# Patient Record
Sex: FEMALE | Race: BLACK OR AFRICAN AMERICAN | NOT HISPANIC OR LATINO | URBAN - METROPOLITAN AREA
[De-identification: names, ages, dates, MRNs, and addresses within clinical notes are randomized per-mention and may not be internally consistent; named-entity substitution may affect disease eponyms.]

---

## 2017-12-14 ENCOUNTER — AMBULATORY SURGICAL CENTER (AMBULATORY)
Dept: URBAN - METROPOLITAN AREA SURGERY 17 | Facility: SURGERY | Age: 67
End: 2017-12-14
Payer: MEDICARE

## 2017-12-14 ENCOUNTER — OFFICE (AMBULATORY)
Dept: URBAN - METROPOLITAN AREA CLINIC 64 | Facility: CLINIC | Age: 67
End: 2017-12-14
Payer: COMMERCIAL

## 2017-12-14 VITALS
HEART RATE: 87 BPM | HEART RATE: 70 BPM | DIASTOLIC BLOOD PRESSURE: 75 MMHG | SYSTOLIC BLOOD PRESSURE: 130 MMHG | OXYGEN SATURATION: 99 % | TEMPERATURE: 96.6 F | SYSTOLIC BLOOD PRESSURE: 142 MMHG | RESPIRATION RATE: 13 BRPM | RESPIRATION RATE: 20 BRPM | DIASTOLIC BLOOD PRESSURE: 106 MMHG | SYSTOLIC BLOOD PRESSURE: 114 MMHG | SYSTOLIC BLOOD PRESSURE: 117 MMHG | SYSTOLIC BLOOD PRESSURE: 128 MMHG | SYSTOLIC BLOOD PRESSURE: 135 MMHG | HEART RATE: 82 BPM | DIASTOLIC BLOOD PRESSURE: 70 MMHG | HEART RATE: 89 BPM | OXYGEN SATURATION: 98 % | OXYGEN SATURATION: 96 % | RESPIRATION RATE: 16 BRPM | DIASTOLIC BLOOD PRESSURE: 84 MMHG | SYSTOLIC BLOOD PRESSURE: 113 MMHG | DIASTOLIC BLOOD PRESSURE: 71 MMHG | HEART RATE: 62 BPM | RESPIRATION RATE: 12 BRPM | OXYGEN SATURATION: 100 % | DIASTOLIC BLOOD PRESSURE: 73 MMHG | SYSTOLIC BLOOD PRESSURE: 107 MMHG | RESPIRATION RATE: 17 BRPM | HEIGHT: 66 IN | WEIGHT: 221 LBS | HEART RATE: 86 BPM | RESPIRATION RATE: 14 BRPM | DIASTOLIC BLOOD PRESSURE: 76 MMHG | TEMPERATURE: 96.2 F | HEART RATE: 60 BPM | HEART RATE: 79 BPM | DIASTOLIC BLOOD PRESSURE: 53 MMHG | SYSTOLIC BLOOD PRESSURE: 102 MMHG | HEART RATE: 80 BPM | HEART RATE: 84 BPM | SYSTOLIC BLOOD PRESSURE: 111 MMHG | RESPIRATION RATE: 18 BRPM | OXYGEN SATURATION: 97 % | HEART RATE: 50 BPM | DIASTOLIC BLOOD PRESSURE: 67 MMHG

## 2017-12-14 DIAGNOSIS — D12.2 BENIGN NEOPLASM OF ASCENDING COLON: ICD-10-CM

## 2017-12-14 DIAGNOSIS — D12.4 BENIGN NEOPLASM OF DESCENDING COLON: ICD-10-CM

## 2017-12-14 DIAGNOSIS — D12.0 BENIGN NEOPLASM OF CECUM: ICD-10-CM

## 2017-12-14 DIAGNOSIS — Z86.010 PERSONAL HISTORY OF COLONIC POLYPS: ICD-10-CM

## 2017-12-14 DIAGNOSIS — D12.5 BENIGN NEOPLASM OF SIGMOID COLON: ICD-10-CM

## 2017-12-14 DIAGNOSIS — K57.30 DIVERTICULOSIS OF LARGE INTESTINE WITHOUT PERFORATION OR ABS: ICD-10-CM

## 2017-12-14 DIAGNOSIS — K64.9 UNSPECIFIED HEMORRHOIDS: ICD-10-CM

## 2017-12-14 PROBLEM — Z12.11 SCREENING FOR COLONIC NEOPLASIA- AVERAGE RISK: Status: ACTIVE | Noted: 2017-12-14

## 2017-12-14 PROBLEM — Z12.11 SURVEILLANCE DUE TO PRIOR COLONIC NEOPLASIA: Status: ACTIVE | Noted: 2017-12-14

## 2017-12-14 LAB
GI HISTOLOGY: A. UNSPECIFIED: (no result)
GI HISTOLOGY: B. UNSPECIFIED: (no result)
GI HISTOLOGY: C. UNSPECIFIED: (no result)
GI HISTOLOGY: D. UNSPECIFIED: (no result)
GI HISTOLOGY: PDF REPORT: (no result)

## 2017-12-14 PROCEDURE — 88305 TISSUE EXAM BY PATHOLOGIST: CPT | Performed by: INTERNAL MEDICINE

## 2017-12-14 PROCEDURE — 45380 COLONOSCOPY AND BIOPSY: CPT | Mod: 33 | Performed by: INTERNAL MEDICINE

## 2017-12-14 RX ADMIN — PROPOFOL 25 MG: 10 INJECTION, EMULSION INTRAVENOUS at 09:30

## 2017-12-14 RX ADMIN — PROPOFOL 50 MG: 10 INJECTION, EMULSION INTRAVENOUS at 09:17

## 2017-12-14 RX ADMIN — PROPOFOL 50 MG: 10 INJECTION, EMULSION INTRAVENOUS at 09:21

## 2017-12-14 RX ADMIN — LIDOCAINE HYDROCHLORIDE 50 MG: 10 INJECTION, SOLUTION EPIDURAL; INFILTRATION; INTRACAUDAL; PERINEURAL at 09:15

## 2017-12-14 RX ADMIN — PROPOFOL 50 MG: 10 INJECTION, EMULSION INTRAVENOUS at 09:27

## 2017-12-14 RX ADMIN — PROPOFOL 25 MG: 10 INJECTION, EMULSION INTRAVENOUS at 09:24

## 2017-12-14 RX ADMIN — PROPOFOL 25 MG: 10 INJECTION, EMULSION INTRAVENOUS at 09:35

## 2017-12-14 RX ADMIN — PROPOFOL 50 MG: 10 INJECTION, EMULSION INTRAVENOUS at 09:19

## 2017-12-14 RX ADMIN — PROPOFOL 100 MG: 10 INJECTION, EMULSION INTRAVENOUS at 09:15

## 2017-12-14 NOTE — SERVICEHPINOTES
Patient is a 68 yo F with h/o HTN, arthritis here for screening.  Her last CN was negative, but per note has had polyps prior.  She denies fam hx colon ca and polyps.  She is asymptomatic.

## 2018-04-10 ENCOUNTER — OFFICE (AMBULATORY)
Dept: URBAN - METROPOLITAN AREA CLINIC 75 | Facility: CLINIC | Age: 68
End: 2018-04-10
Payer: COMMERCIAL

## 2018-04-10 VITALS
HEIGHT: 66 IN | SYSTOLIC BLOOD PRESSURE: 136 MMHG | HEART RATE: 70 BPM | WEIGHT: 215 LBS | DIASTOLIC BLOOD PRESSURE: 84 MMHG

## 2018-04-10 DIAGNOSIS — R19.7 DIARRHEA, UNSPECIFIED: ICD-10-CM

## 2018-04-10 DIAGNOSIS — R10.30 LOWER ABDOMINAL PAIN, UNSPECIFIED: ICD-10-CM

## 2018-04-10 PROCEDURE — 99214 OFFICE O/P EST MOD 30 MIN: CPT

## 2018-05-08 ENCOUNTER — OFFICE (AMBULATORY)
Dept: URBAN - METROPOLITAN AREA CLINIC 75 | Facility: CLINIC | Age: 68
End: 2018-05-08
Payer: COMMERCIAL

## 2018-05-08 VITALS
DIASTOLIC BLOOD PRESSURE: 74 MMHG | SYSTOLIC BLOOD PRESSURE: 126 MMHG | HEIGHT: 66 IN | WEIGHT: 213 LBS | HEART RATE: 68 BPM

## 2018-05-08 DIAGNOSIS — R19.7 DIARRHEA, UNSPECIFIED: ICD-10-CM

## 2018-05-08 DIAGNOSIS — A04.72 ENTEROCOLITIS DUE TO CLOSTRIDIUM DIFFICILE, NOT SPECIFIED AS: ICD-10-CM

## 2018-05-08 PROCEDURE — 99214 OFFICE O/P EST MOD 30 MIN: CPT

## 2019-01-08 ENCOUNTER — APPOINTMENT (OUTPATIENT)
Dept: PREADMISSION TESTING | Facility: HOSPITAL | Age: 69
End: 2019-01-08

## 2019-01-08 ENCOUNTER — HOSPITAL ENCOUNTER (OUTPATIENT)
Dept: GENERAL RADIOLOGY | Facility: HOSPITAL | Age: 69
Discharge: HOME | End: 2019-01-08
Admitting: ORTHOPAEDIC SURGERY

## 2019-01-08 VITALS
OXYGEN SATURATION: 99 % | BODY MASS INDEX: 34.28 KG/M2 | RESPIRATION RATE: 16 BRPM | TEMPERATURE: 97.8 F | HEIGHT: 66 IN | SYSTOLIC BLOOD PRESSURE: 121 MMHG | WEIGHT: 213.31 LBS | DIASTOLIC BLOOD PRESSURE: 80 MMHG | HEART RATE: 61 BPM

## 2019-01-08 DIAGNOSIS — Z96.642 H/O TOTAL HIP ARTHROPLASTY, LEFT: ICD-10-CM

## 2019-01-08 LAB
ABO GROUP BLD: NORMAL
ALBUMIN SERPL-MCNC: 3.7 G/DL (ref 3.5–5.2)
ALBUMIN/GLOB SERPL: 0.9 G/DL
ALP SERPL-CCNC: 68 U/L (ref 39–117)
ALT SERPL W P-5'-P-CCNC: 15 U/L (ref 1–33)
ANION GAP SERPL CALCULATED.3IONS-SCNC: 10.9 MMOL/L
AST SERPL-CCNC: 17 U/L (ref 1–32)
BILIRUB SERPL-MCNC: 0.6 MG/DL (ref 0.1–1.2)
BILIRUB UR QL STRIP: NEGATIVE
BLD GP AB SCN SERPL QL: NEGATIVE
BUN BLD-MCNC: 16 MG/DL (ref 8–23)
BUN/CREAT SERPL: 18.4 (ref 7–25)
CALCIUM SPEC-SCNC: 9.3 MG/DL (ref 8.6–10.5)
CHLORIDE SERPL-SCNC: 100 MMOL/L (ref 98–107)
CLARITY UR: CLEAR
CO2 SERPL-SCNC: 28.1 MMOL/L (ref 22–29)
COLOR UR: YELLOW
CREAT BLD-MCNC: 0.87 MG/DL (ref 0.57–1)
DEPRECATED RDW RBC AUTO: 46 FL (ref 37–54)
ERYTHROCYTE [DISTWIDTH] IN BLOOD BY AUTOMATED COUNT: 14.3 % (ref 11.7–13)
GFR SERPL CREATININE-BSD FRML MDRD: 79 ML/MIN/1.73
GLOBULIN UR ELPH-MCNC: 3.9 GM/DL
GLUCOSE BLD-MCNC: 107 MG/DL (ref 65–99)
GLUCOSE UR STRIP-MCNC: NEGATIVE MG/DL
HCT VFR BLD AUTO: 38.6 % (ref 35.6–45.5)
HGB BLD-MCNC: 12.6 G/DL (ref 11.9–15.5)
HGB UR QL STRIP.AUTO: NEGATIVE
INR PPP: 0.99 (ref 0.9–1.1)
KETONES UR QL STRIP: NEGATIVE
LEUKOCYTE ESTERASE UR QL STRIP.AUTO: NEGATIVE
MCH RBC QN AUTO: 28.4 PG (ref 26.9–32)
MCHC RBC AUTO-ENTMCNC: 32.6 G/DL (ref 32.4–36.3)
MCV RBC AUTO: 87.1 FL (ref 80.5–98.2)
NITRITE UR QL STRIP: NEGATIVE
PH UR STRIP.AUTO: 5.5 [PH] (ref 5–8)
PLATELET # BLD AUTO: 276 10*3/MM3 (ref 140–500)
PMV BLD AUTO: 9.6 FL (ref 6–12)
POTASSIUM BLD-SCNC: 4.2 MMOL/L (ref 3.5–5.2)
PROT SERPL-MCNC: 7.6 G/DL (ref 6–8.5)
PROT UR QL STRIP: NEGATIVE
PROTHROMBIN TIME: 12.9 SECONDS (ref 11.7–14.2)
RBC # BLD AUTO: 4.43 10*6/MM3 (ref 3.9–5.2)
RH BLD: POSITIVE
SODIUM BLD-SCNC: 139 MMOL/L (ref 136–145)
SP GR UR STRIP: 1.02 (ref 1–1.03)
T&S EXPIRATION DATE: NORMAL
UROBILINOGEN UR QL STRIP: NORMAL
WBC NRBC COR # BLD: 2.91 10*3/MM3 (ref 4.5–10.7)

## 2019-01-08 PROCEDURE — 93010 ELECTROCARDIOGRAM REPORT: CPT | Performed by: INTERNAL MEDICINE

## 2019-01-08 PROCEDURE — 80053 COMPREHEN METABOLIC PANEL: CPT | Performed by: ORTHOPAEDIC SURGERY

## 2019-01-08 PROCEDURE — 71046 X-RAY EXAM CHEST 2 VIEWS: CPT

## 2019-01-08 PROCEDURE — 85610 PROTHROMBIN TIME: CPT | Performed by: ORTHOPAEDIC SURGERY

## 2019-01-08 PROCEDURE — 36415 COLL VENOUS BLD VENIPUNCTURE: CPT

## 2019-01-08 PROCEDURE — 86850 RBC ANTIBODY SCREEN: CPT | Performed by: ORTHOPAEDIC SURGERY

## 2019-01-08 PROCEDURE — 86901 BLOOD TYPING SEROLOGIC RH(D): CPT | Performed by: ORTHOPAEDIC SURGERY

## 2019-01-08 PROCEDURE — 81003 URINALYSIS AUTO W/O SCOPE: CPT | Performed by: ORTHOPAEDIC SURGERY

## 2019-01-08 PROCEDURE — 93005 ELECTROCARDIOGRAM TRACING: CPT

## 2019-01-08 PROCEDURE — 86900 BLOOD TYPING SEROLOGIC ABO: CPT | Performed by: ORTHOPAEDIC SURGERY

## 2019-01-08 PROCEDURE — 85027 COMPLETE CBC AUTOMATED: CPT | Performed by: ORTHOPAEDIC SURGERY

## 2019-01-08 RX ORDER — AZITHROMYCIN 250 MG/1
250 TABLET, FILM COATED ORAL DAILY
COMMUNITY
Start: 2019-01-04 | End: 2019-01-08

## 2019-01-08 ASSESSMENT — HOOS JR
HOOS JR SCORE: 14
HOOS JR SCORE: 46.652

## 2019-01-08 NOTE — DISCHARGE INSTRUCTIONS
Take the following medications the morning of surgery with a small sip of water:    CARVEDILOL  BENICAR    General Instructions:  • Do not eat solid food after midnight the night before surgery.  • You may drink clear liquids day of surgery but must stop at least one hour before your hospital arrival time. @ 0430 AM STOP DRINKING!!  • It is beneficial for you to have a clear drink that contains carbohydrates the day of surgery.  We suggest a 12 to 20 ounce bottle of Gatorade or Powerade for non-diabetic patients or a 12 to 20 ounce bottle of G2 or Powerade Zero for diabetic patients.     Clear liquids are liquids you can see through.  Nothing red in color.     Plain water                               Sports drinks  Sodas                                   Gelatin (Jell-O)  Fruit juices without pulp such as white grape juice and apple juice  Popsicles that contain no fruit or yogurt  Tea or coffee (no cream or milk added)  Gatorade / Powerade  G2 / Powerade Zero    • Patients who avoid smoking, chewing tobacco and alcohol for 4 weeks prior to surgery have a reduced risk of post-operative complications.  Quit smoking as many days before surgery as you can.  • Do not smoke, use chewing tobacco or drink alcohol the day of surgery.   • If applicable bring your C-PAP/ BI-PAP machine.  • Bring any papers given to you in the doctor’s office.  • Wear clean comfortable clothes and socks.  • Do not wear contact lenses or make-up.  Bring a case for your glasses.   • Bring crutches or walker if applicable.  • Remove all piercings.  Leave jewelry and any other valuables at home.  • Hair extensions with metal clips must be removed prior to surgery.  • The Pre-Admission Testing nurse will instruct you to bring medications if unable to obtain an accurate list in Pre-Admission Testing.        You were given a blood bank ID arm band remember to bring it with you the day of surgery.     Preventing a Surgical Site Infection:  • For 2 to  3 days before surgery, avoid shaving with a razor because the razor can irritate skin and make it easier to develop an infection.    • Any areas of open skin can increase the risk of a post-operative wound infection by allowing bacteria to enter and travel throughout the body.  Notify your surgeon if you have any skin wounds / rashes even if it is not near the expected surgical site.  The area will need assessed to determine if surgery should be delayed until it is healed.  • The night prior to surgery sleep in a clean bed with clean clothing.  Do not allow pets to sleep with you.  • Shower on the morning of surgery using a fresh bar of anti-bacterial soap (such as Dial) and clean washcloth.  Dry with a clean towel and dress in clean clothing.  • Ask your surgeon if you will be receiving antibiotics prior to surgery.  • Make sure you, your family, and all healthcare providers clean their hands with soap and water or an alcohol based hand  before caring for you or your wound.    Day of surgery:01- ARRIVE @ 0530 AM REPORT TO THE MAIN OR   Upon arrival, a Pre-op nurse and Anesthesiologist will review your health history, obtain vital signs, and answer questions you may have.  The only belongings needed at this time will be your home medications and if applicable your C-PAP/BI-PAP machine.  If you are staying overnight your family can leave the rest of your belongings in the car and bring them to your room later.  A Pre-op nurse will start an IV and you may receive medication in preparation for surgery, including something to help you relax.  Your family will be able to see you in the Pre-op area.  While you are in surgery your family should notify the waiting room  if they leave the waiting room area and provide a contact phone number.    Please be aware that surgery does come with discomfort.  We want to make every effort to control your discomfort so please discuss any uncontrolled  symptoms with your nurse.   Your doctor will most likely have prescribed pain medications.      If you are going home after surgery you will receive individualized written care instructions before being discharged.  A responsible adult must drive you to and from the hospital on the day of your surgery and stay with you for 24 hours.    If you are staying overnight following surgery, you will be transported to your hospital room following the recovery period.  [facility] has all private rooms.    You have received a list of surgical assistants for your reference.  If you have any questions please call Pre-Admission Testing at [phone].  Deductibles and co-payments are collected on the day of service. Please be prepared to pay the required co-pay, deductible or deposit on the day of service as defined by your plan.      2% CHLORAHEXIDINE GLUCONATE* CLOTH  Preparing or “prepping” skin before surgery can reduce the risk of infection at the surgical site. To make the process easier, [facility] has chosen disposable cloths moistened with a rinse-free, 2% Chlorhexidine Gluconate (CHG) antiseptic solution. The steps below outline the prepping process and should be carefully followed.        Use the prep cloth on the area that is circled in the diagram             Directions Night before Surgery 01- PM PRIOR TO SURGERY (LEFT HIP AREA)  1) Shower using a fresh bar of anti-bacterial soap (such as Dial) and clean washcloth.  Use a clean towel to completely dry your skin.  2) Do not use any lotions, oils or creams on your skin.  3) Open the package and remove 1 cloth, wipe your skin for 30 seconds in a circular motion.  Allow to dry for 3 minutes.  4) Repeat #3 with second cloth.  5) Do not touch your eyes, ears, or mouth with the prep cloth.  6) Allow the wet prep solution to air dry.  7) Discard the prep cloth and wash your hands with soap and water.   8) Dress in clean bed clothes and  sleep on fresh clean bed sheets.   9) You may experience some temporary itching after the prep.    Directions Day of Surgery 01- AM PRIOR TO SURGERY (LEFT HIP AREA)  1) Repeat steps 1,2,3,4,5,6,7, and 9.   2) Dress in clean clothes before coming to the hospital.    BACTROBAN NASAL OINTMENT  There are many germs normally in your nose. Bactroban is an ointment that will help reduce these germs. Please follow these instructions for Bactroban use:      ____The day before surgery in the morning  [id number]_______    ____The day before surgery in the evening              [id number]_______    ____The day of surgery in the morning    [id number]_______    **Squirt ½ package of Bactroban Ointment onto a cotton applicator and apply to inside of 1st nostril.  Squirt the remaining Bactroban and apply to the inside of the other nostril.

## 2019-01-15 ENCOUNTER — ANESTHESIA (OUTPATIENT)
Dept: PERIOP | Facility: HOSPITAL | Age: 69
End: 2019-01-15

## 2019-01-15 ENCOUNTER — HOSPITAL ENCOUNTER (INPATIENT)
Facility: HOSPITAL | Age: 69
LOS: 1 days | Discharge: HOME HEALTH | End: 2019-01-16
Attending: ORTHOPAEDIC SURGERY | Admitting: ORTHOPAEDIC SURGERY

## 2019-01-15 ENCOUNTER — ANESTHESIA EVENT (OUTPATIENT)
Dept: PERIOP | Facility: HOSPITAL | Age: 69
End: 2019-01-15

## 2019-01-15 ENCOUNTER — APPOINTMENT (OUTPATIENT)
Dept: GENERAL RADIOLOGY | Facility: HOSPITAL | Age: 69
End: 2019-01-15

## 2019-01-15 PROBLEM — Z96.642 H/O TOTAL HIP ARTHROPLASTY, LEFT: Status: ACTIVE | Noted: 2019-01-15

## 2019-01-15 LAB
HCT VFR BLD AUTO: 37.8 % (ref 35.6–45.5)
HGB BLD-MCNC: 11.9 G/DL (ref 11.9–15.5)

## 2019-01-15 PROCEDURE — 25010000002 ONDANSETRON PER 1 MG: Performed by: ORTHOPAEDIC SURGERY

## 2019-01-15 PROCEDURE — 25010000002 CLONIDINE PER 1 MG: Performed by: ORTHOPAEDIC SURGERY

## 2019-01-15 PROCEDURE — 25010000002 ONDANSETRON PER 1 MG: Performed by: NURSE ANESTHETIST, CERTIFIED REGISTERED

## 2019-01-15 PROCEDURE — 25010000002 FENTANYL CITRATE (PF) 100 MCG/2ML SOLUTION: Performed by: NURSE ANESTHETIST, CERTIFIED REGISTERED

## 2019-01-15 PROCEDURE — 36415 COLL VENOUS BLD VENIPUNCTURE: CPT | Performed by: ORTHOPAEDIC SURGERY

## 2019-01-15 PROCEDURE — 85018 HEMOGLOBIN: CPT | Performed by: ORTHOPAEDIC SURGERY

## 2019-01-15 PROCEDURE — 72170 X-RAY EXAM OF PELVIS: CPT

## 2019-01-15 PROCEDURE — 25010000002 HYDROMORPHONE PER 4 MG: Performed by: NURSE ANESTHETIST, CERTIFIED REGISTERED

## 2019-01-15 PROCEDURE — 25010000002 ROPIVACAINE PER 1 MG: Performed by: ORTHOPAEDIC SURGERY

## 2019-01-15 PROCEDURE — 25010000002 PROPOFOL 10 MG/ML EMULSION: Performed by: NURSE ANESTHETIST, CERTIFIED REGISTERED

## 2019-01-15 PROCEDURE — 25010000002 PHENYLEPHRINE PER 1 ML: Performed by: NURSE ANESTHETIST, CERTIFIED REGISTERED

## 2019-01-15 PROCEDURE — 85014 HEMATOCRIT: CPT | Performed by: ORTHOPAEDIC SURGERY

## 2019-01-15 PROCEDURE — 97110 THERAPEUTIC EXERCISES: CPT

## 2019-01-15 PROCEDURE — C1776 JOINT DEVICE (IMPLANTABLE): HCPCS | Performed by: ORTHOPAEDIC SURGERY

## 2019-01-15 PROCEDURE — 0SRB0JA REPLACEMENT OF LEFT HIP JOINT WITH SYNTHETIC SUBSTITUTE, UNCEMENTED, OPEN APPROACH: ICD-10-PCS | Performed by: ORTHOPAEDIC SURGERY

## 2019-01-15 PROCEDURE — 97161 PT EVAL LOW COMPLEX 20 MIN: CPT

## 2019-01-15 PROCEDURE — 25010000002 KETOROLAC TROMETHAMINE PER 15 MG: Performed by: ORTHOPAEDIC SURGERY

## 2019-01-15 PROCEDURE — 25010000002 DEXAMETHASONE PER 1 MG: Performed by: NURSE ANESTHETIST, CERTIFIED REGISTERED

## 2019-01-15 PROCEDURE — 25010000002 VANCOMYCIN 10 G RECONSTITUTED SOLUTION: Performed by: ORTHOPAEDIC SURGERY

## 2019-01-15 DEVICE — TOTAL HIP PRIMARY: Type: IMPLANTABLE DEVICE | Site: HIP | Status: FUNCTIONAL

## 2019-01-15 DEVICE — IMPLANTABLE DEVICE: Type: IMPLANTABLE DEVICE | Site: HIP | Status: FUNCTIONAL

## 2019-01-15 DEVICE — CAP HIP TM UPCHRG: Type: IMPLANTABLE DEVICE | Site: HIP | Status: FUNCTIONAL

## 2019-01-15 DEVICE — SCRW ACET CORT TRILOGY S/TAP 6.5X25: Type: IMPLANTABLE DEVICE | Site: HIP | Status: FUNCTIONAL

## 2019-01-15 DEVICE — SHLL ACET OSSEOTI G7 3H SZD 50MM: Type: IMPLANTABLE DEVICE | Site: HIP | Status: FUNCTIONAL

## 2019-01-15 DEVICE — HD FEM/HIP BIOLOX/DELTA CERAM NK 36MM STD: Type: IMPLANTABLE DEVICE | Site: HIP | Status: FUNCTIONAL

## 2019-01-15 DEVICE — CAP CERAM HD HIP UPCHRG: Type: IMPLANTABLE DEVICE | Site: HIP | Status: FUNCTIONAL

## 2019-01-15 DEVICE — CAP HIP VE UPCHRG: Type: IMPLANTABLE DEVICE | Site: HIP | Status: FUNCTIONAL

## 2019-01-15 RX ORDER — SODIUM CHLORIDE, SODIUM LACTATE, POTASSIUM CHLORIDE, CALCIUM CHLORIDE 600; 310; 30; 20 MG/100ML; MG/100ML; MG/100ML; MG/100ML
125 INJECTION, SOLUTION INTRAVENOUS CONTINUOUS
Status: DISCONTINUED | OUTPATIENT
Start: 2019-01-15 | End: 2019-01-16 | Stop reason: HOSPADM

## 2019-01-15 RX ORDER — LABETALOL HYDROCHLORIDE 5 MG/ML
5 INJECTION, SOLUTION INTRAVENOUS
Status: DISCONTINUED | OUTPATIENT
Start: 2019-01-15 | End: 2019-01-15 | Stop reason: HOSPADM

## 2019-01-15 RX ORDER — CARVEDILOL 3.12 MG/1
3.12 TABLET ORAL 2 TIMES DAILY WITH MEALS
Status: DISCONTINUED | OUTPATIENT
Start: 2019-01-15 | End: 2019-01-16 | Stop reason: HOSPADM

## 2019-01-15 RX ORDER — LIDOCAINE HYDROCHLORIDE 10 MG/ML
0.5 INJECTION, SOLUTION EPIDURAL; INFILTRATION; INTRACAUDAL; PERINEURAL ONCE AS NEEDED
Status: DISCONTINUED | OUTPATIENT
Start: 2019-01-15 | End: 2019-01-15 | Stop reason: HOSPADM

## 2019-01-15 RX ORDER — DOCUSATE SODIUM 100 MG/1
100 CAPSULE, LIQUID FILLED ORAL 2 TIMES DAILY PRN
Status: DISCONTINUED | OUTPATIENT
Start: 2019-01-15 | End: 2019-01-16 | Stop reason: HOSPADM

## 2019-01-15 RX ORDER — ACETAMINOPHEN 325 MG/1
650 TABLET ORAL ONCE AS NEEDED
Status: DISCONTINUED | OUTPATIENT
Start: 2019-01-15 | End: 2019-01-15 | Stop reason: HOSPADM

## 2019-01-15 RX ORDER — TRAMADOL HYDROCHLORIDE 50 MG/1
50 TABLET ORAL EVERY 8 HOURS
Qty: 45 TABLET | Refills: 0 | Status: SHIPPED | OUTPATIENT
Start: 2019-01-15 | End: 2019-01-25

## 2019-01-15 RX ORDER — GLYCOPYRROLATE 0.2 MG/ML
INJECTION INTRAMUSCULAR; INTRAVENOUS AS NEEDED
Status: DISCONTINUED | OUTPATIENT
Start: 2019-01-15 | End: 2019-01-15 | Stop reason: SURG

## 2019-01-15 RX ORDER — MIDAZOLAM HYDROCHLORIDE 1 MG/ML
1 INJECTION INTRAMUSCULAR; INTRAVENOUS
Status: DISCONTINUED | OUTPATIENT
Start: 2019-01-15 | End: 2019-01-15 | Stop reason: HOSPADM

## 2019-01-15 RX ORDER — SENNA AND DOCUSATE SODIUM 50; 8.6 MG/1; MG/1
2 TABLET, FILM COATED ORAL 2 TIMES DAILY
Status: DISCONTINUED | OUTPATIENT
Start: 2019-01-15 | End: 2019-01-16 | Stop reason: HOSPADM

## 2019-01-15 RX ORDER — ONDANSETRON 4 MG/1
4 TABLET, ORALLY DISINTEGRATING ORAL EVERY 6 HOURS PRN
Status: DISCONTINUED | OUTPATIENT
Start: 2019-01-15 | End: 2019-01-16 | Stop reason: HOSPADM

## 2019-01-15 RX ORDER — ACETAMINOPHEN 325 MG/1
325 TABLET ORAL EVERY 4 HOURS PRN
Status: DISCONTINUED | OUTPATIENT
Start: 2019-01-15 | End: 2019-01-16 | Stop reason: HOSPADM

## 2019-01-15 RX ORDER — ONDANSETRON 4 MG/1
4 TABLET, FILM COATED ORAL EVERY 6 HOURS PRN
Status: DISCONTINUED | OUTPATIENT
Start: 2019-01-15 | End: 2019-01-16 | Stop reason: HOSPADM

## 2019-01-15 RX ORDER — PROMETHAZINE HYDROCHLORIDE 25 MG/ML
12.5 INJECTION, SOLUTION INTRAMUSCULAR; INTRAVENOUS ONCE AS NEEDED
Status: DISCONTINUED | OUTPATIENT
Start: 2019-01-15 | End: 2019-01-15 | Stop reason: HOSPADM

## 2019-01-15 RX ORDER — PROMETHAZINE HYDROCHLORIDE 12.5 MG/1
12.5 TABLET ORAL EVERY 6 HOURS PRN
Status: DISCONTINUED | OUTPATIENT
Start: 2019-01-15 | End: 2019-01-16 | Stop reason: HOSPADM

## 2019-01-15 RX ORDER — KETOROLAC TROMETHAMINE 30 MG/ML
15 INJECTION, SOLUTION INTRAMUSCULAR; INTRAVENOUS EVERY 6 HOURS PRN
Status: DISCONTINUED | OUTPATIENT
Start: 2019-01-15 | End: 2019-01-16 | Stop reason: HOSPADM

## 2019-01-15 RX ORDER — POTASSIUM CHLORIDE 1.5 G/1.77G
20 POWDER, FOR SOLUTION ORAL DAILY
Status: DISCONTINUED | OUTPATIENT
Start: 2019-01-16 | End: 2019-01-16 | Stop reason: HOSPADM

## 2019-01-15 RX ORDER — MAGNESIUM HYDROXIDE 1200 MG/15ML
LIQUID ORAL AS NEEDED
Status: DISCONTINUED | OUTPATIENT
Start: 2019-01-15 | End: 2019-01-15 | Stop reason: HOSPADM

## 2019-01-15 RX ORDER — PROMETHAZINE HYDROCHLORIDE 25 MG/1
25 SUPPOSITORY RECTAL ONCE AS NEEDED
Status: DISCONTINUED | OUTPATIENT
Start: 2019-01-15 | End: 2019-01-15 | Stop reason: HOSPADM

## 2019-01-15 RX ORDER — HYDROMORPHONE HYDROCHLORIDE 1 MG/ML
0.5 INJECTION, SOLUTION INTRAMUSCULAR; INTRAVENOUS; SUBCUTANEOUS
Status: DISCONTINUED | OUTPATIENT
Start: 2019-01-15 | End: 2019-01-16 | Stop reason: HOSPADM

## 2019-01-15 RX ORDER — HYDRALAZINE HYDROCHLORIDE 20 MG/ML
5 INJECTION INTRAMUSCULAR; INTRAVENOUS
Status: DISCONTINUED | OUTPATIENT
Start: 2019-01-15 | End: 2019-01-15 | Stop reason: HOSPADM

## 2019-01-15 RX ORDER — FLUMAZENIL 0.1 MG/ML
0.2 INJECTION INTRAVENOUS AS NEEDED
Status: DISCONTINUED | OUTPATIENT
Start: 2019-01-15 | End: 2019-01-15 | Stop reason: HOSPADM

## 2019-01-15 RX ORDER — HYDROCODONE BITARTRATE AND ACETAMINOPHEN 7.5; 325 MG/1; MG/1
2 TABLET ORAL EVERY 6 HOURS PRN
Qty: 56 TABLET | Refills: 0 | Status: SHIPPED | OUTPATIENT
Start: 2019-01-15 | End: 2019-01-25

## 2019-01-15 RX ORDER — DEXAMETHASONE SODIUM PHOSPHATE 10 MG/ML
INJECTION INTRAMUSCULAR; INTRAVENOUS AS NEEDED
Status: DISCONTINUED | OUTPATIENT
Start: 2019-01-15 | End: 2019-01-15 | Stop reason: SURG

## 2019-01-15 RX ORDER — LIDOCAINE HYDROCHLORIDE 20 MG/ML
INJECTION, SOLUTION INFILTRATION; PERINEURAL AS NEEDED
Status: DISCONTINUED | OUTPATIENT
Start: 2019-01-15 | End: 2019-01-15 | Stop reason: SURG

## 2019-01-15 RX ORDER — EPHEDRINE SULFATE 50 MG/ML
5 INJECTION, SOLUTION INTRAVENOUS ONCE AS NEEDED
Status: DISCONTINUED | OUTPATIENT
Start: 2019-01-15 | End: 2019-01-15 | Stop reason: HOSPADM

## 2019-01-15 RX ORDER — DEXAMETHASONE SODIUM PHOSPHATE 4 MG/ML
4 INJECTION, SOLUTION INTRA-ARTICULAR; INTRALESIONAL; INTRAMUSCULAR; INTRAVENOUS; SOFT TISSUE ONCE
Status: COMPLETED | OUTPATIENT
Start: 2019-01-16 | End: 2019-01-16

## 2019-01-15 RX ORDER — TRAMADOL HYDROCHLORIDE 50 MG/1
50 TABLET ORAL EVERY 8 HOURS
Status: DISCONTINUED | OUTPATIENT
Start: 2019-01-15 | End: 2019-01-16 | Stop reason: HOSPADM

## 2019-01-15 RX ORDER — DIPHENHYDRAMINE HCL 25 MG
25 CAPSULE ORAL
Status: DISCONTINUED | OUTPATIENT
Start: 2019-01-15 | End: 2019-01-15 | Stop reason: HOSPADM

## 2019-01-15 RX ORDER — DIPHENHYDRAMINE HYDROCHLORIDE 50 MG/ML
12.5 INJECTION INTRAMUSCULAR; INTRAVENOUS
Status: DISCONTINUED | OUTPATIENT
Start: 2019-01-15 | End: 2019-01-15 | Stop reason: HOSPADM

## 2019-01-15 RX ORDER — FAMOTIDINE 10 MG/ML
20 INJECTION, SOLUTION INTRAVENOUS ONCE
Status: COMPLETED | OUTPATIENT
Start: 2019-01-15 | End: 2019-01-15

## 2019-01-15 RX ORDER — ONDANSETRON 2 MG/ML
INJECTION INTRAMUSCULAR; INTRAVENOUS AS NEEDED
Status: DISCONTINUED | OUTPATIENT
Start: 2019-01-15 | End: 2019-01-15 | Stop reason: SURG

## 2019-01-15 RX ORDER — ONDANSETRON 2 MG/ML
4 INJECTION INTRAMUSCULAR; INTRAVENOUS ONCE AS NEEDED
Status: DISCONTINUED | OUTPATIENT
Start: 2019-01-15 | End: 2019-01-15 | Stop reason: HOSPADM

## 2019-01-15 RX ORDER — LIDOCAINE HYDROCHLORIDE 40 MG/ML
SOLUTION TOPICAL AS NEEDED
Status: DISCONTINUED | OUTPATIENT
Start: 2019-01-15 | End: 2019-01-15 | Stop reason: SURG

## 2019-01-15 RX ORDER — PSEUDOEPHEDRINE HCL 30 MG
100 TABLET ORAL 2 TIMES DAILY PRN
Qty: 60 EACH | Refills: 3 | Status: SHIPPED | OUTPATIENT
Start: 2019-01-15 | End: 2020-03-04

## 2019-01-15 RX ORDER — SODIUM CHLORIDE 0.9 % (FLUSH) 0.9 %
1-10 SYRINGE (ML) INJECTION AS NEEDED
Status: DISCONTINUED | OUTPATIENT
Start: 2019-01-15 | End: 2019-01-15 | Stop reason: HOSPADM

## 2019-01-15 RX ORDER — NALOXONE HCL 0.4 MG/ML
0.2 VIAL (ML) INJECTION AS NEEDED
Status: DISCONTINUED | OUTPATIENT
Start: 2019-01-15 | End: 2019-01-15 | Stop reason: HOSPADM

## 2019-01-15 RX ORDER — EPHEDRINE SULFATE 50 MG/ML
INJECTION, SOLUTION INTRAVENOUS AS NEEDED
Status: DISCONTINUED | OUTPATIENT
Start: 2019-01-15 | End: 2019-01-15 | Stop reason: SURG

## 2019-01-15 RX ORDER — NALOXONE HCL 0.4 MG/ML
0.1 VIAL (ML) INJECTION
Status: DISCONTINUED | OUTPATIENT
Start: 2019-01-15 | End: 2019-01-16 | Stop reason: HOSPADM

## 2019-01-15 RX ORDER — HYDROCODONE BITARTRATE AND ACETAMINOPHEN 7.5; 325 MG/1; MG/1
1 TABLET ORAL EVERY 6 HOURS PRN
Status: DISCONTINUED | OUTPATIENT
Start: 2019-01-15 | End: 2019-01-16 | Stop reason: HOSPADM

## 2019-01-15 RX ORDER — BISACODYL 5 MG/1
10 TABLET, DELAYED RELEASE ORAL DAILY PRN
Status: DISCONTINUED | OUTPATIENT
Start: 2019-01-15 | End: 2019-01-16 | Stop reason: HOSPADM

## 2019-01-15 RX ORDER — ONDANSETRON 2 MG/ML
4 INJECTION INTRAMUSCULAR; INTRAVENOUS EVERY 6 HOURS PRN
Status: DISCONTINUED | OUTPATIENT
Start: 2019-01-15 | End: 2019-01-16 | Stop reason: HOSPADM

## 2019-01-15 RX ORDER — FENTANYL CITRATE 50 UG/ML
INJECTION, SOLUTION INTRAMUSCULAR; INTRAVENOUS AS NEEDED
Status: DISCONTINUED | OUTPATIENT
Start: 2019-01-15 | End: 2019-01-15 | Stop reason: SURG

## 2019-01-15 RX ORDER — FERROUS SULFATE 325(65) MG
325 TABLET ORAL
Status: DISCONTINUED | OUTPATIENT
Start: 2019-01-15 | End: 2019-01-16 | Stop reason: HOSPADM

## 2019-01-15 RX ORDER — PROPOFOL 10 MG/ML
VIAL (ML) INTRAVENOUS AS NEEDED
Status: DISCONTINUED | OUTPATIENT
Start: 2019-01-15 | End: 2019-01-15 | Stop reason: SURG

## 2019-01-15 RX ORDER — MIDAZOLAM HYDROCHLORIDE 1 MG/ML
2 INJECTION INTRAMUSCULAR; INTRAVENOUS
Status: DISCONTINUED | OUTPATIENT
Start: 2019-01-15 | End: 2019-01-15 | Stop reason: HOSPADM

## 2019-01-15 RX ORDER — TRANEXAMIC ACID 100 MG/ML
INJECTION, SOLUTION INTRAVENOUS AS NEEDED
Status: DISCONTINUED | OUTPATIENT
Start: 2019-01-15 | End: 2019-01-15 | Stop reason: SURG

## 2019-01-15 RX ORDER — SODIUM CHLORIDE 0.9 % (FLUSH) 0.9 %
3 SYRINGE (ML) INJECTION EVERY 12 HOURS SCHEDULED
Status: DISCONTINUED | OUTPATIENT
Start: 2019-01-15 | End: 2019-01-16 | Stop reason: HOSPADM

## 2019-01-15 RX ORDER — ASPIRIN 325 MG
325 TABLET, DELAYED RELEASE (ENTERIC COATED) ORAL EVERY 12 HOURS SCHEDULED
Status: DISCONTINUED | OUTPATIENT
Start: 2019-01-15 | End: 2019-01-16 | Stop reason: HOSPADM

## 2019-01-15 RX ORDER — HYDROMORPHONE HYDROCHLORIDE 1 MG/ML
0.5 INJECTION, SOLUTION INTRAMUSCULAR; INTRAVENOUS; SUBCUTANEOUS
Status: DISCONTINUED | OUTPATIENT
Start: 2019-01-15 | End: 2019-01-15 | Stop reason: HOSPADM

## 2019-01-15 RX ORDER — PROMETHAZINE HYDROCHLORIDE 25 MG/1
25 TABLET ORAL ONCE AS NEEDED
Status: DISCONTINUED | OUTPATIENT
Start: 2019-01-15 | End: 2019-01-15 | Stop reason: HOSPADM

## 2019-01-15 RX ORDER — SODIUM CHLORIDE, SODIUM LACTATE, POTASSIUM CHLORIDE, CALCIUM CHLORIDE 600; 310; 30; 20 MG/100ML; MG/100ML; MG/100ML; MG/100ML
9 INJECTION, SOLUTION INTRAVENOUS CONTINUOUS
Status: DISCONTINUED | OUTPATIENT
Start: 2019-01-15 | End: 2019-01-16 | Stop reason: HOSPADM

## 2019-01-15 RX ORDER — SODIUM CHLORIDE 0.9 % (FLUSH) 0.9 %
3-10 SYRINGE (ML) INJECTION AS NEEDED
Status: DISCONTINUED | OUTPATIENT
Start: 2019-01-15 | End: 2019-01-16 | Stop reason: HOSPADM

## 2019-01-15 RX ORDER — ROCURONIUM BROMIDE 10 MG/ML
INJECTION, SOLUTION INTRAVENOUS AS NEEDED
Status: DISCONTINUED | OUTPATIENT
Start: 2019-01-15 | End: 2019-01-15 | Stop reason: SURG

## 2019-01-15 RX ORDER — FENTANYL CITRATE 50 UG/ML
50 INJECTION, SOLUTION INTRAMUSCULAR; INTRAVENOUS
Status: DISCONTINUED | OUTPATIENT
Start: 2019-01-15 | End: 2019-01-15 | Stop reason: HOSPADM

## 2019-01-15 RX ORDER — HYDROCODONE BITARTRATE AND ACETAMINOPHEN 7.5; 325 MG/1; MG/1
2 TABLET ORAL EVERY 6 HOURS PRN
Status: DISCONTINUED | OUTPATIENT
Start: 2019-01-15 | End: 2019-01-16 | Stop reason: HOSPADM

## 2019-01-15 RX ADMIN — TRAMADOL HYDROCHLORIDE 50 MG: 50 TABLET, FILM COATED ORAL at 14:37

## 2019-01-15 RX ADMIN — ONDANSETRON 4 MG: 2 INJECTION INTRAMUSCULAR; INTRAVENOUS at 09:36

## 2019-01-15 RX ADMIN — KETOROLAC TROMETHAMINE 15 MG: 30 INJECTION, SOLUTION INTRAMUSCULAR at 11:39

## 2019-01-15 RX ADMIN — CARVEDILOL 3.12 MG: 3.12 TABLET, FILM COATED ORAL at 17:54

## 2019-01-15 RX ADMIN — GLYCOPYRROLATE 0.1 MG: 0.2 INJECTION INTRAMUSCULAR; INTRAVENOUS at 08:19

## 2019-01-15 RX ADMIN — EPHEDRINE SULFATE 10 MG: 50 INJECTION INTRAMUSCULAR; INTRAVENOUS; SUBCUTANEOUS at 10:16

## 2019-01-15 RX ADMIN — SUGAMMADEX 200 MG: 100 INJECTION, SOLUTION INTRAVENOUS at 09:39

## 2019-01-15 RX ADMIN — SODIUM CHLORIDE, POTASSIUM CHLORIDE, SODIUM LACTATE AND CALCIUM CHLORIDE: 600; 310; 30; 20 INJECTION, SOLUTION INTRAVENOUS at 08:25

## 2019-01-15 RX ADMIN — HYDROMORPHONE HYDROCHLORIDE 0.5 MG: 1 INJECTION, SOLUTION INTRAMUSCULAR; INTRAVENOUS; SUBCUTANEOUS at 10:59

## 2019-01-15 RX ADMIN — EPHEDRINE SULFATE 10 MG: 50 INJECTION INTRAMUSCULAR; INTRAVENOUS; SUBCUTANEOUS at 10:06

## 2019-01-15 RX ADMIN — ONDANSETRON 4 MG: 2 INJECTION INTRAMUSCULAR; INTRAVENOUS at 16:10

## 2019-01-15 RX ADMIN — FENTANYL CITRATE 50 MCG: 50 INJECTION INTRAMUSCULAR; INTRAVENOUS at 08:09

## 2019-01-15 RX ADMIN — VANCOMYCIN HYDROCHLORIDE 1500 MG: 10 INJECTION, POWDER, LYOPHILIZED, FOR SOLUTION INTRAVENOUS at 06:42

## 2019-01-15 RX ADMIN — ASPIRIN 325 MG: 325 TABLET, DELAYED RELEASE ORAL at 21:04

## 2019-01-15 RX ADMIN — FAMOTIDINE 20 MG: 10 INJECTION, SOLUTION INTRAVENOUS at 06:42

## 2019-01-15 RX ADMIN — VANCOMYCIN HYDROCHLORIDE 1500 MG: 10 INJECTION, POWDER, LYOPHILIZED, FOR SOLUTION INTRAVENOUS at 17:54

## 2019-01-15 RX ADMIN — TRANEXAMIC ACID 1000 MG: 100 INJECTION, SOLUTION INTRAVENOUS at 07:53

## 2019-01-15 RX ADMIN — EPHEDRINE SULFATE 10 MG: 50 INJECTION INTRAMUSCULAR; INTRAVENOUS; SUBCUTANEOUS at 08:21

## 2019-01-15 RX ADMIN — HYDROMORPHONE HYDROCHLORIDE 0.5 MG: 1 INJECTION, SOLUTION INTRAMUSCULAR; INTRAVENOUS; SUBCUTANEOUS at 11:30

## 2019-01-15 RX ADMIN — PROPOFOL 200 MG: 10 INJECTION, EMULSION INTRAVENOUS at 07:41

## 2019-01-15 RX ADMIN — PHENYLEPHRINE HYDROCHLORIDE 100 MCG: 10 INJECTION INTRAVENOUS at 09:26

## 2019-01-15 RX ADMIN — LIDOCAINE HYDROCHLORIDE 1 EACH: 40 SOLUTION TOPICAL at 07:46

## 2019-01-15 RX ADMIN — ROCURONIUM BROMIDE 50 MG: 10 INJECTION INTRAVENOUS at 07:41

## 2019-01-15 RX ADMIN — PHENYLEPHRINE HYDROCHLORIDE 100 MCG: 10 INJECTION INTRAVENOUS at 08:57

## 2019-01-15 RX ADMIN — PHENYLEPHRINE HYDROCHLORIDE 100 MCG: 10 INJECTION INTRAVENOUS at 09:16

## 2019-01-15 RX ADMIN — FENTANYL CITRATE 50 MCG: 50 INJECTION INTRAMUSCULAR; INTRAVENOUS at 07:36

## 2019-01-15 RX ADMIN — PHENYLEPHRINE HYDROCHLORIDE 100 MCG: 10 INJECTION INTRAVENOUS at 09:06

## 2019-01-15 RX ADMIN — KETOROLAC TROMETHAMINE 15 MG: 30 INJECTION, SOLUTION INTRAMUSCULAR at 21:04

## 2019-01-15 RX ADMIN — TRAMADOL HYDROCHLORIDE 50 MG: 50 TABLET, FILM COATED ORAL at 21:04

## 2019-01-15 RX ADMIN — EPHEDRINE SULFATE 10 MG: 50 INJECTION INTRAMUSCULAR; INTRAVENOUS; SUBCUTANEOUS at 09:56

## 2019-01-15 RX ADMIN — TRANEXAMIC ACID 1000 MG: 100 INJECTION, SOLUTION INTRAVENOUS at 09:32

## 2019-01-15 RX ADMIN — HYDROMORPHONE HYDROCHLORIDE 0.5 MG: 1 INJECTION, SOLUTION INTRAMUSCULAR; INTRAVENOUS; SUBCUTANEOUS at 11:48

## 2019-01-15 RX ADMIN — LIDOCAINE HYDROCHLORIDE 100 MG: 20 INJECTION, SOLUTION INFILTRATION; PERINEURAL at 07:41

## 2019-01-15 RX ADMIN — SODIUM CHLORIDE, POTASSIUM CHLORIDE, SODIUM LACTATE AND CALCIUM CHLORIDE 9 ML/HR: 600; 310; 30; 20 INJECTION, SOLUTION INTRAVENOUS at 06:10

## 2019-01-15 RX ADMIN — SODIUM CHLORIDE, PRESERVATIVE FREE 3 ML: 5 INJECTION INTRAVENOUS at 21:04

## 2019-01-15 RX ADMIN — PHENYLEPHRINE HYDROCHLORIDE 100 MCG: 10 INJECTION INTRAVENOUS at 09:36

## 2019-01-15 RX ADMIN — DEXAMETHASONE SODIUM PHOSPHATE 8 MG: 10 INJECTION INTRAMUSCULAR; INTRAVENOUS at 07:48

## 2019-01-15 RX ADMIN — FENTANYL CITRATE 50 MCG: 50 INJECTION INTRAMUSCULAR; INTRAVENOUS at 08:35

## 2019-01-15 RX ADMIN — SENNOSIDES AND DOCUSATE SODIUM 2 TABLET: 8.6; 5 TABLET ORAL at 21:04

## 2019-01-15 NOTE — OP NOTE
TOTAL HIP ARTHROPLASTY  Procedure Note    Sol Rojas  1/15/2019    Pre-op Diagnosis: Left hip osteoarthritis  Post-op Diagnosis: Same  Procedure: Left total hip arthroplasty  Surgeon:  Hieu Orosco MD  Anesthesia: General, Anesthesiologist: Pepe Tran MD  CRNA: Shannan Floyd CRNA  Staff: Circulator: Tanya Ji RN  Scrub Person: Gaviota Salazar William  Vendor Representative: Dany Solorzano  Assistant: Delmer Sena CSA CFA  Estimated Blood Loss: 300 mL  Specimens: * No orders in the log *  Drains: none  Complications: None    Components Utilized:    Implant Name Type Inv. Item Serial No.  Lot No. LRB No. Used   SHLL ACET OSSEOTI G7 3H SZD 50MM - [id number] Implant SHLL ACET OSSEOTI G7 3H SZD 50MM  [employer] [id number] Left 1   SCRW ACET LAUREN TRILOGY S/TAP 6.5X25 - [id number] Implant SCRW ACET LAUREN TRILOGY S/TAP 6.5X25  [employer] [id number] Left 1   SCRW ACET LAUREN TRILOGY S/TAP 6.5X25 - [id number] Implant SCRW ACET LAUREN TRILOGY S/TAP 6.5X25  [employer] [id number] Left 1   LINER ACET G7 NEUTRAL E1 SZD 36MM - [id number] Implant LINER ACET G7 NEUTRAL E1 SZD 36MM  [employer] [id number] Left 1   Taperloc Complete Micro Primary Femoral Porous Coated Stem [id number] standard offset    [employer] [id number] Left 1   HD FEM BIOLOX DELTA CERAM NK 36MM STD - [id number] Implant HD FEM BIOLOX DELTA CERAM NK 36MM STD  [employer] [id number] Left 1       Indication for Procedure:  This patient is a 69 y.o. female who failed conservative treatment of end-stage osteoarthritis of the left hip.  Surgical options and non-surgical options were discussed in detail and to the patient's satisfaction.  Surgical intervention was recommended based on the patient's injury and functional status.      The risks and benefits of surgery were discussed with patient and informed consent was obtained.  Risks include but are not limited to, infection, bleeding, nerve injury, blood clots, risks  associated with anesthesia, need for further surgery, persistent pain, and possibly death.    Protocols for intravenous antibiotics and venous thrombosis were followed for this patient.  IV antibiotics were infused prior to surgery and will be discontinued within 24 hours of completion of the surgical procedure.       DESCRIPTION OF PROCEDURE:     The patient was seen in Preoperative Holding Area where their surgical site was marked. Preoperative antibiotics were received. H&P and consent updated. They were taken to the Operating Room and provided general anesthesia on the Operating Room table.  Patient was placed in the lateral decubitus position with the operative hip up.  Bony prominences were well-padded.  An axillary pad was placed.  The pegboard was positioned in the pelvis was stabilized in neutral alignment.  Attention was paid to make certain that the pelvis was oriented perpendicular to the floor.  At this point the extremity was prepped and draped in usual sterile fashion using alcohol followed by ChloraPrep.  Next a formal surgical timeout was carried out by all members team confirming correct patient laterality procedure be performed perioperative antibiotics as well as tranexamic acid administration all members of the team were in agreement.  Next an incision was marked out at the posterior lateral aspect of the greater trochanter for standard posterior approach.  A 10 blade scalpel used to sharply incise the skin and subcutaneous tissue Bovie electrocautery was utilized to achieve hemostasis.  Dissection was carried down to the level of the IT band.  IT band was identified and then was incised.  This was incised proximally into the gluteus linda raphae.  A Charnley retractor was utilized to reflect these tissues anteriorly and posteriorly.  Next the rotators were reflected off the posterior aspect of the greater trochanter down to the level of the lesser trochanter.  Hemostasis was achieved.  A  full-thickness capsulotomy was performed including the piriformis tendon.  This was tagged using #5 Ethibond suture.  Next the femoral head was identified once the capsulotomy was released off the inferior neck the femoral head was dislocated from the acetabulum.  A neck osteotomy was made using a sagittal saw.  At this point anterior and posterior acetabular retractors were placed and the knee was placed in mild flexion as well as internal rotation to improve acetabular exposure.  The pulvinar was removed using Bovie electrocautery.  The remainder of the acetabular labrum was also removed.  The Y ligament of ajay was incised using a tonsil as well as Bovie electrocautery.  Next the acetabulum was sized was reamed concentrically medially down to the medial wall and then concentrically reamed up until achieving excellent rim fit and purchase.  The reaming was felt to be concentric with good bleeding bone all the way around.  No fractures were identified. The patient did have coxa profunda preoperatively.  There was excellent purchase at the rim so the decision was made to bone graft medially in an attempt to lateralize the acetabular component to a more anatomic position for increased offset and stability.  Bone graft from the acetabular reamings was impacted into the medial wall.  At this point a final implant was impacted in place in appropriate abduction and version in accordance with the pelvic landmarks.  The acetabular component was confirmed to be all the way down to the medial wall and then 2 screws were placed for supplemental fixation.  A definitive liner was placed.  Next attention was directed towards the femur.  The femur was exposed femoral elevator was utilized the lateral ridge was removed using a box osteotome.  A dull canal finder was used to open the canal and then a lateralizing reamer was utilized to perform further lateralization the femur was then concentrically broached up to the  aforementioned size and the definitive implant sheet.  This had excellent purchase.  The hip was reduced and had excellent stability at 90° of flexion tolerating internal rotation 60°.  At 45° of flexion tolerating internal rotation about 70° without any subluxation.  Leg lengths were felt to be equal patient preoperatively was about 7 mm short.  Next the hip was again dislocated.  Definitive implants were placed and the hip was re-trialed with a neutral head as I did that impacted the stem slightly further than the broach after calcar planing.  At this point the hip was re-used with a trial head and had excellent stability again with equal leg lengths.  A definitive head was placed.  The hip was then irrigated using copious sterile saline after our reduction and then para-articular injection was placed throughout the hip.  Next the capsulotomy and piriformis tendon were repaired using transosseous tunnels to the greater trochanter.  A side-to-side repair of the superior aspect of the capsule was repaired using Ethibond suture as well.  Next the iliotibial band was closed using #2 Ethibond stay sutures followed by #1 PDS stratofix running suture.  The deep layer was closed using 0 Vicryl suture in interrupted fashion followed by 2-0 Vicryl suture in interrupted inverted fashion for the subcutaneous cutaneous layer and subcuticular layer was closed using 3-0 Monocryl stratofix suture.  Dermabond was applied as well as a silver impregnated occlusive dressing.  All counts are correct at the end of the procedure.    Postoperative Plan:  Protocols for intravenous antibiotics and venous thrombosis were followed for this patient.  IV antibiotics were infused prior to surgery and will be discontinued within 24 hours of completion of the surgical procedure.  Thrombosis prophylaxis will be initiated within 24 hours of the completion of the surgical procedure.  The patient will be weight bearing as tolerated with posterior hip  precautions.    Hieu Orosco MD

## 2019-01-15 NOTE — INTERVAL H&P NOTE
H&P reviewed. The patient was examined and there are no changes to the H&P.  Proceed with left total hip arthroplasty

## 2019-01-15 NOTE — ANESTHESIA PROCEDURE NOTES
ANESTHESIA INTUBATION  Urgency: elective    Date/Time: 1/15/2019 7:46 AM  Airway not difficult    General Information and Staff    Patient location during procedure: OR  Anesthesiologist: Pepe Tran MD  CRNA: Shannan Floyd CRNA    Indications and Patient Condition  Indications for airway management: airway protection    Preoxygenated: yes  Mask difficulty assessment: 2 - vent by mask + OA or adjuvant +/- NMBA    Final Airway Details  Final airway type: endotracheal airway      Successful airway: ETT  Cuffed: yes   Successful intubation technique: direct laryngoscopy  Facilitating devices/methods: intubating stylet  Endotracheal tube insertion site: oral  Blade: Espinoza  Blade size: 2  ETT size (mm): 7.0  Cormack-Lehane Classification: grade I - full view of glottis  Placement verified by: chest auscultation and capnometry   Measured from: teeth  ETT to teeth (cm): 18  Number of attempts at approach: 1    Additional Comments  Atraumatic. No dental damage noted.

## 2019-01-15 NOTE — ANESTHESIA PREPROCEDURE EVALUATION
Anesthesia Evaluation     Patient summary reviewed and Nursing notes reviewed                Airway   Mallampati: II  TM distance: >3 FB  Neck ROM: full  No difficulty expected  Dental - normal exam     Pulmonary - normal exam   Cardiovascular - normal exam    (+) hypertension,       Neuro/Psych  GI/Hepatic/Renal/Endo    (+) obesity,       Musculoskeletal     Abdominal   (+) obese,    Substance History      OB/GYN          Other   (+) arthritis                     Anesthesia Plan    ASA 2     general     intravenous induction   Anesthetic plan, all risks, benefits, and alternatives have been provided, discussed and informed consent has been obtained with: patient.

## 2019-01-15 NOTE — BRIEF OP NOTE
TOTAL HIP ARTHROPLASTY  Progress Note    Sol Rojas  1/15/2019    Pre-op Diagnosis:   Left hip osteoarthritis       Post-Op Diagnosis Codes    Procedure/CPT® Codes:  Left total hip arthroplasty    Procedure(s):  LEFT TOTAL HIP ARTHROPLASTY    Surgeon(s):  Hieu Orosco MD    Anesthesia: General    Staff:   Circulator: Tanya Ji RN  Scrub Person: Gavitoa Salazar William  Vendor Representative: Dany Solorzano  Assistant: Delmer Sena CSA    Estimated Blood Loss: 300 mL    Urine Voided: * No values recorded between 1/15/2019  7:32 AM and 1/15/2019 10:13 AM *    Specimens:                None      Drains:  None     Findings: Left hip OA    Complications: None      Hieu Orosco MD     Date: 1/15/2019  Time: 10:13 AM

## 2019-01-15 NOTE — DISCHARGE INSTRUCTIONS
Dr. Hieu Orosco Total Hip Joint Replacement Discharge Instructions:  Office Phone Number: [phone]    I. ACTIVITIES:  1. Exercises:  ? Complete exercise program as taught by the hospital physical therapist 2 times per day.  You may wean off the walker to a cane when directed by the physical therapist.  ? Exercise program will be advanced by the physical therapist  ? During the day be up ambulating every 2 hours (while awake) for short distances  ? Complete the ankle pump exercises at least 10 times per hour (while awake)  ? Elevate legs most of the day the first week post operatively and thereafter elevate legs when in bed and for at least 30 minutes during the day. Use cold packs 20-30 minutes approximately 5 times per day. This should be done before and after completing your exercises and at any time you are experiencing pain/ stiffness in your operative extremity.      2. Activities of Daily Living:  ? No tub baths, hot tubs, or swimming pools for 4 weeks  ? The clear dressing with thin white gauze strip dressing is waterproof.  You may shower without covering the dressing beginning 3 days after the operation.  After 7 days you may remove the dressing.  If the dressing becomes saturated prior to day 7, it may be changed.  After dressing removal, do not scrub or rub the incision. Allow skin glue to fall off over the next few weeks.  After the dressing is removed, simply let the water run over the incision and pat dry.    II. Restrictions  ? Follow any movement restrictions that was discussed with you by either Dr. Orosco or the physical therapist.     ? You should maintain posterior hip precautions on the operative hip as directed by the therapist.  Avoid deep bending at the hips beyond 90 degrees, no bending at the waist and use an elevated toilet seat if necessary.  ? Dr. Orosco will discuss with you when you will be able to drive again at your first post-op appointment.  ? Weight bearing is as  tolerated.  ? First week stay inside on even terrain. May go up and down stairs one stair at a time utilizing the hand rail.  ? Once you feel confident, you may venture outside.    III. Precautions:  ? Everyone that comes near you should wash their hands  ? No elective dental, genital-urinary, or colon procedures or surgical procedures for 12 weeks after surgery unless absolutely necessary.  ?  If dental work or surgical procedure is deemed absolutely necessary within 12 weeks of surgery, you will need to contact Dr. Orosco's office as you will need to take antibiotics 1 hour prior to any dental work (including teeth cleanings).  ? Dr. Orosco will prescribe prophylactic antibiotics for all dental procedures for one year  as a precautionary measure to minimize risk of infection.  If you are a diabetic or take immunosuppressive medication, you may have to take prophylactic antibiotics the remainder of your life before dental work.    ? Avoid sick people. If you must be around someone who is ill, they should wear a mask.  ? Avoid visits to the Emergency Room or Urgent Care unless you are having a life threatening event.   ? If you have leg swelling you may wear leg compression stocking.   Stockings are to be placed on in the morning and removed at night. Monitor the stockings to ensure that any swelling is not causing the stockings to become too tight. In this case, remove stockings immediately.    IV. INCISION CARE:  ? Dr. Orosco takes great care in closing your incision to give you the best opportunity for a healthy incision with minimal scarring. He places sutures below the skin surface that will eventually dissolve.  The incision is then covered with a skin glue which makes the incision water tight, and minimizes bleeding onto the dressing.  No staples are used.  Occasionally one of the buried stitches may come to the skin surface and may need to be removed.  Please resist the temptation of removing the stitch by  yourself.   will be happy to remove it for you.  Bruising around the incision and thigh is normal and to be expected.  Please keep dressing in place at least until post-op day 7. You may remove and replace dressing before day 7 if the dressing begins to fall off or becomes saturated. Wash your hands and under your finger nails prior to dressing changes.  After day 7 as long as incision is dry and intact, you may leave the dressing off and open to air.  You will need to find underwear that does not rub on the incision for a few weeks after the surgery.  You may find it more comfortable to place a dressing on to keep your underwear from rubbing the incision.       ? If dressing must be changed, utilize dry gauze and paper tape. Avoid touching the side of the gauze that goes against the incision with your hands.  ? No creams or ointments to the incision until permission given by Dr. Dodge.  ? Do not touch or pick at the incision, or try to remove any sutures or skin glue.  ? Check dressing every day and notify surgeon immediately if any of the following signs or symptoms are noted:  o Increase in redness  o Increase in swelling of the entire extremity that does not go away with elevation.  Notify office that you may have a blood clot.    o Drainage oozing from the incision  o Pulling apart of the edges of the incision  o Increase in overall body temperature (greater than 100.5 degrees)    V. Medications:   1. Anticoagulants: You will be discharged on an anticoagulant. This is a prophylactic medication that helps prevent blood clots during your post-operative period. The type and length of dosage varies based on your individual needs, procedure performed, and Dr. Orosco's preference.  ? While taking the anticoagulant, you should avoid taking any additional aspirin than what is prescribed.   ? Notify surgeon immediately if any luh bleeding is noted in the urine, stool, emesis, or from the nose or the  incision. Blood in the stool will often appear as black rather than red. Blood in urine may appear as pink. Blood in emesis may appear as brown/black like coffee grounds.  ? You will need to apply pressure for longer periods of time to any cuts or abrasions to stop bleeding  ? Avoid alcohol while taking anticoagulants.    2. Stool Softeners: You will be at greater risk of constipation after surgery due to being less mobile and the pain medications.   ? Take stool softeners as instructed by your surgeon while on pain medications. Over the counter Colace 100 mg 1-2 capsules twice daily.   ? If stools become too loose or too frequent, please decreases the dosage or stop the stool softener.  ? If constipation occurs despite use of stool softeners, you are to continue the stool softeners and add a laxative (Milk of Magnesia 1 ounce daily as needed).  If no bowel movement occurs past 3 days, then purchase Magnesium citrate and drink 1/2 bottle every 8 hours (on ice tastes better) until success. If no bowel movement by post-operative day 5 please call Dr. Dodge office for further instructions.   You may need to decrease or stop your pain medications if bowel movements to not occur.     ? Drink plenty of fluids, and eat fruits and vegetables during your recovery time.    3. Pain Medications utilized after surgery are narcotics and the law requires that the following information be given to all patients that are prescribed narcotics:  ? CLASSIFICATION: Pain medications are called Opioids and are narcotics  ? LEGALITIES: It is illegal to share narcotics with others and to drive within 24 hours of taking narcotics  ? POTENTIAL SIDE EFFECTS: Potential side effects of opioids include: nausea, vomiting, itching, dizziness, drowsiness, dry mouth, constipation, and difficulty urinating.  ? POTENTIAL ADVERSE EFFECTS:   o Opioid tolerance can develop with use of pain medications and this simply means that it requires more and more  "of the medication to control pain; however, this is seen more in patients that use opioids for longer periods of time.  o Opioid dependence can develop with use of Opioids and this simply means that to stop the medication can cause withdrawal symptoms; however, this is seen with patients that use Opioids for longer periods of time.  o Opioid addiction can develop with use of Opioids and the incidence of this is very unlikely in patients who take the medications as ordered and stop the medications as instructed.  o Opioid overdose can be dangerous, but is unlikely when the medication is taken as ordered and stopped when ordered. It is important not to mix opioids with alcohol or with and type of sedative such as Benadryl as this can lead to over sedation and respiratory difficulty.  ? DOSAGE:   o Pain medications will need to be taken consistently for the first few days to decrease pain and promote adequate pain relief and participation in physical therapy.  o After the initial surgical pain begins to resolve, you may begin to decrease the pain medication. By the end of 6 weeks, you should be off of pain medications except for before physical therapy or to help with pain when attempting to fall asleep.  Pain medications will be tapered to lesser dosages as you are further from your surgical day.  No pain medications will be provided after 3 months from surgery.     o Refills will not be given by the office during evening hours, or weekends.  o To seek refills on pain medications during the post-operative period, you must call the office 48 hours in advance to request the refill. The office will then notify you when to  the prescription. DO NOT wait until you are out of the medication to request a refill.  They can not be \"called in\" to the pharmacy.      How to Wean Off Pain Medication:   As you begin to feel better, gradually wean off the narcotic pain medication and begin to use it only for breakthrough " pain.  · Gradually reduce the total number of pills you take each day.  This can be done by taking fewer pills at a time or by increasing the amount of time between each pill.    · For example, if you were taking 2 pills every 6 hours you would be taking a total of 8 pills per day.  Reduce this to 6 pills per day, then 4-5 and so on.  This can be done by taking 1 pill at a time instead of 2, or by taking the pills every 8 hours instead of every 6.    · As you begin to wean from the narcotic pain medication, begin substituting with over the counter tylenol when you are not taking the narcotic.  Limit total tylenol dosage to less than 4 grams per day.    V. FOLLOW-UP VISITS:  ? You will need to follow up in the office with Dr. Orosco at 3 weeks.  Please call [phone] if you need to confirm or reschedule your appointment time.   ? If you have any concerns or suspected complications prior to your follow up visit, please call your surgeons office. Do not wait until your appointment time if you suspect complications. These will need to be addressed in the office promptly.

## 2019-01-15 NOTE — THERAPY EVALUATION
"Acute Care - Physical Therapy Initial Evaluation  [facility]     Patient Name: Sol Rojas  : 1950  MRN: [id number]  Today's Date: 1/15/2019                Admit Date: 1/15/2019    Visit Dx: No diagnosis found.  Patient Active Problem List   Diagnosis   • Pain in left shin   • Hyperglycemia   • H/O total hip arthroplasty, left     Past Medical History:   Diagnosis Date   • Arthritis     OSTEO   • Bilateral knee pain    • Chest pain     SAW A CARDIOLOGIST--PER PT, EVERYTHING WAS \"OKAY.\"   • Hypertension    • Left hip pain    • Obesity    • Pulmonary hypertension (CMS/HCC)      Past Surgical History:   Procedure Laterality Date   • CATARACT EXTRACTION Bilateral    • COLONOSCOPY W/ BIOPSIES AND POLYPECTOMY      BENIGN   • HERNIA REPAIR      UMBILICAL HERNIA REPAIR   • LEG SURGERY Left     cellulitis        PT ASSESSMENT (last 12 hours)      Physical Therapy Evaluation     Row Name 01/15/19 1443          PT Evaluation Time/Intention    Subjective Information  complains of;pain;fatigue;dizziness;nausea/vomiting  -RD     Document Type  evaluation  -RD     Mode of Treatment  physical therapy  -RD     Patient Effort  good  -RD     Row Name 01/15/19 1443          General Information    Prior Level of Function  independent:;community mobility  -RD     Existing Precautions/Restrictions  hip, posterior  -RD     Row Name 01/15/19 1443          Cognitive Assessment/Intervention- PT/OT    Orientation Status (Cognition)  oriented x 4  -RD     Follows Commands (Cognition)  WNL  -RD     Row Name 01/15/19 1443          Mobility Assessment/Treatment    Extremity Weight-bearing Status  left lower extremity  -RD     Left Lower Extremity (Weight-bearing Status)  weight-bearing as tolerated (WBAT)  -RD     Row Name 01/15/19 1443          Bed Mobility Assessment/Treatment    Bed Mobility Assessment/Treatment  supine-sit  -RD     Supine-Sit [place] (Bed Mobility)  supervision;verbal cues  -RD     Assistive Device (Bed " Mobility)  head of bed elevated  -     Row Name 01/15/19 1443          Transfer Assessment/Treatment    Transfer Assessment/Treatment  sit-stand transfer;stand-sit transfer  -RD     Sit-Stand [place] (Transfers)  contact guard  -RD     Stand-Sit [place] (Transfers)  contact guard  -RD     Row Name 01/15/19 1443          Sit-Stand Transfer    Assistive Device (Sit-Stand Transfers)  walker, front-wheeled  -RD     Row Name 01/15/19 1443          Stand-Sit Transfer    Assistive Device (Stand-Sit Transfers)  walker, front-wheeled  -RD     Row Name 01/15/19 1443          Gait/Stairs Assessment/Training    [place] Level (Gait)  contact guard  -RD     Assistive Device (Gait)  walker, front-wheeled  -RD     Distance in Feet (Gait)  10  -RD     Pattern (Gait)  step-to  -RD     Deviations/Abnormal Patterns (Gait)  antalgic;gait speed decreased;stride length decreased  -RD     Left Sided Gait Deviations  heel strike decreased;weight shift ability decreased  -     Row Name 01/15/19 1443          General ROM    GENERAL ROM COMMENTS  wfl following hip prec  -[facility] Name 01/15/19 1443          MMT (Manual Muscle Testing)    General MMT Comments  3-4/5  -[facility] Name 01/15/19 1443          Motor Assessment/Intervention    Additional Documentation  Therapeutic Exercise Interventions (Group)  -     Row Name 01/15/19 1443          Therapeutic Exercise    Comment (Therapeutic Exercise)  10 reps barbara protocol  -[facility] Name 01/15/19 1443          Pain Assessment    Additional Documentation  Pain Scale: Numbers Pre/Post-Treatment (Group)  -[facility] Name 01/15/19 1443          Pain Scale: Numbers Pre/Post-Treatment    Pain Scale: Numbers, Pretreatment  7/10  -RD     Pain Scale: Numbers, Post-Treatment  7/10  -RD     Pain Intervention(s)  Medication (See MAR);Cold applied;Ambulation/increased activity  -     Row Name             Wound 01/15/19 0936 Left hip incision    Wound - Properties Group Date first  assessed: 01/15/19  -BM Time first assessed: 0936  -BM Side: Left  -BM Location: hip  -BM Type: incision  -BM    Row Name 01/15/19 1443          Physical Therapy Clinical Impression    Patient/Family Goals Statement (PT Clinical Impression)  go home   -RD     Criteria for Skilled Interventions Met (PT Clinical Impression)  yes  -RD     Pathology/Pathophysiology Noted (Describe Specifically for Each System)  musculoskeletal  -RD     Impairments Found (describe specific impairments)  muscle performance;motor function;gait, locomotion, and balance  -RD     Functional Limitations in Following Categories (Describe Specific Limitations)  self-care;home management  -RD     Rehab Potential (PT Clinical Summary)  good, to achieve stated therapy goals  -RD     Row Name 01/15/19 1443          Physical Therapy Goals    Bed Mobility Goal Selection (PT)  bed mobility, PT goal 1  -RD     Transfer Goal Selection (PT)  transfer, PT goal 1  -RD     Row Name 01/15/19 1443          Bed Mobility Goal 1 (PT)    Activity/Assistive Device (Bed Mobility Goal 1, PT)  bed mobility activities, all  -RD     [place] Level/Cues Needed (Bed Mobility Goal 1, PT)  minimum assist (75% or more patient effort)  -RD     Time Frame (Bed Mobility Goal 1, PT)  3 days  -RD     Row Name 01/15/19 1443          Transfer Goal 1 (PT)    Activity/Assistive Device (Transfer Goal 1, PT)  walker, rolling  -RD     [place] Level/Cues Needed (Transfer Goal 1, PT)  contact guard assist  -RD     Time Frame (Transfer Goal 1, PT)  3 days  -RD     Row Name 01/15/19 1400          ROM Goal 1 (PT)    ROM Goal 1 (PT)  --  -RD     Time Frame (ROM Goal 1, PT)  --  -RD     Row Name 01/15/19 1428          Patient Education Goal (PT)    Activity (Patient Education Goal, PT)  indep w/ HEP; hip prec  -RD     [place]/Cues/Accuracy (Memory Goal 2, PT)  demonstrates adequately  -RD     Time Frame (Patient Education Goal, PT)  3 days  -RD     Row Name 01/15/19 1174           Positioning and Restraints    Pre-Treatment Position  in bed  -RD     Post Treatment Position  chair  -RD     In Chair  reclined;call light within reach;encouraged to call for assist;with family/caregiver  -RD       User Key  (r) = Recorded By, (t) = Taken By, (c) = Cosigned By    Initials Name Provider Type    RD Candice Schmitz, PT Physical Therapist    Tanya Cast RN Registered Nurse        Physical Therapy Education     Title: PT OT SLP Therapies (Done)     Topic: Physical Therapy (Done)     Point: Mobility training (Done)     Learning Progress Summary           Patient Acceptance, E,TB, VU,NR by RD at 1/15/2019  2:41 PM                   Point: Home exercise program (Done)     Learning Progress Summary           Patient Acceptance, E,TB, VU,NR by RD at 1/15/2019  2:41 PM                               User Key     Initials Effective Dates Name Provider Type Discipline    RD 04/03/18 -  Candice Schmitz, PT Physical Therapist PT              PT Recommendation and Plan  Anticipated Discharge Disposition (PT): home with home health  Planned Therapy Interventions (PT Eval): bed mobility training, gait training, home exercise program, patient/family education  Therapy Frequency (PT Clinical Impression): 2 times/day  Outcome Summary/Treatment Plan (PT)  Anticipated Discharge Disposition (PT): home with home health  Plan of Care Reviewed With: patient  Outcome Summary: pt c/o nausea, fatigue, and pain but was able to transfer and amb short distance.  Gt distance limited by nausea but pt demonstrated good technique and strength for postop.  Encouraged inc. amb later today with [facility] staff.  Will be ready for home after tomm 930 gym session.   Outcome Measures     Row Name 01/15/19 1400             How much help from another person do you currently need...    Turning from your back to your side while in flat bed without using bedrails?  3  -RD      Moving from lying on back to sitting on the side of a flat  bed without bedrails?  3  -RD      Moving to and from a bed to a chair (including a wheelchair)?  3  -RD      Standing up from a chair using your arms (e.g., wheelchair, bedside chair)?  3  -RD      Climbing 3-5 steps with a railing?  3  -RD      To walk in hospital room?  3  -RD      AM-PAC 6 Clicks Score  18  -RD         Functional Assessment    Outcome Measure Options  AM-PAC 6 Clicks Basic Mobility (PT)  -RD        User Key  (r) = Recorded By, (t) = Taken By, (c) = Cosigned By    Initials Name Provider Type    Candice Curtis, PT Physical Therapist         Time Calculation:   PT Charges     Row Name 01/15/19 1512 01/15/19 1440          Time Calculation    Start Time  --  1441  -RD     Stop Time  1512  -RD  --     PT Received On  --  01/15/19  -RD     PT - Next Appointment  --  01/16/19  -RD     PT Goal Re-Cert Due Date  --  01/18/19  -RD        Time Calculation- PT    Total Timed Code Minutes- PT  15 minute(s)  -RD  --       User Key  (r) = Recorded By, (t) = Taken By, (c) = Cosigned By    Initials Name Provider Type    Candice Curtis, PT Physical Therapist        Therapy Suggested Charges     Code   Minutes Charges    None           Therapy Charges for Today     Code Description Service Date Service Provider Modifiers Qty    99803191185 HC PT EVAL LOW COMPLEXITY 2 1/15/2019 Candice Schmitz, PT GP 1    51941724444 HC PT THER PROC EA 15 MIN 1/15/2019 Candice Schmitz, PT GP 1          PT G-Codes  Outcome Measure Options: AM-PAC 6 Clicks Basic Mobility (PT)  AM-PAC 6 Clicks Score: 18      Candice Schmitz, PT  1/15/2019

## 2019-01-15 NOTE — PLAN OF CARE
Problem: Patient Care Overview  Goal: Plan of Care Review   01/15/19 1510   Coping/Psychosocial   Plan of Care Reviewed With patient   OTHER   Outcome Summary pt c/o nausea, fatigue, and pain but was able to transfer and amb short distance. Gt distance limited by nausea but pt demonstrated good technique and strength for postop. Encouraged inc. amb later today with [facility] staff. Will be ready for home after tomm 930 gym session.

## 2019-01-15 NOTE — ANESTHESIA POSTPROCEDURE EVALUATION
"Patient: Sol Rojas    Procedure Summary     Date:  01/15/19 Room / Location:  [facility] OR  /  [facility]    Anesthesia Start:  0733 Anesthesia Stop:  1033    Procedure:  LEFT TOTAL HIP ARTHROPLASTY (Left Hip) Diagnosis:      Surgeon:  Hieu Orosco MD Provider:  Pepe Tran MD    Anesthesia Type:  general ASA Status:  2          Anesthesia Type: general  Last vitals  BP   112/68 (01/15/19 1428)   Temp   36.2 °C (97.2 °F) (01/15/19 1428)   Pulse   55 (01/15/19 1428)   Resp   12 (01/15/19 1428)     SpO2   99 % (01/15/19 1428)     Post Anesthesia Care and Evaluation      Comments: Patient discharged before being evaluated by an Anesthesiologist. No apparent complications per the record.  This case was not medically directed. I am completing this chart for medical records purposes; I personally have no medical involvement with this patient.    /68 (BP Location: Left arm, Patient Position: Lying)   Pulse 55   Temp 36.2 °C (97.2 °F) (Oral)   Resp 12   Ht 167.6 cm (66\")   Wt 99.6 kg (219 lb 8 oz)   LMP  (LMP Unknown)   SpO2 99%   BMI 35.43 kg/m²           "

## 2019-01-15 NOTE — PLAN OF CARE
Problem: Patient Care Overview  Goal: Plan of Care Review  Outcome: Ongoing (interventions implemented as appropriate)   01/15/19 1556   Coping/Psychosocial   Plan of Care Reviewed With patient   OTHER   Outcome Summary Patient received from pacu s/p left [place]AMEKA. Dressing c/d/i. Vitals have been stable. DTV by 1830. Ongoing sedation from anesthesia present. Pain control w/ po meds. Up in chair and resting comfortably at this time.  Anticipates dc home tomorrow.    Plan of Care Review   Progress improving       Problem: Fall Risk (Adult)  Goal: Absence of Fall  Outcome: Ongoing (interventions implemented as appropriate)   01/15/19 1556   Fall Risk (Adult)   Absence of Fall achieves outcome       Problem: Hip Arthroplasty (Total, Partial) (Adult)  Goal: Signs and Symptoms of Listed Potential Problems Will be Absent, Minimized or Managed (Hip Arthroplasty)  Outcome: Ongoing (interventions implemented as appropriate)   01/15/19 1556   Goal/Outcome Evaluation   Problems Assessed (Hip Arthroplasty) all   Problems Present (Hip Arthroplasty) pain     Goal: Anesthesia/Sedation Recovery  Outcome: Ongoing (interventions implemented as appropriate)   01/15/19 1556   Goal/Outcome Evaluation   Anesthesia/Sedation Recovery ongoing sedation/anesthesia

## 2019-01-16 VITALS
RESPIRATION RATE: 18 BRPM | SYSTOLIC BLOOD PRESSURE: 106 MMHG | BODY MASS INDEX: 35.27 KG/M2 | HEART RATE: 63 BPM | HEIGHT: 66 IN | DIASTOLIC BLOOD PRESSURE: 63 MMHG | WEIGHT: 219.5 LBS | TEMPERATURE: 97.9 F | OXYGEN SATURATION: 97 %

## 2019-01-16 LAB
ANION GAP SERPL CALCULATED.3IONS-SCNC: 11.7 MMOL/L
BUN BLD-MCNC: 23 MG/DL (ref 8–23)
BUN/CREAT SERPL: 23.5 (ref 7–25)
CALCIUM SPEC-SCNC: 8.6 MG/DL (ref 8.6–10.5)
CHLORIDE SERPL-SCNC: 103 MMOL/L (ref 98–107)
CO2 SERPL-SCNC: 23.3 MMOL/L (ref 22–29)
CREAT BLD-MCNC: 0.98 MG/DL (ref 0.57–1)
GFR SERPL CREATININE-BSD FRML MDRD: 69 ML/MIN/1.73
GLUCOSE BLD-MCNC: 118 MG/DL (ref 65–99)
HCT VFR BLD AUTO: 34.6 % (ref 35.6–45.5)
HGB BLD-MCNC: 11 G/DL (ref 11.9–15.5)
POTASSIUM BLD-SCNC: 4.5 MMOL/L (ref 3.5–5.2)
SODIUM BLD-SCNC: 138 MMOL/L (ref 136–145)

## 2019-01-16 PROCEDURE — 80048 BASIC METABOLIC PNL TOTAL CA: CPT | Performed by: ORTHOPAEDIC SURGERY

## 2019-01-16 PROCEDURE — 97150 GROUP THERAPEUTIC PROCEDURES: CPT

## 2019-01-16 PROCEDURE — 97110 THERAPEUTIC EXERCISES: CPT

## 2019-01-16 PROCEDURE — 85018 HEMOGLOBIN: CPT | Performed by: ORTHOPAEDIC SURGERY

## 2019-01-16 PROCEDURE — 85014 HEMATOCRIT: CPT | Performed by: ORTHOPAEDIC SURGERY

## 2019-01-16 PROCEDURE — 25010000002 DEXAMETHASONE PER 1 MG: Performed by: ORTHOPAEDIC SURGERY

## 2019-01-16 PROCEDURE — 25010000002 KETOROLAC TROMETHAMINE PER 15 MG: Performed by: ORTHOPAEDIC SURGERY

## 2019-01-16 RX ADMIN — TRAMADOL HYDROCHLORIDE 50 MG: 50 TABLET, FILM COATED ORAL at 05:14

## 2019-01-16 RX ADMIN — SODIUM CHLORIDE, PRESERVATIVE FREE 3 ML: 5 INJECTION INTRAVENOUS at 08:13

## 2019-01-16 RX ADMIN — ASPIRIN 325 MG: 325 TABLET, DELAYED RELEASE ORAL at 08:13

## 2019-01-16 RX ADMIN — POTASSIUM CHLORIDE 20 MEQ: 1.5 POWDER, FOR SOLUTION ORAL at 08:13

## 2019-01-16 RX ADMIN — KETOROLAC TROMETHAMINE 15 MG: 30 INJECTION, SOLUTION INTRAMUSCULAR at 05:14

## 2019-01-16 RX ADMIN — HYDROCODONE BITARTRATE AND ACETAMINOPHEN 1 TABLET: 7.5; 325 TABLET ORAL at 08:13

## 2019-01-16 RX ADMIN — SENNOSIDES AND DOCUSATE SODIUM 2 TABLET: 8.6; 5 TABLET ORAL at 08:13

## 2019-01-16 RX ADMIN — DEXAMETHASONE SODIUM PHOSPHATE 4 MG: 4 INJECTION, SOLUTION INTRA-ARTICULAR; INTRALESIONAL; INTRAMUSCULAR; INTRAVENOUS; SOFT TISSUE at 05:14

## 2019-01-16 RX ADMIN — CARVEDILOL 3.12 MG: 3.12 TABLET, FILM COATED ORAL at 08:13

## 2019-01-16 RX ADMIN — TRAMADOL HYDROCHLORIDE 50 MG: 50 TABLET, FILM COATED ORAL at 13:41

## 2019-01-16 RX ADMIN — DOCUSATE SODIUM 100 MG: 100 CAPSULE, LIQUID FILLED ORAL at 08:13

## 2019-01-16 RX ADMIN — FERROUS SULFATE TAB 325 MG (65 MG ELEMENTAL FE) 325 MG: 325 (65 FE) TAB at 08:13

## 2019-01-16 NOTE — DISCHARGE SUMMARY
"    Discharge Summary    Date of Admission: 1/15/2019  5:37 AM  Date of Discharge:  1/16/2019    Discharge Diagnosis:   H/O total hip arthroplasty, left [Z96.642]      PMHX:   Past Medical History:   Diagnosis Date   • Arthritis     OSTEO   • Bilateral knee pain    • Chest pain     SAW A CARDIOLOGIST--PER PT, EVERYTHING WAS \"OKAY.\"   • Hypertension    • Left hip pain    • Obesity    • Pulmonary hypertension (CMS/HCC)        Discharge Disposition  [place]Health Care [facility] [place]    Procedures Performed  Procedure(s):  LEFT TOTAL HIP ARTHROPLASTY       Indication for Admission  Patient is a 69 y.o. female admitted after undergoing the above surgical procedure. They were admitted for post-operative pain control, medical management and physical therapy.  They progressed with physical therapy.  They were immediately started on DVT prophylaxis and antibiotics per SCIP protocol. Post-operative labs and vital signs remained stable and  they were deemed stable for discharge home with home health care.      Consults:   Consults     No orders found for last 30 day(s).          Discharge Instructions:  Patient is weight bearing as tolerated on the operative leg. Strict Posterior hip precautions. Use walker as needed for stability and gait.  May progress to cane as tolerated.  The dressing should be left on hip until post-operative day 7, and then removed.  Dressing is waterproof. Patient may shower after two weeks.  Use ace wrap as needed for swelling.  Patient will follow-up in the office in 3 weeks.  Call the office at [phone] for any questions or concerns.      Discharge Medications     Discharge Medications      New Medications      Instructions Start Date   aspirin 325 MG EC tablet   325 mg, Oral, Every 12 Hours Scheduled      docusate sodium 100 MG capsule   100 mg, Oral, 2 Times Daily PRN      HYDROcodone-acetaminophen 7.5-325 MG per tablet  Commonly known as:  NORCO   2 tablets, Oral, Every 6 Hours PRN      traMADol 50 MG " tablet  Commonly known as:  ULTRAM   50 mg, Oral, Every 8 Hours         Continue These Medications      Instructions Start Date   carvedilol 3.125 MG tablet  Commonly known as:  COREG   3.125 mg, Oral, 2 Times Daily With Meals      KCL-20 PO   20 mEq, Oral, Daily      MULTIVITAMIN PO   1 tablet, Oral, Daily      olmesartan-hydrochlorothiazide 40-25 MG per tablet  Commonly known as:  BENICAR HCT   1 tablet, Oral, Daily         Stop These Medications    CHLORHEXIDINE GLUCONATE CLOTH EX     mupirocin 2 % nasal ointment  Commonly known as:  BACTROBAN            Discharge Diet: Regular home diet    Activity at Discharge: WBAT with walker. Progress with physical therapy. Strict posterior hip precautions.    Follow-up Appointments    Follow-up Dr. Orosco's office in 3 weeks.      Robbie Causey PA-C  01/16/19,  7:37 AM

## 2019-01-16 NOTE — PLAN OF CARE
Problem: Patient Care Overview  Goal: Plan of Care Review  Outcome: Ongoing (interventions implemented as appropriate)   01/16/19 0618   Coping/Psychosocial   Plan of Care Reviewed With patient   OTHER   Outcome Summary client POD 0 LTHA. VSS, pain well controlled with prn toradol and scheduled ultram, PONV has subsided per client. assist x1 with amb and transfers tolerated well with assist x1, walker, and gait belt. plans to d/c home with HH 1/16. discussed BP monitoring r/t hx HTN.    Plan of Care Review   Progress improving     Goal: Individualization and Mutuality  Outcome: Ongoing (interventions implemented as appropriate)    Goal: Discharge Needs Assessment  Outcome: Ongoing (interventions implemented as appropriate)      Problem: Fall Risk (Adult)  Goal: Identify Related Risk Factors and Signs and Symptoms  Outcome: Outcome(s) achieved Date Met: 01/16/19    Goal: Absence of Fall  Outcome: Ongoing (interventions implemented as appropriate)      Problem: Hip Arthroplasty (Total, Partial) (Adult)  Goal: Signs and Symptoms of Listed Potential Problems Will be Absent, Minimized or Managed (Hip Arthroplasty)  Outcome: Ongoing (interventions implemented as appropriate)    Goal: Anesthesia/Sedation Recovery  Outcome: Ongoing (interventions implemented as appropriate)

## 2019-01-16 NOTE — PROGRESS NOTES
Discharge Planning Assessment  [facility]     Patient Name: Sol Rojas  MRN: [id number]  Today's Date: 1/16/2019    Admit Date: 1/15/2019    Discharge Needs Assessment     Row Name 01/16/19 1021       Living Environment    Lives With  spouse    Current Living Arrangements  home/apartment/condo       Discharge Needs Assessment    Readmission Within the Last 30 Days  no previous admission in last 30 days    Concerns to be Addressed  basic needs    Discharge Facility/Level of Care Needs  home with home health        Discharge Plan     Row Name 01/16/19 1021       Plan    Plan  [facility]    Patient/Family in Agreement with Plan  yes    Plan Comments  Spoke with pt, verified correct information on facesheet and explained the role of CCP. Pt would like to d/c home with [facility], referral given to Renate with [facility] who states they are able to accept. Plan will be to d/c home with [facility] and family support. No other needs identified.     Final Discharge Disposition Code  06 - home with home health care    Final Note  Pt to d/c home with [facility] and family support.        Destination      No service coordination in this encounter.      Durable Medical Equipment      No service coordination in this encounter.      Dialysis/Infusion      No service coordination in this encounter.      Home Medical Care - Selection Complete      Service Provider Request Status Selected Services Address Phone Number Fax Number    [facility] Selected Home Health Services [address] [address] [phone]      Community Resources      No service coordination in this encounter.        Expected Discharge Date and Time     Expected Discharge Date Expected Discharge Time    Jan 16, 2019         Demographic Summary    No documentation.       Functional Status    No documentation.       Psychosocial    No documentation.       Abuse/Neglect    No documentation.       Legal    No documentation.        Substance Abuse    No documentation.       Patient Forms    No documentation.           Lindsay Strauss RN

## 2019-01-16 NOTE — PROGRESS NOTES
Orthopedic Note   LOS: 1 day  Total Hip Arthroplasty - Posterior approach    Subjective :   Patient seen and examined. Resting comfortably in hospital bed. Ambulated yesterday. Pain controlled.    Objective :    Vital signs in last 24 hours:  Temp:  [96 °F (35.6 °C)-97.9 °F (36.6 °C)] 97.8 °F (36.6 °C)  Heart Rate:  [48-98] 68  Resp:  [12-20] 16  BP: (102-126)/(52-80) 105/66  Vitals:    01/15/19 1900 01/15/19 2300 01/16/19 0300 01/16/19 0722   BP: 116/74 118/76 102/67 105/66   BP Location: Right arm Right arm Left arm Right arm   Patient Position: Lying Lying Lying Lying   Pulse: 75 82 85 68   Resp: 20 16 16 16   Temp: 96.9 °F (36.1 °C) 97.9 °F (36.6 °C) 97.9 °F (36.6 °C) 97.8 °F (36.6 °C)   TempSrc: Oral   Oral   SpO2: 98% 96% 97% 96%   Weight:       Height:           PHYSICAL EXAM:  Patient is calm, in no acute distress, awake and oriented x 3.  Dressing is clean, dry and intact.  No signs of infection.  Swelling is appropriate.  Ecchymosis is appropriate in amount.  Homans test is negative.  Patient is neurovascularly intact distally.  EHL,FHL,TA,GS are intact  DP/TP 2+    LABS:  Results from last 7 days   Lab Units 01/16/19  0520   HEMOGLOBIN g/dL 11.0*   HEMATOCRIT % 34.6*     Results from last 7 days   Lab Units 01/16/19  0520   SODIUM mmol/L 138   POTASSIUM mmol/L 4.5   CHLORIDE mmol/L 103   CO2 mmol/L 23.3   BUN mg/dL 23   CREATININE mg/dL 0.98   GLUCOSE mg/dL 118*   CALCIUM mg/dL 8.6           ASSESSMENT:  Status post left total hip arthroplasty POD#1    Plan:  Continue Physical Therapy, increase mobility and range of motion as tolerated.  WBAT, Posterior hip precautions  Continue SCDs, Continue Lovenox for DVT prophalaxis in hospital. ECASA 325mg PO BID x30 days on discharge.  Dispo planning for home with home health care today when medically stable.    Robbie Causey PA-C    Date: 1/16/2019  Time: 7:31 AM

## 2019-01-16 NOTE — PLAN OF CARE
Problem: Patient Care Overview  Goal: Plan of Care Review  Outcome: Ongoing (interventions implemented as appropriate)   01/16/19 1408   Coping/Psychosocial   Plan of Care Reviewed With patient   OTHER   Outcome Summary Vitals stable. Dressing cdi. Pain control w/ po meds. Transfering well using walker, x1 assist. Progressed with PT today. Patient feels ready for discharge home today w. HH.    Plan of Care Review   Progress improving       Problem: Fall Risk (Adult)  Goal: Absence of Fall  Outcome: Ongoing (interventions implemented as appropriate)   01/16/19 1408   Fall Risk (Adult)   Absence of Fall achieves outcome       Problem: Hip Arthroplasty (Total, Partial) (Adult)  Goal: Signs and Symptoms of Listed Potential Problems Will be Absent, Minimized or Managed (Hip Arthroplasty)  Outcome: Ongoing (interventions implemented as appropriate)   01/16/19 1408   Goal/Outcome Evaluation   Problems Assessed (Hip Arthroplasty) all   Problems Present (Hip Arthroplasty) pain

## 2019-01-16 NOTE — DISCHARGE SUMMARY
"    Discharge Summary    Date of Admission: 1/15/2019  5:37 AM    Date of Discharge:  1/16/2019    Discharge Diagnosis:   H/O total hip arthroplasty, left [Z96.642]    PMHX:   Past Medical History:   Diagnosis Date   • Arthritis     OSTEO   • Bilateral knee pain    • Chest pain     SAW A CARDIOLOGIST--PER PT, EVERYTHING WAS \"OKAY.\"   • Hypertension    • Left hip pain    • Obesity    • Pulmonary hypertension (CMS/HCC)        Discharge Disposition  Home-Health Care [facility] [place]    Procedures Performed  Procedure(s):  LEFT TOTAL HIP ARTHROPLASTY       Indication for Admission  Patient is a 69 y.o. female admitted after undergoing the above surgical procedure. They were admitted for post-operative pain control, medical management and physical therapy.  They progressed with physical therapy.    They were deemed stable for discharge.      Consults:   Consults     No orders found for last 30 day(s).          Discharge Instructions:  Patient is weight bearing as tolerated on the operative leg.  Patient has no hip dislocation precautions.  Patient is to progress ambulation as tolerated.  Use walker as needed for stability and gait.  May progress to cane as tolerated.  The dressing is waterproof, and the patient may shower starting 3 days after the operation.  Keep dressing in place 7 days. May change dressing before saturated or starts to fall off.  Patient will follow-up in the office at 3 weeks. Home health physical therapy will follow patient once patient is discharged home.   Call the office at [phone] for any questions or concerns.      Discharge Medications     Discharge Medications      New Medications      Instructions Start Date   aspirin 325 MG EC tablet   325 mg, Oral, Every 12 Hours Scheduled      docusate sodium 100 MG capsule   100 mg, Oral, 2 Times Daily PRN      HYDROcodone-acetaminophen 7.5-325 MG per tablet  Commonly known as:  NORCO   2 tablets, Oral, Every 6 Hours PRN      traMADol 50 MG tablet  Commonly " known as:  ULTRAM   50 mg, Oral, Every 8 Hours         Continue These Medications      Instructions Start Date   carvedilol 3.125 MG tablet  Commonly known as:  COREG   3.125 mg, Oral, 2 Times Daily With Meals      KCL-20 PO   20 mEq, Oral, Daily      MULTIVITAMIN PO   1 tablet, Oral, Daily      olmesartan-hydrochlorothiazide 40-25 MG per tablet  Commonly known as:  BENICAR HCT   1 tablet, Oral, Daily         Stop These Medications    CHLORHEXIDINE GLUCONATE CLOTH EX     mupirocin 2 % nasal ointment  Commonly known as:  BACTROBAN            Discharge Diet: Regular diet    Activity at Discharge: Weight bearing as tolerated, posterior hip precautions    Follow-up Appointments  No future appointments.          01/16/19,  2:33 PM    Hieu Orosco MD  Orthopaedic Surgeon    [facility]  [phone]

## 2019-01-18 NOTE — THERAPY TREATMENT NOTE
Acute Care - Physical Therapy Treatment Note  [facility]     Patient Name: Sol Rojas  : 1950  MRN: [id number]  Today's Date: 2019             Admit Date: 1/15/2019    Visit Dx:  No diagnosis found.  Patient Active Problem List   Diagnosis   • Pain in left shin   • Hyperglycemia   • H/O total hip arthroplasty, left       Therapy Treatment    Rehabilitation Treatment Summary     Row Name                Wound 01/15/19 0936 Left hip incision    Wound - Properties Group Date first assessed: 01/15/19 [BM] Time first assessed: 936 [BM] Side: Left [BM] Location: hip [BM] Type: incision [BM] Recorded by:  [BM] Tanya Ji RN 01/15/19 0936      User Key  (r) = Recorded By, (t) = Taken By, (c) = Cosigned By    Initials Name Effective Dates Discipline     Tanya Ji RN 16 -  Nurse                   Physical Therapy Education     Title: PT OT SLP Therapies (Resolved)     Topic: Physical Therapy (Resolved)     Point: Mobility training (Resolved)     Learning Progress Summary           Patient Acceptance, E,TB, VU,NR by RD at 1/15/2019  2:41 PM                   Point: Home exercise program (Resolved)     Learning Progress Summary           Patient Acceptance, E,TB, VU,NR by LIV at 1/15/2019  2:41 PM                               User Key     Initials Effective Dates Name Provider Type Discipline    RD 18 -  Candice Schmitz PT Physical Therapist PT                PT Recommendation and Plan        Outcome Measures     Row Name 01/15/19 1400             How much help from another person do you currently need...    Turning from your back to your side while in flat bed without using bedrails?  3  -RD      Moving from lying on back to sitting on the side of a flat bed without bedrails?  3  -RD      Moving to and from a bed to a chair (including a wheelchair)?  3  -RD      Standing up from a chair using your arms (e.g., wheelchair, bedside chair)?  3  -RD      Climbing 3-5 steps  with a railing?  3  -RD      To walk in hospital room?  3  -RD      AM-PAC 6 Clicks Score  18  -RD         Functional Assessment    Outcome Measure Options  AM-PAC 6 Clicks Basic Mobility (PT)  -RD        User Key  (r) = Recorded By, (t) = Taken By, (c) = Cosigned By    Initials Name Provider Type    Candice Curtis, PT Physical Therapist         Time Calculation:     Therapy Suggested Charges     Code   Minutes Charges    None           Therapy Charges for Today     Code Description Service Date Service Provider Modifiers Qty    87387381198 HC PT THER PROC EA 15 MIN 1/16/2019 Jacqueline Espinoza, JENARO GP 2    85223378628 HC PT THER PROC GROUP 1/16/2019 Jacqueline Espinoza, JENARO GP 1          PT G-Codes  Outcome Measure Options: AM-PAC 6 Clicks Basic Mobility (PT)  AM-PAC 6 Clicks Score: 18    Jacqueline Espinoza PTA  1/17/2019

## 2019-12-16 ENCOUNTER — OFFICE (AMBULATORY)
Dept: URBAN - METROPOLITAN AREA LAB 2 | Facility: LAB | Age: 69
End: 2019-12-16
Payer: MEDICARE

## 2019-12-16 ENCOUNTER — OFFICE (AMBULATORY)
Dept: URBAN - METROPOLITAN AREA CLINIC 75 | Facility: CLINIC | Age: 69
End: 2019-12-16
Payer: COMMERCIAL

## 2019-12-16 VITALS
HEIGHT: 66 IN | WEIGHT: 223 LBS | HEART RATE: 73 BPM | DIASTOLIC BLOOD PRESSURE: 82 MMHG | SYSTOLIC BLOOD PRESSURE: 122 MMHG | OXYGEN SATURATION: 98 %

## 2019-12-16 DIAGNOSIS — A04.72 ENTEROCOLITIS DUE TO CLOSTRIDIUM DIFFICILE, NOT SPECIFIED AS: ICD-10-CM

## 2019-12-16 DIAGNOSIS — A04.71 ENTEROCOLITIS DUE TO CLOSTRIDIUM DIFFICILE, RECURRENT: ICD-10-CM

## 2019-12-16 DIAGNOSIS — I10 ESSENTIAL (PRIMARY) HYPERTENSION: ICD-10-CM

## 2019-12-16 DIAGNOSIS — R19.7 DIARRHEA, UNSPECIFIED: ICD-10-CM

## 2019-12-16 PROCEDURE — 87449 NOS EACH ORGANISM AG IA: CPT | Performed by: INTERNAL MEDICINE

## 2019-12-16 PROCEDURE — 99214 OFFICE O/P EST MOD 30 MIN: CPT

## 2019-12-16 PROCEDURE — 87324 CLOSTRIDIUM AG IA: CPT | Mod: 59 | Performed by: INTERNAL MEDICINE

## 2019-12-30 ENCOUNTER — OFFICE (AMBULATORY)
Dept: URBAN - METROPOLITAN AREA LAB 2 | Facility: LAB | Age: 69
End: 2019-12-30
Payer: MEDICARE

## 2019-12-30 DIAGNOSIS — R19.7 DIARRHEA, UNSPECIFIED: ICD-10-CM

## 2019-12-30 PROCEDURE — 87449 NOS EACH ORGANISM AG IA: CPT | Performed by: INTERNAL MEDICINE

## 2019-12-30 PROCEDURE — 87324 CLOSTRIDIUM AG IA: CPT | Mod: 59 | Performed by: INTERNAL MEDICINE

## 2020-01-30 ENCOUNTER — TRANSCRIBE ORDERS (OUTPATIENT)
Dept: ADMINISTRATIVE | Facility: HOSPITAL | Age: 70
End: 2020-01-30

## 2020-01-30 DIAGNOSIS — R89.9 ABNORMAL LABORATORY TEST: Primary | ICD-10-CM

## 2020-02-06 ENCOUNTER — HOSPITAL ENCOUNTER (OUTPATIENT)
Dept: NUCLEAR MEDICINE | Facility: HOSPITAL | Age: 70
Discharge: HOME | End: 2020-02-06

## 2020-02-06 DIAGNOSIS — R89.9 ABNORMAL LABORATORY TEST: ICD-10-CM

## 2020-02-06 PROCEDURE — 0 SODIUM IODIDE 3.7 MBQ CAPSULE: Performed by: FAMILY MEDICINE

## 2020-02-06 PROCEDURE — 78014 THYROID IMAGING W/BLOOD FLOW: CPT

## 2020-02-06 PROCEDURE — A9516 IODINE I-123 SOD IODIDE MIC: HCPCS | Performed by: FAMILY MEDICINE

## 2020-02-06 RX ORDER — SODIUM IODIDE I 123 100 UCI/1
1 CAPSULE, GELATIN COATED ORAL
Status: COMPLETED | OUTPATIENT
Start: 2020-02-06 | End: 2020-02-06

## 2020-02-06 RX ADMIN — Medication 1 CAPSULE: at 13:24

## 2020-02-07 ENCOUNTER — HOSPITAL ENCOUNTER (OUTPATIENT)
Dept: NUCLEAR MEDICINE | Facility: HOSPITAL | Age: 70
Discharge: HOME | End: 2020-02-07

## 2020-03-04 ENCOUNTER — OFFICE VISIT (OUTPATIENT)
Dept: ORTHOPEDIC SURGERY | Facility: CLINIC | Age: 70
End: 2020-03-04

## 2020-03-04 VITALS — TEMPERATURE: 98.2 F | WEIGHT: 227 LBS | HEIGHT: 66 IN | BODY MASS INDEX: 36.48 KG/M2

## 2020-03-04 DIAGNOSIS — M17.12 ARTHRITIS OF LEFT KNEE: ICD-10-CM

## 2020-03-04 DIAGNOSIS — M17.11 ARTHRITIS OF RIGHT KNEE: Primary | ICD-10-CM

## 2020-03-04 PROCEDURE — 99204 OFFICE O/P NEW MOD 45 MIN: CPT | Performed by: ORTHOPAEDIC SURGERY

## 2020-03-04 PROCEDURE — 20610 DRAIN/INJ JOINT/BURSA W/O US: CPT | Performed by: ORTHOPAEDIC SURGERY

## 2020-03-04 PROCEDURE — 73562 X-RAY EXAM OF KNEE 3: CPT | Performed by: ORTHOPAEDIC SURGERY

## 2020-03-04 RX ORDER — METHYLPREDNISOLONE ACETATE 80 MG/ML
80 INJECTION, SUSPENSION INTRA-ARTICULAR; INTRALESIONAL; INTRAMUSCULAR; SOFT TISSUE
Status: COMPLETED | OUTPATIENT
Start: 2020-03-04 | End: 2020-03-04

## 2020-03-04 RX ADMIN — METHYLPREDNISOLONE ACETATE 80 MG: 80 INJECTION, SUSPENSION INTRA-ARTICULAR; INTRALESIONAL; INTRAMUSCULAR; SOFT TISSUE at 15:26

## 2020-03-04 NOTE — PROGRESS NOTES
Patient Name: Sol Rojas   YOB: 1950  Referring Primary Care Physician: Reyes, Rosenberg Acosta, MD  BMI: Body mass index is 36.64 kg/m².    Chief Complaint:    Chief Complaint   Patient presents with   • Left Knee - Establish Care, Pain   • Right Knee - Establish Care, Pain        HPI:     Sol Rojas is a 70 y.o. female who presents today for evaluation of   Chief Complaint   Patient presents with   • Left Knee - Establish Care, Pain   • Right Knee - Establish Care, Pain   .  She is seen today complaining of bilateral knee pain.  Said that been going on better than a year.  No real medicines no physical therapy.  She was seeing Dr. Orosco who did a total hip on her in the past and he had injected her knee on the left that helped some.  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  Does have a history of elevated blood sugars.  Old charts reviewed.    This problem is new to this examiner.     Subjective   Medications:   Home Medications:  Current Outpatient Medications on File Prior to Visit   Medication Sig   • carvedilol (COREG) 3.125 MG tablet Take 3.125 mg by mouth 2 (two) times a day with meals.   • Multiple Vitamins-Minerals (MULTIVITAMIN PO) Take 1 tablet by mouth Daily.   • olmesartan-hydrochlorothiazide (BENICAR HCT) 40-25 MG per tablet Take 1 tablet by mouth daily.   • Potassium Chloride (KCL-20 PO) Take 20 mEq by mouth Daily.   • [DISCONTINUED] aspirin  MG EC tablet Take 1 tablet by mouth Every 12 (Twelve) Hours.   • [DISCONTINUED] docusate sodium 100 MG capsule Take 100 mg by mouth 2 (Two) Times a Day As Needed for Constipation.     No current facility-administered medications on file prior to visit.      Current Medications:  Scheduled Meds:  Continuous Infusions:  No current facility-administered medications for this visit.   PRN Meds:.    I have reviewed the patient's medical history in detail and updated the computerized patient record.   "Review and summarization of old records includes:    Past Medical History:   Diagnosis Date   • Arthritis     OSTEO   • Bilateral knee pain    • Chest pain     SAW A CARDIOLOGIST--PER PT, EVERYTHING WAS \"OKAY.\"   • Hypertension    • Left hip pain    • Obesity    • Pulmonary hypertension (CMS/HCC)         Past Surgical History:   Procedure Laterality Date   • CATARACT EXTRACTION Bilateral    • COLONOSCOPY W/ BIOPSIES AND POLYPECTOMY      BENIGN   • HERNIA REPAIR      UMBILICAL HERNIA REPAIR   • LEG SURGERY Left     cellulitis   • TOTAL HIP ARTHROPLASTY Left 1/15/2019    Procedure: LEFT TOTAL HIP ARTHROPLASTY;  Surgeon: Hieu Orosco MD;  Location: [facility] OR;  Service: Orthopedics        Social History     Occupational History   • Not on file   Tobacco Use   • Smoking status: Never Smoker   • Smokeless tobacco: Never Used   Substance and Sexual Activity   • Alcohol use: No   • Drug use: No   • Sexual activity: Defer      Social History     Social History Narrative   • Not on file        Family History   Problem Relation Age of Onset   • Malig Hyperthermia Neg Hx        ROS: 14 point review of systems was performed and all other systems were reviewed and are negative except for documented findings in HPI and today's encounter.     Allergies:   Allergies   Allergen Reactions   • Augmentin [Amoxicillin-Pot Clavulanate] GI Intolerance   • Hydrocodone Nausea Only and GI Intolerance   • Latex Hives   • Penicillins Itching, Nausea Only and GI Intolerance   • Percocet [Oxycodone-Acetaminophen] GI Intolerance     Constitutional:  Denies fever, shaking or chills   Eyes:  Denies change in visual acuity   HENT:  Denies nasal congestion or sore throat   Respiratory:  Denies cough or shortness of breath   Cardiovascular:  Denies chest pain or severe LE edema   GI:  Denies abdominal pain, nausea, vomiting, bloody stools or diarrhea   Musculoskeletal:  Numbness, tingling, pain, or loss of motor function only as noted above in " "history of present illness.  : Denies painful urination or hematuria  Integument:  Denies rash, lesion or ulceration   Neurologic:  Denies headache or focal weakness  Endocrine:  Denies lymphadenopathy  Psych:  Denies confusion or change in mental status   Hem:  Denies active bleeding    OBJECTIVE:  Physical Exam: 70 y.o. female  Wt Readings from Last 3 Encounters:   03/04/20 103 kg (227 lb)   01/15/19 99.6 kg (219 lb 8 oz)   01/08/19 96.8 kg (213 lb 5 oz)     Ht Readings from Last 1 Encounters:   03/04/20 167.6 cm (66\")     Body mass index is 36.64 kg/m².  Vitals:    03/04/20 1455   Temp: 98.2 °F (36.8 °C)     Vital signs reviewed.     General Appearance:    Alert, cooperative, in no acute distress                  Eyes: conjunctiva clear  ENT: external ears and nose atraumatic  CV: no peripheral edema  Resp: normal respiratory effort  Skin: no rashes or wounds; normal turgor  Psych: mood and affect appropriate  Lymph: no nodes appreciated  Neuro: gross sensation intact  Vascular:  Palpable peripheral pulse in noted extremity  Musculoskeletal Extremities: Bilateral knees have crepitation synovitis swelling joint line tenderness and varus hips are stiff but noncontributory and no true effusion    Radiology:   AP lateral 40 agree PA x-rays taken in the office today without comparison view show advanced end-stage varus osteoarthritis with bone-on-bone  Assessment:     ICD-10-CM ICD-9-CM   1. Arthritis of right knee M17.11 716.96   2. Arthritis of left knee M17.12 716.96        Large Joint Arthrocentesis: R knee  Date/Time: 3/4/2020 3:26 PM  Consent given by: patient  Site marked: site marked  Timeout: Immediately prior to procedure a time out was called to verify the correct patient, procedure, equipment, support staff and site/side marked as required   Supporting Documentation  Indications: pain and joint swelling   Procedure Details  Location: knee - R knee  Preparation: Patient was prepped and draped in the usual " sterile fashion  Needle gauge: 21.  Approach: anterolateral  Medications administered: 80 mg methylPREDNISolone acetate 80 MG/ML; 4 mL lidocaine (cardiac)  Patient tolerance: patient tolerated the procedure well with no immediate complications    Large Joint Arthrocentesis: L knee  Date/Time: 3/4/2020 3:26 PM  Consent given by: patient  Site marked: site marked  Timeout: Immediately prior to procedure a time out was called to verify the correct patient, procedure, equipment, support staff and site/side marked as required   Supporting Documentation  Indications: pain and joint swelling   Procedure Details  Location: knee - L knee  Preparation: Patient was prepped and draped in the usual sterile fashion  Needle gauge: 21.  Approach: anterolateral  Medications administered: 80 mg methylPREDNISolone acetate 80 MG/ML; 4 mL lidocaine (cardiac)  Patient tolerance: patient tolerated the procedure well with no immediate complications             Plan: Biomechanics of pertinent body area discussed.  Risks, benefits, alternatives, comparisons, and complications of accepted medicines, injections, recommendations, surgical procedures, and therapies explained and education provided in laymen's terms. Natural history and expected course of this patient's diagnosis discussed along with evaluation of therapies. Questions answered. When appropriate I also discussed proper use of cane, walker, trekking poles.   BMI:  The concept of BMI body mass index and its importance and implications discussed.  BMI suggested to be < 40 or as low as possible. Lifestyle measures for weight loss and how this affects orthopedic condition.  EXERCISES:  Advice on benefits of, and types of regular/moderate exercise including biomechanical forces involved as it pertains to this complaint.  MEDICATIONS:  Prescription, OTC and Monitoring of Medications per orders to address ortho complaints; Evaluation and discussion of safety, precautions, side effects,  and warnings given especially of long term NSAID or steroid therapy.    DIABETES:  Diabetic counseling on effects of coritisone injections given and importance of keeping Hgb A1c <7.5.   RICE: Rest, ice, compression, and elevation therapy, Cryotherapy/brachy therapy, and or OTC linaments as indicated with instructions.   Cortisone Injection. See procedure note.  PT referral.  MEDICAL RECORDS reviewed from other provider(s) for past and current medical history pertinent to this complaint.  Suggest to see her back in about 6 weeks to see how she is getting along and/or if she desired surgery..  She is doing well she can cancel that and see us in 2 months consider cortisone injection      3/4/2020    Much of this encounter note is an electronic transcription/translation of spoken language to printed text. The electronic translation of spoken language may permit erroneous, or at times, nonsensical words or phrases to be inadvertently transcribed; Although I have reviewed the note for such errors, some may still exist

## 2020-03-11 ENCOUNTER — TREATMENT (OUTPATIENT)
Dept: PHYSICAL THERAPY | Facility: CLINIC | Age: 70
End: 2020-03-11

## 2020-03-11 DIAGNOSIS — M25.561 PAIN IN BOTH KNEES, UNSPECIFIED CHRONICITY: Primary | ICD-10-CM

## 2020-03-11 DIAGNOSIS — M25.562 PAIN IN BOTH KNEES, UNSPECIFIED CHRONICITY: Primary | ICD-10-CM

## 2020-03-11 DIAGNOSIS — R26.9 GAIT DISTURBANCE: ICD-10-CM

## 2020-03-11 DIAGNOSIS — M17.0 OSTEOARTHRITIS OF BOTH KNEES, UNSPECIFIED OSTEOARTHRITIS TYPE: ICD-10-CM

## 2020-03-11 PROCEDURE — G0283 ELEC STIM OTHER THAN WOUND: HCPCS | Performed by: PHYSICAL THERAPIST

## 2020-03-11 PROCEDURE — 97110 THERAPEUTIC EXERCISES: CPT | Performed by: PHYSICAL THERAPIST

## 2020-03-11 PROCEDURE — 97161 PT EVAL LOW COMPLEX 20 MIN: CPT | Performed by: PHYSICAL THERAPIST

## 2020-03-11 NOTE — PROGRESS NOTES
Physical Therapy Initial Evaluation and Plan of Care    Patient: Sol Rojas   : 1950  Diagnosis/ICD-10 Code:  No primary diagnosis found.  Referring practitioner: Guilherme Smith MD  Date of Initial Evaluation:  Type: THERAPY  Noted: 3/11/2020    Subjective Evaluation    History of Present Illness  Mechanism of injury: Insidious onset ~ 1 yr ago; had L SHAMEKA 2019 and did OK; injections B knees; x-rays show arthritis; L knee gives out some but no catch/lock      Patient Occupation: retired   Precautions and Work Restrictions: [employer]  Current pain ratin  At best pain ratin  At worst pain ratin  Location: anterior knees L>R  Quality: burning, sharp and grinding  Relieving factors: rest  Aggravating factors: stairs, ambulation and squatting  Progression: worsening    Social Support  Lives in: one-story house    Diagnostic Tests  X-ray: abnormal    Treatments  Previous treatment: injection treatment  Current treatment: physical therapy  Patient Goals  Patient goals for therapy: decreased pain             Objective       Observations     Additional Observation Details  Mild antalgia L>R with dec stance time and stride    Tenderness   Left Knee   Tenderness in the lateral joint line and medial joint line.     Right Knee   Tenderness in the medial joint line.     Active Range of Motion   Left Knee   Flexion: 100 degrees   Extensor la degrees     Right Knee   Flexion: 113 degrees   Extensor lag: 3 degrees     Patellar Mobility     Additional Patellar Mobility Details  B hypomobile L>R    Strength/Myotome Testing     Left Hip   Planes of Motion   Flexion: 4-  Abduction: 4+  Adduction: 4+  External rotation: 4-    Right Hip   Planes of Motion   Flexion: 4+  Abduction: 4+  Adduction: 4+  External rotation: 4    Left Knee   Flexion: 4  Extension: 4  Quadriceps contraction: poor    Right Knee   Right knee flexion strength: 5-/5.  Extension: 4+  Quadriceps contraction: fair    Tests     Additional  Tests Details  Negative instability testing; mod HS tightness     Subjective Questionnaire: LEFS: 36        Assessment & Plan     Assessment  Impairments: abnormal gait, abnormal or restricted ROM and impaired physical strength  Assessment details: 71 yo F with insidious onset pain and gradual worsening presents with Dx of OA and with moderate weakness and tightness BLE, L>R and abnormal PFJ mechanics.  Should benefit from PT to improve mobility, strength and decreased pain for improved ADLs  Prognosis: fair  Functional Limitations: walking, uncomfortable because of pain, standing and stooping  Goals  Plan Goals: STGs x 2 wks  1. Increasing ROM and strength for improved ADLs  2. Pain at rest < 3/10 and with ADLs < 6/10  3. Review body mechanics and joint protection principles with ADLs  4. Ambulates level surface and 4 inch steps with min to no antalgia    LTGs x 6 wks  1. ROM and strength WFL for normal ADLs  2. Independent with HEP and joint protection for prevention  3. Ambulates 5 min and 6 inch steps with minimal to no antalgia  4. Demonstrates tolerance for prolonged sit and stand/walk > 20 min for improved ADLs and perform functional squat     Plan  Therapy options: will be seen for skilled physical therapy services  Planned modality interventions: cryotherapy, electrical stimulation/Russian stimulation and thermotherapy (hydrocollator packs)  Planned therapy interventions: therapeutic activities, stretching, strengthening, home exercise program, gait training and body mechanics training  Frequency: 2x week  Duration in weeks: 6  Treatment plan discussed with: patient        Manual Therapy:    0     mins  19314;  Therapeutic Exercise:    24     mins  04424;     E-stim               15   mins 22878    Timed Treatment:   24   mins   Total Treatment:     62   mins    PT SIGNATURE: Veronica Rose PT   DATE TREATMENT INITIATED: 3/11/2020    Medicare Initial Certification  Certification Period: 6/9/2020  I certify  that the therapy services are furnished while this patient is under my care.  The services outlined above are required by this patient, and will be reviewed every 90 days.     PHYSICIAN: Guilherme Smith MD      DATE:     Please sign and return via fax to [phone].. Thank you, [facility].

## 2020-03-13 ENCOUNTER — TREATMENT (OUTPATIENT)
Dept: PHYSICAL THERAPY | Facility: CLINIC | Age: 70
End: 2020-03-13

## 2020-03-13 DIAGNOSIS — M25.562 PAIN IN BOTH KNEES, UNSPECIFIED CHRONICITY: Primary | ICD-10-CM

## 2020-03-13 DIAGNOSIS — M17.0 OSTEOARTHRITIS OF BOTH KNEES, UNSPECIFIED OSTEOARTHRITIS TYPE: ICD-10-CM

## 2020-03-13 DIAGNOSIS — M25.561 PAIN IN BOTH KNEES, UNSPECIFIED CHRONICITY: Primary | ICD-10-CM

## 2020-03-13 DIAGNOSIS — R26.9 GAIT DISTURBANCE: ICD-10-CM

## 2020-03-13 PROCEDURE — 97110 THERAPEUTIC EXERCISES: CPT | Performed by: PHYSICAL THERAPIST

## 2020-03-13 PROCEDURE — G0283 ELEC STIM OTHER THAN WOUND: HCPCS | Performed by: PHYSICAL THERAPIST

## 2020-03-13 NOTE — PROGRESS NOTES
Physical Therapy Daily Progress Note      Visit # 2      Subjective   Had some muscle cramps in the front and back of legs.    Objective   See Exercise, Manual, and Modality Logs for complete treatment.       Assessment/Plan    Able to progress ex but still easily aggravated, shimon left knee.    Progress strength and WB activities as asha.  SLR or SAQ               Manual Therapy:    0     mins  62835;  Therapeutic Exercise:    43     mins  40855;     Neuromuscular Ang:    0    mins  16969;    Therapeutic Activity:     5     mins  66508;       Estim   15 min 56328      Timed Treatment:   48   mins   Total Treatment:     63   mins    Veronica Rose, PT  Physical Therapist  [place] License #[id number]

## 2020-03-16 ENCOUNTER — TREATMENT (OUTPATIENT)
Dept: PHYSICAL THERAPY | Facility: CLINIC | Age: 70
End: 2020-03-16

## 2020-03-16 DIAGNOSIS — M25.562 PAIN IN BOTH KNEES, UNSPECIFIED CHRONICITY: Primary | ICD-10-CM

## 2020-03-16 DIAGNOSIS — M25.561 PAIN IN BOTH KNEES, UNSPECIFIED CHRONICITY: Primary | ICD-10-CM

## 2020-03-16 DIAGNOSIS — R26.9 GAIT DISTURBANCE: ICD-10-CM

## 2020-03-16 DIAGNOSIS — M17.0 OSTEOARTHRITIS OF BOTH KNEES, UNSPECIFIED OSTEOARTHRITIS TYPE: ICD-10-CM

## 2020-03-16 PROCEDURE — G0283 ELEC STIM OTHER THAN WOUND: HCPCS | Performed by: PHYSICAL THERAPIST

## 2020-03-16 PROCEDURE — 97110 THERAPEUTIC EXERCISES: CPT | Performed by: PHYSICAL THERAPIST

## 2020-03-16 NOTE — PROGRESS NOTES
Physical Therapy Daily Progress Note      Visit # 3      Subjective   Had a cramp in front of R leg Friday night but none since then.  Feel good today.    Objective   See Exercise, Manual, and Modality Logs for complete treatment.       Assessment/Plan    Overall responding favorably to PT with dec c/o pain.  Still mild gait disturbance and intermittent cramping.    Progress strength and WB activities as asha             Manual Therapy:    0     mins  20978;  Therapeutic Exercise:    46     mins  14707;     Neuromuscular Ang:    0    mins  33726;    Therapeutic Activity:     3     mins  76587;       Estim   15 min 82340      Timed Treatment:   49   mins   Total Treatment:     64   mins    Veronica Rose PT  Physical Therapist  [place] License #[id number]

## 2020-03-18 ENCOUNTER — TREATMENT (OUTPATIENT)
Dept: PHYSICAL THERAPY | Facility: CLINIC | Age: 70
End: 2020-03-18

## 2020-03-18 DIAGNOSIS — M25.561 PAIN IN BOTH KNEES, UNSPECIFIED CHRONICITY: Primary | ICD-10-CM

## 2020-03-18 DIAGNOSIS — M25.562 PAIN IN BOTH KNEES, UNSPECIFIED CHRONICITY: Primary | ICD-10-CM

## 2020-03-18 DIAGNOSIS — R26.9 GAIT DISTURBANCE: ICD-10-CM

## 2020-03-18 DIAGNOSIS — M17.0 OSTEOARTHRITIS OF BOTH KNEES, UNSPECIFIED OSTEOARTHRITIS TYPE: ICD-10-CM

## 2020-03-18 PROCEDURE — 97110 THERAPEUTIC EXERCISES: CPT | Performed by: PHYSICAL THERAPIST

## 2020-03-18 NOTE — PROGRESS NOTES
Physical Therapy Daily Progress Note      Visit # 4      Subjective   Knees not bad - doing pretty good.  Less muscle cramps.  Had shot in foot yesterday.    Objective   See Exercise, Manual, and Modality Logs for complete treatment.       Assessment/Plan    Responding favorably to PT intervention so far.  Reviewed HEP - demos independence on current ex.    Hold further PT until Covid-19 concerns resolve per pt request         Manual Therapy:    0     mins  07011;  Therapeutic Exercise:    46     mins  45537;     Neuromuscular Ang:    0    mins  42206;    Therapeutic Activity:     4     mins  74781;         Timed Treatment:   50   mins   Total Treatment:     50   mins    Veronica Rose, PT  Physical Therapist  [place] License #[id number]

## 2020-06-01 ENCOUNTER — OFFICE VISIT (OUTPATIENT)
Dept: ORTHOPEDIC SURGERY | Facility: CLINIC | Age: 70
End: 2020-06-01

## 2020-06-01 VITALS — BODY MASS INDEX: 35.68 KG/M2 | WEIGHT: 222 LBS | TEMPERATURE: 97.8 F | HEIGHT: 66 IN

## 2020-06-01 DIAGNOSIS — M17.0 PRIMARY OSTEOARTHRITIS OF BOTH KNEES: Primary | ICD-10-CM

## 2020-06-01 PROCEDURE — 99213 OFFICE O/P EST LOW 20 MIN: CPT | Performed by: ORTHOPAEDIC SURGERY

## 2020-06-01 PROCEDURE — 20610 DRAIN/INJ JOINT/BURSA W/O US: CPT | Performed by: ORTHOPAEDIC SURGERY

## 2020-06-01 RX ORDER — METHYLPREDNISOLONE ACETATE 80 MG/ML
80 INJECTION, SUSPENSION INTRA-ARTICULAR; INTRALESIONAL; INTRAMUSCULAR; SOFT TISSUE
Status: COMPLETED | OUTPATIENT
Start: 2020-06-01 | End: 2020-06-01

## 2020-06-01 RX ADMIN — METHYLPREDNISOLONE ACETATE 80 MG: 80 INJECTION, SUSPENSION INTRA-ARTICULAR; INTRALESIONAL; INTRAMUSCULAR; SOFT TISSUE at 09:02

## 2020-06-01 NOTE — PROGRESS NOTES
"Patient Name: Sol Rojas   YOB: 1950  Referring Primary Care Physician: Reyes, Rosenberg Acosta, MD  BMI: Body mass index is 35.83 kg/m².    Chief Complaint:    Chief Complaint   Patient presents with   • Left Knee - Follow-up, Pain   • Right Knee - Follow-up, Pain        HPI:     Sol Rojas is a 70 y.o. female who presents today for evaluation of   Chief Complaint   Patient presents with   • Left Knee - Follow-up, Pain   • Right Knee - Follow-up, Pain   . Seen back today on knee arthritis bi.  Had injections of knees 3 months ago.  helped \"40%\" trying exercises and activity.    This problem is not new to this examiner.     Subjective   Medications:   Home Medications:  Current Outpatient Medications on File Prior to Visit   Medication Sig   • carvedilol (COREG) 3.125 MG tablet Take 3.125 mg by mouth 2 (two) times a day with meals.   • Multiple Vitamins-Minerals (MULTIVITAMIN PO) Take 1 tablet by mouth Daily.   • olmesartan-hydrochlorothiazide (BENICAR HCT) 40-25 MG per tablet Take 1 tablet by mouth daily.   • Potassium Chloride (KCL-20 PO) Take 20 mEq by mouth Daily.     No current facility-administered medications on file prior to visit.      Current Medications:  Scheduled Meds:  Continuous Infusions:  No current facility-administered medications for this visit.   PRN Meds:.    I have reviewed the patient's medical history in detail and updated the computerized patient record.  Review and summarization of old records includes:    Past Medical History:   Diagnosis Date   • Arthritis     OSTEO   • Bilateral knee pain    • Chest pain     SAW A CARDIOLOGIST--PER PT, EVERYTHING WAS \"OKAY.\"   • Hypertension    • Left hip pain    • Obesity    • Pulmonary hypertension (CMS/HCC)         Past Surgical History:   Procedure Laterality Date   • CATARACT EXTRACTION Bilateral    • COLONOSCOPY W/ BIOPSIES AND POLYPECTOMY      BENIGN   • HERNIA REPAIR      UMBILICAL HERNIA REPAIR   • LEG SURGERY Left     " cellulitis   • TOTAL HIP ARTHROPLASTY Left 1/15/2019    Procedure: LEFT TOTAL HIP ARTHROPLASTY;  Surgeon: Hieu Orosco MD;  Location: [facility] OR;  Service: Orthopedics        Social History     Occupational History   • Not on file   Tobacco Use   • Smoking status: Never Smoker   • Smokeless tobacco: Never Used   Substance and Sexual Activity   • Alcohol use: No   • Drug use: No   • Sexual activity: Defer      Social History     Social History Narrative   • Not on file        Family History   Problem Relation Age of Onset   • Malig Hyperthermia Neg Hx        ROS: 14 point review of systems was performed and all other systems were reviewed and are negative except for documented findings in HPI and today's encounter.     Allergies:   Allergies   Allergen Reactions   • Augmentin [Amoxicillin-Pot Clavulanate] GI Intolerance   • Hydrocodone Nausea Only and GI Intolerance   • Latex Hives   • Penicillins Itching, Nausea Only and GI Intolerance   • Percocet [Oxycodone-Acetaminophen] GI Intolerance     Constitutional:  Denies fever, shaking or chills   Eyes:  Denies change in visual acuity   HENT:  Denies nasal congestion or sore throat   Respiratory:  Denies cough or shortness of breath   Cardiovascular:  Denies chest pain or severe LE edema   GI:  Denies abdominal pain, nausea, vomiting, bloody stools or diarrhea   Musculoskeletal:  Numbness, tingling, pain, or loss of motor function only as noted above in history of present illness.  : Denies painful urination or hematuria  Integument:  Denies rash, lesion or ulceration   Neurologic:  Denies headache or focal weakness  Endocrine:  Denies lymphadenopathy  Psych:  Denies confusion or change in mental status   Hem:  Denies active bleeding    OBJECTIVE:  Physical Exam: 70 y.o. female  Wt Readings from Last 3 Encounters:   06/01/20 101 kg (222 lb)   03/04/20 103 kg (227 lb)   01/15/19 99.6 kg (219 lb 8 oz)     Ht Readings from Last 1 Encounters:   06/01/20 167.6 cm  "(66\")     Body mass index is 35.83 kg/m².  Vitals:    06/01/20 0837   Temp: 97.8 °F (36.6 °C)     Vital signs reviewed.     General Appearance:    Alert, cooperative, in no acute distress                  Eyes: conjunctiva clear  ENT: external ears and nose atraumatic  CV: no peripheral edema  Resp: normal respiratory effort  Skin: no rashes or wounds; normal turgor  Psych: mood and affect appropriate  Lymph: no nodes appreciated  Neuro: gross sensation intact  Vascular:  Palpable peripheral pulse in noted extremity  Musculoskeletal Extremities: katty knees with stiffness, swelling, synovitis and joint line swelling    Radiology:   X-rays from March 4 AP lateral 4 degree PA bilateral knees show advanced arthritis without comparison view    Assessment:     ICD-10-CM ICD-9-CM   1. Primary osteoarthritis of both knees M17.0 715.16        Large Joint Arthrocentesis: R knee  Date/Time: 6/1/2020 9:02 AM  Consent given by: patient  Site marked: site marked  Timeout: Immediately prior to procedure a time out was called to verify the correct patient, procedure, equipment, support staff and site/side marked as required   Supporting Documentation  Indications: pain and joint swelling   Procedure Details  Location: knee - R knee  Preparation: Patient was prepped and draped in the usual sterile fashion  Needle size: 22 G  Approach: anterolateral  Medications administered: 80 mg methylPREDNISolone acetate 80 MG/ML; 4 mL lidocaine (cardiac)  Patient tolerance: patient tolerated the procedure well with no immediate complications    Large Joint Arthrocentesis: L knee  Date/Time: 6/1/2020 9:02 AM  Consent given by: patient  Site marked: site marked  Timeout: Immediately prior to procedure a time out was called to verify the correct patient, procedure, equipment, support staff and site/side marked as required   Supporting Documentation  Indications: pain and joint swelling   Procedure Details  Location: knee - L knee  Preparation: " Patient was prepped and draped in the usual sterile fashion  Needle size: 22 G  Approach: anterolateral  Medications administered: 80 mg methylPREDNISolone acetate 80 MG/ML; 4 mL lidocaine (cardiac)  Patient tolerance: patient tolerated the procedure well with no immediate complications             Plan: Biomechanics of pertinent body area discussed.  Risks, benefits, alternatives, comparisons, and complications of accepted medicines, injections, recommendations, surgical procedures, and therapies explained and education provided in laymen's terms. Natural history and expected course of this patient's diagnosis discussed along with evaluation of therapies. Questions answered. When appropriate I also discussed proper use of cane, walker, trekking poles.   BMI:  The concept of BMI body mass index and its importance and implications discussed.  BMI suggested to be < 40 or as low as possible. Lifestyle measures for weight loss and how this affects orthopedic condition.  EXERCISES:  Advice on benefits of, and types of regular/moderate exercise including biomechanical forces involved as it pertains to this complaint.  RICE: Rest, ice, compression, and elevation therapy, Cryotherapy/brachy therapy, and or OTC linaments as indicated with instructions.   Cortisone Injection. See procedure note.  Spoke to her about total knee arthroplasty if and when she is ready.  She think she is about 40% better with injections she will try to do some exercises      6/1/2020    Much of this encounter note is an electronic transcription/translation of spoken language to printed text. The electronic translation of spoken language may permit erroneous, or at times, nonsensical words or phrases to be inadvertently transcribed; Although I have reviewed the note for such errors, some may still exist

## 2020-09-14 ENCOUNTER — CLINICAL SUPPORT (OUTPATIENT)
Dept: ORTHOPEDIC SURGERY | Facility: CLINIC | Age: 70
End: 2020-09-14

## 2020-09-14 VITALS — TEMPERATURE: 98 F | WEIGHT: 217 LBS | HEIGHT: 66 IN | BODY MASS INDEX: 34.87 KG/M2

## 2020-09-14 DIAGNOSIS — M25.562 PAIN IN BOTH KNEES, UNSPECIFIED CHRONICITY: Primary | ICD-10-CM

## 2020-09-14 DIAGNOSIS — M17.0 PRIMARY OSTEOARTHRITIS OF BOTH KNEES: ICD-10-CM

## 2020-09-14 DIAGNOSIS — M25.561 PAIN IN BOTH KNEES, UNSPECIFIED CHRONICITY: Primary | ICD-10-CM

## 2020-09-14 PROCEDURE — 20610 DRAIN/INJ JOINT/BURSA W/O US: CPT | Performed by: NURSE PRACTITIONER

## 2020-09-14 PROCEDURE — 73562 X-RAY EXAM OF KNEE 3: CPT | Performed by: NURSE PRACTITIONER

## 2020-09-14 PROCEDURE — 99213 OFFICE O/P EST LOW 20 MIN: CPT | Performed by: NURSE PRACTITIONER

## 2020-09-14 RX ADMIN — METHYLPREDNISOLONE ACETATE 80 MG: 80 INJECTION, SUSPENSION INTRA-ARTICULAR; INTRALESIONAL; INTRAMUSCULAR; SOFT TISSUE at 08:43

## 2020-09-14 RX ADMIN — METHYLPREDNISOLONE ACETATE 80 MG: 80 INJECTION, SUSPENSION INTRA-ARTICULAR; INTRALESIONAL; INTRAMUSCULAR; SOFT TISSUE at 08:44

## 2020-09-14 NOTE — PROGRESS NOTES
"Patient Name: Sol Rojas   YOB: 1950  Referring Primary Care Physician: Reyes, Rosenberg Acosta, MD  BMI: Body mass index is 35.02 kg/m².    Chief Complaint:    Chief Complaint   Patient presents with   • Left Knee - Follow-up   • Right Knee - Follow-up        HPI: Patient Dr. Daily here today for cortisone injection to both knees these work well for her mass were worn by everyone for the duration of the visit    Sol Rojas is a 70 y.o. female who presents today for evaluation of   Chief Complaint   Patient presents with   • Left Knee - Follow-up   • Right Knee - Follow-up       This problem is new to this examiner.     Subjective   Medications:   Home Medications:  Current Outpatient Medications on File Prior to Visit   Medication Sig   • carvedilol (COREG) 3.125 MG tablet Take 3.125 mg by mouth 2 (two) times a day with meals.   • Multiple Vitamins-Minerals (MULTIVITAMIN PO) Take 1 tablet by mouth Daily.   • olmesartan-hydrochlorothiazide (BENICAR HCT) 40-25 MG per tablet Take 1 tablet by mouth daily.   • Potassium Chloride (KCL-20 PO) Take 20 mEq by mouth Daily.     No current facility-administered medications on file prior to visit.      Current Medications:  Scheduled Meds:  Continuous Infusions:No current facility-administered medications for this visit.     PRN Meds:.    I have reviewed the patient's medical history in detail and updated the computerized patient record.  Review and summarization of old records includes:    Past Medical History:   Diagnosis Date   • Arthritis     OSTEO   • Bilateral knee pain    • Chest pain     SAW A CARDIOLOGIST--PER PT, EVERYTHING WAS \"OKAY.\"   • Hypertension    • Left hip pain    • Obesity    • Pulmonary hypertension (CMS/HCC)         Past Surgical History:   Procedure Laterality Date   • CATARACT EXTRACTION Bilateral    • COLONOSCOPY W/ BIOPSIES AND POLYPECTOMY      BENIGN   • HERNIA REPAIR      UMBILICAL HERNIA REPAIR   • LEG SURGERY Left     " "cellulitis   • TOTAL HIP ARTHROPLASTY Left 1/15/2019    Procedure: LEFT TOTAL HIP ARTHROPLASTY;  Surgeon: Hieu Orosco MD;  Location: [facility] OR;  Service: Orthopedics        Social History     Occupational History   • Not on file   Tobacco Use   • Smoking status: Never Smoker   • Smokeless tobacco: Never Used   Substance and Sexual Activity   • Alcohol use: No   • Drug use: No   • Sexual activity: Defer      Social History     Social History Narrative   • Not on file        Family History   Problem Relation Age of Onset   • Malig Hyperthermia Neg Hx        ROS: 14 point review of systems was performed and all other systems were reviewed and are negative except for documented findings in HPI and today's encounter.     Allergies:   Allergies   Allergen Reactions   • Augmentin [Amoxicillin-Pot Clavulanate] GI Intolerance   • Hydrocodone Nausea Only and GI Intolerance   • Latex Hives   • Penicillins Itching, Nausea Only and GI Intolerance   • Percocet [Oxycodone-Acetaminophen] GI Intolerance     Constitutional:  Denies fever, shaking or chills   Eyes:  Denies change in visual acuity   HENT:  Denies nasal congestion or sore throat   Respiratory:  Denies cough or shortness of breath   Cardiovascular:  Denies chest pain or severe LE edema   GI:  Denies abdominal pain, nausea, vomiting, bloody stools or diarrhea   Musculoskeletal:  Numbness, tingling, pain, or loss of motor function only as noted above in history of present illness.  : Denies painful urination or hematuria  Integument:  Denies rash, lesion or ulceration   Neurologic:  Denies headache or focal weakness  Endocrine:  Denies lymphadenopathy  Psych:  Denies confusion or change in mental status   Hem:  Denies active bleeding    OBJECTIVE:  Physical Exam: 70 y.o. female  Wt Readings from Last 3 Encounters:   09/14/20 98.4 kg (217 lb)   06/01/20 101 kg (222 lb)   03/04/20 103 kg (227 lb)     Ht Readings from Last 1 Encounters:   09/14/20 167.6 cm (66\") "     Body mass index is 35.02 kg/m².  Vitals:    09/14/20 1026   Temp: 98 °F (36.7 °C)     Vital signs reviewed.     General Appearance:    Alert, cooperative, in no acute distress                  Eyes: conjunctiva clear  ENT: external ears and nose atraumatic  CV: no peripheral edema  Resp: normal respiratory effort  Skin: no rashes or wounds; normal turgor  Psych: mood and affect appropriate  Lymph: no nodes appreciated  Neuro: gross sensation intact  Vascular:  Palpable peripheral pulse in noted extremity  Musculoskeletal Extremities: Skin is warm dry intact with good pulses mood sensation calves are soft and nontender patient has bilateral knee medial joint line effusion synovitis and crepitation    Radiology:   Bilateral knees 3 views were done that show severe medial compartment osteoarthritis in both knees done for pain no comparisons readily available  Assessment:     ICD-10-CM ICD-9-CM   1. Pain in both knees, unspecified chronicity  M25.561 719.46    M25.562    2. Primary osteoarthritis of both knees  M17.0 715.16        Large Joint Arthrocentesis: R knee  Date/Time: 9/14/2020 8:43 AM  Consent given by: patient  Site marked: site marked  Timeout: Immediately prior to procedure a time out was called to verify the correct patient, procedure, equipment, support staff and site/side marked as required   Supporting Documentation  Indications: pain   Procedure Details  Location: knee - R knee  Needle size: 25 G  Approach: anteromedial  Medications administered: 80 mg methylPREDNISolone acetate 80 MG/ML; 4 mL lidocaine (cardiac)      Large Joint Arthrocentesis: L knee  Date/Time: 9/14/2020 8:44 AM  Consent given by: patient  Site marked: site marked  Timeout: Immediately prior to procedure a time out was called to verify the correct patient, procedure, equipment, support staff and site/side marked as required   Supporting Documentation  Indications: pain   Procedure Details  Location: knee - L knee  Needle size:  25 G  Approach: anteromedial  Medications administered: 4 mL lidocaine (cardiac); 80 mg methylPREDNISolone acetate 80 MG/ML  Patient tolerance: patient tolerated the procedure well with no immediate complications             Plan: Biomechanics of pertinent body area discussed.  Risks, benefits, alternatives, comparisons, and complications of accepted medicines, injections, recommendations, surgical procedures, and therapies explained and education provided in laymen's terms. Natural history and expected course of this patient's diagnosis discussed along with evaluation of therapies. Questions answered. When appropriate I also discussed proper use of cane, walker, trekking poles.   EXERCISES:  Advice on benefits of, and types of regular/moderate exercise including biomechanical forces involved as it pertains to this complaint.  MEDICATIONS:  Prescription, OTC and Monitoring of Medications per orders to address ortho complaints; Evaluation and discussion of safety, precautions, side effects, and warnings given especially of long term NSAID or steroid therapy.    RICE: Rest, ice, compression, and elevation therapy, Cryotherapy/brachy therapy, and or OTC linaments as indicated with instructions.   Cortisone Injection. See procedure note.      9/24/2020    Much of this encounter note is an electronic transcription/translation of spoken language to printed text. The electronic translation of spoken language may permit erroneous, or at times, nonsensical words or phrases to be inadvertently transcribed; Although I have reviewed the note for such errors, some may still exist

## 2020-09-24 RX ORDER — METHYLPREDNISOLONE ACETATE 80 MG/ML
80 INJECTION, SUSPENSION INTRA-ARTICULAR; INTRALESIONAL; INTRAMUSCULAR; SOFT TISSUE
Status: COMPLETED | OUTPATIENT
Start: 2020-09-14 | End: 2020-09-14

## 2020-12-16 ENCOUNTER — CLINICAL SUPPORT (OUTPATIENT)
Dept: ORTHOPEDIC SURGERY | Facility: CLINIC | Age: 70
End: 2020-12-16

## 2020-12-16 VITALS — BODY MASS INDEX: 34.86 KG/M2 | WEIGHT: 216.93 LBS | TEMPERATURE: 97.1 F | HEIGHT: 66 IN

## 2020-12-16 DIAGNOSIS — M17.0 PRIMARY OSTEOARTHRITIS OF BOTH KNEES: Primary | ICD-10-CM

## 2020-12-16 PROCEDURE — 20610 DRAIN/INJ JOINT/BURSA W/O US: CPT | Performed by: NURSE PRACTITIONER

## 2020-12-16 PROCEDURE — 99213 OFFICE O/P EST LOW 20 MIN: CPT | Performed by: NURSE PRACTITIONER

## 2020-12-16 RX ORDER — MONTELUKAST SODIUM 10 MG/1
10 TABLET ORAL DAILY
COMMUNITY
Start: 2020-10-07 | End: 2021-04-12

## 2020-12-16 RX ORDER — METHYLPREDNISOLONE ACETATE 80 MG/ML
80 INJECTION, SUSPENSION INTRA-ARTICULAR; INTRALESIONAL; INTRAMUSCULAR; SOFT TISSUE
Status: COMPLETED | OUTPATIENT
Start: 2020-12-16 | End: 2020-12-16

## 2020-12-16 RX ORDER — POTASSIUM CHLORIDE 1500 MG/1
20 TABLET, EXTENDED RELEASE ORAL DAILY
COMMUNITY
Start: 2020-12-09

## 2020-12-16 RX ADMIN — METHYLPREDNISOLONE ACETATE 80 MG: 80 INJECTION, SUSPENSION INTRA-ARTICULAR; INTRALESIONAL; INTRAMUSCULAR; SOFT TISSUE at 08:26

## 2020-12-16 RX ADMIN — METHYLPREDNISOLONE ACETATE 80 MG: 80 INJECTION, SUSPENSION INTRA-ARTICULAR; INTRALESIONAL; INTRAMUSCULAR; SOFT TISSUE at 08:27

## 2020-12-16 NOTE — PROGRESS NOTES
Patient Name: Sol Rojas   YOB: 1950  Referring Primary Care Physician: Reyes, Rosenberg Acosta, MD  BMI: Body mass index is 35.03 kg/m².    Chief Complaint:    Chief Complaint   Patient presents with   • Right Knee - Injections, Pain   • Left Knee - Injections, Pain        HPI:     Sol Rojas is a 70 y.o. female who presents today for evaluation of   Chief Complaint   Patient presents with   • Right Knee - Injections, Pain   • Left Knee - Injections, Pain   .  Patient with acute on chronic pain in bilateral knees due to history of osteoarthritis.  She is receiving cortisone injections every 3 months which are effective in relieving her pain.  They typically do last almost a full 3 months.  She denies taking any anti-inflammatory regularly but she has tried physical therapy in the past and felt like it was not helpful.  She uses ice/heat and topical liniments as needed.  Patient with history of left total arthroplasty done by Dr. Orosco.  Patient asking today if she can transfer care for that to Dr. Smith.  It was replaced in 2018 and is doing well and she currently has no issues.  I told her if she ever has any problems she can certainly see Dr. Smith and have an x-ray taken, however there is no need to do routine x-rays of the joint.      Subjective   Medications:   Home Medications:  Current Outpatient Medications on File Prior to Visit   Medication Sig   • carvedilol (COREG) 3.125 MG tablet Take 3.125 mg by mouth 2 (two) times a day with meals.   • KLOR-CON 20 MEQ CR tablet    • Magnesium 400 MG capsule Take  by mouth.   • montelukast (SINGULAIR) 10 MG tablet Take 10 mg by mouth Daily.   • Multiple Vitamins-Minerals (MULTIVITAMIN PO) Take 1 tablet by mouth Daily.   • olmesartan-hydrochlorothiazide (BENICAR HCT) 40-25 MG per tablet Take 1 tablet by mouth daily.   • Potassium Chloride (KCL-20 PO) Take 20 mEq by mouth Daily.     No current facility-administered medications on file prior to  "visit.      Current Medications:  Scheduled Meds:  Continuous Infusions:No current facility-administered medications for this visit.     PRN Meds:.    I have reviewed the patient's medical history in detail and updated the computerized patient record.  Review and summarization of old records includes:    Past Medical History:   Diagnosis Date   • Arthritis     OSTEO   • Bilateral knee pain    • Chest pain     SAW A CARDIOLOGIST--PER PT, EVERYTHING WAS \"OKAY.\"   • Hypertension    • Left hip pain    • Obesity    • Pulmonary hypertension (CMS/HCC)         Past Surgical History:   Procedure Laterality Date   • CATARACT EXTRACTION Bilateral    • COLONOSCOPY W/ BIOPSIES AND POLYPECTOMY      BENIGN   • HERNIA REPAIR      UMBILICAL HERNIA REPAIR   • LEG SURGERY Left     cellulitis   • TOTAL HIP ARTHROPLASTY Left 1/15/2019    Procedure: LEFT TOTAL HIP ARTHROPLASTY;  Surgeon: Hieu Orosco MD;  Location: [facility];  Service: Orthopedics        Social History     Occupational History   • Not on file   Tobacco Use   • Smoking status: Never Smoker   • Smokeless tobacco: Never Used   Substance and Sexual Activity   • Alcohol use: No   • Drug use: No   • Sexual activity: Defer      Social History     Social History Narrative   • Not on file        Family History   Problem Relation Age of Onset   • Malig Hyperthermia Neg Hx        ROS: 14 point review of systems was performed and all other systems were reviewed and are negative except for documented findings in HPI and today's encounter.     Allergies:   Allergies   Allergen Reactions   • Augmentin [Amoxicillin-Pot Clavulanate] GI Intolerance   • Hydrocodone Nausea Only and GI Intolerance   • Latex Hives   • Penicillins Itching, Nausea Only and GI Intolerance   • Percocet [Oxycodone-Acetaminophen] GI Intolerance     Constitutional:  Denies fever, shaking or chills   Eyes:  Denies change in visual acuity   HENT:  Denies nasal congestion or sore throat   Respiratory:  Denies " "cough or shortness of breath   Cardiovascular:  Denies chest pain or severe LE edema   GI:  Denies abdominal pain, nausea, vomiting, bloody stools or diarrhea   Musculoskeletal:  Numbness, tingling, pain, or loss of motor function only as noted above in history of present illness.  : Denies painful urination or hematuria  Integument:  Denies rash, lesion or ulceration   Neurologic:  Denies headache or focal weakness  Endocrine:  Denies lymphadenopathy  Psych:  Denies confusion or change in mental status   Hem:  Denies active bleeding    OBJECTIVE:  Physical Exam: 70 y.o. female  Wt Readings from Last 3 Encounters:   12/16/20 98.4 kg (216 lb 14.9 oz)   09/14/20 98.4 kg (217 lb)   06/01/20 101 kg (222 lb)     Ht Readings from Last 1 Encounters:   12/16/20 167.6 cm (65.98\")     Body mass index is 35.03 kg/m².  Vitals:    12/16/20 0823   Temp: 97.1 °F (36.2 °C)     Vital signs reviewed.     General Appearance:    Alert, cooperative, in no acute distress                  Eyes: conjunctiva clear  ENT: external ears and nose atraumatic  CV: no peripheral edema  Resp: normal respiratory effort  Skin: no rashes or wounds; normal turgor  Psych: mood and affect appropriate  Lymph: no nodes appreciated  Neuro: gross sensation intact  Vascular:  Palpable peripheral pulse in noted extremity  Musculoskeletal Extremities: Swelling, synovitis, crepitus, medial lateral joint line tenderness to bilateral knees.  Calves soft, nontender.  Skin clean, intact with no unusual rash or lesions.    Radiology:   No x-rays obtained today.  AP, lateral, 40 degree PA of bilateral knees reviewed from 9/2020 which show severe, tricompartmental arthritis to bilateral knees, worse in medial compartment bilaterally.    Assessment:     ICD-10-CM ICD-9-CM   1. Primary osteoarthritis of both knees  M17.0 715.16        Procedures       Plan: The diagnosis(es), natural history, pathophysiology and treatment for diagnosis(es) were discussed. Opportunity " given and questions answered.  Biomechanics of pertinent body areas discussed.  When appropriate, the use of ambulatory aids discussed.  BMI:  The concept of BMI body mass index and its importance and implications discussed.    EXERCISES:  Advice on benefits of, and types of regular/moderate exercise pertaining to orthopedic diagnosis(es).  Inflammation/pain control; with cold, heat, elevation and/or liniments discussed as appropriate  Cortisone Injection. See procedure note.      12/16/2020    Much of this encounter note is an electronic transcription/translation of spoken language to printed text. The electronic translation of spoken language may permit erroneous, or at times, nonsensical words or phrases to be inadvertently transcribed; Although I have reviewed the note for such errors, some may still exist

## 2020-12-16 NOTE — PROGRESS NOTES
Large Joint Arthrocentesis: R knee  Date/Time: 12/16/2020 8:26 AM  Consent given by: patient  Site marked: site marked  Timeout: Immediately prior to procedure a time out was called to verify the correct patient, procedure, equipment, support staff and site/side marked as required   Supporting Documentation  Indications: pain   Procedure Details  Location: knee - R knee  Preparation: Patient was prepped and draped in the usual sterile fashion  Needle gauge: 21 G.  Approach: anterolateral  Medications administered: 2 mL lidocaine (cardiac); 80 mg methylPREDNISolone acetate 80 MG/ML  Patient tolerance: patient tolerated the procedure well with no immediate complications    Large Joint Arthrocentesis: L knee  Date/Time: 12/16/2020 8:27 AM  Consent given by: patient  Site marked: site marked  Timeout: Immediately prior to procedure a time out was called to verify the correct patient, procedure, equipment, support staff and site/side marked as required   Supporting Documentation  Indications: pain   Procedure Details  Location: knee - L knee  Preparation: Patient was prepped and draped in the usual sterile fashion  Needle gauge: 21G.  Medications administered: 2 mL lidocaine (cardiac); 80 mg methylPREDNISolone acetate 80 MG/ML  Patient tolerance: patient tolerated the procedure well with no immediate complications

## 2021-02-08 ENCOUNTER — TRANSCRIBE ORDERS (OUTPATIENT)
Dept: ADMINISTRATIVE | Facility: HOSPITAL | Age: 71
End: 2021-02-08

## 2021-02-08 DIAGNOSIS — E04.2 MULTIPLE THYROID NODULES: Primary | ICD-10-CM

## 2021-02-20 ENCOUNTER — HOSPITAL ENCOUNTER (OUTPATIENT)
Dept: ULTRASOUND IMAGING | Facility: HOSPITAL | Age: 71
Discharge: HOME | End: 2021-02-20
Admitting: FAMILY MEDICINE

## 2021-02-20 DIAGNOSIS — E04.2 MULTIPLE THYROID NODULES: ICD-10-CM

## 2021-02-20 PROCEDURE — 76536 US EXAM OF HEAD AND NECK: CPT

## 2021-03-23 ENCOUNTER — CLINICAL SUPPORT (OUTPATIENT)
Dept: ORTHOPEDIC SURGERY | Facility: CLINIC | Age: 71
End: 2021-03-23

## 2021-03-23 VITALS — HEIGHT: 65 IN | BODY MASS INDEX: 35.99 KG/M2 | TEMPERATURE: 97.2 F | WEIGHT: 216 LBS

## 2021-03-23 DIAGNOSIS — Z96.642 STATUS POST TOTAL REPLACEMENT OF LEFT HIP: ICD-10-CM

## 2021-03-23 DIAGNOSIS — M25.552 LEFT HIP PAIN: ICD-10-CM

## 2021-03-23 DIAGNOSIS — M17.0 PRIMARY OSTEOARTHRITIS OF BOTH KNEES: Primary | ICD-10-CM

## 2021-03-23 DIAGNOSIS — R52 PAIN: ICD-10-CM

## 2021-03-23 PROCEDURE — 20610 DRAIN/INJ JOINT/BURSA W/O US: CPT | Performed by: NURSE PRACTITIONER

## 2021-03-23 PROCEDURE — 73562 X-RAY EXAM OF KNEE 3: CPT | Performed by: NURSE PRACTITIONER

## 2021-03-23 PROCEDURE — 73502 X-RAY EXAM HIP UNI 2-3 VIEWS: CPT | Performed by: NURSE PRACTITIONER

## 2021-03-23 PROCEDURE — 99213 OFFICE O/P EST LOW 20 MIN: CPT | Performed by: NURSE PRACTITIONER

## 2021-03-23 RX ORDER — METHYLPREDNISOLONE ACETATE 80 MG/ML
80 INJECTION, SUSPENSION INTRA-ARTICULAR; INTRALESIONAL; INTRAMUSCULAR; SOFT TISSUE
Status: COMPLETED | OUTPATIENT
Start: 2021-03-23 | End: 2021-03-23

## 2021-03-23 RX ADMIN — METHYLPREDNISOLONE ACETATE 80 MG: 80 INJECTION, SUSPENSION INTRA-ARTICULAR; INTRALESIONAL; INTRAMUSCULAR; SOFT TISSUE at 09:07

## 2021-03-23 NOTE — PROGRESS NOTES
Patient Name: Sol Rojas   YOB: 1950  Referring Primary Care Physician: Reyes, Rosenberg Acosta, MD  BMI: Body mass index is 35.94 kg/m².    Chief Complaint:    Chief Complaint   Patient presents with   • Left Knee - Follow-up, Pain   • Right Knee - Follow-up, Pain        HPI:     Sol Rojas is a 71 y.o. female who presents today for evaluation of   Chief Complaint   Patient presents with   • Left Knee - Follow-up, Pain   • Right Knee - Follow-up, Pain   .  Patient presents for evaluation of bilateral knee pain.  She has known history of osteoarthritis and describes an intermittent achy, generalized pain to bilateral knees with swelling and stiffness.  She denies any new injury or trauma to the knees.  Cortisone injections have worked for her in the past so she returns today for bilateral knee injections.    Patient also mentions that she is having left hip/groin pain.  She is status post left total hip arthroplasty in 2018 by Dr. Orosco.  She reports that she has done well until that a week ago when she started having a mild, achy pain in the groin area as well as in the lateral aspect of the hip.  She denies any injury or trauma.  She denies any fever or chills.  She is able to ambulate without difficulty.      Subjective   Medications:   Home Medications:  Current Outpatient Medications on File Prior to Visit   Medication Sig   • carvedilol (COREG) 3.125 MG tablet Take 3.125 mg by mouth 2 (two) times a day with meals.   • KLOR-CON 20 MEQ CR tablet    • Magnesium 400 MG capsule Take  by mouth.   • montelukast (SINGULAIR) 10 MG tablet Take 10 mg by mouth Daily.   • Multiple Vitamins-Minerals (MULTIVITAMIN PO) Take 1 tablet by mouth Daily.   • olmesartan-hydrochlorothiazide (BENICAR HCT) 40-25 MG per tablet Take 1 tablet by mouth daily.   • Potassium Chloride (KCL-20 PO) Take 20 mEq by mouth Daily.     No current facility-administered medications on file prior to visit.     Current  "Medications:  Scheduled Meds:  Continuous Infusions:No current facility-administered medications for this visit.    PRN Meds:.    I have reviewed the patient's medical history in detail and updated the computerized patient record.  Review and summarization of old records includes:    Past Medical History:   Diagnosis Date   • Arthritis     OSTEO   • Bilateral knee pain    • Chest pain     SAW A CARDIOLOGIST--PER PT, EVERYTHING WAS \"OKAY.\"   • Hypertension    • Left hip pain    • Obesity    • Pulmonary hypertension (CMS/HCC)         Past Surgical History:   Procedure Laterality Date   • CATARACT EXTRACTION Bilateral    • COLONOSCOPY W/ BIOPSIES AND POLYPECTOMY      BENIGN   • HERNIA REPAIR      UMBILICAL HERNIA REPAIR   • LEG SURGERY Left     cellulitis   • TOTAL HIP ARTHROPLASTY Left 1/15/2019    Procedure: LEFT TOTAL HIP ARTHROPLASTY;  Surgeon: Hieu Orosco MD;  Location: [facility];  Service: Orthopedics        Social History     Occupational History   • Not on file   Tobacco Use   • Smoking status: Never Smoker   • Smokeless tobacco: Never Used   Vaping Use   • Vaping Use: Never used   Substance and Sexual Activity   • Alcohol use: No   • Drug use: No   • Sexual activity: Defer      Social History     Social History Narrative   • Not on file        Family History   Problem Relation Age of Onset   • Malig Hyperthermia Neg Hx        ROS: 14 point review of systems was performed and all other systems were reviewed and are negative except for documented findings in HPI and today's encounter.     Allergies:   Allergies   Allergen Reactions   • Augmentin [Amoxicillin-Pot Clavulanate] GI Intolerance   • Hydrocodone Nausea Only and GI Intolerance   • Latex Hives   • Penicillins Itching, Nausea Only and GI Intolerance   • Percocet [Oxycodone-Acetaminophen] GI Intolerance     Constitutional:  Denies fever, shaking or chills   Eyes:  Denies change in visual acuity   HENT:  Denies nasal congestion or sore throat " "  Respiratory:  Denies cough or shortness of breath   Cardiovascular:  Denies chest pain or severe LE edema   GI:  Denies abdominal pain, nausea, vomiting, bloody stools or diarrhea   Musculoskeletal:  Numbness, tingling, pain, or loss of motor function only as noted above in history of present illness.  : Denies painful urination or hematuria  Integument:  Denies rash, lesion or ulceration   Neurologic:  Denies headache or focal weakness  Endocrine:  Denies lymphadenopathy  Psych:  Denies confusion or change in mental status   Hem:  Denies active bleeding    OBJECTIVE:  Physical Exam: 71 y.o. female  Wt Readings from Last 3 Encounters:   03/23/21 98 kg (216 lb)   12/16/20 98.4 kg (216 lb 14.9 oz)   09/14/20 98.4 kg (217 lb)     Ht Readings from Last 1 Encounters:   03/23/21 165.1 cm (65\")     Body mass index is 35.94 kg/m².  Vitals:    03/23/21 0907   Temp: 97.2 °F (36.2 °C)     Vital signs reviewed.     General Appearance:    Alert, cooperative, in no acute distress                  Eyes: conjunctiva clear  ENT: external ears and nose atraumatic  CV: no peripheral edema  Resp: normal respiratory effort  Skin: no rashes or wounds; normal turgor  Psych: mood and affect appropriate  Lymph: no nodes appreciated  Neuro: gross sensation intact  Vascular:  Palpable peripheral pulse in noted extremity  Musculoskeletal Extremities: Medial joint line tenderness, swelling, synovitis, effusion bilateral knee.  Calf soft, nontender.  Skin clean and intact with no rash or lesions.  Left hip: Stinchfield mildly positive, logroll negative.  No pain with internal/external rotation of the hip.  Mild tenderness over left greater trochanter.  Extremity neurovascularly intact.  Ambulates with normal gait without assistive device    Radiology:   AP, lateral, 40 degree PA of bilateral knees obtained in office today due to pain with prior comparison show advanced tricompartmental osteoarthritis of bilateral knees.    AP pelvis, lateral " left hip obtained in the office today due to pain without prior comparison shows well aligned, well positioned left total hip arthroplasty    Assessment:     ICD-10-CM ICD-9-CM   1. Pain  R52 780.96   2. Primary osteoarthritis of both knees  M17.0 715.16           MDM/Plan:   The diagnosis(es), natural history, pathophysiology and treatment for diagnosis(es) were discussed. Opportunity given and questions answered.  Biomechanics of pertinent body areas discussed.  When appropriate, the use of ambulatory aids discussed.  Proceeded with cortisone injections in bilateral knees.  Encouraged to continue Tylenol, ice, heat, topical liniments as needed for pain control.  X-rays of the left hip show a well-positioned, well aligned left total hip arthroplasty although I do not have any comparison views.  Her Stinchfield is mildly positive on the left but she has good range of motion and no pain with internal/external rotation of the hip.  She is ambulating without difficulty.  We discussed obtaining CRP and ESR to rule out infection.  However, patient wants to hold off right now as her pain is very mild and she has had no other symptoms of infection.  She will call the office if her pain worsens or persists.  Return to the office as needed.      3/23/2021    Much of this encounter note is an electronic transcription/translation of spoken language to printed text. The electronic translation of spoken language may permit erroneous, or at times, nonsensical words or phrases to be inadvertently transcribed; Although I have reviewed the note for such errors, some may still exist      Large Joint Arthrocentesis: R knee  Date/Time: 3/23/2021 9:07 AM  Consent given by: patient  Site marked: site marked  Timeout: Immediately prior to procedure a time out was called to verify the correct patient, procedure, equipment, support staff and site/side marked as required   Supporting Documentation  Indications: pain   Procedure  Details  Location: knee - R knee  Preparation: Patient was prepped and draped in the usual sterile fashion  Needle gauge: 21G.  Approach: anterolateral  Medications administered: 80 mg methylPREDNISolone acetate 80 MG/ML; 4 mL lidocaine (cardiac)  Patient tolerance: patient tolerated the procedure well with no immediate complications    Large Joint Arthrocentesis: L knee  Date/Time: 3/23/2021 9:07 AM  Consent given by: patient  Site marked: site marked  Timeout: Immediately prior to procedure a time out was called to verify the correct patient, procedure, equipment, support staff and site/side marked as required   Supporting Documentation  Indications: pain   Procedure Details  Location: knee - L knee  Preparation: Patient was prepped and draped in the usual sterile fashion  Needle gauge: 21G.  Approach: anterolateral  Medications administered: 4 mL lidocaine (cardiac); 80 mg methylPREDNISolone acetate 80 MG/ML  Patient tolerance: patient tolerated the procedure well with no immediate complications

## 2021-03-30 ENCOUNTER — TRANSCRIBE ORDERS (OUTPATIENT)
Dept: SLEEP MEDICINE | Facility: HOSPITAL | Age: 71
End: 2021-03-30

## 2021-03-30 DIAGNOSIS — Z01.818 OTHER SPECIFIED PRE-OPERATIVE EXAMINATION: Primary | ICD-10-CM

## 2021-03-31 ENCOUNTER — TRANSCRIBE ORDERS (OUTPATIENT)
Dept: ADMINISTRATIVE | Facility: HOSPITAL | Age: 71
End: 2021-03-31

## 2021-03-31 DIAGNOSIS — E04.1 THYROID NODULE: Primary | ICD-10-CM

## 2021-04-06 NOTE — PROGRESS NOTES
04/15/21 0000   Pre-Procedure Phone Call   Procedure Time Verified Yes   Arrival Time 1230   Procedure Location Verified Yes   Medical History Reviewed No   NPO Status Reinforced Yes   Ride and Caregiver Arranged Yes   Patient Knows to Bring Current Medications No   Bring Outside Films Requested No

## 2021-04-10 ENCOUNTER — LAB (OUTPATIENT)
Dept: LAB | Facility: HOSPITAL | Age: 71
End: 2021-04-10

## 2021-04-10 DIAGNOSIS — Z01.818 OTHER SPECIFIED PRE-OPERATIVE EXAMINATION: ICD-10-CM

## 2021-04-10 PROCEDURE — C9803 HOPD COVID-19 SPEC COLLECT: HCPCS

## 2021-04-10 PROCEDURE — U0004 COV-19 TEST NON-CDC HGH THRU: HCPCS

## 2021-04-10 PROCEDURE — U0005 INFEC AGEN DETEC AMPLI PROBE: HCPCS

## 2021-04-12 LAB — SARS-COV-2 RNA RESP QL NAA+PROBE: NOT DETECTED

## 2021-04-13 ENCOUNTER — ON CAMPUS - OUTPATIENT (AMBULATORY)
Dept: URBAN - METROPOLITAN AREA HOSPITAL 114 | Facility: HOSPITAL | Age: 71
End: 2021-04-13
Payer: MEDICARE

## 2021-04-13 ENCOUNTER — ANESTHESIA EVENT (OUTPATIENT)
Dept: GASTROENTEROLOGY | Facility: HOSPITAL | Age: 71
End: 2021-04-13

## 2021-04-13 ENCOUNTER — ANESTHESIA (OUTPATIENT)
Dept: GASTROENTEROLOGY | Facility: HOSPITAL | Age: 71
End: 2021-04-13

## 2021-04-13 ENCOUNTER — HOSPITAL ENCOUNTER (OUTPATIENT)
Facility: HOSPITAL | Age: 71
Setting detail: HOSPITAL OUTPATIENT SURGERY
Discharge: HOME | End: 2021-04-13
Attending: INTERNAL MEDICINE | Admitting: INTERNAL MEDICINE

## 2021-04-13 VITALS
HEIGHT: 66 IN | RESPIRATION RATE: 20 BRPM | OXYGEN SATURATION: 98 % | SYSTOLIC BLOOD PRESSURE: 101 MMHG | BODY MASS INDEX: 34.87 KG/M2 | WEIGHT: 217 LBS | TEMPERATURE: 97.2 F | DIASTOLIC BLOOD PRESSURE: 72 MMHG | HEART RATE: 73 BPM

## 2021-04-13 DIAGNOSIS — K57.30 DIVERTICULOSIS OF LARGE INTESTINE WITHOUT PERFORATION OR ABS: ICD-10-CM

## 2021-04-13 DIAGNOSIS — Z86.010 PERSONAL HISTORY OF COLONIC POLYPS: ICD-10-CM

## 2021-04-13 PROCEDURE — 25010000002 PROPOFOL 10 MG/ML EMULSION: Performed by: ANESTHESIOLOGY

## 2021-04-13 PROCEDURE — G0105 COLORECTAL SCRN; HI RISK IND: HCPCS | Performed by: INTERNAL MEDICINE

## 2021-04-13 RX ORDER — SODIUM CHLORIDE, SODIUM LACTATE, POTASSIUM CHLORIDE, CALCIUM CHLORIDE 600; 310; 30; 20 MG/100ML; MG/100ML; MG/100ML; MG/100ML
30 INJECTION, SOLUTION INTRAVENOUS CONTINUOUS PRN
Status: DISCONTINUED | OUTPATIENT
Start: 2021-04-13 | End: 2021-04-13 | Stop reason: HOSPADM

## 2021-04-13 RX ORDER — PROPOFOL 10 MG/ML
VIAL (ML) INTRAVENOUS CONTINUOUS PRN
Status: DISCONTINUED | OUTPATIENT
Start: 2021-04-13 | End: 2021-04-13 | Stop reason: SURG

## 2021-04-13 RX ORDER — LIDOCAINE HYDROCHLORIDE 20 MG/ML
INJECTION, SOLUTION INFILTRATION; PERINEURAL AS NEEDED
Status: DISCONTINUED | OUTPATIENT
Start: 2021-04-13 | End: 2021-04-13 | Stop reason: SURG

## 2021-04-13 RX ADMIN — SODIUM CHLORIDE, POTASSIUM CHLORIDE, SODIUM LACTATE AND CALCIUM CHLORIDE 30 ML/HR: 600; 310; 30; 20 INJECTION, SOLUTION INTRAVENOUS at 10:42

## 2021-04-13 RX ADMIN — PROPOFOL 100 MCG/KG/MIN: 10 INJECTION, EMULSION INTRAVENOUS at 10:47

## 2021-04-13 RX ADMIN — LIDOCAINE HYDROCHLORIDE 60 MG: 20 INJECTION, SOLUTION INFILTRATION; PERINEURAL at 10:47

## 2021-04-13 NOTE — ANESTHESIA PREPROCEDURE EVALUATION
Anesthesia Evaluation     Patient summary reviewed and Nursing notes reviewed   NPO Solid Status: > 8 hours  NPO Liquid Status: > 6 hours           Airway   Mallampati: I  TM distance: >3 FB  Neck ROM: full  No difficulty expected  Dental - normal exam     Pulmonary - negative pulmonary ROS and normal exam   Cardiovascular - normal exam    (+) hypertension,       Neuro/Psych- negative ROS  GI/Hepatic/Renal/Endo    (+) obesity, morbid obesity,      Musculoskeletal     Abdominal  - normal exam    Bowel sounds: normal.   Substance History - negative use     OB/GYN negative ob/gyn ROS         Other   arthritis,                    Anesthesia Plan    ASA 3     MAC       Anesthetic plan, all risks, benefits, and alternatives have been provided, discussed and informed consent has been obtained with: patient.

## 2021-04-13 NOTE — ANESTHESIA POSTPROCEDURE EVALUATION
"Patient: Sol Rojas    Procedure Summary     Date: 04/13/21 Room / [facility]:  [facility]    Anesthesia Start: 1045 Anesthesia Stop: 1111    Procedure: COLONOSCOPY TO CECUM AND INTO TI (N/A ) Diagnosis:     Surgeons: Louie Smith MD Provider: Rehan Hernandez MD    Anesthesia Type: MAC ASA Status: 3          Anesthesia Type: MAC    Vitals  Vitals Value Taken Time   /72 04/13/21 1133   Temp     Pulse 73 04/13/21 1133   Resp 20 04/13/21 1133   SpO2 98 % 04/13/21 1133           Post Anesthesia Care and Evaluation    Patient location during evaluation: PACU  Patient participation: complete - patient participated  Level of consciousness: awake  Pain score: 0  Pain management: adequate  Airway patency: patent  Anesthetic complications: No anesthetic complications  PONV Status: none  Cardiovascular status: acceptable  Respiratory status: acceptable  Hydration status: acceptable    Comments: /72   Pulse 73   Temp 36.2 °C (97.2 °F) (Oral)   Resp 20   Ht 167.6 cm (66\")   Wt 98.4 kg (217 lb)   LMP  (LMP Unknown)   SpO2 98%   BMI 35.02 kg/m²       "

## 2021-04-13 NOTE — H&P
[place] [place]    Patient Name: Sol Rojas  : 1950  MRN: [id number]  Primary Care Physician: Reyes, Rosenberg Acosta, MD  Date of admission: 2021    Subjective   Subjective     Chief Complaint: history of colon polyps     History of Present Illness  The patient presents with no gastrointestinal  Symptoms or issues at this time   Review of Systems   All other systems reviewed and are negative.       Personal History     Past Medical History:   Diagnosis Date   • Arthritis     OSTEO   • Bilateral knee pain    • Chest pain     SAW A CARDIOLOGIST--PER PT, EVERYTHING WAS \"OKAY.\"   • Disease of thyroid gland     POLYP   • Hypertension    • Left hip pain    • Obesity    • Pulmonary hypertension (CMS/HCC)        Past Surgical History:   Procedure Laterality Date   • CATARACT EXTRACTION Bilateral    • COLONOSCOPY W/ BIOPSIES AND POLYPECTOMY      BENIGN   • HERNIA REPAIR      UMBILICAL HERNIA REPAIR   • LEG SURGERY Left     cellulitis   • TOTAL HIP ARTHROPLASTY Left 1/15/2019    Procedure: LEFT TOTAL HIP ARTHROPLASTY;  Surgeon: Hieu Orosco MD;  Location: [facility];  Service: Orthopedics       Family History: family history is not on file. Otherwise pertinent FHx was reviewed and not pertinent to current issue.    Social History:  reports that she has never smoked. She has never used smokeless tobacco. She reports that she does not drink alcohol and does not use drugs.    Home Medications:  Magnesium, carvedilol, multivitamin, olmesartan-hydrochlorothiazide, and potassium chloride      Allergies:  Allergies   Allergen Reactions   • Augmentin [Amoxicillin-Pot Clavulanate] GI Intolerance   • Hydrocodone Nausea Only and GI Intolerance   • Latex Hives   • Penicillins Itching, Nausea Only and GI Intolerance   • Percocet [Oxycodone-Acetaminophen] GI Intolerance       Objective    Objective     Vitals:  Temp:  [97.2 °F (36.2 °C)] 97.2 °F (36.2 °C)  Heart Rate:  [64] 64  Resp:  [16] " 16  BP: (105)/(64) 105/64    Physical Exam  Vitals reviewed.   HENT:      Right Ear: External ear normal.      Left Ear: External ear normal.      Mouth/Throat:      Pharynx: Oropharynx is clear.   Eyes:      Conjunctiva/sclera: Conjunctivae normal.   Cardiovascular:      Rate and Rhythm: Normal rate.   Pulmonary:      Effort: Pulmonary effort is normal.   Abdominal:      General: Abdomen is flat.   Neurological:      General: No focal deficit present.      Mental Status: She is alert.   Psychiatric:         Mood and Affect: Mood normal.         Result Review    Result Review:  I have personally reviewed the results from the time of this admission to 04/13/21 10:46 AM EDT and agree with these findings:  []  Laboratory  []  Microbiology  []  Radiology  []  EKG/Telemetry   []  Cardiology/Vascular   []  Pathology  []  Old records  []  Other:    Assessment/Plan   Assessment / Plan     Brief Patient Summary:  Sol Rojas is a 71 y.o. female with  Personal history of colon polyps     Active Hospital Problems:  There are no active hospital problems to display for this patient.      Plan: Colonoscopy risks, alternatives and benefits discussed with patient and the patient is agreeable to having procedure done.    There are no questions and answers to display.     Electronically signed by Louie Smith MD, 04/13/21, 10:46 AM EDT.

## 2021-04-15 ENCOUNTER — HOSPITAL ENCOUNTER (OUTPATIENT)
Dept: ULTRASOUND IMAGING | Facility: HOSPITAL | Age: 71
Discharge: HOME | End: 2021-04-15
Admitting: OTOLARYNGOLOGY

## 2021-04-15 VITALS
RESPIRATION RATE: 16 BRPM | WEIGHT: 217 LBS | TEMPERATURE: 97.5 F | DIASTOLIC BLOOD PRESSURE: 67 MMHG | BODY MASS INDEX: 34.87 KG/M2 | SYSTOLIC BLOOD PRESSURE: 116 MMHG | OXYGEN SATURATION: 97 % | HEART RATE: 62 BPM | HEIGHT: 66 IN

## 2021-04-15 DIAGNOSIS — E04.1 THYROID NODULE: ICD-10-CM

## 2021-04-15 PROCEDURE — 88305 TISSUE EXAM BY PATHOLOGIST: CPT | Performed by: OTOLARYNGOLOGY

## 2021-04-15 PROCEDURE — 25010000003 LIDOCAINE 1 % SOLUTION: Performed by: RADIOLOGY

## 2021-04-15 PROCEDURE — 88173 CYTOPATH EVAL FNA REPORT: CPT | Performed by: OTOLARYNGOLOGY

## 2021-04-15 RX ORDER — LIDOCAINE HYDROCHLORIDE 10 MG/ML
10 INJECTION, SOLUTION INFILTRATION; PERINEURAL ONCE
Status: COMPLETED | OUTPATIENT
Start: 2021-04-15 | End: 2021-04-15

## 2021-04-15 RX ORDER — ALPRAZOLAM 0.5 MG/1
0.5 TABLET ORAL ONCE
Status: COMPLETED | OUTPATIENT
Start: 2021-04-15 | End: 2021-04-15

## 2021-04-15 RX ADMIN — LIDOCAINE HYDROCHLORIDE 3 ML: 10 INJECTION, SOLUTION INFILTRATION; PERINEURAL at 13:16

## 2021-04-15 RX ADMIN — ALPRAZOLAM 0.5 MG: 0.5 TABLET ORAL at 12:35

## 2021-04-15 NOTE — NURSING NOTE
Pt arrived to Radiology triage [place] [facility] for US Thyroid FNA.   Pt wearing a mask as well as this RN for any bedside care.

## 2021-04-15 NOTE — NURSING NOTE
Pt denies any concerns. Pt assisted to private vehicle via wheelchair by DINA Remy to leave with her spouse. NAD noted.

## 2021-04-15 NOTE — DISCHARGE INSTRUCTIONS
EDUCATION / DISCHARGE INSTRUCTIONS  Aspiration:  An aspiration is a procedure used to take a sample of cells or tissue from a lump, mass or other area of concern.   Within a week the radiologist will send a report to your physician.  A pathologist will also examine the tissue and send a report.  THIS EDUCATION INFORMATION WAS REVIEWED PRIOR TO THE PROCEDURE AND CONSENT.  Patient initials_________________Time________________      Post Procedure:    *  Rest today (no pushing pulling or straining).   *  Slowly increase activity tomorow.    *  If you received sedation do not drive for 24 hours.   *  Keep dressing clean and dry.   *  Leave dressing on puncture site for 24 hours.    *  You may shower when dressing removed.   *  If needed, use an ice pack at the site for up to 20 minutes at a time for pain.    Call your doctor if experiencing:   *  Signs of infection such as redness, swelling, excessive pain and / or foul smelling drainage from the puncture site.   *  Chills or fever over 101 degrees (by mouth).   *  Unrelieved pain.   *  Any new or severe symptoms.      Following the procedure:     Follow-up with the ordering physician as directed.    Continue to take other medications as directed by your physician unless otherwise instructed.     If you have any concerns please call the Radiology Nurses Desk at [phone].  You are the most important factor in your recovery.  Follow the above instructions carefully.

## 2021-04-15 NOTE — H&P
"        Name: Sol Rojas ADMIT: 4/15/2021   : 1950  PCP: Reyes, Rosenberg Acosta, MD    MRN: [id number] LOS: 0 days   AGE/SEX: 71 y.o. female  ROOM: Room/bed info not found       Chief complaint THYROID NODULE    Present Illness or Internal History:  Patient is a 71 y.o. female presents with THYROID NODULE.     Past Surgical History:  Past Surgical History:   Procedure Laterality Date   • CATARACT EXTRACTION Bilateral    • COLONOSCOPY N/A 2021    Procedure: COLONOSCOPY TO CECUM AND INTO TI;  Surgeon: Louie Smith MD;  Location: [facility] ENDOSCOPY;  Service: Gastroenterology;  Laterality: N/A;  pre: history of polyps  post: diverticulosis   • COLONOSCOPY W/ BIOPSIES AND POLYPECTOMY      BENIGN   • HERNIA REPAIR      UMBILICAL HERNIA REPAIR   • LEG SURGERY Left     cellulitis   • TOTAL HIP ARTHROPLASTY Left 1/15/2019    Procedure: LEFT TOTAL HIP ARTHROPLASTY;  Surgeon: Hieu Orosco MD;  Location: [facility] MAIN OR;  Service: Orthopedics       Past Medical History:  Past Medical History:   Diagnosis Date   • Arthritis     OSTEO   • Bilateral knee pain    • Chest pain     SAW A CARDIOLOGIST--PER PT, EVERYTHING WAS \"OKAY.\"   • Disease of thyroid gland     POLYP   • Hypertension    • Left hip pain    • Obesity    • Pulmonary hypertension (CMS/HCC)        Home Medications:  (Not in a hospital admission)      Allergies:  Augmentin [amoxicillin-pot clavulanate], Hydrocodone, Latex, Penicillins, and Percocet [oxycodone-acetaminophen]    Family History:  Family History   Problem Relation Age of Onset   • Malig Hyperthermia Neg Hx        Social History:  Social History     Tobacco Use   • Smoking status: Never Smoker   • Smokeless tobacco: Never Used   Vaping Use   • Vaping Use: Never used   Substance Use Topics   • Alcohol use: No   • Drug use: No        Objective     Physical Exam:      General Appearance:    Alert, cooperative, in no acute distress   Head:    Normocephalic, without obvious abnormality, " atraumatic   Eyes:            Lids and lashes normal, conjunctivae and sclerae normal, no   icterus, no pallor, corneas clear, PERRLA   Ears:    Ears appear intact with no abnormalities noted   Throat:   No oral lesions, no thrush, oral mucosa moist   Neck:   No adenopathy, supple, trachea midline, no thyromegaly, no     carotid bruit, no JVD   Back:     No kyphosis present, no scoliosis present, no skin lesions,       erythema or scars, no tenderness to percussion or                   palpation,   range of motion normal   Lungs:     Clear to auscultation,respirations regular, even and                   unlabored    Heart:    Regular rhythm and normal rate, normal S1 and S2, no            murmur, no gallop, no rub, no click   Breast Exam:    Deferred   Abdomen:     Normal bowel sounds, no masses, no organomegaly, soft        non-tender, non-distended, no guarding, no rebound                 tenderness   Genitalia:    Deferred   Extremities:   Moves all extremities well, no edema, no cyanosis, no              redness   Pulses:   Pulses palpable and equal bilaterally   Skin:   No bleeding, bruising or rash   Lymph nodes:   No palpable adenopathy   Neurologic:   Cranial nerves 2 - 12 grossly intact, sensation intact, DTR        present and equal bilaterally       Vital Signs  Temp:  [97.5 °F (36.4 °C)] 97.5 °F (36.4 °C)  Heart Rate:  [73] 73  Resp:  [18] 18  BP: (114)/(73) 114/73    Anticipated Surgical Procedure:  US THYROID BIOPSY    The risks, benefits and alternatives of this procedure have been discussed with the patient or responsible party: Yes        Georges Lacey MD  04/15/21  13:05 EDT

## 2021-04-16 ENCOUNTER — TELEPHONE (OUTPATIENT)
Dept: INTERVENTIONAL RADIOLOGY/VASCULAR | Facility: HOSPITAL | Age: 71
End: 2021-04-16

## 2021-04-16 LAB
CYTO UR: NORMAL
CYTOLOGY INITIAL INTERPRETATION: NORMAL
LAB AP CASE REPORT: NORMAL
LAB AP NON-GYN SPECIMEN ADEQUACY: NORMAL
PATH REPORT.FINAL DX SPEC: NORMAL
PATH REPORT.GROSS SPEC: NORMAL

## 2021-05-23 ENCOUNTER — APPOINTMENT (OUTPATIENT)
Dept: CT IMAGING | Facility: HOSPITAL | Age: 71
End: 2021-05-23

## 2021-05-23 ENCOUNTER — HOSPITAL ENCOUNTER (EMERGENCY)
Facility: HOSPITAL | Age: 71
Discharge: HOME | End: 2021-05-23
Attending: EMERGENCY MEDICINE | Admitting: EMERGENCY MEDICINE

## 2021-05-23 VITALS
WEIGHT: 220 LBS | HEART RATE: 64 BPM | DIASTOLIC BLOOD PRESSURE: 72 MMHG | HEIGHT: 66 IN | BODY MASS INDEX: 35.36 KG/M2 | SYSTOLIC BLOOD PRESSURE: 119 MMHG | RESPIRATION RATE: 16 BRPM | OXYGEN SATURATION: 100 % | TEMPERATURE: 98.4 F

## 2021-05-23 DIAGNOSIS — E86.0 DEHYDRATION: ICD-10-CM

## 2021-05-23 DIAGNOSIS — R19.7 DIARRHEA, UNSPECIFIED TYPE: Primary | ICD-10-CM

## 2021-05-23 LAB
ALBUMIN SERPL-MCNC: 4.1 G/DL (ref 3.5–5.2)
ALBUMIN/GLOB SERPL: 1.5 G/DL
ALP SERPL-CCNC: 78 U/L (ref 39–117)
ALT SERPL W P-5'-P-CCNC: 13 U/L (ref 1–33)
ANION GAP SERPL CALCULATED.3IONS-SCNC: 7.2 MMOL/L (ref 5–15)
AST SERPL-CCNC: 15 U/L (ref 1–32)
BACTERIA UR QL AUTO: ABNORMAL /HPF
BASOPHILS # BLD AUTO: 0.01 10*3/MM3 (ref 0–0.2)
BASOPHILS NFR BLD AUTO: 0.2 % (ref 0–1.5)
BILIRUB SERPL-MCNC: 0.7 MG/DL (ref 0–1.2)
BILIRUB UR QL STRIP: NEGATIVE
BUN SERPL-MCNC: 21 MG/DL (ref 8–23)
BUN/CREAT SERPL: 19.4 (ref 7–25)
CALCIUM SPEC-SCNC: 9.2 MG/DL (ref 8.6–10.5)
CHLORIDE SERPL-SCNC: 104 MMOL/L (ref 98–107)
CLARITY UR: CLEAR
CO2 SERPL-SCNC: 28.8 MMOL/L (ref 22–29)
COLOR UR: YELLOW
CREAT SERPL-MCNC: 1.08 MG/DL (ref 0.57–1)
DEPRECATED RDW RBC AUTO: 42 FL (ref 37–54)
EOSINOPHIL # BLD AUTO: 0.06 10*3/MM3 (ref 0–0.4)
EOSINOPHIL NFR BLD AUTO: 1.3 % (ref 0.3–6.2)
ERYTHROCYTE [DISTWIDTH] IN BLOOD BY AUTOMATED COUNT: 13.7 % (ref 12.3–15.4)
GFR SERPL CREATININE-BSD FRML MDRD: 61 ML/MIN/1.73
GLOBULIN UR ELPH-MCNC: 2.7 GM/DL
GLUCOSE SERPL-MCNC: 92 MG/DL (ref 65–99)
GLUCOSE UR STRIP-MCNC: NEGATIVE MG/DL
HCT VFR BLD AUTO: 33.8 % (ref 34–46.6)
HGB BLD-MCNC: 11.3 G/DL (ref 12–15.9)
HGB UR QL STRIP.AUTO: NEGATIVE
HOLD SPECIMEN: NORMAL
HOLD SPECIMEN: NORMAL
HYALINE CASTS UR QL AUTO: ABNORMAL /LPF
IMM GRANULOCYTES # BLD AUTO: 0.01 10*3/MM3 (ref 0–0.05)
IMM GRANULOCYTES NFR BLD AUTO: 0.2 % (ref 0–0.5)
KETONES UR QL STRIP: NEGATIVE
LEUKOCYTE ESTERASE UR QL STRIP.AUTO: ABNORMAL
LIPASE SERPL-CCNC: 19 U/L (ref 13–60)
LYMPHOCYTES # BLD AUTO: 1.07 10*3/MM3 (ref 0.7–3.1)
LYMPHOCYTES NFR BLD AUTO: 23.2 % (ref 19.6–45.3)
MAGNESIUM SERPL-MCNC: 2.1 MG/DL (ref 1.6–2.4)
MCH RBC QN AUTO: 28.4 PG (ref 26.6–33)
MCHC RBC AUTO-ENTMCNC: 33.4 G/DL (ref 31.5–35.7)
MCV RBC AUTO: 84.9 FL (ref 79–97)
MONOCYTES # BLD AUTO: 0.63 10*3/MM3 (ref 0.1–0.9)
MONOCYTES NFR BLD AUTO: 13.7 % (ref 5–12)
NEUTROPHILS NFR BLD AUTO: 2.83 10*3/MM3 (ref 1.7–7)
NEUTROPHILS NFR BLD AUTO: 61.4 % (ref 42.7–76)
NITRITE UR QL STRIP: NEGATIVE
NRBC BLD AUTO-RTO: 0 /100 WBC (ref 0–0.2)
PH UR STRIP.AUTO: <=5 [PH] (ref 5–8)
PLATELET # BLD AUTO: 327 10*3/MM3 (ref 140–450)
PMV BLD AUTO: 9 FL (ref 6–12)
POTASSIUM SERPL-SCNC: 4.6 MMOL/L (ref 3.5–5.2)
PROT SERPL-MCNC: 6.8 G/DL (ref 6–8.5)
PROT UR QL STRIP: NEGATIVE
QT INTERVAL: 405 MS
RBC # BLD AUTO: 3.98 10*6/MM3 (ref 3.77–5.28)
RBC # UR: ABNORMAL /HPF
REF LAB TEST METHOD: ABNORMAL
SODIUM SERPL-SCNC: 140 MMOL/L (ref 136–145)
SP GR UR STRIP: >=1.03 (ref 1–1.03)
SQUAMOUS #/AREA URNS HPF: ABNORMAL /HPF
TROPONIN T SERPL-MCNC: <0.01 NG/ML (ref 0–0.03)
UROBILINOGEN UR QL STRIP: ABNORMAL
WBC # BLD AUTO: 4.61 10*3/MM3 (ref 3.4–10.8)
WBC UR QL AUTO: ABNORMAL /HPF
WHOLE BLOOD HOLD SPECIMEN: NORMAL
WHOLE BLOOD HOLD SPECIMEN: NORMAL

## 2021-05-23 PROCEDURE — 83735 ASSAY OF MAGNESIUM: CPT | Performed by: EMERGENCY MEDICINE

## 2021-05-23 PROCEDURE — 25010000002 ONDANSETRON PER 1 MG: Performed by: EMERGENCY MEDICINE

## 2021-05-23 PROCEDURE — 74177 CT ABD & PELVIS W/CONTRAST: CPT

## 2021-05-23 PROCEDURE — 93005 ELECTROCARDIOGRAM TRACING: CPT | Performed by: EMERGENCY MEDICINE

## 2021-05-23 PROCEDURE — 96374 THER/PROPH/DIAG INJ IV PUSH: CPT

## 2021-05-23 PROCEDURE — 81001 URINALYSIS AUTO W/SCOPE: CPT | Performed by: EMERGENCY MEDICINE

## 2021-05-23 PROCEDURE — 25010000002 IOPAMIDOL 61 % SOLUTION: Performed by: EMERGENCY MEDICINE

## 2021-05-23 PROCEDURE — 85025 COMPLETE CBC W/AUTO DIFF WBC: CPT | Performed by: EMERGENCY MEDICINE

## 2021-05-23 PROCEDURE — 83690 ASSAY OF LIPASE: CPT | Performed by: EMERGENCY MEDICINE

## 2021-05-23 PROCEDURE — 93010 ELECTROCARDIOGRAM REPORT: CPT | Performed by: INTERNAL MEDICINE

## 2021-05-23 PROCEDURE — 84484 ASSAY OF TROPONIN QUANT: CPT | Performed by: EMERGENCY MEDICINE

## 2021-05-23 PROCEDURE — 80053 COMPREHEN METABOLIC PANEL: CPT | Performed by: EMERGENCY MEDICINE

## 2021-05-23 PROCEDURE — 99283 EMERGENCY DEPT VISIT LOW MDM: CPT

## 2021-05-23 RX ORDER — SODIUM CHLORIDE 0.9 % (FLUSH) 0.9 %
10 SYRINGE (ML) INJECTION AS NEEDED
Status: DISCONTINUED | OUTPATIENT
Start: 2021-05-23 | End: 2021-05-23 | Stop reason: HOSPADM

## 2021-05-23 RX ORDER — ONDANSETRON 2 MG/ML
4 INJECTION INTRAMUSCULAR; INTRAVENOUS ONCE
Status: COMPLETED | OUTPATIENT
Start: 2021-05-23 | End: 2021-05-23

## 2021-05-23 RX ORDER — ONDANSETRON 4 MG/1
4 TABLET, FILM COATED ORAL EVERY 6 HOURS PRN
Qty: 10 TABLET | Refills: 0 | Status: SHIPPED | OUTPATIENT
Start: 2021-05-23 | End: 2022-04-18

## 2021-05-23 RX ADMIN — IOPAMIDOL 85 ML: 612 INJECTION, SOLUTION INTRAVENOUS at 18:25

## 2021-05-23 RX ADMIN — ONDANSETRON 4 MG: 2 INJECTION INTRAMUSCULAR; INTRAVENOUS at 16:58

## 2021-05-23 RX ADMIN — SODIUM CHLORIDE 1000 ML: 9 INJECTION, SOLUTION INTRAVENOUS at 16:20

## 2021-05-23 NOTE — ED PROVIDER NOTES
EMERGENCY DEPARTMENT ENCOUNTER    Room Number:  42/42  Date of encounter:  5/23/2021  PCP: Reyes, Rosenberg Acosta, MD  Historian: Patient and daughter      HPI:  Chief Complaint: Abdominal pain and diarrhea      Context: Sol Rojas is a 71 y.o. female who presents to the ED c/o lower abdominal pain and diarrhea for the past 48 hours.  The patient states that she had a Frankfurter on Thursday night and on Friday morning began with lower abdominal pain.  She took Dulcolax for that and since that time has had multiple episodes of diarrhea.  Last night the patient began to feel weak and today had a near syncopal episode at the urgent care center with a reported blood pressure of 80.  The patient now complains of some mild lower abdominal cramping but denies vomiting, fevers, chills, recent antibiotics, ill contacts, headache, sore throat, chest pain, cough, known COVID-19 exposure, lower extremity pain, lower extremity swelling or focal neuro deficit      PAST MEDICAL HISTORY  Active Ambulatory Problems     Diagnosis Date Noted   • Pain in left shin 05/01/2016   • Hyperglycemia 05/01/2016   • H/O total hip arthroplasty, left 01/15/2019     Resolved Ambulatory Problems     Diagnosis Date Noted   • Syncope 04/30/2016   • Dehydration 04/30/2016   • Azotemia 05/01/2016   • Hypoxemia 05/01/2016   • Hypermagnesemia 05/01/2016   • Orthostatic hypotension 05/01/2016   • ARF (acute renal failure) (CMS/HCC) 05/01/2016     Past Medical History:   Diagnosis Date   • Arthritis    • Bilateral knee pain    • Chest pain    • Disease of thyroid gland    • Hypertension    • Left hip pain    • Obesity    • Pulmonary hypertension (CMS/[facility])          PAST SURGICAL HISTORY  Past Surgical History:   Procedure Laterality Date   • CATARACT EXTRACTION Bilateral    • COLONOSCOPY N/A 4/13/2021    Procedure: COLONOSCOPY TO CECUM AND INTO TI;  Surgeon: Louie Smith MD;  Location: [facility] ENDOSCOPY;  Service: Gastroenterology;  Laterality:  N/A;  pre: history of polyps  post: diverticulosis   • COLONOSCOPY W/ BIOPSIES AND POLYPECTOMY      BENIGN   • HERNIA REPAIR      UMBILICAL HERNIA REPAIR   • LEG SURGERY Left     cellulitis   • TOTAL HIP ARTHROPLASTY Left 1/15/2019    Procedure: LEFT TOTAL HIP ARTHROPLASTY;  Surgeon: Hieu Orosco MD;  Location: [facility] OR;  Service: Orthopedics   • US GUIDED FINE NEEDLE ASPIRATION  4/15/2021         FAMILY HISTORY  Family History   Problem Relation Age of Onset   • Malig Hyperthermia Neg Hx          SOCIAL HISTORY  Social History     Socioeconomic History   • Marital status:      Spouse name: Not on file   • Number of children: Not on file   • Years of education: Not on file   • Highest education level: Not on file   Tobacco Use   • Smoking status: Never Smoker   • Smokeless tobacco: Never Used   Vaping Use   • Vaping Use: Never used   Substance and Sexual Activity   • Alcohol use: No   • Drug use: No   • Sexual activity: Defer         ALLERGIES  Augmentin [amoxicillin-pot clavulanate], Hydrocodone, Latex, Penicillins, and Percocet [oxycodone-acetaminophen]        REVIEW OF SYSTEMS  Review of Systems         All systems reviewed and negative except for those discussed in HPI.     PHYSICAL EXAM    I have reviewed the triage vital signs and nursing notes.    ED Triage Vitals [05/23/21 1448]   Temp Heart Rate Resp BP SpO2   98.4 °F (36.9 °C) 79 16 117/89 97 %      Temp src Heart Rate Source Patient Position BP Location FiO2 (%)   -- -- -- -- --       GENERAL: 71-year-old well developed, well nourished in no acute distress  HENT: NCAT, neck supple, trachea midline  EYES: no scleral icterus, PERRL, normal conjunctiva  CV: regular rhythm, regular rate, no murmur  RESPIRATORY: unlabored effort, CTAB  ABDOMEN: soft, bilateral lower quadrant tenderness with no guarding or rebound and diminished bowel sounds  MUSCULOSKELETAL: no gross deformity, no pedal edema, no calf tenderness  NEURO: alert,  sensory and motor  function of extremities grossly intact, speech clear, mental status normal/baseline  SKIN: warm, dry, no rash  PSYCH:  Appropriate mood and affect      PPE  Pt does not present with symptoms for COVID19; however, I was wearing a mask and goggles throughout all patient interaction.    Vital signs and nursing notes reviewed.      LAB RESULTS  Recent Results (from the past 24 hour(s))   Light Blue Top    Collection Time: 05/23/21  3:56 PM   Result Value Ref Range    Extra Tube hold for add-on    Green Top (Gel)    Collection Time: 05/23/21  3:56 PM   Result Value Ref Range    Extra Tube Hold for add-ons.    Lavender Top    Collection Time: 05/23/21  3:56 PM   Result Value Ref Range    Extra Tube hold for add-on    Gold Top - SST    Collection Time: 05/23/21  3:56 PM   Result Value Ref Range    Extra Tube Hold for add-ons.    Comprehensive Metabolic Panel    Collection Time: 05/23/21  3:56 PM    Specimen: Blood   Result Value Ref Range    Glucose 92 65 - 99 mg/dL    BUN 21 8 - 23 mg/dL    Creatinine 1.08 (H) 0.57 - 1.00 mg/dL    Sodium 140 136 - 145 mmol/L    Potassium 4.6 3.5 - 5.2 mmol/L    Chloride 104 98 - 107 mmol/L    CO2 28.8 22.0 - 29.0 mmol/L    Calcium 9.2 8.6 - 10.5 mg/dL    Total Protein 6.8 6.0 - 8.5 g/dL    Albumin 4.10 3.50 - 5.20 g/dL    ALT (SGPT) 13 1 - 33 U/L    AST (SGOT) 15 1 - 32 U/L    Alkaline Phosphatase 78 39 - 117 U/L    Total Bilirubin 0.7 0.0 - 1.2 mg/dL    eGFR  African Amer 61 >60 mL/min/1.73    Globulin 2.7 gm/dL    A/G Ratio 1.5 g/dL    BUN/Creatinine Ratio 19.4 7.0 - 25.0    Anion Gap 7.2 5.0 - 15.0 mmol/L   Lipase    Collection Time: 05/23/21  3:56 PM    Specimen: Blood   Result Value Ref Range    Lipase 19 13 - 60 U/L   Troponin    Collection Time: 05/23/21  3:56 PM    Specimen: Blood   Result Value Ref Range    Troponin T <0.010 0.000 - 0.030 ng/mL   Magnesium    Collection Time: 05/23/21  3:56 PM    Specimen: Blood   Result Value Ref Range    Magnesium 2.1 1.6 - 2.4 mg/dL   CBC Auto  Differential    Collection Time: 05/23/21  3:56 PM    Specimen: Blood   Result Value Ref Range    WBC 4.61 3.40 - 10.80 10*3/mm3    RBC 3.98 3.77 - 5.28 10*6/mm3    Hemoglobin 11.3 (L) 12.0 - 15.9 g/dL    Hematocrit 33.8 (L) 34.0 - 46.6 %    MCV 84.9 79.0 - 97.0 fL    MCH 28.4 26.6 - 33.0 pg    MCHC 33.4 31.5 - 35.7 g/dL    RDW 13.7 12.3 - 15.4 %    RDW-SD 42.0 37.0 - 54.0 fl    MPV 9.0 6.0 - 12.0 fL    Platelets 327 140 - 450 10*3/mm3    Neutrophil % 61.4 42.7 - 76.0 %    Lymphocyte % 23.2 19.6 - 45.3 %    Monocyte % 13.7 (H) 5.0 - 12.0 %    Eosinophil % 1.3 0.3 - 6.2 %    Basophil % 0.2 0.0 - 1.5 %    Immature Grans % 0.2 0.0 - 0.5 %    Neutrophils, Absolute 2.83 1.70 - 7.00 10*3/mm3    Lymphocytes, Absolute 1.07 0.70 - 3.10 10*3/mm3    Monocytes, Absolute 0.63 0.10 - 0.90 10*3/mm3    Eosinophils, Absolute 0.06 0.00 - 0.40 10*3/mm3    Basophils, Absolute 0.01 0.00 - 0.20 10*3/mm3    Immature Grans, Absolute 0.01 0.00 - 0.05 10*3/mm3    nRBC 0.0 0.0 - 0.2 /100 WBC   ECG 12 Lead    Collection Time: 05/23/21  4:35 PM   Result Value Ref Range    QT Interval 405 ms   Urinalysis With Microscopic If Indicated (No Culture) - Urine, Clean Catch    Collection Time: 05/23/21  8:00 PM    Specimen: Urine, Clean Catch   Result Value Ref Range    Color, UA Yellow Yellow, Straw    Appearance, UA Clear Clear    pH, UA <=5.0 5.0 - 8.0    Specific Gravity, UA >=1.030 1.005 - 1.030    Glucose, UA Negative Negative    Ketones, UA Negative Negative    Bilirubin, UA Negative Negative    Blood, UA Negative Negative    Protein, UA Negative Negative    Leuk Esterase, UA Small (1+) (A) Negative    Nitrite, UA Negative Negative    Urobilinogen, UA 0.2 E.U./dL 0.2 - 1.0 E.U./dL   Urinalysis, Microscopic Only - Urine, Clean Catch    Collection Time: 05/23/21  8:00 PM    Specimen: Urine, Clean Catch   Result Value Ref Range    RBC, UA 0-2 None Seen, 0-2 /HPF    WBC, UA 6-12 (A) None Seen, 0-2 /HPF    Bacteria, UA None Seen None Seen /HPF     Squamous Epithelial Cells, UA 0-2 None Seen, 0-2 /HPF    Hyaline Casts, UA None Seen None Seen /LPF    Methodology Automated Microscopy        Ordered the above labs and independently reviewed the results.        RADIOLOGY  CT Abdomen Pelvis With Contrast    Result Date: 5/23/2021  Patient: AKBAR DELA CRUZ  Time Out: 19:32 Exam(s): CT ABDOMEN + PELVIS With Contrast EXAM:   CT Abdomen and Pelvis With Intravenous Contrast CLINICAL HISTORY:    Lower abdominal pain with diarrhea. TECHNIQUE:   Axial computed tomography images of the abdomen and pelvis with intravenous contrast.  Sagittal and coronal reformatted images were created and reviewed.  CTDI is 28.40 mGy and DLP is 1579 mGy-cm.  This CT exam was performed according to the principle of ALARA (As Low As Reasonably Achievable) by using one or more of the following dose reduction techniques: automated exposure control, adjustment of the mA and or kV according to patient size, and or use of iterative reconstruction technique. COMPARISON:   CT abdomen pelvis dated 06 02 14. FINDINGS:   Limitations:  Beam hardening artifact from left hip hardware degrades evaluation in the left pelvis.   Lung bases:  Unremarkable. No consolidation. No effusions.  ABDOMEN:   Liver:  13 mm hypodensity in the right hepatic lobe, likely small simple cyst.  The liver otherwise appears unremarkable.   Gallbladder and bile ducts:  Unremarkable.  No calcified stones.  No ductal dilation.   Pancreas:  Unremarkable.  No mass.  No ductal dilation.   Spleen:  Unremarkable.  No splenomegaly.   Adrenals:  Unremarkable.  No mass.   Kidneys and ureters:  Unremarkable.  No solid mass.  No hydronephrosis.   Stomach and bowel:  Scattered luminal air-fluid levels throughout small bowel and colon, consistent with diarrheal disease.  No focal wall thickening.  No evidence of bowel obstruction.  Descending and sigmoid colonic diverticulosis without evidence of acute inflammation.  The stomach and GE  junction appear unremarkable.  PELVIS:   Appendix:  Incidental note of appendicolith at the base of the appendix without evidence of appendiceal inflammation.   Bladder:  Bladder is not visualized and may be surgically absent.   Reproductive:  The uterus and ovaries appear unremarkable.  ABDOMEN and PELVIS:   Intraperitoneal space:  Unremarkable.  No free air.  No significant fluid collection.   Bones joints:  Grade 1 anterolisthesis of L4 relative to L5 with 6 mm offset.  No spondylolysis.  Associated bilateral facet arthrosis.  Status post left hip replacement.  Hardware appears intact with expected alignment.   Soft tissues:  Unremarkable.   Vasculature:  Unremarkable.  No abdominal aortic aneurysm.   Lymph nodes:  Unremarkable.  No enlarged lymph nodes. IMPRESSION:     1.  Scattered luminal air-fluid levels throughout small bowel and colon, consistent with diarrheal disease.  No focal wall thickening.  No evidence of bowel obstruction. 2.  Descending and sigmoid colonic diverticulosis without evidence of acute inflammation. 3.  Incidental note of appendicolith at the base of the appendix without evidence of appendiceal inflammation. 4.  Grade 1 anterolisthesis of L4 relative to L5 with 6 mm offset.  No spondylolysis.  Associated bilateral facet arthrosis.     Electronically signed by Ricardo Parmar MD on 05-23-21 at 1932      I ordered the above noted radiological studies. Independently reviewed by me and discussed with radiologist.  See dictation above for official radiology interpretation.      PROCEDURES    Procedures        MEDICATIONS GIVEN IN ER    Medications   sodium chloride 0.9 % flush 10 mL (has no administration in time range)   sodium chloride 0.9 % bolus 1,000 mL (0 mL Intravenous Stopped 5/23/21 2110)   ondansetron (ZOFRAN) injection 4 mg (4 mg Intravenous Given 5/23/21 1658)   iopamidol (ISOVUE-300) 61 % injection 100 mL (85 mL Intravenous Given by Other 5/23/21 1829)         PROGRESS, DATA  ANALYSIS, CONSULTS, AND MEDICAL DECISION MAKING    All labs have been independently reviewed by me.  All radiology studies have been reviewed by me and discussed with radiologist dictating report.   EKG's independently reviewed by me.  Discussion below represents my analysis of pertinent findings related to patient's condition, differential diagnosis, treatment plan and final disposition.      ED Course as of May 23 2137   Sun May 23, 2021   1618 I will treat the patient with IV fluids while obtaining labs, EKG and abdominal CT for further evaluation.    [GP]   1637 EKG    EKG time: 1635  Rhythm/Rate: Sinus bradycardia 54  No Acute Ischemia  Non-Specific ST-T changes    Unchanged compared to prior on 1/8/2019 when her heart rate was 53    Interpreted Contemporaneously by me.  Independently viewed by me        [GP]   2107 The patient now feels better.  She is ambulated back and forth to the bathroom several times.  I advised her that her lab work is unremarkable and that her CT appears that she has some diarrhea.  I encouraged her to keep yourself hydrated and I gave her a prescription for Zofran and that she can follow-up with her PCP or return to the ER if worse.  The patient and daughter understand and agree with the plan.    [GP]      ED Course User Index  [GP] Osei Capps MD           The differential diagnosis includes but is not limited to gastritis, pancreatitis, cholecystitis, appendicitis, diverticulitis, urinary tract infection, kidney stone, or bowel obstruction.          AS OF 21:37 EDT VITALS:    BP - 119/72  HR - 64  TEMP - 98.4 °F (36.9 °C)  02 SATS - 100%        DIAGNOSIS  Final diagnoses:   Diarrhea, unspecified type   Dehydration         DISPOSITION  DISCHARGE    Patient discharged in stable condition.    Reviewed implications of results, diagnosis, meds, responsibility to follow up, warning signs and symptoms of possible worsening, potential complications and reasons to return to ER,  including fever, vomiting, worsening pain or passing out.    Patient/Family voiced understanding of above instructions.    Discussed plan for discharge, as there is no emergent indication for admission.  Pt/family is agreeable and understands need for follow up and repeat testing.  Pt is aware that discharge does not mean that nothing is wrong but it indicates no emergency is present and they must continue care with follow-up as given below or physician of their choice.     FOLLOW-UP  Reyes, Rosenberg Acosta, MD  [address] [place] [address]  [phone]    In 2 days  If symptoms worsen              EMR Dragon/Transcription disclaimer:   Much of this encounter note is an electronic transcription/translation of spoken language to printed text.        Osei Capps MD  05/23/21 6973

## 2021-05-23 NOTE — ED NOTES
Patient sent from [facility] after being sen for lightheadedness, hypotension, abdominal pain, n/v. Patients blood pressure found to be 80/60 at [facility].      Esmer Hassan, PENNY  05/23/21 5866

## 2021-07-01 ENCOUNTER — CLINICAL SUPPORT (OUTPATIENT)
Dept: ORTHOPEDIC SURGERY | Facility: CLINIC | Age: 71
End: 2021-07-01

## 2021-07-01 VITALS — TEMPERATURE: 97.8 F | HEIGHT: 66 IN | WEIGHT: 216 LBS | BODY MASS INDEX: 34.72 KG/M2

## 2021-07-01 DIAGNOSIS — M17.0 PRIMARY OSTEOARTHRITIS OF BOTH KNEES: Primary | ICD-10-CM

## 2021-07-01 PROCEDURE — 20610 DRAIN/INJ JOINT/BURSA W/O US: CPT | Performed by: NURSE PRACTITIONER

## 2021-07-01 RX ORDER — METHYLPREDNISOLONE ACETATE 80 MG/ML
80 INJECTION, SUSPENSION INTRA-ARTICULAR; INTRALESIONAL; INTRAMUSCULAR; SOFT TISSUE
Status: COMPLETED | OUTPATIENT
Start: 2021-07-01 | End: 2021-07-01

## 2021-07-01 RX ORDER — ALUMINUM HYDROXIDE, MAGNESIUM HYDROXIDE, DIMETHICONE 400; 400; 40 MG/5ML; MG/5ML; MG/5ML
1 LIQUID ORAL DAILY
COMMUNITY
End: 2022-07-20 | Stop reason: HOSPADM

## 2021-07-01 RX ORDER — LIDOCAINE HYDROCHLORIDE 10 MG/ML
4 INJECTION, SOLUTION EPIDURAL; INFILTRATION; INTRACAUDAL; PERINEURAL
Status: COMPLETED | OUTPATIENT
Start: 2021-07-01 | End: 2021-07-01

## 2021-07-01 RX ADMIN — LIDOCAINE HYDROCHLORIDE 4 ML: 10 INJECTION, SOLUTION EPIDURAL; INFILTRATION; INTRACAUDAL; PERINEURAL at 08:04

## 2021-07-01 RX ADMIN — METHYLPREDNISOLONE ACETATE 80 MG: 80 INJECTION, SUSPENSION INTRA-ARTICULAR; INTRALESIONAL; INTRAMUSCULAR; SOFT TISSUE at 08:04

## 2021-07-01 NOTE — PROGRESS NOTES
7/1/2021    Sol Rojas is here today for worsening knee pain. Pt has undergone injection of the knee in the past with good resolution of symptoms. Pt is requesting a repeat injection.     KNEE Injection Procedure Note:  Large Joint Arthrocentesis: R knee  Date/Time: 7/1/2021 8:04 AM  Consent given by: patient  Site marked: site marked  Timeout: Immediately prior to procedure a time out was called to verify the correct patient, procedure, equipment, support staff and site/side marked as required   Supporting Documentation  Indications: pain   Procedure Details  Location: knee - R knee  Preparation: Patient was prepped and draped in the usual sterile fashion  Needle gauge: 21G.  Approach: anterolateral  Medications administered: 80 mg methylPREDNISolone acetate 80 MG/ML; 4 mL lidocaine PF 1% 1 %  Patient tolerance: patient tolerated the procedure well with no immediate complications    Large Joint Arthrocentesis: L knee  Date/Time: 7/1/2021 8:04 AM  Consent given by: patient  Site marked: site marked  Timeout: Immediately prior to procedure a time out was called to verify the correct patient, procedure, equipment, support staff and site/side marked as required   Supporting Documentation  Indications: pain   Procedure Details  Location: knee - L knee  Preparation: Patient was prepped and draped in the usual sterile fashion  Needle gauge: 21G.  Approach: anterolateral  Medications administered: 80 mg methylPREDNISolone acetate 80 MG/ML; 4 mL lidocaine PF 1% 1 %  Patient tolerance: patient tolerated the procedure well with no immediate complications            At the conclusion of the injection I discussed the importance of continued quad strengthening exercises on a daily basis. I will see the patient back if the symptoms should fail to improve or worsen.    Alma Bowen, APRN  7/1/2021

## 2021-10-05 ENCOUNTER — CLINICAL SUPPORT (OUTPATIENT)
Dept: ORTHOPEDIC SURGERY | Facility: CLINIC | Age: 71
End: 2021-10-05

## 2021-10-05 VITALS — HEIGHT: 66 IN | WEIGHT: 213 LBS | BODY MASS INDEX: 34.23 KG/M2 | TEMPERATURE: 98.2 F

## 2021-10-05 DIAGNOSIS — M17.0 PRIMARY OSTEOARTHRITIS OF BOTH KNEES: Primary | ICD-10-CM

## 2021-10-05 DIAGNOSIS — R52 PAIN: ICD-10-CM

## 2021-10-05 PROCEDURE — 73562 X-RAY EXAM OF KNEE 3: CPT | Performed by: NURSE PRACTITIONER

## 2021-10-05 PROCEDURE — 20610 DRAIN/INJ JOINT/BURSA W/O US: CPT | Performed by: NURSE PRACTITIONER

## 2021-10-05 RX ORDER — METHYLPREDNISOLONE ACETATE 80 MG/ML
80 INJECTION, SUSPENSION INTRA-ARTICULAR; INTRALESIONAL; INTRAMUSCULAR; SOFT TISSUE
Status: COMPLETED | OUTPATIENT
Start: 2021-10-05 | End: 2021-10-05

## 2021-10-05 RX ADMIN — METHYLPREDNISOLONE ACETATE 80 MG: 80 INJECTION, SUSPENSION INTRA-ARTICULAR; INTRALESIONAL; INTRAMUSCULAR; SOFT TISSUE at 16:05

## 2021-10-05 RX ADMIN — METHYLPREDNISOLONE ACETATE 80 MG: 80 INJECTION, SUSPENSION INTRA-ARTICULAR; INTRALESIONAL; INTRAMUSCULAR; SOFT TISSUE at 16:06

## 2021-10-05 NOTE — PROGRESS NOTES
10/5/2021    Sol Rojas is here today for worsening knee pain. Pt has undergone injection of the knee in the past with good resolution of symptoms. Pt is requesting a repeat injection.   Radiology: AP, lateral, 40 degree PA of bilateral knees obtained in the office today due to pain with prior comparison shows advanced osteoarthritis of bilateral knees with varus pattern    KNEE Injection Procedure Note:      Large Joint Arthrocentesis: R knee  Date/Time: 10/5/2021 4:05 PM  Consent given by: patient  Site marked: site marked  Timeout: Immediately prior to procedure a time out was called to verify the correct patient, procedure, equipment, support staff and site/side marked as required   Supporting Documentation  Indications: pain   Procedure Details  Location: knee - R knee  Preparation: Patient was prepped and draped in the usual sterile fashion  Needle gauge: 21g.  Approach: lateral  Medications administered: 80 mg methylPREDNISolone acetate 80 MG/ML; 4 mL lidocaine (cardiac)  Patient tolerance: patient tolerated the procedure well with no immediate complications    Large Joint Arthrocentesis: L knee  Date/Time: 10/5/2021 4:06 PM  Consent given by: patient  Site marked: site marked  Timeout: Immediately prior to procedure a time out was called to verify the correct patient, procedure, equipment, support staff and site/side marked as required   Supporting Documentation  Indications: pain   Procedure Details  Location: knee - L knee  Preparation: Patient was prepped and draped in the usual sterile fashion  Needle gauge: 21g.  Approach: lateral  Medications administered: 80 mg methylPREDNISolone acetate 80 MG/ML; 4 mL lidocaine (cardiac)  Patient tolerance: patient tolerated the procedure well with no immediate complications      At the conclusion of the injection I discussed the importance of continued quad strengthening exercises on a daily basis. I will see the patient back if the symptoms should fail to  improve or worsen.    Alma Bowen, APRN  10/5/2021

## 2021-10-16 ENCOUNTER — IMMUNIZATION (OUTPATIENT)
Dept: VACCINE CLINIC | Facility: HOSPITAL | Age: 71
End: 2021-10-16

## 2021-10-16 PROCEDURE — 91300 HC SARSCOV02 VAC 30MCG/0.3ML IM: CPT | Performed by: INTERNAL MEDICINE

## 2021-10-16 PROCEDURE — 0004A ADM SARSCOV2 30MCG/0.3ML BOOSTER: CPT | Performed by: INTERNAL MEDICINE

## 2021-12-28 ENCOUNTER — TRANSCRIBE ORDERS (OUTPATIENT)
Dept: ADMINISTRATIVE | Facility: HOSPITAL | Age: 71
End: 2021-12-28

## 2021-12-28 DIAGNOSIS — E04.1 THYROID NODULE: Primary | ICD-10-CM

## 2022-01-04 ENCOUNTER — HOSPITAL ENCOUNTER (OUTPATIENT)
Dept: ULTRASOUND IMAGING | Facility: HOSPITAL | Age: 72
Discharge: HOME | End: 2022-01-04
Admitting: FAMILY MEDICINE

## 2022-01-04 DIAGNOSIS — E04.1 THYROID NODULE: ICD-10-CM

## 2022-01-04 PROCEDURE — 76536 US EXAM OF HEAD AND NECK: CPT

## 2022-01-07 ENCOUNTER — CLINICAL SUPPORT (OUTPATIENT)
Dept: ORTHOPEDIC SURGERY | Facility: CLINIC | Age: 72
End: 2022-01-07

## 2022-01-07 VITALS — WEIGHT: 213 LBS | BODY MASS INDEX: 34.23 KG/M2 | TEMPERATURE: 97.5 F | HEIGHT: 66 IN

## 2022-01-07 DIAGNOSIS — M17.0 PRIMARY OSTEOARTHRITIS OF BOTH KNEES: Primary | ICD-10-CM

## 2022-01-07 PROCEDURE — 20610 DRAIN/INJ JOINT/BURSA W/O US: CPT | Performed by: NURSE PRACTITIONER

## 2022-01-07 RX ORDER — METHYLPREDNISOLONE ACETATE 40 MG/ML
80 INJECTION, SUSPENSION INTRA-ARTICULAR; INTRALESIONAL; INTRAMUSCULAR; SOFT TISSUE
Status: COMPLETED | OUTPATIENT
Start: 2022-01-07 | End: 2022-01-07

## 2022-01-07 RX ADMIN — METHYLPREDNISOLONE ACETATE 80 MG: 40 INJECTION, SUSPENSION INTRA-ARTICULAR; INTRALESIONAL; INTRAMUSCULAR; SOFT TISSUE at 14:23

## 2022-01-07 RX ADMIN — METHYLPREDNISOLONE ACETATE 80 MG: 40 INJECTION, SUSPENSION INTRA-ARTICULAR; INTRALESIONAL; INTRAMUSCULAR; SOFT TISSUE at 14:22

## 2022-01-07 NOTE — PROGRESS NOTES
1/7/2022    Sol Rojas is here today for worsening knee pain. Pt has undergone injection of the knee in the past with good resolution of symptoms. Pt is requesting a repeat injection.       KNEE Injection Procedure Note:  Large Joint Arthrocentesis: R knee  Date/Time: 1/7/2022 2:22 PM  Consent given by: patient  Site marked: site marked  Timeout: Immediately prior to procedure a time out was called to verify the correct patient, procedure, equipment, support staff and site/side marked as required   Supporting Documentation  Indications: pain   Procedure Details  Location: knee - R knee  Preparation: Patient was prepped and draped in the usual sterile fashion  Needle gauge: 21G.  Approach: anterolateral  Medications administered: 4 mL lidocaine (cardiac); 80 mg methylPREDNISolone acetate 40 MG/ML  Patient tolerance: patient tolerated the procedure well with no immediate complications    Large Joint Arthrocentesis: L knee  Date/Time: 1/7/2022 2:23 PM  Consent given by: patient  Site marked: site marked  Timeout: Immediately prior to procedure a time out was called to verify the correct patient, procedure, equipment, support staff and site/side marked as required   Supporting Documentation  Indications: pain   Procedure Details  Location: knee - L knee  Preparation: Patient was prepped and draped in the usual sterile fashion  Needle gauge: 21G.  Approach: anterolateral  Medications administered: 4 mL lidocaine (cardiac); 80 mg methylPREDNISolone acetate 40 MG/ML  Patient tolerance: patient tolerated the procedure well with no immediate complications          At the conclusion of the injection I discussed the importance of continued quad strengthening exercises on a daily basis. I will see the patient back if the symptoms should fail to improve or worsen.    Alma Bowen, APRN  1/7/2022

## 2022-04-07 ENCOUNTER — CLINICAL SUPPORT (OUTPATIENT)
Dept: ORTHOPEDIC SURGERY | Facility: CLINIC | Age: 72
End: 2022-04-07

## 2022-04-07 VITALS — TEMPERATURE: 97.1 F | HEIGHT: 66 IN | WEIGHT: 213 LBS | BODY MASS INDEX: 34.23 KG/M2

## 2022-04-07 DIAGNOSIS — M17.0 PRIMARY OSTEOARTHRITIS OF BOTH KNEES: Primary | ICD-10-CM

## 2022-04-07 PROCEDURE — 20610 DRAIN/INJ JOINT/BURSA W/O US: CPT | Performed by: NURSE PRACTITIONER

## 2022-04-07 RX ORDER — METHYLPREDNISOLONE ACETATE 80 MG/ML
80 INJECTION, SUSPENSION INTRA-ARTICULAR; INTRALESIONAL; INTRAMUSCULAR; SOFT TISSUE
Status: COMPLETED | OUTPATIENT
Start: 2022-04-07 | End: 2022-04-07

## 2022-04-07 RX ADMIN — METHYLPREDNISOLONE ACETATE 80 MG: 80 INJECTION, SUSPENSION INTRA-ARTICULAR; INTRALESIONAL; INTRAMUSCULAR; SOFT TISSUE at 09:07

## 2022-04-07 RX ADMIN — METHYLPREDNISOLONE ACETATE 80 MG: 80 INJECTION, SUSPENSION INTRA-ARTICULAR; INTRALESIONAL; INTRAMUSCULAR; SOFT TISSUE at 09:08

## 2022-04-07 NOTE — PROGRESS NOTES
HPI: Patient Dr. Parr here today for cortisone injection to both knees these work well for her masks were worn by everyone for the duration of the visit.  She went to talk to her knee replacement on the left told her she would have to have cardiac clearance and clearance from her primary care and continue to work on weight loss and follow-up with Dr. Adam in regards to this decisions      Skin is warm dry intact to both knees with medial joint line tenderness and effusion calves are soft and nontender  Large Joint Arthrocentesis: R knee  Date/Time: 4/7/2022 9:07 AM  Consent given by: patient  Site marked: site marked  Timeout: Immediately prior to procedure a time out was called to verify the correct patient, procedure, equipment, support staff and site/side marked as required   Supporting Documentation  Indications: pain   Procedure Details  Location: knee - R knee  Preparation: Patient was prepped and draped in the usual sterile fashion  Needle gauge: 21.  Approach: medial  Medications administered: 1 mL lidocaine (cardiac); 80 mg methylPREDNISolone acetate 80 MG/ML  Patient tolerance: patient tolerated the procedure well with no immediate complications    Large Joint Arthrocentesis: L knee  Date/Time: 4/7/2022 9:08 AM  Consent given by: patient  Site marked: site marked  Timeout: Immediately prior to procedure a time out was called to verify the correct patient, procedure, equipment, support staff and site/side marked as required   Supporting Documentation  Indications: pain   Procedure Details  Location: knee - L knee  Preparation: Patient was prepped and draped in the usual sterile fashion  Needle gauge: 21.  Approach: medial  Medications administered: 1 mL lidocaine (cardiac); 80 mg methylPREDNISolone acetate 80 MG/ML  Patient tolerance: patient tolerated the procedure well with no immediate complications

## 2022-04-18 ENCOUNTER — OFFICE VISIT (OUTPATIENT)
Dept: CARDIOLOGY | Facility: CLINIC | Age: 72
End: 2022-04-18

## 2022-04-18 VITALS
BODY MASS INDEX: 33.75 KG/M2 | HEIGHT: 66 IN | DIASTOLIC BLOOD PRESSURE: 75 MMHG | WEIGHT: 210 LBS | SYSTOLIC BLOOD PRESSURE: 110 MMHG | HEART RATE: 68 BPM

## 2022-04-18 DIAGNOSIS — Z01.818 PRE-OP TESTING: Primary | ICD-10-CM

## 2022-04-18 DIAGNOSIS — R94.31 ABNORMAL ELECTROCARDIOGRAM (ECG) (EKG): ICD-10-CM

## 2022-04-18 DIAGNOSIS — R06.02 SOB (SHORTNESS OF BREATH): ICD-10-CM

## 2022-04-18 PROCEDURE — 93000 ELECTROCARDIOGRAM COMPLETE: CPT | Performed by: INTERNAL MEDICINE

## 2022-04-18 PROCEDURE — 99203 OFFICE O/P NEW LOW 30 MIN: CPT | Performed by: INTERNAL MEDICINE

## 2022-04-18 NOTE — PROGRESS NOTES
"SURGERY CLEARANCE FOR LEFT KNEE SURGERY. DR. LAFLEUR   Subjective:        [facility]  Cardiology Consult Note    Patient Identification:  Name: Sol Rojas  Age: 72 y.o.  Sex: female  :  1950  MRN: [id number]             CC  KNEE SURG CLEARANCE    HTN  SOB ON WALKING FAST      History of Present Illness:   72 yr old with htn and sob, here for pre op clearance for knee surgery, no cp    Comorbid cardiac risk factors:     Past Medical History:  Past Medical History:   Diagnosis Date   • Arthritis     OSTEO   • Bilateral knee pain    • Chest pain     SAW A CARDIOLOGIST--PER PT, EVERYTHING WAS \"OKAY.\"   • Disease of thyroid gland     POLYP   • Hypertension    • Left hip pain    • Obesity    • Pulmonary hypertension (HCC)      Past Surgical History:  Past Surgical History:   Procedure Laterality Date   • CATARACT EXTRACTION Bilateral    • COLONOSCOPY N/A 2021    Procedure: COLONOSCOPY TO CECUM AND INTO TI;  Surgeon: Louie Lafleur MD;  Location: [facility] ENDOSCOPY;  Service: Gastroenterology;  Laterality: N/A;  pre: history of polyps  post: diverticulosis   • COLONOSCOPY W/ BIOPSIES AND POLYPECTOMY      BENIGN   • HERNIA REPAIR      UMBILICAL HERNIA REPAIR   • LEG SURGERY Left     cellulitis   • TOTAL HIP ARTHROPLASTY Left 1/15/2019    Procedure: LEFT TOTAL HIP ARTHROPLASTY;  Surgeon: Hieu Orosco MD;  Location: [facility] MAIN OR;  Service: Orthopedics   • US GUIDED FINE NEEDLE ASPIRATION  4/15/2021      Allergies:  Allergies   Allergen Reactions   • Augmentin [Amoxicillin-Pot Clavulanate] GI Intolerance   • Hydrocodone Nausea Only and GI Intolerance   • Latex Hives   • Penicillins Itching, Nausea Only and GI Intolerance   • Percocet [Oxycodone-Acetaminophen] GI Intolerance     Home Meds:  (Not in a hospital admission)    Current Meds:   [unfilled]  Social History:   Social History     Tobacco Use   • Smoking status: Never Smoker   • Smokeless tobacco: Never Used   Substance Use Topics   • " Alcohol use: No      Family History:  Family History   Problem Relation Age of Onset   • Malig Hyperthermia Neg Hx         Review of Systems    Constitutional: No weakness,fatigue, fever, rigors, chills   Eyes: No vision changes, eye pain   ENT/oropharynx: No difficulty swallowing, sore throat, epistaxis, changes in hearing   Cardiovascular: No chest pain, chest tightness, palpitations, paroxysmal nocturnal dyspnea, orthopnea, diaphoresis, dizziness / syncopal episode   Respiratory: mild shortness of breath, dyspnea on exertion, cough, wheezing hemoptysis   Gastrointestinal: No abdominal pain, nausea, vomiting, diarrhea, bloody stools   Genitourinary: No hematuria, dysuria   Neurological: No headache, tremors, numbness,  one-sided weakness    Musculoskeletal: No cramps, myalgias,  joint pain, joint swelling   Integument: No rash, edema           Constitutional:  Heart Rate:  [68] 68  BP: (110)/(75) 110/75    Physical Exam   General:  Appears in no acute distress  Eyes: PERTL,  HEENT:  No JVD. Thyroid not visibly enlarged. No mucosal pallor or cyanosis  Respiratory: Respirations regular and unlabored at rest. BBS with good air entry in all fields. No crackles, rubs or wheezes auscultated  Cardiovascular: S1S2 Regular rate and rhythm. No murmur, rub or gallop auscultated. No carotid bruits. DP/PT pulses    . No pretibial pitting edema  Gastrointestinal: Abdomen soft, flat, non tender. Bowel sounds present. No hepatosplenomegaly. No ascites  Musculoskeletal: LONG x4. No abnormal movements  Extremities: No digital clubbing or cyanosis  Skin: Color pink. Skin warm and dry to touch. No rashes  No xanthoma  Neuro: AAO x3 CN II-XII grossly intact            ECG 12 Lead    Date/Time: 4/18/2022 1:44 PM  Performed by: Deepak Myers MD  Authorized by: Deepak Myers MD   Comparison: compared with previous ECG   Similar to previous ECG  Rhythm: sinus rhythm  ST Flattening: all    Clinical impression: non-specific  ECG                Cardiographics  ECG:     Telemetry:    Echocardiogram:     Imaging  Chest X-ray:     Lab Review               @[email or web address]@              Assessment:/ Recommendations / Plan:   Patient Active Problem List   Diagnosis   • Pain in left shin   • Hyperglycemia   • H/O total hip arthroplasty, left                    ICD-10-CM ICD-9-CM   1. Pre-op testing  Z01.818 V72.84   2. Abnormal electrocardiogram (ECG) (EKG)   R94.31 794.31   3. Abnormal electrocardiogram (ECG) (EKG)  R94.31 794.31   4. SOB (shortness of breath)  R06.02 786.05     1. Pre-op testing  Considering the patient's symptoms as well as clinical situation and  EKG findings, along with cardiac risk factors, ischemic workup is necessary to rule out ischemic cardiomyopathy, stress induced arrhythmias, and functional capacity for diagnosis as well as prognostic consideration    - Stress Test With Myocardial Perfusion One Day  - Adult Transthoracic Echo Complete W/ Cont if Necessary Per Protocol    2. Abnormal electrocardiogram (ECG) (EKG)   Considering patient's medical condition as well as the risk factors, patient will require echocardiogram for further evaluation for the LV function, four-chamber evaluation, including the pressures, valvular function and  pericardial disease and pericardial effusion    - Stress Test With Myocardial Perfusion One Day    3. Abnormal electrocardiogram (ECG) (EKG)  Considering the patient's symptoms as well as clinical situation and  EKG findings, along with cardiac risk factors, ischemic workup is necessary to rule out ischemic cardiomyopathy, stress induced arrhythmias, and functional capacity for diagnosis as well as prognostic consideration    - Stress Test With Myocardial Perfusion One Day    4. SOB (shortness of breath)  Considering the patient's symptoms as well as clinical situation and  EKG findings, along with cardiac risk factors, ischemic workup is necessary to rule out ischemic cardiomyopathy,  stress induced arrhythmias, and functional capacity for diagnosis as well as prognostic consideration       WALKING RAUL, ECHO FOR SURGERY CLEARANCE  FOLLOW UP    Labs/tests ordered for am    Deepak Myers MD  4/18/2022, 13:42 EDT      EMR Dragon/Transcription:   Dictated utilizing Dragon dictation

## 2022-04-19 ENCOUNTER — HOSPITAL ENCOUNTER (OUTPATIENT)
Dept: CARDIOLOGY | Facility: HOSPITAL | Age: 72
Discharge: HOME | End: 2022-04-19

## 2022-04-19 ENCOUNTER — HOSPITAL ENCOUNTER (OUTPATIENT)
Dept: CARDIOLOGY | Facility: HOSPITAL | Age: 72
Discharge: HOME | End: 2022-04-19
Admitting: INTERNAL MEDICINE

## 2022-04-19 VITALS
SYSTOLIC BLOOD PRESSURE: 109 MMHG | HEIGHT: 66 IN | BODY MASS INDEX: 33.75 KG/M2 | WEIGHT: 210 LBS | HEART RATE: 52 BPM | DIASTOLIC BLOOD PRESSURE: 74 MMHG

## 2022-04-19 VITALS
SYSTOLIC BLOOD PRESSURE: 120 MMHG | HEART RATE: 54 BPM | RESPIRATION RATE: 18 BRPM | HEIGHT: 66 IN | BODY MASS INDEX: 33.75 KG/M2 | WEIGHT: 210 LBS | DIASTOLIC BLOOD PRESSURE: 64 MMHG

## 2022-04-19 LAB
BH CV ECHO MEAS - ACS: 1.65 CM
BH CV ECHO MEAS - AO MAX PG: 11.1 MMHG
BH CV ECHO MEAS - AO MEAN PG: 5.2 MMHG
BH CV ECHO MEAS - AO ROOT DIAM: 2.26 CM
BH CV ECHO MEAS - AO V2 MAX: 166.5 CM/SEC
BH CV ECHO MEAS - AO V2 VTI: 41.6 CM
BH CV ECHO MEAS - AVA(I,D): 3 CM2
BH CV ECHO MEAS - EDV(CUBED): 81.1 ML
BH CV ECHO MEAS - EDV(MOD-SP2): 71 ML
BH CV ECHO MEAS - EDV(MOD-SP4): 64 ML
BH CV ECHO MEAS - EF(MOD-BP): 56.1 %
BH CV ECHO MEAS - EF(MOD-SP2): 57.7 %
BH CV ECHO MEAS - EF(MOD-SP4): 54.7 %
BH CV ECHO MEAS - ESV(CUBED): 29.5 ML
BH CV ECHO MEAS - ESV(MOD-SP2): 30 ML
BH CV ECHO MEAS - ESV(MOD-SP4): 29 ML
BH CV ECHO MEAS - FS: 28.6 %
BH CV ECHO MEAS - IVS/LVPW: 0.92 CM
BH CV ECHO MEAS - IVSD: 1.11 CM
BH CV ECHO MEAS - LA DIMENSION: 4.6 CM
BH CV ECHO MEAS - LAT PEAK E' VEL: 10 CM/SEC
BH CV ECHO MEAS - LV DIASTOLIC VOL/BSA (35-75): 31.3 CM2
BH CV ECHO MEAS - LV MASS(C)D: 177.8 GRAMS
BH CV ECHO MEAS - LV MAX PG: 7.2 MMHG
BH CV ECHO MEAS - LV MEAN PG: 3.1 MMHG
BH CV ECHO MEAS - LV SYSTOLIC VOL/BSA (12-30): 14.2 CM2
BH CV ECHO MEAS - LV V1 MAX: 134.4 CM/SEC
BH CV ECHO MEAS - LV V1 VTI: 32.9 CM
BH CV ECHO MEAS - LVIDD: 4.3 CM
BH CV ECHO MEAS - LVIDS: 3.1 CM
BH CV ECHO MEAS - LVOT AREA: 3.8 CM2
BH CV ECHO MEAS - LVOT DIAM: 2.2 CM
BH CV ECHO MEAS - LVPWD: 1.21 CM
BH CV ECHO MEAS - MED PEAK E' VEL: 8.5 CM/SEC
BH CV ECHO MEAS - MV A DUR: 0.15 SEC
BH CV ECHO MEAS - MV A MAX VEL: 78.1 CM/SEC
BH CV ECHO MEAS - MV DEC SLOPE: 320.2 CM/SEC2
BH CV ECHO MEAS - MV DEC TIME: 143 MSEC
BH CV ECHO MEAS - MV E MAX VEL: 75.7 CM/SEC
BH CV ECHO MEAS - MV E/A: 0.97
BH CV ECHO MEAS - MV MAX PG: 3.9 MMHG
BH CV ECHO MEAS - MV MEAN PG: 1.34 MMHG
BH CV ECHO MEAS - MV V2 VTI: 39.2 CM
BH CV ECHO MEAS - MVA(VTI): 3.2 CM2
BH CV ECHO MEAS - PA ACC TIME: 0.13 SEC
BH CV ECHO MEAS - PA PR(ACCEL): 20.8 MMHG
BH CV ECHO MEAS - PA V2 MAX: 95 CM/SEC
BH CV ECHO MEAS - PULM A REVS VEL: 37.8 CM/SEC
BH CV ECHO MEAS - PULM DIAS VEL: 35.9 CM/SEC
BH CV ECHO MEAS - PULM SYS VEL: 52.4 CM/SEC
BH CV ECHO MEAS - RAP SYSTOLE: 3 MMHG
BH CV ECHO MEAS - RV MAX PG: 1.66 MMHG
BH CV ECHO MEAS - RV V1 MAX: 64.5 CM/SEC
BH CV ECHO MEAS - RV V1 VTI: 17.7 CM
BH CV ECHO MEAS - RVDD: 3.6 CM
BH CV ECHO MEAS - RVSP: 30.2 MMHG
BH CV ECHO MEAS - SI(MOD-SP2): 20.1 ML/M2
BH CV ECHO MEAS - SI(MOD-SP4): 17.1 ML/M2
BH CV ECHO MEAS - SV(LVOT): 125.1 ML
BH CV ECHO MEAS - SV(MOD-SP2): 41 ML
BH CV ECHO MEAS - SV(MOD-SP4): 35 ML
BH CV ECHO MEAS - TAPSE (>1.6): 2.6 CM
BH CV ECHO MEAS - TR MAX PG: 27.2 MMHG
BH CV ECHO MEAS - TR MAX VEL: 260.6 CM/SEC
BH CV ECHO MEASUREMENTS AVERAGE E/E' RATIO: 8.18
BH CV XLRA - RV BASE: 3.3 CM
BH CV XLRA - RV LENGTH: 7.3 CM
BH CV XLRA - RV MID: 2.8 CM
BH CV XLRA - TDI S': 11.2 CM/SEC
LEFT ATRIUM VOLUME INDEX: 30.6 ML/M2
MAXIMAL PREDICTED HEART RATE: 150 BPM
SINUS: 2.7 CM
STJ: 2.7 CM
STRESS TARGET HR: 128 BPM

## 2022-04-19 PROCEDURE — 93306 TTE W/DOPPLER COMPLETE: CPT

## 2022-04-19 PROCEDURE — 93018 CV STRESS TEST I&R ONLY: CPT | Performed by: INTERNAL MEDICINE

## 2022-04-19 PROCEDURE — 78452 HT MUSCLE IMAGE SPECT MULT: CPT | Performed by: INTERNAL MEDICINE

## 2022-04-19 PROCEDURE — 0 TECHNETIUM SESTAMIBI: Performed by: INTERNAL MEDICINE

## 2022-04-19 PROCEDURE — A9500 TC99M SESTAMIBI: HCPCS | Performed by: INTERNAL MEDICINE

## 2022-04-19 PROCEDURE — 93306 TTE W/DOPPLER COMPLETE: CPT | Performed by: INTERNAL MEDICINE

## 2022-04-19 PROCEDURE — 93016 CV STRESS TEST SUPVJ ONLY: CPT

## 2022-04-19 PROCEDURE — 25010000002 REGADENOSON 0.4 MG/5ML SOLUTION: Performed by: INTERNAL MEDICINE

## 2022-04-19 PROCEDURE — 78452 HT MUSCLE IMAGE SPECT MULT: CPT

## 2022-04-19 PROCEDURE — 93017 CV STRESS TEST TRACING ONLY: CPT

## 2022-04-19 RX ADMIN — TECHNETIUM TC 99M SESTAMIBI 1 DOSE: 1 INJECTION INTRAVENOUS at 10:04

## 2022-04-19 RX ADMIN — REGADENOSON 0.4 MG: 0.08 INJECTION, SOLUTION INTRAVENOUS at 10:04

## 2022-04-19 RX ADMIN — TECHNETIUM TC 99M SESTAMIBI 1 DOSE: 1 INJECTION INTRAVENOUS at 07:16

## 2022-04-21 LAB
BH CV REST NUCLEAR ISOTOPE DOSE: 10.9 MCI
BH CV STRESS BP STAGE 1: NORMAL
BH CV STRESS COMMENTS STAGE 1: NORMAL
BH CV STRESS DOSE REGADENOSON STAGE 1: 0.4
BH CV STRESS DURATION MIN STAGE 1: 1
BH CV STRESS DURATION SEC STAGE 1: 51
BH CV STRESS GRADE STAGE 1: 0
BH CV STRESS HR STAGE 1: 126
BH CV STRESS METS STAGE 1: 1.8
BH CV STRESS NUCLEAR ISOTOPE DOSE: 31.9 MCI
BH CV STRESS PROTOCOL 1: NORMAL
BH CV STRESS RECOVERY BP: NORMAL MMHG
BH CV STRESS RECOVERY HR: 93 BPM
BH CV STRESS SPEED STAGE 1: 1.2
BH CV STRESS STAGE 1: 1
LV EF NUC BP: 55 %
MAXIMAL PREDICTED HEART RATE: 150 BPM
PERCENT MAX PREDICTED HR: 84 %
STRESS BASELINE BP: NORMAL MMHG
STRESS BASELINE HR: 60 BPM
STRESS PERCENT HR: 99 %
STRESS POST ESTIMATED WORKLOAD: 1.8 METS
STRESS POST EXERCISE DUR MIN: 1 MIN
STRESS POST EXERCISE DUR SEC: 51 SEC
STRESS POST PEAK BP: NORMAL MMHG
STRESS POST PEAK HR: 126 BPM
STRESS TARGET HR: 128 BPM

## 2022-04-26 PROBLEM — Z01.818 PRE-OP TESTING: Status: ACTIVE | Noted: 2022-04-26

## 2022-04-26 PROBLEM — R94.31 ABNORMAL ELECTROCARDIOGRAM (ECG) (EKG): Status: ACTIVE | Noted: 2022-04-26

## 2022-04-26 PROBLEM — R06.02 SOB (SHORTNESS OF BREATH): Status: ACTIVE | Noted: 2022-04-26

## 2022-05-05 ENCOUNTER — OFFICE VISIT (OUTPATIENT)
Dept: CARDIOLOGY | Facility: CLINIC | Age: 72
End: 2022-05-05

## 2022-05-05 VITALS
BODY MASS INDEX: 33.91 KG/M2 | DIASTOLIC BLOOD PRESSURE: 65 MMHG | SYSTOLIC BLOOD PRESSURE: 113 MMHG | HEIGHT: 66 IN | HEART RATE: 59 BPM | WEIGHT: 211 LBS

## 2022-05-05 DIAGNOSIS — Z01.818 PREOPERATIVE CLEARANCE: ICD-10-CM

## 2022-05-05 DIAGNOSIS — I10 PRIMARY HYPERTENSION: ICD-10-CM

## 2022-05-05 DIAGNOSIS — R06.02 SOB (SHORTNESS OF BREATH): Primary | ICD-10-CM

## 2022-05-05 DIAGNOSIS — R94.30 ABNORMAL CARDIAC FUNCTION TEST: ICD-10-CM

## 2022-05-05 PROCEDURE — 99214 OFFICE O/P EST MOD 30 MIN: CPT | Performed by: INTERNAL MEDICINE

## 2022-05-05 NOTE — PROGRESS NOTES
"TEST RESULTS   Subjective:        Sol Rojas is a 72 y.o. female who here for follow up    CC  Follow-up shortness of breath  HPI  72-year-old female here for the cardiac clearance with shortness of breath denies any chest pains tightness heaviness or the pressure sensation     Problems Addressed this Visit        Cardiac and Vasculature    Abnormal cardiac function test       Health Encounters    Preoperative clearance       Pulmonary and Pneumonias    SOB (shortness of breath) - Primary      Diagnoses       Codes Comments    SOB (shortness of breath)    -  Primary ICD-10-CM: R06.02  ICD-9-CM: 786.05     Abnormal cardiac function test     ICD-10-CM: R94.30  ICD-9-CM: 794.30     Preoperative clearance     ICD-10-CM: Z01.818  ICD-9-CM: V72.84         .    The following portions of the patient's history were reviewed and updated as appropriate: allergies, current medications, past family history, past medical history, past social history, past surgical history and problem list.    Past Medical History:   Diagnosis Date   • Arthritis     OSTEO   • Bilateral knee pain    • Chest pain     SAW A CARDIOLOGIST--PER PT, EVERYTHING WAS \"OKAY.\"   • Disease of thyroid gland     POLYP   • Dry eye syndrome of both eyes    • Hypertension    • Left hip pain    • Obesity    • Pulmonary hypertension (HCC)      reports that she has never smoked. She has never used smokeless tobacco. She reports that she does not drink alcohol and does not use drugs.   Family History   Problem Relation Age of Onset   • Stroke Mother    • Cancer Father    • Malig Hyperthermia Neg Hx        Review of Systems  Constitutional: No wt loss, fever, fatigue  Gastrointestinal: No nausea, abdominal pain  Behavioral/Psych: No insomnia or anxiety   Cardiovascular no chest pains or tightness in the chest  Objective:       Physical Exam  /65   Pulse 59   Ht 167.6 cm (66\")   Wt 95.7 kg (211 lb)   LMP  (LMP Unknown)   BMI 34.06 kg/m²   General " appearance: No acute changes   Neck: Trachea midline; NECK, supple, no thyromegaly or lymphadenopathy   Lungs: Normal size and shape, normal breath sounds, equal distribution of air, no rales and rhonchi   CV: S1-S2 regular, no murmurs, no rub, no gallop   Abdomen: Soft, nontender; no masses , no abnormal abdominal sounds   Extremities: No deformity , normal color , no peripheral edema   Skin: Normal temperature, turgor and texture; no rash, ulcers          Procedures  Interpretation Summary       · Findings consistent with an equivocal ECG stress test.  · Left ventricular ejection fraction is normal. (Calculated EF = 55%).  · Myocardial perfusion imaging indicates a small-sized, mildly severe area of ischemia located in the lateral wall.  · Impressions are consistent with an intermediate risk study.      Interpretation Summary    · Saline test results are negative.  · Calculated left ventricular EF = 56.1% Estimated left ventricular EF was in agreement with the calculated left ventricular EF.  · Left ventricular diastolic function was normal.  · There is no evidence of pericardial effusion.      Echocardiogram:        Current Outpatient Medications:   •  carvedilol (COREG) 3.125 MG tablet, Take 3.125 mg by mouth 2 (two) times a day with meals., Disp: , Rfl:   •  KLOR-CON 20 MEQ CR tablet, Take 20 mEq by mouth Daily., Disp: , Rfl:   •  Lactobacillus Rhamnosus, GG, ( Probiotic Digestive Care) capsule, Take 1 capsule by mouth Daily., Disp: , Rfl:   •  Magnesium 400 MG capsule, Take  by mouth Daily., Disp: , Rfl:   •  Multiple Vitamins-Minerals (MULTIVITAMIN PO), Take 1 tablet by mouth Daily., Disp: , Rfl:   •  olmesartan-hydrochlorothiazide (BENICAR HCT) 40-25 MG per tablet, Take 1 tablet by mouth daily., Disp: , Rfl:   •  Propylene Glycol 0.6 % solution, Apply  to eye(s) as directed by provider., Disp: , Rfl:    Assessment:        Patient Active Problem List   Diagnosis   • Pain in left shin   • Hyperglycemia   •  H/O total hip arthroplasty, left   • Pre-op testing   • Abnormal electrocardiogram (ECG) (EKG)    • SOB (shortness of breath)               Plan:            ICD-10-CM ICD-9-CM   1. SOB (shortness of breath)  R06.02 786.05   2. Abnormal cardiac function test  R94.30 794.30   3. Preoperative clearance  Z01.818 V72.84   4. Primary hypertension  I10 401.9     1. SOB (shortness of breath)  Multifactorial    2. Abnormal cardiac function test  Treat medically as no chest pain    3. Preoperative clearance  Cleared for the surgery    4.  Hypertension  Continue current treatment    ABNORMAL CARDIAC FUN TEST  NO CP  WILL CLEAR FOR KNEE SURG  SEE IN 6 MONTHS  COUNSELING:    Sol Dennis was given to patient for the following topics: diagnostic results, risk factor reductions, impressions, risks and benefits of treatment options and importance of treatment compliance .       SMOKING COUNSELING:    [unfilled]    Dictated using Dragon dictation

## 2022-05-20 ENCOUNTER — PREP FOR SURGERY (OUTPATIENT)
Dept: OTHER | Facility: HOSPITAL | Age: 72
End: 2022-05-20

## 2022-05-20 ENCOUNTER — OFFICE VISIT (OUTPATIENT)
Dept: ORTHOPEDIC SURGERY | Facility: CLINIC | Age: 72
End: 2022-05-20

## 2022-05-20 VITALS — BODY MASS INDEX: 33.97 KG/M2 | HEIGHT: 66 IN | WEIGHT: 211.4 LBS | TEMPERATURE: 98.2 F

## 2022-05-20 DIAGNOSIS — M17.12 ARTHRITIS OF LEFT KNEE: ICD-10-CM

## 2022-05-20 DIAGNOSIS — R52 PAIN: Primary | ICD-10-CM

## 2022-05-20 DIAGNOSIS — M17.12 ARTHRITIS OF LEFT KNEE: Primary | ICD-10-CM

## 2022-05-20 PROCEDURE — 73562 X-RAY EXAM OF KNEE 3: CPT | Performed by: ORTHOPAEDIC SURGERY

## 2022-05-20 PROCEDURE — 99214 OFFICE O/P EST MOD 30 MIN: CPT | Performed by: ORTHOPAEDIC SURGERY

## 2022-05-20 RX ORDER — PREGABALIN 75 MG/1
150 CAPSULE ORAL ONCE
Status: CANCELLED | OUTPATIENT
Start: 2022-07-19 | End: 2022-05-20

## 2022-05-20 RX ORDER — FERROUS SULFATE 325(65) MG
325 TABLET ORAL
COMMUNITY

## 2022-05-20 RX ORDER — MELOXICAM 15 MG/1
15 TABLET ORAL ONCE
Status: CANCELLED | OUTPATIENT
Start: 2022-07-19 | End: 2022-05-20

## 2022-05-20 RX ORDER — ACETAMINOPHEN 500 MG
1000 TABLET ORAL ONCE
Status: CANCELLED | OUTPATIENT
Start: 2022-07-19 | End: 2022-05-20

## 2022-05-20 RX ORDER — POVIDONE-IODINE 10 MG/ML
SOLUTION TOPICAL ONCE
Status: CANCELLED | OUTPATIENT
Start: 2022-07-19 | End: 2022-05-20

## 2022-05-20 RX ORDER — CHLORHEXIDINE GLUCONATE 500 MG/1
1 CLOTH TOPICAL TAKE AS DIRECTED
Status: CANCELLED | OUTPATIENT
Start: 2022-05-20

## 2022-05-20 NOTE — PROGRESS NOTES
Patient Name: Sol Rojas   YOB: 1950  Referring Primary Care Physician: Reyes, Rosenberg Acosta, MD  BMI: Body mass index is 34.12 kg/m².    Chief Complaint:    Chief Complaint   Patient presents with   • Left Knee - Follow-up        HPI:     Sol Rojas is a 72 y.o. female who presents today for evaluation of   Chief Complaint   Patient presents with   • Left Knee - Follow-up   .  Patient follows up today she has bilateral knee pain but is much worse on the left than the right.  We also got her  on the line with [place].  She tried therapy in the past.  She has had injections.  She tried medications.  She is tried inflammation control measures.  She is tried a cane.  Her knee is hurting her and affecting her quality of life spite these efforts.  She wants to talk about knee replacement at this time.  She is already seeing cardiology and her medical doctor and received clearance.      Subjective   Medications:   Home Medications:  Current Outpatient Medications on File Prior to Visit   Medication Sig   • carvedilol (COREG) 3.125 MG tablet Take 3.125 mg by mouth 2 (two) times a day with meals.   • ferrous sulfate 325 (65 FE) MG tablet Take 325 mg by mouth Daily With Breakfast.   • KLOR-CON 20 MEQ CR tablet Take 20 mEq by mouth Daily.   • Lactobacillus Rhamnosus, GG, ( Probiotic Digestive Care) capsule Take 1 capsule by mouth Daily.   • Magnesium 400 MG capsule Take  by mouth Daily.   • Multiple Vitamins-Minerals (MULTIVITAMIN PO) Take 1 tablet by mouth Daily.   • olmesartan-hydrochlorothiazide (BENICAR HCT) 40-25 MG per tablet Take 1 tablet by mouth daily.   • Propylene Glycol 0.6 % solution Apply  to eye(s) as directed by provider.     No current facility-administered medications on file prior to visit.     Current Medications:  Scheduled Meds:  Continuous Infusions:No current facility-administered medications for this visit.    PRN Meds:.    I have reviewed the patient's medical  "history in detail and updated the computerized patient record.  Review and summarization of old records includes:    Past Medical History:   Diagnosis Date   • Arthritis     OSTEO   • Bilateral knee pain    • Chest pain     SAW A CARDIOLOGIST--PER PT, EVERYTHING WAS \"OKAY.\"   • Disease of thyroid gland     POLYP   • Dry eye syndrome of both eyes    • Hypertension    • Left hip pain    • Obesity    • Pulmonary hypertension (HCC)         Past Surgical History:   Procedure Laterality Date   • CATARACT EXTRACTION Bilateral    • COLONOSCOPY N/A 4/13/2021    Procedure: COLONOSCOPY TO CECUM AND INTO TI;  Surgeon: Louie Smith MD;  Location: [facility] ENDOSCOPY;  Service: Gastroenterology;  Laterality: N/A;  pre: history of polyps  post: diverticulosis   • COLONOSCOPY W/ BIOPSIES AND POLYPECTOMY      BENIGN   • HERNIA REPAIR      UMBILICAL HERNIA REPAIR   • LEG SURGERY Left     cellulitis   • TOTAL HIP ARTHROPLASTY Left 1/15/2019    Procedure: LEFT TOTAL HIP ARTHROPLASTY;  Surgeon: Hieu Orosco MD;  Location: [facility] MAIN OR;  Service: Orthopedics   • US GUIDED FINE NEEDLE ASPIRATION  4/15/2021        Social History     Occupational History   • Not on file   Tobacco Use   • Smoking status: Never Smoker   • Smokeless tobacco: Never Used   Vaping Use   • Vaping Use: Never used   Substance and Sexual Activity   • Alcohol use: No   • Drug use: No   • Sexual activity: Defer     Birth control/protection: Post-menopausal      Social History     Social History Narrative   • Not on file        Family History   Problem Relation Age of Onset   • Stroke Mother    • Cancer Father    • Malig Hyperthermia Neg Hx        ROS: 14 point review of systems was performed and all other systems were reviewed and are negative except for documented findings in HPI and today's encounter.     Allergies:   Allergies   Allergen Reactions   • Augmentin [Amoxicillin-Pot Clavulanate] GI Intolerance   • Hydrocodone Nausea Only and GI Intolerance   • Latex " "Hives   • Penicillins Itching, Nausea Only and GI Intolerance   • Percocet [Oxycodone-Acetaminophen] GI Intolerance     Constitutional:  Denies fever, shaking or chills   Eyes:  Denies change in visual acuity   HENT:  Denies nasal congestion or sore throat   Respiratory:  Denies cough or shortness of breath   Cardiovascular:  Denies chest pain or severe LE edema   GI:  Denies abdominal pain, nausea, vomiting, bloody stools or diarrhea   Musculoskeletal:  Numbness, tingling, pain, or loss of motor function only as noted above in history of present illness.  : Denies painful urination or hematuria  Integument:  Denies rash, lesion or ulceration   Neurologic:  Denies headache or focal weakness  Endocrine:  Denies lymphadenopathy  Psych:  Denies confusion or change in mental status   Hem:  Denies active bleeding    OBJECTIVE:  Physical Exam: 72 y.o. female  Wt Readings from Last 3 Encounters:   05/20/22 95.9 kg (211 lb 6.4 oz)   05/05/22 95.7 kg (211 lb)   04/19/22 95.3 kg (210 lb)     Ht Readings from Last 1 Encounters:   05/20/22 167.6 cm (66\")     Body mass index is 34.12 kg/m².  Vitals:    05/20/22 0806   Temp: 98.2 °F (36.8 °C)     Vital signs reviewed.     General Appearance:    Alert, cooperative, in no acute distress                  Eyes: conjunctiva clear  ENT: external ears and nose atraumatic  CV: no peripheral edema  Resp: normal respiratory effort  Skin: no rashes or wounds; normal turgor  Psych: mood and affect appropriate  Lymph: no nodes appreciated  Neuro: gross sensation intact  Vascular:  Palpable peripheral pulse in noted extremity  Musculoskeletal Extremities: Exam shows crepitation synovitis and swelling in the left versus the right knee hips are noncontributory to the pain picture she has medial joint line tenderness and pseudolaxity range of motion is about -7 maybe back to about 110 degrees she has a Baker's cyst.    Radiology:   AP lateral 40 degree PA x-ray left knee taken the office today " "for complaints of pain with comparison views show advanced end-stage tricompartmental arthritis with a varus pattern with \"bone-on-bone subchondral sclerosis and small cysts        Assessment:     ICD-10-CM ICD-9-CM   1. Pain  R52 780.96   2. Arthritis of left knee  M17.12 716.96        MDM/Plan:   The diagnosis(es), natural history, pathophysiology and treatment for diagnosis(es) were discussed. Opportunity given and questions answered.  Biomechanics of pertinent body areas discussed.  When appropriate, the use of ambulatory aids discussed.    Inflammation/pain control; with cold, heat, elevation and/or liniments discussed as appropriate  PT referral.  MEDICAL RECORDS reviewed from other provider(s) for past and current medical history pertinent to this complaint.  Total Knee Replacement : Continuation of conservative management vs. TKA: I reviewed anatomy of a total knee arthroplasty in laymen's terms, as well as typical postoperative recovery and possibly 6-12 months for maximal recovery, and possible need for rehabilitation stay after hospitalization. We also discussed risks, benefits, alternatives, and limitations of procedure with risks including but not limited to neurovascular damage, bleeding, infection, malalignment, chronic pain, failure of implants, osteolysis, loosening of implants, loss of motion, weakness, stiffness, instability, DVT, pulmonary embolus, death, stroke, complex regional pain syndrome, myocardial infarction, and need for additional procedures. Concept of substitution vs. replacement discussed.  No guarantees were given regarding results of surgery.  Patient verbalized understanding, and was given the opportunity to ask and have all questions answered today.   Will go ahead with a left total knee replacement wanted to get a refresher course of physical therapy and work on the  rehab leading into it.    5/20/2022    Dictated utilizing Dragon dictation    "

## 2022-05-25 ENCOUNTER — TELEPHONE (OUTPATIENT)
Dept: ORTHOPEDIC SURGERY | Facility: CLINIC | Age: 72
End: 2022-05-25

## 2022-05-31 ENCOUNTER — TREATMENT (OUTPATIENT)
Dept: PHYSICAL THERAPY | Facility: CLINIC | Age: 72
End: 2022-05-31

## 2022-05-31 DIAGNOSIS — M25.562 CHRONIC PAIN OF LEFT KNEE: Primary | ICD-10-CM

## 2022-05-31 DIAGNOSIS — Z74.09 IMPAIRED FUNCTIONAL MOBILITY AND ACTIVITY TOLERANCE: ICD-10-CM

## 2022-05-31 DIAGNOSIS — G89.29 CHRONIC PAIN OF LEFT KNEE: Primary | ICD-10-CM

## 2022-05-31 DIAGNOSIS — M17.12 OSTEOARTHRITIS OF LEFT KNEE, UNSPECIFIED OSTEOARTHRITIS TYPE: ICD-10-CM

## 2022-05-31 PROCEDURE — 97110 THERAPEUTIC EXERCISES: CPT | Performed by: PHYSICAL THERAPIST

## 2022-05-31 PROCEDURE — 97161 PT EVAL LOW COMPLEX 20 MIN: CPT | Performed by: PHYSICAL THERAPIST

## 2022-05-31 PROCEDURE — 97530 THERAPEUTIC ACTIVITIES: CPT | Performed by: PHYSICAL THERAPIST

## 2022-05-31 NOTE — PROGRESS NOTES
Physical Therapy Initial Evaluation and Plan of Care    Patient: Sol Rojas   : 1950  Diagnosis/ICD-10 Code:  Chronic pain of left knee [M25.562, G89.29]  Referring practitioner: Guilherme Smith MD  Date of Initial Visit: 2022  Today's Date: 2022  Patient seen for 1 session         Visit Diagnoses:    ICD-10-CM ICD-9-CM   1. Chronic pain of left knee  M25.562 719.46    G89.29 338.29   2. Osteoarthritis of left knee, unspecified osteoarthritis type  M17.12 715.96   3. Impaired functional mobility and activity tolerance  Z74.09 V49.89         Subjective Questionnaire: LEFS: 47      Subjective Evaluation    History of Present Illness  Mechanism of injury: No SHAHIDA - pain for about 3 yrs; Advanced OA per X-rays; some buckling B knees; had prior PT for both knees at this clinic 2+ yrs ago; injections; here for pre-hab for TKA; Hx of left SHAMEKA; no other significant PMH reported      Patient Occupation: NA   Precautions and Work Restrictions: NAPain  Current pain ratin  At best pain ratin  At worst pain ratin  Location: anterior knees  Quality: dull ache (instability)  Relieving factors: rest and medications (topical)  Aggravating factors: ambulation, squatting, prolonged positioning, standing and sleeping (steps)    Social Support  Lives in: apartment (first floor)  Lives with: spouse    Diagnostic Tests  X-ray: abnormal    Treatments  Previous treatment: physical therapy and injection treatment  Current treatment: physical therapy  Patient Goals  Patient goals for therapy: decreased pain  Patient goal: prepare for surgery           Objective          Tenderness   Left Knee   Tenderness in the medial joint line.     Active Range of Motion   Left Knee   Flexion: 119 degrees     Right Knee   Flexion: 116 degrees     Additional Active Range of Motion Details  Lacking 3 deg ext    Patellar Mobility   Left Knee Hypomobile in the left medial, left lateral, left superior and left inferior  patellar tendon(s).     Additional Patellar Mobility Details  + crepitus    Strength/Myotome Testing     Left Hip   Planes of Motion   Flexion: 5  Abduction: 5 (seated)  Adduction: 5    Right Hip   Planes of Motion   Flexion: 5  Abduction: 5  Adduction: 5    Left Knee   Flexion: 5  Extension: 4+    Right Knee   Flexion: 5  Extension: 4+    Tests     Additional Tests Details  No lig instabiltiy    Left Knee Flexibility Comments:   Tightness: B HS, gastroc, quad and hip flexors    Ambulation     Ambulation: Level Surfaces   Ambulation without assistive device: independent    Additional Level Surfaces Ambulation Details  No notable antalgia brief walk          Assessment & Plan     Assessment  Impairments: abnormal or restricted ROM, activity intolerance, impaired physical strength and pain with function  Functional Limitations: walking, standing and stooping  Assessment details: 73 yo F with advanced OA and plans for left TKA this summer presents with limited ROM and  mm flexibility and mild strength deficits.  Can benefit from PT now to improve her flexibility and strength and decrease pain to better prepare her for successful recovery after planned TKA.  Prognosis: good  Prognosis details: For stated goals    Goals  Plan Goals: STGs x 1 wk  1. Demos asha for and compliance with initial HEP  2. Pain decreasing per pt report  3. Review body mechanics and joint protection principles with ADLs  4. Ambulates level surface 300 ft and 4 inch steps with min to no antalgia    LTGs x 3-4 wks  1. ROM, flexibility and strength improving  2. Independent with HEP and joint protection  3. Decreasing pain report  4. No episodes in past  week of giving way    Plan  Therapy options: will be seen for skilled therapy services  Planned modality interventions: cryotherapy  Planned therapy interventions: strengthening, stretching, therapeutic activities, home exercise program, body mechanics training and neuromuscular  re-education  Frequency: 1x week  Duration in weeks: 4  Treatment plan discussed with: patient        History # of Personal Factors and/or Comorbidities: MODERATE (1-2)  Examination of Body System(s): # of elements: LOW (1-2)  Clinical Presentation: STABLE   Clinical Decision Making: LOW       Timed:         Manual Therapy:    0     mins  45862;     Therapeutic Exercise:    20     mins  42495;     Neuromuscular Ang:    0    mins  98922;    Therapeutic Activity:     9     mins  52783;           Un-Timed:  Electrical Stimulation:    0     mins  55140 ( );  Dry Needling     0     mins self-pay  Traction     0     mins 07704  Low Eval     21     Mins  24541  Mod Eval     0     Mins  46830  High Eval                       0     Mins  01139        Timed Treatment:   29   mins   Total Treatment:     50   mins          PT: Veronica Rose PT     License Number: [id number]  Electronically signed by Veronica Rose PT, 05/31/22, 12:30 PM EDT    Certification Period: 5/31/2022 thru 8/28/2022  I certify that the therapy services are furnished while this patient is under my care.  The services outlined above are required by this patient, and will be reviewed every 90 days.         Physician Signature:__________________________________________________    PHYSICIAN: Guilherme Smith MD  NPI: [id number]                                      DATE:      Please sign and return via fax to [email or web address] . Thank you, [facility].

## 2022-05-31 NOTE — PATIENT INSTRUCTIONS
Educated about Dx and exam findings, rationale and POC. Gave handout on HEP with instructions. Educated on basic joint protection principles

## 2022-06-08 ENCOUNTER — TREATMENT (OUTPATIENT)
Dept: PHYSICAL THERAPY | Facility: CLINIC | Age: 72
End: 2022-06-08

## 2022-06-08 DIAGNOSIS — M25.562 CHRONIC PAIN OF LEFT KNEE: Primary | ICD-10-CM

## 2022-06-08 DIAGNOSIS — M17.12 OSTEOARTHRITIS OF LEFT KNEE, UNSPECIFIED OSTEOARTHRITIS TYPE: ICD-10-CM

## 2022-06-08 DIAGNOSIS — Z74.09 IMPAIRED FUNCTIONAL MOBILITY AND ACTIVITY TOLERANCE: ICD-10-CM

## 2022-06-08 DIAGNOSIS — G89.29 CHRONIC PAIN OF LEFT KNEE: Primary | ICD-10-CM

## 2022-06-08 PROCEDURE — 97530 THERAPEUTIC ACTIVITIES: CPT | Performed by: PHYSICAL THERAPIST

## 2022-06-08 PROCEDURE — 97110 THERAPEUTIC EXERCISES: CPT | Performed by: PHYSICAL THERAPIST

## 2022-06-08 NOTE — PROGRESS NOTES
Physical Therapy Daily Progress Note      Visit # 2      Subjective   Doing ok with exercises.  Was tight after last visit.    Objective   See Exercise, Manual, and Modality Logs for complete treatment.       Assessment/Plan    Reporting some tightness at end of sessions but good tolerance for prior ex and new ones today.    F/u in 2 weeks to assess HEP tolerance and progress.  Make revisions as needed with plans for probable D/C if appropriate         Manual Therapy:    0     mins  80730;  Therapeutic Exercise:    30     mins  49334;     Neuromuscular Ang:    0    mins  38944;    Therapeutic Activity:     10     mins  95757;     Gait Trainin     mins  44143;     Ultrasound:     0     mins  63469;    Work Hardening           0      mins 84130  Iontophoresis               0   mins 09528  Estim   0 min 57698      Timed Treatment:   40   mins   Total Treatment:     40   mins    Veronica Rose PT  Physical Therapist  [place] License #[id number]

## 2022-06-22 ENCOUNTER — TREATMENT (OUTPATIENT)
Dept: PHYSICAL THERAPY | Facility: CLINIC | Age: 72
End: 2022-06-22

## 2022-06-22 DIAGNOSIS — G89.29 CHRONIC PAIN OF LEFT KNEE: Primary | ICD-10-CM

## 2022-06-22 DIAGNOSIS — M17.12 OSTEOARTHRITIS OF LEFT KNEE, UNSPECIFIED OSTEOARTHRITIS TYPE: ICD-10-CM

## 2022-06-22 DIAGNOSIS — M25.562 CHRONIC PAIN OF LEFT KNEE: Primary | ICD-10-CM

## 2022-06-22 DIAGNOSIS — Z74.09 IMPAIRED FUNCTIONAL MOBILITY AND ACTIVITY TOLERANCE: ICD-10-CM

## 2022-06-22 PROCEDURE — 97530 THERAPEUTIC ACTIVITIES: CPT | Performed by: PHYSICAL THERAPIST

## 2022-06-22 PROCEDURE — 97110 THERAPEUTIC EXERCISES: CPT | Performed by: PHYSICAL THERAPIST

## 2022-06-22 NOTE — PROGRESS NOTES
Physical Therapy Daily Treatment Note      Visit # 3      Subjective   Pain and tightness both knees and pain left side low back and buttock started last week.  Hard to hold stretches for that long.    Objective   See Exercise, Manual, and Modality Logs for complete treatment.       Assessment/Plan    Modified stretches and other ex for improved tolerance.  Decreased pain with the changes.  Desires to continue with HEP only at this time.    To continue with HEP and return only prn                Manual Therapy:    0     mins  29552;  Therapeutic Exercise:    16     mins  21801;     Neuromuscular Ang:    0    mins  48896;    Therapeutic Activity:     14     mins  77033;     Gait Trainin     mins  00166;     Ultrasound:     0     mins  21673;    Work Hardening           0      mins 61694  Iontophoresis               0   mins 13377  Estim   0 min 59094      Timed Treatment:   30   mins   Total Treatment:     30   mins    Veronica Rose PT  Physical Therapist  [place] License #[id number]

## 2022-07-07 ENCOUNTER — PRE-ADMISSION TESTING (OUTPATIENT)
Dept: PREADMISSION TESTING | Facility: HOSPITAL | Age: 72
End: 2022-07-07

## 2022-07-07 VITALS
OXYGEN SATURATION: 97 % | BODY MASS INDEX: 34.47 KG/M2 | HEART RATE: 65 BPM | SYSTOLIC BLOOD PRESSURE: 113 MMHG | RESPIRATION RATE: 18 BRPM | WEIGHT: 214.5 LBS | DIASTOLIC BLOOD PRESSURE: 74 MMHG | HEIGHT: 66 IN

## 2022-07-07 DIAGNOSIS — M17.12 ARTHRITIS OF LEFT KNEE: ICD-10-CM

## 2022-07-07 LAB
ANION GAP SERPL CALCULATED.3IONS-SCNC: 10 MMOL/L (ref 5–15)
BACTERIA UR QL AUTO: NORMAL /HPF
BILIRUB UR QL STRIP: NEGATIVE
BUN SERPL-MCNC: 19 MG/DL (ref 8–23)
BUN/CREAT SERPL: 17 (ref 7–25)
CALCIUM SPEC-SCNC: 9.4 MG/DL (ref 8.6–10.5)
CHLORIDE SERPL-SCNC: 103 MMOL/L (ref 98–107)
CLARITY UR: CLEAR
CO2 SERPL-SCNC: 27 MMOL/L (ref 22–29)
COLOR UR: YELLOW
CREAT SERPL-MCNC: 1.12 MG/DL (ref 0.57–1)
DEPRECATED RDW RBC AUTO: 40.6 FL (ref 37–54)
EGFRCR SERPLBLD CKD-EPI 2021: 53 ML/MIN/1.73
ERYTHROCYTE [DISTWIDTH] IN BLOOD BY AUTOMATED COUNT: 13 % (ref 12.3–15.4)
GLUCOSE SERPL-MCNC: 97 MG/DL (ref 65–99)
GLUCOSE UR STRIP-MCNC: NEGATIVE MG/DL
HCT VFR BLD AUTO: 36.5 % (ref 34–46.6)
HGB BLD-MCNC: 12.3 G/DL (ref 12–15.9)
HGB UR QL STRIP.AUTO: NEGATIVE
HYALINE CASTS UR QL AUTO: NORMAL /LPF
KETONES UR QL STRIP: NEGATIVE
LEUKOCYTE ESTERASE UR QL STRIP.AUTO: ABNORMAL
MCH RBC QN AUTO: 29.3 PG (ref 26.6–33)
MCHC RBC AUTO-ENTMCNC: 33.7 G/DL (ref 31.5–35.7)
MCV RBC AUTO: 86.9 FL (ref 79–97)
NITRITE UR QL STRIP: NEGATIVE
PH UR STRIP.AUTO: 6.5 [PH] (ref 5–8)
PLATELET # BLD AUTO: 240 10*3/MM3 (ref 140–450)
PMV BLD AUTO: 9.3 FL (ref 6–12)
POTASSIUM SERPL-SCNC: 3.8 MMOL/L (ref 3.5–5.2)
PROT UR QL STRIP: NEGATIVE
RBC # BLD AUTO: 4.2 10*6/MM3 (ref 3.77–5.28)
RBC # UR STRIP: NORMAL /HPF
REF LAB TEST METHOD: NORMAL
SODIUM SERPL-SCNC: 140 MMOL/L (ref 136–145)
SP GR UR STRIP: 1.02 (ref 1–1.03)
SQUAMOUS #/AREA URNS HPF: NORMAL /HPF
UROBILINOGEN UR QL STRIP: ABNORMAL
WBC # UR STRIP: NORMAL /HPF
WBC NRBC COR # BLD: 3.45 10*3/MM3 (ref 3.4–10.8)

## 2022-07-07 PROCEDURE — 36415 COLL VENOUS BLD VENIPUNCTURE: CPT

## 2022-07-07 PROCEDURE — 80048 BASIC METABOLIC PNL TOTAL CA: CPT

## 2022-07-07 PROCEDURE — 85027 COMPLETE CBC AUTOMATED: CPT

## 2022-07-07 PROCEDURE — 81001 URINALYSIS AUTO W/SCOPE: CPT

## 2022-07-07 RX ORDER — CHLORHEXIDINE GLUCONATE 500 MG/1
CLOTH TOPICAL
Status: ON HOLD | COMMUNITY
End: 2022-07-19

## 2022-07-07 ASSESSMENT — KOOS JR
KOOS JR SCORE: 44.905
KOOS JR SCORE: 17

## 2022-07-07 NOTE — DISCHARGE INSTRUCTIONS
ARRIVE DAY OF SURGERY AT  10:00 AM        Take the following medications the morning of surgery:  CARVEDILOL (COREG)      If you are on prescription narcotic pain medication to control your pain you may also take that medication the morning of surgery.    General Instructions:  Do not eat solid food after midnight the night before surgery.  You may drink clear liquids day of surgery but must stop at least one hour before your hospital arrival time.  It is beneficial for you to have a clear drink that contains carbohydrates the day of surgery.  We suggest a 12 to 20 ounce bottle of Gatorade or Powerade for non-diabetic patients or a 12 to 20 ounce bottle of G2 or Powerade Zero for diabetic patients. (Pediatric patients, are not advised to drink a 12 to 20 ounce carbohydrate drink)    Clear liquids are liquids you can see through.  Nothing red in color.     Plain water                               Sports drinks  Sodas                                   Gelatin (Jell-O)  Fruit juices without pulp such as white grape juice and apple juice  Popsicles that contain no fruit or yogurt  Tea or coffee (no cream or milk added)  Gatorade / Powerade  G2 / Powerade Zero    Infants may have breast milk up to four hours before surgery.  Infants drinking formula may drink formula up to six hours before surgery.   Patients who avoid smoking, chewing tobacco and alcohol for 4 weeks prior to surgery have a reduced risk of post-operative complications.  Quit smoking as many days before surgery as you can.  Do not smoke, use chewing tobacco or drink alcohol the day of surgery.   If applicable bring your C-PAP/ BI-PAP machine.  Bring any papers given to you in the doctor’s office.  Wear clean comfortable clothes.  Do not wear contact lenses, false eyelashes or make-up.  Bring a case for your glasses.   Bring crutches or walker if applicable.  Remove all piercings.  Leave jewelry and any other valuables at home.  Hair extensions with metal  clips must be removed prior to surgery.  The Pre-Admission Testing nurse will instruct you to bring medications if unable to obtain an accurate list in Pre-Admission Testing.          Preventing a Surgical Site Infection:  For 2 to 3 days before surgery, avoid shaving with a razor because the razor can irritate skin and make it easier to develop an infection.    Any areas of open skin can increase the risk of a post-operative wound infection by allowing bacteria to enter and travel throughout the body.  Notify your surgeon if you have any skin wounds / rashes even if it is not near the expected surgical site.  The area will need assessed to determine if surgery should be delayed until it is healed.  The night prior to surgery shower using a fresh bar of anti-bacterial soap (such as Dial) and clean washcloth.  Sleep in a clean bed with clean clothing.  Do not allow pets to sleep with you.  Shower on the morning of surgery using a fresh bar of anti-bacterial soap (such as Dial) and clean washcloth.  Dry with a clean towel and dress in clean clothing.  Ask your surgeon if you will be receiving antibiotics prior to surgery.  Make sure you, your family, and all healthcare providers clean their hands with soap and water or an alcohol based hand  before caring for you or your wound.    Day of surgery:  Your arrival time is approximately two hours before your scheduled surgery time.  Upon arrival, a Pre-op nurse and Anesthesiologist will review your health history, obtain vital signs, and answer questions you may have.  The only belongings needed at this time will be a list of your home medications and if applicable your C-PAP/BI-PAP machine.  A Pre-op nurse will start an IV and you may receive medication in preparation for surgery, including something to help you relax.     Please be aware that surgery does come with discomfort.  We want to make every effort to control your discomfort so please discuss any  uncontrolled symptoms with your nurse.   Your doctor will most likely have prescribed pain medications.      If you are going home after surgery you will receive individualized written care instructions before being discharged.  A responsible adult must drive you to and from the hospital on the day of your surgery and stay with you for 24 hours.  Discharge prescriptions can be filled by the hospital pharmacy during regular pharmacy hours.  If you are having surgery late in the day/evening your prescription may be e-prescribed to your pharmacy.  Please verify your pharmacy hours or chose a 24 hour pharmacy to avoid not having access to your prescription because your pharmacy has closed for the day.    If you are staying overnight following surgery, you will be transported to your hospital room following the recovery period.  [facility] has all private rooms.    If you have any questions please call Pre-Admission Testing at [phone].  Deductibles and co-payments are collected on the day of service. Please be prepared to pay the required co-pay, deductible or deposit on the day of service as defined by your plan.    Patient Education for Self-Quarantine Process    Following your COVID testing, we strongly recommend that you wear a mask when you are with other people and practice social distancing.   Limit your activities to only required outings.  Wash your hands with soap and water frequently for at least 20 seconds.   Avoid touching your eyes, nose and mouth with unwashed hands.  Do not share anything - utensils, drinking glasses, food from the same bowl.   Sanitize household surfaces daily. Include all high touch areas (door handles, light switches, phones, countertops, etc.)    Call your surgeon immediately if you experience any of the following symptoms:  Sore Throat  Shortness of Breath or difficulty breathing  Cough  Chills  Body soreness or muscle pain  Headache  Fever  New loss of taste or  smell  Do not arrive for your surgery ill.  Your procedure will need to be rescheduled to another time.  You will need to call your physician before the day of surgery to avoid any unnecessary exposure to hospital staff as well as other patients.           CHLORHEXIDINE CLOTH INSTRUCTIONS  The morning of surgery follow these instructions using the Chlorhexidine cloths you've been given.  These steps reduce bacteria on the body.  Do not use the cloths near your eyes, ears mouth, genitalia or on open wounds.  Throw the cloths away after use but do not try to flush them down a toilet.      Open and remove one cloth at a time from the package.    Leave the cloth unfolded and begin the bathing.  Massage the skin with the cloths using gentle pressure to remove bacteria.  Do not scrub harshly.   Follow the steps below with one 2% CHG cloth per area (6 total cloths).  One cloth for neck, shoulders and chest.  One cloth for both arms, hands, fingers and underarms (do underarms last).  One cloth for the abdomen followed by groin.  One cloth for right leg and foot including between the toes.  One cloth for left leg and foot including between the toes.  The last cloth is to be used for the back of the neck, back and buttocks.    Allow the CHG to air dry 3 minutes on the skin which will give it time to work and decrease the chance of irritation.  The skin may feel sticky until it is dry.  Do not rinse with water or any other liquid or you will lose the beneficial effects of the CHG.  If mild skin irritation occurs, do rinse the skin to remove the CHG.  Report this to the nurse at time of admission.  Do not apply lotions, creams, ointments, deodorants or perfumes after using the clothes. Dress in clean clothes before coming to the hospital.

## 2022-07-12 ENCOUNTER — OFFICE VISIT (OUTPATIENT)
Dept: ORTHOPEDIC SURGERY | Facility: CLINIC | Age: 72
End: 2022-07-12

## 2022-07-12 VITALS — WEIGHT: 213.3 LBS | BODY MASS INDEX: 34.28 KG/M2 | TEMPERATURE: 97.1 F | HEIGHT: 66 IN

## 2022-07-12 DIAGNOSIS — M17.12 ARTHRITIS OF KNEE, LEFT: Primary | ICD-10-CM

## 2022-07-12 PROCEDURE — S0260 H&P FOR SURGERY: HCPCS | Performed by: NURSE PRACTITIONER

## 2022-07-12 NOTE — PROGRESS NOTES
"   History & Physical       Patient: Sol Rojas  YOB: 1950  Medical Record Number: [id number]  Wt Readings from Last 3 Encounters:   07/12/22 96.8 kg (213 lb 4.8 oz)   07/07/22 97.3 kg (214 lb 8 oz)   05/20/22 95.9 kg (211 lb 6.4 oz)     Ht Readings from Last 3 Encounters:   07/12/22 167.6 cm (66\")   07/07/22 167.6 cm (66\")   05/20/22 167.6 cm (66\")     Body mass index is 34.43 kg/m².  Facility age limit for growth percentiles is 20 years.    Surgeon:  Dr. Guilherme Smith    Chief Complaints:   Chief Complaint   Patient presents with   • Left Knee - Follow-up     Surgery:  Left Total Knee Arthroplasty with Chai Navigation    Subjective:    History of Present Illness: 72 y.o. female presents with   Chief Complaint   Patient presents with   • Left Knee - Follow-up   . Chronic symptoms have been progressively worsening despite more conservative treatment measures including medications OTC and prescription, cortisone injections and physical therapy.  Symptoms are associated with ability to move, exercise, and perform activities of daily living.  Symptoms are aggravated by weight bearing and ROM necessary for activities of daily living.   Symptoms improve with rest, ice and elevation only minimally.      Allergies:   Allergies   Allergen Reactions   • Augmentin [Amoxicillin-Pot Clavulanate] GI Intolerance   • Hydrocodone Nausea Only and GI Intolerance   • Latex Hives   • Penicillins Itching, Nausea Only and GI Intolerance   • Percocet [Oxycodone-Acetaminophen] GI Intolerance       Medications:   Home Medications:  Current Outpatient Medications on File Prior to Visit   Medication Sig   • carvedilol (COREG) 3.125 MG tablet Take 3.125 mg by mouth 2 (two) times a day with meals.   • Chlorhexidine Gluconate Cloth 2 % pads Apply  topically. AS DIRECTED PREOP   • ferrous sulfate 325 (65 FE) MG tablet Take 325 mg by mouth Daily With Breakfast.   • KLOR-CON 20 MEQ CR tablet Take 20 mEq by mouth Daily.   • " "Lactobacillus Rhamnosus, GG, ( Probiotic Digestive Care) capsule Take 1 capsule by mouth Daily.   • Magnesium 400 MG capsule Take  by mouth Daily.   • Multiple Vitamins-Minerals (MULTIVITAMIN PO) Take 1 tablet by mouth Daily.   • olmesartan-hydrochlorothiazide (BENICAR HCT) 40-25 MG per tablet Take 1 tablet by mouth daily.   • polyethyl glycol-propyl glycol (SYSTANE) 0.4-0.3 % solution ophthalmic solution (artificial tears) Every 1 (One) Hour As Needed.     No current facility-administered medications on file prior to visit.     Current Medications:  Scheduled Meds:  Continuous Infusions:No current facility-administered medications for this visit.    PRN Meds:.    I have reviewed the patient's medical history in detail and updated the computerized patient record.  Review and summarization of old records include:    Past Medical History:   Diagnosis Date   • Ankle sprain     Left ankle   • Arthritis     OSTEO   • Bilateral knee pain    • Chest pain     SAW A CARDIOLOGIST--PER PT, EVERYTHING WAS \"OKAY.\"   • Disease of thyroid gland     POLYP   • Dry eye syndrome of both eyes    • Hip arthrosis    • Hypertension    • Knee swelling    • Left hip pain    • Left knee pain    • Obesity    • Pulmonary hypertension (HCC)         Past Surgical History:   Procedure Laterality Date   • CATARACT EXTRACTION Bilateral    • COLONOSCOPY N/A 04/13/2021    Procedure: COLONOSCOPY TO CECUM AND INTO TI;  Surgeon: Louie Smith MD;  Location: [facility] ENDOSCOPY;  Service: Gastroenterology;  Laterality: N/A;  pre: history of polyps  post: diverticulosis   • COLONOSCOPY W/ BIOPSIES AND POLYPECTOMY      BENIGN   • HERNIA REPAIR      UMBILICAL HERNIA REPAIR   • JOINT REPLACEMENT  1/15/19   • LEG SURGERY Left     cellulitis   • TOTAL HIP ARTHROPLASTY Left 01/15/2019    Procedure: LEFT TOTAL HIP ARTHROPLASTY;  Surgeon: Hieu Orosco MD;  Location: [facility] OR;  Service: Orthopedics   • US GUIDED FINE NEEDLE ASPIRATION  04/15/2021      " "  Social History     Occupational History   • Not on file   Tobacco Use   • Smoking status: Never Smoker   • Smokeless tobacco: Never Used   Vaping Use   • Vaping Use: Never used   Substance and Sexual Activity   • Alcohol use: No   • Drug use: No   • Sexual activity: Not Currently     Partners: Male     Birth control/protection: Abstinence      Social History     Social History Narrative   • Not on file        Family History   Problem Relation Age of Onset   • Stroke Mother    • Cancer Father    • Malig Hyperthermia Neg Hx        ROS: 14 point review of systems was performed and was negative except for documented findings in HPI and today's encounter.       Constitutional:  Denies fever, shaking or chills   Eyes:  Denies change in visual acuity   HENT:  Denies nasal congestion or sore throat   Respiratory:  Denies cough or shortness of breath   Cardiovascular:  Denies chest pain or severe LE edema   GI:  Denies abdominal pain, nausea, vomiting, bloody stools or diarrhea   Musculoskeletal:  Denies numbness tingling or loss of motor function except as outlined above in history of present illness.  : Denies painful urination or hematuria  Integument:  Denies rash, lesion or ulceration   Neurologic:  Denies headache or focal weakness  Endocrine:  Denies lymphadenopathy  Psych:  Denies confusion or change in mental status   Hem:  Denies active bleeding    Physical Exam: 72 y.o. female  Wt Readings from Last 3 Encounters:   07/12/22 96.8 kg (213 lb 4.8 oz)   07/07/22 97.3 kg (214 lb 8 oz)   05/20/22 95.9 kg (211 lb 6.4 oz)     Ht Readings from Last 3 Encounters:   07/12/22 167.6 cm (66\")   07/07/22 167.6 cm (66\")   05/20/22 167.6 cm (66\")     Body mass index is 34.43 kg/m².  Facility age limit for growth percentiles is 20 years.  Vitals:    07/12/22 1006   Temp: 97.1 °F (36.2 °C)       Vital signs reviewed.   General Appearance:    Alert, cooperative, in no acute distress                  Eyes: conjunctiva clear  ENT: " external ears and nose atraumatic  CV: no peripheral edema  Resp: normal respiratory effort  Skin: no rashes or wounds; normal turgor  Psych: mood and affect appropriate  Lymph: no nodes appreciated  Neuro: gross sensation intact  Vascular:  Palpable peripheral pulse in noted extremity  Musculoskeletal Extremities: KNEE Exam: medial and lateral joint line tenderness with crepitation, synovitis, swelling, and joint effusion left knee.        Diagnostic Tests:  Results for orders placed or performed during the hospital encounter of 21   CBC Auto Differential    Specimen: Blood   Result Value Ref Range    WBC 4.61 3.40 - 10.80 10*3/mm3    RBC 3.98 3.77 - 5.28 10*6/mm3    Hemoglobin 11.3 (L) 12.0 - 15.9 g/dL    Hematocrit 33.8 (L) 34.0 - 46.6 %    MCV 84.9 79.0 - 97.0 fL    MCH 28.4 26.6 - 33.0 pg    MCHC 33.4 31.5 - 35.7 g/dL    RDW 13.7 12.3 - 15.4 %    RDW-SD 42.0 37.0 - 54.0 fl    MPV 9.0 6.0 - 12.0 fL    Platelets 327 140 - 450 10*3/mm3    Neutrophil % 61.4 42.7 - 76.0 %    Lymphocyte % 23.2 19.6 - 45.3 %    Monocyte % 13.7 (H) 5.0 - 12.0 %    Eosinophil % 1.3 0.3 - 6.2 %    Basophil % 0.2 0.0 - 1.5 %    Immature Grans % 0.2 0.0 - 0.5 %    Neutrophils, Absolute 2.83 1.70 - 7.00 10*3/mm3    Lymphocytes, Absolute 1.07 0.70 - 3.10 10*3/mm3    Monocytes, Absolute 0.63 0.10 - 0.90 10*3/mm3    Eosinophils, Absolute 0.06 0.00 - 0.40 10*3/mm3    Basophils, Absolute 0.01 0.00 - 0.20 10*3/mm3    Immature Grans, Absolute 0.01 0.00 - 0.05 10*3/mm3    nRBC 0.0 0.0 - 0.2 /100 WBC     Lab Results   Component Value Date    GLUCOSE 97 2022    CALCIUM 9.4 2022     2022    K 3.8 2022    CO2 27.0 2022     2022    BUN 19 2022    CREATININE 1.12 (H) 2022    EGFRIFAFRI 61 2021    BCR 17.0 2022    ANIONGAP 10.0 2022       EK2022 - Sinus Rhythm     I have reviewed all the lab & EKG results.     Imaging was done previously in the office, viewed  images and discussed with the patient:    Indication: pain related symptoms,  Assessment:  Patient Active Problem List   Diagnosis   • Pain in left shin   • Hyperglycemia   • H/O total hip arthroplasty, left   • Preoperative clearance   • Abnormal electrocardiogram (ECG) (EKG)    • SOB (shortness of breath)   • Abnormal cardiac function test   • Primary hypertension   • Arthritis of left knee       Plan:  Reviewed anatomy of a total joint arthroplasty in laymen's terms, as well as typical postoperative recovery and possibly 6-12 months for maximal recovery, and possible need for rehabilitation stay after hospitalization. We also discussed risks, benefits, alternatives, and limitations of procedure with risks including but not limited to neurovascular damage, bleeding, infection, malalignment, chronic pian, failure of implants, osteolysis, loosening of implants, loss of motion, weakness, stiffness, instability, DVT, pulmonary embolus, death, stroke, complex regional pain syndrome, myocardial infarction, and need for additional procedures. Concept of substitution vs. replacement discussed.  No guarantees were given regarding results of surgery.      Sol Rojas was given the opportunity to ask and have all questions answered today.  The patient voiced understanding of the risks, benefits, and alternative forms of treatment that were discussed and the patient consents to proceed with surgery.       Cleared by PCP on Dr. Reyes 4/13/2022 and Cardio Dr. Myers 5/6/2022    Skin breakdown? WNL  Metal allergy? No  DVT Risk Factors:No significant increased risk factors  COVID Status:Vaccinated with Booster    DVT Prophylaxis:  Aspirin 81mg BID x4wks  Walker - has at home  Consults - none  Added labs- none    Discharge Plan: POD 1 to home, home health and when cleared by physical therapy as safe for discharge      To be updated    Date: 7/12/2022  KELLY Mcrae        Much of this encounter note is an  electronic transcription/translation of spoken language to printed text. The electronic translation of spoken language may permit erroneous, or at times, nonsensical words or phrases to be inadvertently transcribed; Although I have reviewed the note for such errors, some may still exist

## 2022-07-12 NOTE — H&P (VIEW-ONLY)
"   History & Physical       Patient: Sol Rojas  YOB: 1950  Medical Record Number: [id number]  Wt Readings from Last 3 Encounters:   07/12/22 96.8 kg (213 lb 4.8 oz)   07/07/22 97.3 kg (214 lb 8 oz)   05/20/22 95.9 kg (211 lb 6.4 oz)     Ht Readings from Last 3 Encounters:   07/12/22 167.6 cm (66\")   07/07/22 167.6 cm (66\")   05/20/22 167.6 cm (66\")     Body mass index is 34.43 kg/m².  Facility age limit for growth percentiles is 20 years.    Surgeon:  Dr. Guilherme Smith    Chief Complaints:   Chief Complaint   Patient presents with   • Left Knee - Follow-up     Surgery:  Left Total Knee Arthroplasty with Chai Navigation    Subjective:    History of Present Illness: 72 y.o. female presents with   Chief Complaint   Patient presents with   • Left Knee - Follow-up   . Chronic symptoms have been progressively worsening despite more conservative treatment measures including medications OTC and prescription, cortisone injections and physical therapy.  Symptoms are associated with ability to move, exercise, and perform activities of daily living.  Symptoms are aggravated by weight bearing and ROM necessary for activities of daily living.   Symptoms improve with rest, ice and elevation only minimally.      Allergies:   Allergies   Allergen Reactions   • Augmentin [Amoxicillin-Pot Clavulanate] GI Intolerance   • Hydrocodone Nausea Only and GI Intolerance   • Latex Hives   • Penicillins Itching, Nausea Only and GI Intolerance   • Percocet [Oxycodone-Acetaminophen] GI Intolerance       Medications:   Home Medications:  Current Outpatient Medications on File Prior to Visit   Medication Sig   • carvedilol (COREG) 3.125 MG tablet Take 3.125 mg by mouth 2 (two) times a day with meals.   • Chlorhexidine Gluconate Cloth 2 % pads Apply  topically. AS DIRECTED PREOP   • ferrous sulfate 325 (65 FE) MG tablet Take 325 mg by mouth Daily With Breakfast.   • KLOR-CON 20 MEQ CR tablet Take 20 mEq by mouth Daily.   • " "Lactobacillus Rhamnosus, GG, ( Probiotic Digestive Care) capsule Take 1 capsule by mouth Daily.   • Magnesium 400 MG capsule Take  by mouth Daily.   • Multiple Vitamins-Minerals (MULTIVITAMIN PO) Take 1 tablet by mouth Daily.   • olmesartan-hydrochlorothiazide (BENICAR HCT) 40-25 MG per tablet Take 1 tablet by mouth daily.   • polyethyl glycol-propyl glycol (SYSTANE) 0.4-0.3 % solution ophthalmic solution (artificial tears) Every 1 (One) Hour As Needed.     No current facility-administered medications on file prior to visit.     Current Medications:  Scheduled Meds:  Continuous Infusions:No current facility-administered medications for this visit.    PRN Meds:.    I have reviewed the patient's medical history in detail and updated the computerized patient record.  Review and summarization of old records include:    Past Medical History:   Diagnosis Date   • Ankle sprain     Left ankle   • Arthritis     OSTEO   • Bilateral knee pain    • Chest pain     SAW A CARDIOLOGIST--PER PT, EVERYTHING WAS \"OKAY.\"   • Disease of thyroid gland     POLYP   • Dry eye syndrome of both eyes    • Hip arthrosis    • Hypertension    • Knee swelling    • Left hip pain    • Left knee pain    • Obesity    • Pulmonary hypertension (HCC)         Past Surgical History:   Procedure Laterality Date   • CATARACT EXTRACTION Bilateral    • COLONOSCOPY N/A 04/13/2021    Procedure: COLONOSCOPY TO CECUM AND INTO TI;  Surgeon: Louie Smith MD;  Location: [facility] ENDOSCOPY;  Service: Gastroenterology;  Laterality: N/A;  pre: history of polyps  post: diverticulosis   • COLONOSCOPY W/ BIOPSIES AND POLYPECTOMY      BENIGN   • HERNIA REPAIR      UMBILICAL HERNIA REPAIR   • JOINT REPLACEMENT  1/15/19   • LEG SURGERY Left     cellulitis   • TOTAL HIP ARTHROPLASTY Left 01/15/2019    Procedure: LEFT TOTAL HIP ARTHROPLASTY;  Surgeon: Hieu Orosco MD;  Location: [facility] OR;  Service: Orthopedics   • US GUIDED FINE NEEDLE ASPIRATION  04/15/2021      " "  Social History     Occupational History   • Not on file   Tobacco Use   • Smoking status: Never Smoker   • Smokeless tobacco: Never Used   Vaping Use   • Vaping Use: Never used   Substance and Sexual Activity   • Alcohol use: No   • Drug use: No   • Sexual activity: Not Currently     Partners: Male     Birth control/protection: Abstinence      Social History     Social History Narrative   • Not on file        Family History   Problem Relation Age of Onset   • Stroke Mother    • Cancer Father    • Malig Hyperthermia Neg Hx        ROS: 14 point review of systems was performed and was negative except for documented findings in HPI and today's encounter.       Constitutional:  Denies fever, shaking or chills   Eyes:  Denies change in visual acuity   HENT:  Denies nasal congestion or sore throat   Respiratory:  Denies cough or shortness of breath   Cardiovascular:  Denies chest pain or severe LE edema   GI:  Denies abdominal pain, nausea, vomiting, bloody stools or diarrhea   Musculoskeletal:  Denies numbness tingling or loss of motor function except as outlined above in history of present illness.  : Denies painful urination or hematuria  Integument:  Denies rash, lesion or ulceration   Neurologic:  Denies headache or focal weakness  Endocrine:  Denies lymphadenopathy  Psych:  Denies confusion or change in mental status   Hem:  Denies active bleeding    Physical Exam: 72 y.o. female  Wt Readings from Last 3 Encounters:   07/12/22 96.8 kg (213 lb 4.8 oz)   07/07/22 97.3 kg (214 lb 8 oz)   05/20/22 95.9 kg (211 lb 6.4 oz)     Ht Readings from Last 3 Encounters:   07/12/22 167.6 cm (66\")   07/07/22 167.6 cm (66\")   05/20/22 167.6 cm (66\")     Body mass index is 34.43 kg/m².  Facility age limit for growth percentiles is 20 years.  Vitals:    07/12/22 1006   Temp: 97.1 °F (36.2 °C)       Vital signs reviewed.   General Appearance:    Alert, cooperative, in no acute distress                  Eyes: conjunctiva clear  ENT: " external ears and nose atraumatic  CV: no peripheral edema  Resp: normal respiratory effort  Skin: no rashes or wounds; normal turgor  Psych: mood and affect appropriate  Lymph: no nodes appreciated  Neuro: gross sensation intact  Vascular:  Palpable peripheral pulse in noted extremity  Musculoskeletal Extremities: KNEE Exam: medial and lateral joint line tenderness with crepitation, synovitis, swelling, and joint effusion left knee.        Diagnostic Tests:  Results for orders placed or performed during the hospital encounter of 21   CBC Auto Differential    Specimen: Blood   Result Value Ref Range    WBC 4.61 3.40 - 10.80 10*3/mm3    RBC 3.98 3.77 - 5.28 10*6/mm3    Hemoglobin 11.3 (L) 12.0 - 15.9 g/dL    Hematocrit 33.8 (L) 34.0 - 46.6 %    MCV 84.9 79.0 - 97.0 fL    MCH 28.4 26.6 - 33.0 pg    MCHC 33.4 31.5 - 35.7 g/dL    RDW 13.7 12.3 - 15.4 %    RDW-SD 42.0 37.0 - 54.0 fl    MPV 9.0 6.0 - 12.0 fL    Platelets 327 140 - 450 10*3/mm3    Neutrophil % 61.4 42.7 - 76.0 %    Lymphocyte % 23.2 19.6 - 45.3 %    Monocyte % 13.7 (H) 5.0 - 12.0 %    Eosinophil % 1.3 0.3 - 6.2 %    Basophil % 0.2 0.0 - 1.5 %    Immature Grans % 0.2 0.0 - 0.5 %    Neutrophils, Absolute 2.83 1.70 - 7.00 10*3/mm3    Lymphocytes, Absolute 1.07 0.70 - 3.10 10*3/mm3    Monocytes, Absolute 0.63 0.10 - 0.90 10*3/mm3    Eosinophils, Absolute 0.06 0.00 - 0.40 10*3/mm3    Basophils, Absolute 0.01 0.00 - 0.20 10*3/mm3    Immature Grans, Absolute 0.01 0.00 - 0.05 10*3/mm3    nRBC 0.0 0.0 - 0.2 /100 WBC     Lab Results   Component Value Date    GLUCOSE 97 2022    CALCIUM 9.4 2022     2022    K 3.8 2022    CO2 27.0 2022     2022    BUN 19 2022    CREATININE 1.12 (H) 2022    EGFRIFAFRI 61 2021    BCR 17.0 2022    ANIONGAP 10.0 2022       EK2022 - Sinus Rhythm     I have reviewed all the lab & EKG results.     Imaging was done previously in the office, viewed  images and discussed with the patient:    Indication: pain related symptoms,  Assessment:  Patient Active Problem List   Diagnosis   • Pain in left shin   • Hyperglycemia   • H/O total hip arthroplasty, left   • Preoperative clearance   • Abnormal electrocardiogram (ECG) (EKG)    • SOB (shortness of breath)   • Abnormal cardiac function test   • Primary hypertension   • Arthritis of left knee       Plan:  Reviewed anatomy of a total joint arthroplasty in laymen's terms, as well as typical postoperative recovery and possibly 6-12 months for maximal recovery, and possible need for rehabilitation stay after hospitalization. We also discussed risks, benefits, alternatives, and limitations of procedure with risks including but not limited to neurovascular damage, bleeding, infection, malalignment, chronic pian, failure of implants, osteolysis, loosening of implants, loss of motion, weakness, stiffness, instability, DVT, pulmonary embolus, death, stroke, complex regional pain syndrome, myocardial infarction, and need for additional procedures. Concept of substitution vs. replacement discussed.  No guarantees were given regarding results of surgery.      Sol Rojas was given the opportunity to ask and have all questions answered today.  The patient voiced understanding of the risks, benefits, and alternative forms of treatment that were discussed and the patient consents to proceed with surgery.       Cleared by PCP on Dr. Reyes 4/13/2022 and Cardio Dr. Myers 5/6/2022    Skin breakdown? WNL  Metal allergy? No  DVT Risk Factors:No significant increased risk factors  COVID Status:Vaccinated with Booster    DVT Prophylaxis:  Aspirin 81mg BID x4wks  Walker - has at home  Consults - none  Added labs- none    Discharge Plan: POD 1 to home, home health and when cleared by physical therapy as safe for discharge      To be updated    Date: 7/12/2022  KELLY Mcrae        Much of this encounter note is an  electronic transcription/translation of spoken language to printed text. The electronic translation of spoken language may permit erroneous, or at times, nonsensical words or phrases to be inadvertently transcribed; Although I have reviewed the note for such errors, some may still exist

## 2022-07-16 ENCOUNTER — LAB (OUTPATIENT)
Dept: LAB | Facility: HOSPITAL | Age: 72
End: 2022-07-16

## 2022-07-16 DIAGNOSIS — M17.12 ARTHRITIS OF LEFT KNEE: ICD-10-CM

## 2022-07-16 LAB — SARS-COV-2 ORF1AB RESP QL NAA+PROBE: NOT DETECTED

## 2022-07-16 PROCEDURE — C9803 HOPD COVID-19 SPEC COLLECT: HCPCS

## 2022-07-16 PROCEDURE — U0005 INFEC AGEN DETEC AMPLI PROBE: HCPCS

## 2022-07-16 PROCEDURE — U0004 COV-19 TEST NON-CDC HGH THRU: HCPCS

## 2022-07-19 ENCOUNTER — ANESTHESIA EVENT (OUTPATIENT)
Dept: PERIOP | Facility: HOSPITAL | Age: 72
End: 2022-07-19

## 2022-07-19 ENCOUNTER — APPOINTMENT (OUTPATIENT)
Dept: GENERAL RADIOLOGY | Facility: HOSPITAL | Age: 72
End: 2022-07-19

## 2022-07-19 ENCOUNTER — ANESTHESIA (OUTPATIENT)
Dept: PERIOP | Facility: HOSPITAL | Age: 72
End: 2022-07-19

## 2022-07-19 ENCOUNTER — HOSPITAL ENCOUNTER (OUTPATIENT)
Facility: HOSPITAL | Age: 72
Discharge: HOME | End: 2022-07-20
Attending: ORTHOPAEDIC SURGERY | Admitting: ORTHOPAEDIC SURGERY

## 2022-07-19 DIAGNOSIS — M17.12 ARTHRITIS OF LEFT KNEE: ICD-10-CM

## 2022-07-19 PROCEDURE — C1713 ANCHOR/SCREW BN/BN,TIS/BN: HCPCS | Performed by: ORTHOPAEDIC SURGERY

## 2022-07-19 PROCEDURE — 25010000002 ONDANSETRON PER 1 MG

## 2022-07-19 PROCEDURE — 25010000002 PROPOFOL 10 MG/ML EMULSION: Performed by: ANESTHESIOLOGY

## 2022-07-19 PROCEDURE — 25010000002 CLONIDINE PER 1 MG: Performed by: ORTHOPAEDIC SURGERY

## 2022-07-19 PROCEDURE — 97161 PT EVAL LOW COMPLEX 20 MIN: CPT

## 2022-07-19 PROCEDURE — 25010000002 KETOROLAC TROMETHAMINE PER 15 MG: Performed by: ORTHOPAEDIC SURGERY

## 2022-07-19 PROCEDURE — 27447 TOTAL KNEE ARTHROPLASTY: CPT | Performed by: ORTHOPAEDIC SURGERY

## 2022-07-19 PROCEDURE — 25010000002 EPINEPHRINE 1 MG/ML SOLUTION 30 ML VIAL: Performed by: ORTHOPAEDIC SURGERY

## 2022-07-19 PROCEDURE — 97110 THERAPEUTIC EXERCISES: CPT

## 2022-07-19 PROCEDURE — 25010000002 PHENYLEPHRINE 10 MG/ML SOLUTION: Performed by: ANESTHESIOLOGY

## 2022-07-19 PROCEDURE — 25010000002 NEOSTIGMINE 5 MG/10ML SOLUTION

## 2022-07-19 PROCEDURE — 25010000002 DEXAMETHASONE PER 1 MG: Performed by: ANESTHESIOLOGY

## 2022-07-19 PROCEDURE — 25010000002 FENTANYL CITRATE (PF) 50 MCG/ML SOLUTION: Performed by: ANESTHESIOLOGY

## 2022-07-19 PROCEDURE — 25010000002 VANCOMYCIN 10 G RECONSTITUTED SOLUTION: Performed by: ORTHOPAEDIC SURGERY

## 2022-07-19 PROCEDURE — 20985 CPTR-ASST DIR MS PX: CPT | Performed by: ORTHOPAEDIC SURGERY

## 2022-07-19 PROCEDURE — 73560 X-RAY EXAM OF KNEE 1 OR 2: CPT

## 2022-07-19 PROCEDURE — 63710000001 ONDANSETRON PER 8 MG: Performed by: NURSE PRACTITIONER

## 2022-07-19 PROCEDURE — G0378 HOSPITAL OBSERVATION PER HR: HCPCS

## 2022-07-19 PROCEDURE — 25010000002 ROPIVACAINE PER 1 MG: Performed by: ORTHOPAEDIC SURGERY

## 2022-07-19 PROCEDURE — C1776 JOINT DEVICE (IMPLANTABLE): HCPCS | Performed by: ORTHOPAEDIC SURGERY

## 2022-07-19 PROCEDURE — 25010000002 VANCOMYCIN 10 G RECONSTITUTED SOLUTION: Performed by: NURSE PRACTITIONER

## 2022-07-19 PROCEDURE — 25010000002 HYDROMORPHONE PER 4 MG: Performed by: ANESTHESIOLOGY

## 2022-07-19 DEVICE — DEV CONTRL TISS STRATAFIXSPIRALMNCRYL PLSPS2 REV3/0 45CM: Type: IMPLANTABLE DEVICE | Site: KNEE | Status: FUNCTIONAL

## 2022-07-19 DEVICE — CAP KN ATTUNE FB CMT: Type: IMPLANTABLE DEVICE | Site: KNEE | Status: FUNCTIONAL

## 2022-07-19 DEVICE — ARISTA AH ABSORBABLE HEMOSTATIC PARTICLES
Type: IMPLANTABLE DEVICE | Site: KNEE | Status: FUNCTIONAL
Brand: ARISTA™ AH

## 2022-07-19 DEVICE — SMARTSET HIGH PERFORMANCE MV MEDIUM VISCOSITY BONE CEMENT 40G
Type: IMPLANTABLE DEVICE | Site: KNEE | Status: FUNCTIONAL
Brand: SMARTSET

## 2022-07-19 DEVICE — ATTUNE PATELLA MEDIALIZED DOME 35MM CEMENTED AOX
Type: IMPLANTABLE DEVICE | Site: KNEE | Status: FUNCTIONAL
Brand: ATTUNE

## 2022-07-19 DEVICE — ATTUNE KNEE SYSTEM TIBIAL BASE FIXED BEARING SIZE 5 CEMENTED
Type: IMPLANTABLE DEVICE | Site: KNEE | Status: FUNCTIONAL
Brand: ATTUNE

## 2022-07-19 DEVICE — ATTUNE KNEE SYSTEM FEMORAL CRUCIATE RETAINING SIZE 5 LEFT CEMENTED
Type: IMPLANTABLE DEVICE | Site: KNEE | Status: FUNCTIONAL
Brand: ATTUNE

## 2022-07-19 DEVICE — DEV CONTRL TISS STRATAFIX SYMM PDS PLUS VIL CT-1 60CM: Type: IMPLANTABLE DEVICE | Site: KNEE | Status: FUNCTIONAL

## 2022-07-19 DEVICE — ATTUNE KNEE SYSTEM TIBIAL INSERT FIXED BEARING CRUCIATE RETAINING 5 7MM AOX
Type: IMPLANTABLE DEVICE | Site: KNEE | Status: FUNCTIONAL
Brand: ATTUNE

## 2022-07-19 RX ORDER — LIDOCAINE HYDROCHLORIDE 20 MG/ML
INJECTION, SOLUTION INFILTRATION; PERINEURAL AS NEEDED
Status: DISCONTINUED | OUTPATIENT
Start: 2022-07-19 | End: 2022-07-19 | Stop reason: SURG

## 2022-07-19 RX ORDER — MAGNESIUM HYDROXIDE 1200 MG/15ML
LIQUID ORAL AS NEEDED
Status: DISCONTINUED | OUTPATIENT
Start: 2022-07-19 | End: 2022-07-19 | Stop reason: HOSPADM

## 2022-07-19 RX ORDER — TRANEXAMIC ACID 100 MG/ML
INJECTION, SOLUTION INTRAVENOUS AS NEEDED
Status: DISCONTINUED | OUTPATIENT
Start: 2022-07-19 | End: 2022-07-19 | Stop reason: SURG

## 2022-07-19 RX ORDER — MIDAZOLAM HYDROCHLORIDE 1 MG/ML
1 INJECTION INTRAMUSCULAR; INTRAVENOUS
Status: DISCONTINUED | OUTPATIENT
Start: 2022-07-19 | End: 2022-07-19 | Stop reason: HOSPADM

## 2022-07-19 RX ORDER — ASPIRIN 81 MG/1
81 TABLET ORAL EVERY 12 HOURS SCHEDULED
Status: DISCONTINUED | OUTPATIENT
Start: 2022-07-20 | End: 2022-07-20 | Stop reason: HOSPADM

## 2022-07-19 RX ORDER — PHENYLEPHRINE HYDROCHLORIDE 10 MG/ML
INJECTION INTRAVENOUS AS NEEDED
Status: DISCONTINUED | OUTPATIENT
Start: 2022-07-19 | End: 2022-07-19 | Stop reason: SURG

## 2022-07-19 RX ORDER — NEOSTIGMINE METHYLSULFATE 0.5 MG/ML
INJECTION, SOLUTION INTRAVENOUS AS NEEDED
Status: DISCONTINUED | OUTPATIENT
Start: 2022-07-19 | End: 2022-07-19 | Stop reason: SURG

## 2022-07-19 RX ORDER — MELOXICAM 15 MG/1
15 TABLET ORAL ONCE
Status: DISCONTINUED | OUTPATIENT
Start: 2022-07-19 | End: 2022-07-19 | Stop reason: HOSPADM

## 2022-07-19 RX ORDER — ONDANSETRON 2 MG/ML
4 INJECTION INTRAMUSCULAR; INTRAVENOUS EVERY 6 HOURS PRN
Status: DISCONTINUED | OUTPATIENT
Start: 2022-07-19 | End: 2022-07-20 | Stop reason: HOSPADM

## 2022-07-19 RX ORDER — ONDANSETRON 2 MG/ML
INJECTION INTRAMUSCULAR; INTRAVENOUS AS NEEDED
Status: DISCONTINUED | OUTPATIENT
Start: 2022-07-19 | End: 2022-07-19 | Stop reason: SURG

## 2022-07-19 RX ORDER — PROMETHAZINE HYDROCHLORIDE 25 MG/1
25 SUPPOSITORY RECTAL ONCE AS NEEDED
Status: DISCONTINUED | OUTPATIENT
Start: 2022-07-19 | End: 2022-07-19 | Stop reason: HOSPADM

## 2022-07-19 RX ORDER — GLYCOPYRROLATE 0.2 MG/ML
INJECTION INTRAMUSCULAR; INTRAVENOUS AS NEEDED
Status: DISCONTINUED | OUTPATIENT
Start: 2022-07-19 | End: 2022-07-19 | Stop reason: SURG

## 2022-07-19 RX ORDER — FENTANYL CITRATE 50 UG/ML
50 INJECTION, SOLUTION INTRAMUSCULAR; INTRAVENOUS
Status: DISCONTINUED | OUTPATIENT
Start: 2022-07-19 | End: 2022-07-19 | Stop reason: HOSPADM

## 2022-07-19 RX ORDER — ACETAMINOPHEN 650 MG/1
650 SUPPOSITORY RECTAL ONCE AS NEEDED
Status: DISCONTINUED | OUTPATIENT
Start: 2022-07-19 | End: 2022-07-19 | Stop reason: HOSPADM

## 2022-07-19 RX ORDER — SODIUM CHLORIDE, SODIUM LACTATE, POTASSIUM CHLORIDE, CALCIUM CHLORIDE 600; 310; 30; 20 MG/100ML; MG/100ML; MG/100ML; MG/100ML
100 INJECTION, SOLUTION INTRAVENOUS CONTINUOUS
Status: DISCONTINUED | OUTPATIENT
Start: 2022-07-19 | End: 2022-07-20 | Stop reason: HOSPADM

## 2022-07-19 RX ORDER — DIPHENHYDRAMINE HYDROCHLORIDE 50 MG/ML
12.5 INJECTION INTRAMUSCULAR; INTRAVENOUS
Status: DISCONTINUED | OUTPATIENT
Start: 2022-07-19 | End: 2022-07-19 | Stop reason: HOSPADM

## 2022-07-19 RX ORDER — HYDROMORPHONE HYDROCHLORIDE 1 MG/ML
0.25 INJECTION, SOLUTION INTRAMUSCULAR; INTRAVENOUS; SUBCUTANEOUS
Status: DISCONTINUED | OUTPATIENT
Start: 2022-07-19 | End: 2022-07-19 | Stop reason: HOSPADM

## 2022-07-19 RX ORDER — IBUPROFEN 600 MG/1
600 TABLET ORAL ONCE AS NEEDED
Status: DISCONTINUED | OUTPATIENT
Start: 2022-07-19 | End: 2022-07-19 | Stop reason: HOSPADM

## 2022-07-19 RX ORDER — EPHEDRINE SULFATE 50 MG/ML
5 INJECTION, SOLUTION INTRAVENOUS ONCE AS NEEDED
Status: DISCONTINUED | OUTPATIENT
Start: 2022-07-19 | End: 2022-07-19 | Stop reason: HOSPADM

## 2022-07-19 RX ORDER — DOCUSATE SODIUM 100 MG/1
100 CAPSULE, LIQUID FILLED ORAL 2 TIMES DAILY
Status: DISCONTINUED | OUTPATIENT
Start: 2022-07-19 | End: 2022-07-20 | Stop reason: HOSPADM

## 2022-07-19 RX ORDER — PREGABALIN 75 MG/1
150 CAPSULE ORAL ONCE
Status: COMPLETED | OUTPATIENT
Start: 2022-07-19 | End: 2022-07-19

## 2022-07-19 RX ORDER — FAMOTIDINE 20 MG/1
40 TABLET, FILM COATED ORAL DAILY
Status: DISCONTINUED | OUTPATIENT
Start: 2022-07-19 | End: 2022-07-20 | Stop reason: HOSPADM

## 2022-07-19 RX ORDER — LABETALOL HYDROCHLORIDE 5 MG/ML
5 INJECTION, SOLUTION INTRAVENOUS
Status: DISCONTINUED | OUTPATIENT
Start: 2022-07-19 | End: 2022-07-19 | Stop reason: HOSPADM

## 2022-07-19 RX ORDER — CARVEDILOL 3.12 MG/1
3.12 TABLET ORAL 2 TIMES DAILY WITH MEALS
Status: DISCONTINUED | OUTPATIENT
Start: 2022-07-19 | End: 2022-07-20 | Stop reason: HOSPADM

## 2022-07-19 RX ORDER — DIPHENHYDRAMINE HCL 25 MG
25 CAPSULE ORAL
Status: DISCONTINUED | OUTPATIENT
Start: 2022-07-19 | End: 2022-07-19 | Stop reason: HOSPADM

## 2022-07-19 RX ORDER — NALOXONE HCL 0.4 MG/ML
0.2 VIAL (ML) INJECTION AS NEEDED
Status: DISCONTINUED | OUTPATIENT
Start: 2022-07-19 | End: 2022-07-19 | Stop reason: HOSPADM

## 2022-07-19 RX ORDER — OXYCODONE AND ACETAMINOPHEN 7.5; 325 MG/1; MG/1
1 TABLET ORAL EVERY 4 HOURS PRN
Status: DISCONTINUED | OUTPATIENT
Start: 2022-07-19 | End: 2022-07-19 | Stop reason: HOSPADM

## 2022-07-19 RX ORDER — SODIUM CHLORIDE 0.9 % (FLUSH) 0.9 %
3-10 SYRINGE (ML) INJECTION AS NEEDED
Status: DISCONTINUED | OUTPATIENT
Start: 2022-07-19 | End: 2022-07-19 | Stop reason: HOSPADM

## 2022-07-19 RX ORDER — POTASSIUM CHLORIDE 1.5 G/1.77G
20 POWDER, FOR SOLUTION ORAL DAILY
Status: DISCONTINUED | OUTPATIENT
Start: 2022-07-20 | End: 2022-07-20 | Stop reason: HOSPADM

## 2022-07-19 RX ORDER — FAMOTIDINE 10 MG/ML
20 INJECTION, SOLUTION INTRAVENOUS ONCE
Status: COMPLETED | OUTPATIENT
Start: 2022-07-19 | End: 2022-07-19

## 2022-07-19 RX ORDER — ACETAMINOPHEN 500 MG
1000 TABLET ORAL ONCE
Status: COMPLETED | OUTPATIENT
Start: 2022-07-19 | End: 2022-07-19

## 2022-07-19 RX ORDER — CHLORHEXIDINE GLUCONATE 500 MG/1
1 CLOTH TOPICAL TAKE AS DIRECTED
Status: DISCONTINUED | OUTPATIENT
Start: 2022-07-19 | End: 2022-07-19

## 2022-07-19 RX ORDER — ONDANSETRON 2 MG/ML
4 INJECTION INTRAMUSCULAR; INTRAVENOUS ONCE AS NEEDED
Status: DISCONTINUED | OUTPATIENT
Start: 2022-07-19 | End: 2022-07-19 | Stop reason: HOSPADM

## 2022-07-19 RX ORDER — SODIUM CHLORIDE, SODIUM LACTATE, POTASSIUM CHLORIDE, CALCIUM CHLORIDE 600; 310; 30; 20 MG/100ML; MG/100ML; MG/100ML; MG/100ML
9 INJECTION, SOLUTION INTRAVENOUS CONTINUOUS
Status: DISCONTINUED | OUTPATIENT
Start: 2022-07-19 | End: 2022-07-20 | Stop reason: HOSPADM

## 2022-07-19 RX ORDER — NALOXONE HCL 0.4 MG/ML
0.1 VIAL (ML) INJECTION
Status: DISCONTINUED | OUTPATIENT
Start: 2022-07-19 | End: 2022-07-20 | Stop reason: HOSPADM

## 2022-07-19 RX ORDER — MIDAZOLAM HYDROCHLORIDE 1 MG/ML
0.5 INJECTION INTRAMUSCULAR; INTRAVENOUS
Status: DISCONTINUED | OUTPATIENT
Start: 2022-07-19 | End: 2022-07-19 | Stop reason: HOSPADM

## 2022-07-19 RX ORDER — BISACODYL 5 MG/1
10 TABLET, DELAYED RELEASE ORAL DAILY PRN
Status: DISCONTINUED | OUTPATIENT
Start: 2022-07-19 | End: 2022-07-20 | Stop reason: HOSPADM

## 2022-07-19 RX ORDER — ACETAMINOPHEN 500 MG
1000 TABLET ORAL EVERY 8 HOURS
Status: DISCONTINUED | OUTPATIENT
Start: 2022-07-19 | End: 2022-07-20 | Stop reason: HOSPADM

## 2022-07-19 RX ORDER — HYDROMORPHONE HYDROCHLORIDE 1 MG/ML
0.5 INJECTION, SOLUTION INTRAMUSCULAR; INTRAVENOUS; SUBCUTANEOUS
Status: DISCONTINUED | OUTPATIENT
Start: 2022-07-19 | End: 2022-07-19 | Stop reason: HOSPADM

## 2022-07-19 RX ORDER — TRAMADOL HYDROCHLORIDE 50 MG/1
50 TABLET ORAL EVERY 4 HOURS PRN
Status: DISCONTINUED | OUTPATIENT
Start: 2022-07-19 | End: 2022-07-20 | Stop reason: HOSPADM

## 2022-07-19 RX ORDER — TRAMADOL HYDROCHLORIDE 50 MG/1
100 TABLET ORAL EVERY 4 HOURS PRN
Status: DISCONTINUED | OUTPATIENT
Start: 2022-07-19 | End: 2022-07-20 | Stop reason: HOSPADM

## 2022-07-19 RX ORDER — FLUMAZENIL 0.1 MG/ML
0.2 INJECTION INTRAVENOUS AS NEEDED
Status: DISCONTINUED | OUTPATIENT
Start: 2022-07-19 | End: 2022-07-19 | Stop reason: HOSPADM

## 2022-07-19 RX ORDER — FERROUS SULFATE 325(65) MG
325 TABLET ORAL
Status: DISCONTINUED | OUTPATIENT
Start: 2022-07-20 | End: 2022-07-20 | Stop reason: HOSPADM

## 2022-07-19 RX ORDER — DROPERIDOL 2.5 MG/ML
0.62 INJECTION, SOLUTION INTRAMUSCULAR; INTRAVENOUS ONCE AS NEEDED
Status: DISCONTINUED | OUTPATIENT
Start: 2022-07-19 | End: 2022-07-19 | Stop reason: HOSPADM

## 2022-07-19 RX ORDER — ACETAMINOPHEN 325 MG/1
650 TABLET ORAL ONCE AS NEEDED
Status: DISCONTINUED | OUTPATIENT
Start: 2022-07-19 | End: 2022-07-19 | Stop reason: HOSPADM

## 2022-07-19 RX ORDER — POVIDONE-IODINE 10 MG/ML
SOLUTION TOPICAL ONCE
Status: COMPLETED | OUTPATIENT
Start: 2022-07-19 | End: 2022-07-19

## 2022-07-19 RX ORDER — ROCURONIUM BROMIDE 10 MG/ML
INJECTION, SOLUTION INTRAVENOUS AS NEEDED
Status: DISCONTINUED | OUTPATIENT
Start: 2022-07-19 | End: 2022-07-19 | Stop reason: SURG

## 2022-07-19 RX ORDER — HYDROCODONE BITARTRATE AND ACETAMINOPHEN 7.5; 325 MG/1; MG/1
1 TABLET ORAL ONCE AS NEEDED
Status: DISCONTINUED | OUTPATIENT
Start: 2022-07-19 | End: 2022-07-19 | Stop reason: HOSPADM

## 2022-07-19 RX ORDER — POLYETHYLENE GLYCOL 3350 17 G/17G
17 POWDER, FOR SOLUTION ORAL DAILY
Status: DISCONTINUED | OUTPATIENT
Start: 2022-07-19 | End: 2022-07-20 | Stop reason: HOSPADM

## 2022-07-19 RX ORDER — ONDANSETRON 4 MG/1
4 TABLET, FILM COATED ORAL EVERY 6 HOURS PRN
Status: DISCONTINUED | OUTPATIENT
Start: 2022-07-19 | End: 2022-07-20 | Stop reason: HOSPADM

## 2022-07-19 RX ORDER — HYDRALAZINE HYDROCHLORIDE 20 MG/ML
5 INJECTION INTRAMUSCULAR; INTRAVENOUS
Status: DISCONTINUED | OUTPATIENT
Start: 2022-07-19 | End: 2022-07-19 | Stop reason: HOSPADM

## 2022-07-19 RX ORDER — LIDOCAINE HYDROCHLORIDE 10 MG/ML
0.5 INJECTION, SOLUTION EPIDURAL; INFILTRATION; INTRACAUDAL; PERINEURAL ONCE AS NEEDED
Status: DISCONTINUED | OUTPATIENT
Start: 2022-07-19 | End: 2022-07-19 | Stop reason: HOSPADM

## 2022-07-19 RX ORDER — SODIUM CHLORIDE 0.9 % (FLUSH) 0.9 %
3 SYRINGE (ML) INJECTION EVERY 12 HOURS SCHEDULED
Status: DISCONTINUED | OUTPATIENT
Start: 2022-07-19 | End: 2022-07-19 | Stop reason: HOSPADM

## 2022-07-19 RX ORDER — PROMETHAZINE HYDROCHLORIDE 25 MG/1
25 TABLET ORAL ONCE AS NEEDED
Status: DISCONTINUED | OUTPATIENT
Start: 2022-07-19 | End: 2022-07-19 | Stop reason: HOSPADM

## 2022-07-19 RX ORDER — BISACODYL 10 MG
10 SUPPOSITORY, RECTAL RECTAL DAILY PRN
Status: DISCONTINUED | OUTPATIENT
Start: 2022-07-19 | End: 2022-07-20 | Stop reason: HOSPADM

## 2022-07-19 RX ORDER — HYDROMORPHONE HYDROCHLORIDE 1 MG/ML
0.5 INJECTION, SOLUTION INTRAMUSCULAR; INTRAVENOUS; SUBCUTANEOUS
Status: DISCONTINUED | OUTPATIENT
Start: 2022-07-19 | End: 2022-07-20 | Stop reason: HOSPADM

## 2022-07-19 RX ORDER — PROPOFOL 10 MG/ML
VIAL (ML) INTRAVENOUS AS NEEDED
Status: DISCONTINUED | OUTPATIENT
Start: 2022-07-19 | End: 2022-07-19 | Stop reason: SURG

## 2022-07-19 RX ORDER — DEXAMETHASONE SODIUM PHOSPHATE 10 MG/ML
INJECTION INTRAMUSCULAR; INTRAVENOUS AS NEEDED
Status: DISCONTINUED | OUTPATIENT
Start: 2022-07-19 | End: 2022-07-19 | Stop reason: SURG

## 2022-07-19 RX ADMIN — ONDANSETRON 4 MG: 2 INJECTION INTRAMUSCULAR; INTRAVENOUS at 11:19

## 2022-07-19 RX ADMIN — PREGABALIN 150 MG: 75 CAPSULE ORAL at 08:51

## 2022-07-19 RX ADMIN — ACETAMINOPHEN 1000 MG: 500 TABLET ORAL at 19:36

## 2022-07-19 RX ADMIN — PHENYLEPHRINE HYDROCHLORIDE 100 MCG: 10 INJECTION INTRAVENOUS at 10:11

## 2022-07-19 RX ADMIN — PROPOFOL 50 MG: 10 INJECTION, EMULSION INTRAVENOUS at 10:00

## 2022-07-19 RX ADMIN — VANCOMYCIN HYDROCHLORIDE 1500 MG: 10 INJECTION, POWDER, LYOPHILIZED, FOR SOLUTION INTRAVENOUS at 09:27

## 2022-07-19 RX ADMIN — LIDOCAINE HYDROCHLORIDE 60 MG: 20 INJECTION, SOLUTION INFILTRATION; PERINEURAL at 09:54

## 2022-07-19 RX ADMIN — FENTANYL CITRATE 50 MCG: 0.05 INJECTION, SOLUTION INTRAMUSCULAR; INTRAVENOUS at 10:22

## 2022-07-19 RX ADMIN — NEOSTIGMINE METHYLSULFATE 4 MG: 0.5 INJECTION INTRAVENOUS at 11:26

## 2022-07-19 RX ADMIN — DEXAMETHASONE SODIUM PHOSPHATE 8 MG: 10 INJECTION INTRAMUSCULAR; INTRAVENOUS at 10:08

## 2022-07-19 RX ADMIN — DOCUSATE SODIUM 100 MG: 100 CAPSULE, LIQUID FILLED ORAL at 21:04

## 2022-07-19 RX ADMIN — ROCURONIUM BROMIDE 50 MG: 50 INJECTION INTRAVENOUS at 09:54

## 2022-07-19 RX ADMIN — GLYCOPYRROLATE 0.6 MG: 0.2 INJECTION INTRAMUSCULAR; INTRAVENOUS at 11:26

## 2022-07-19 RX ADMIN — FAMOTIDINE 20 MG: 10 INJECTION INTRAVENOUS at 09:23

## 2022-07-19 RX ADMIN — SODIUM CHLORIDE, POTASSIUM CHLORIDE, SODIUM LACTATE AND CALCIUM CHLORIDE 100 ML/HR: 600; 310; 30; 20 INJECTION, SOLUTION INTRAVENOUS at 16:54

## 2022-07-19 RX ADMIN — SODIUM CHLORIDE, POTASSIUM CHLORIDE, SODIUM LACTATE AND CALCIUM CHLORIDE 9 ML/HR: 600; 310; 30; 20 INJECTION, SOLUTION INTRAVENOUS at 08:56

## 2022-07-19 RX ADMIN — TRANEXAMIC ACID 1000 MG: 1 INJECTION, SOLUTION INTRAVENOUS at 10:18

## 2022-07-19 RX ADMIN — ACETAMINOPHEN 1000 MG: 500 TABLET ORAL at 08:51

## 2022-07-19 RX ADMIN — POVIDONE-IODINE: 10 SOLUTION TOPICAL at 08:54

## 2022-07-19 RX ADMIN — TRAMADOL HYDROCHLORIDE 100 MG: 50 TABLET, COATED ORAL at 12:55

## 2022-07-19 RX ADMIN — HYDROMORPHONE HYDROCHLORIDE 0.25 MG: 1 INJECTION, SOLUTION INTRAMUSCULAR; INTRAVENOUS; SUBCUTANEOUS at 12:29

## 2022-07-19 RX ADMIN — TRAMADOL HYDROCHLORIDE 100 MG: 50 TABLET, COATED ORAL at 16:54

## 2022-07-19 RX ADMIN — ONDANSETRON HYDROCHLORIDE 4 MG: 4 TABLET, FILM COATED ORAL at 19:36

## 2022-07-19 RX ADMIN — HYDROMORPHONE HYDROCHLORIDE 0.25 MG: 1 INJECTION, SOLUTION INTRAMUSCULAR; INTRAVENOUS; SUBCUTANEOUS at 12:43

## 2022-07-19 RX ADMIN — PHENYLEPHRINE HYDROCHLORIDE 100 MCG: 10 INJECTION INTRAVENOUS at 11:22

## 2022-07-19 RX ADMIN — PROPOFOL 150 MG: 10 INJECTION, EMULSION INTRAVENOUS at 09:54

## 2022-07-19 RX ADMIN — VANCOMYCIN HYDROCHLORIDE 1500 MG: 10 INJECTION, POWDER, LYOPHILIZED, FOR SOLUTION INTRAVENOUS at 21:04

## 2022-07-19 RX ADMIN — FENTANYL CITRATE 50 MCG: 0.05 INJECTION, SOLUTION INTRAMUSCULAR; INTRAVENOUS at 10:30

## 2022-07-19 NOTE — INTERVAL H&P NOTE
Dictation #1  MRN:[id number]  [facility]:[id number]  Pediatric Neurology Clinic note 02/10/2020  Shakira Caldwell date of birth 2001    This is a 18-year-old whom I saw initially on 12/31/2019.  There were questions about word-finding but her mental status examination was quite normal.  She is being followed by sleep medicine for excessive daytime sleepiness.  There been questions of dysautonomia and she has an appointment next month at [facility].  Stimulant medications will be considered thereafter.  She has had some recent abdominal pain which seems to been solved by dietary adjustment.  No other illness surgery medication allergy or injury.  She is home schooled.  A sibling has epilepsy.  She lives with her mother.  General review of systems is benign.    Weight 72.05 kg height 176 cm blood pressure 127/60 normal body habitus.  Head eyes ears nose and throat were normal.  Neck is supple no masses chest clear no murmurs abdomen benign.  Mental status examination is once again normal. Cranial nerves intact with normal fundi pupils eye movements facial movements hearing neck trapezius strength and tongue protrusion.  Deep tendon reflexes are 2+ throughout, no pathologic reflexes.  Muscle tone and strength normal in all 4 extremities.  Normal gait, no ataxia or intention tremor.  Sensation intact distally to touch.  Today her EEG was normal and I reviewed this personally.    At this point I do not see any evidence for epilepsy or any other primary neurologic disease.  I await the results of her evaluation for autonomic dysfunction at [facility] and a possible trial of stimulants for her excessive sleepiness.---Sushant León MD   H&P reviewed.  The patient was examined and there are no changes to the H&P

## 2022-07-19 NOTE — PLAN OF CARE
Goal Outcome Evaluation:  Plan of Care Reviewed With: patient, spouse           Outcome Evaluation: Patient is a 72 y.o female POD0 L TKA. Patient lives at home with her  with no steps to enter. Patient reports she does not use an AD and is independent at baseline. Patient reports no equipment needs at d/c. Today patient completed all bed mobility with Jennifer.  Patient ambulated 30ft with rwx and CGA. Ambulation distance limited by feeling dizzy and nauseous. Patient returned back to bed at end of evaluation due to no chair being present in room. Patient educated in TKA post op protocol x10. Patient would benefit from continued skilled PT to address deficits in strength, ROM, and activity endurance. PT recommends home with assist and  HHPT at d/c.

## 2022-07-19 NOTE — ANESTHESIA POSTPROCEDURE EVALUATION
Patient: Sol Rojas    Procedure Summary     Date: 07/19/22 Room / Location:  [facility] [address] [place] [address] [facility]    Anesthesia Start: 0944 Anesthesia Stop: 1156    Procedure: LEFT TOTAL KNEE ARTHROPLASTY WITH SANTIAGO NAVIGATION (Left Knee) Diagnosis:       Arthritis of left knee      (Arthritis of left knee [M17.12])    Surgeons: Guilherme Smith MD Provider: Mc Mcfarlane MD    Anesthesia Type: general ASA Status: 2          Anesthesia Type: general    Vitals  Vitals Value Taken Time   /64 07/19/22 1330   Temp 36.4 °C (97.5 °F) 07/19/22 1153   Pulse 46 07/19/22 1346   Resp 16 07/19/22 1300   SpO2 100 % 07/19/22 1346   Vitals shown include unvalidated device data.        Post Anesthesia Care and Evaluation    Patient location during evaluation: bedside  Patient participation: complete - patient participated  Level of consciousness: awake and alert  Pain management: adequate    Airway patency: patent  Anesthetic complications: No anesthetic complications  PONV Status: controlled  Cardiovascular status: acceptable and hemodynamically stable  Respiratory status: acceptable, spontaneous ventilation and nonlabored ventilation  Hydration status: acceptable    Comments: /67   Pulse (!) 43   Temp 36.4 °C (97.5 °F) (Oral)   Resp 16   LMP  (LMP Unknown)   SpO2 98%

## 2022-07-19 NOTE — ANESTHESIA PREPROCEDURE EVALUATION
Anesthesia Evaluation     Patient summary reviewed   no history of anesthetic complications:  NPO Solid Status: > 8 hours  NPO Liquid Status: > 2 hours           Airway   Mallampati: II  TM distance: >3 FB  Neck ROM: full  No difficulty expected  Dental - normal exam     Pulmonary     breath sounds clear to auscultation  (-) shortness of breath, recent URI, not a smoker  Cardiovascular   Exercise tolerance: good (4-7 METS)    ECG reviewed  Patient on routine beta blocker and Beta blocker given within 24 hours of surgery  Rhythm: regular  Rate: normal    (+) hypertension,   (-) past MI, dysrhythmias, angina    ROS comment: 4/2022 - MPI and TTE     Stress Procedure    Rest ECG Baseline ECG rhythm of sinus bradycardia noted. Slow R wave progression.   MD interval = 187 ms. QRS complex = 103 ms. There was no ST segment deviation noted.  Stress Description A pharmacological stress test was performed using regadenoson with low-level exercise.   The patient reached the end of the protocol.   The patient reported expected effects during the stress test. Symptoms were not clinically significant.   Blood pressure demonstrated a normal response to stress. Heart rate demonstrated a normal response to stress.  Stress ECG Stress ECG rhythm of sinus tachycardia noted. Significant motion artifact due to poor exercise tolerance,  ECG is equivocal for ischemia. .   Significant motion artifact due to poor exercise tolerance,  ECG is equivocal for ischemia.     Stress ECG was uninterpretable. Significant motion artifact due to poor exercise tolerance,  ECG is equivocal for ischemia.  Recovery ECG During recovery, the patient complained of no significant symptoms following stress.   Sinus rhythm was noted during recovery. PACs noted on ECG during recovery stage.   There was no ST segment deviation noted during recovery.   Arrhythmias during recovery: occasional PAC's.  Stress Findings Equivocal ECG evidence of myocardial ischemia.  Negative clinical evidence of myocardial ischemia. Findings consistent with an equivocal ECG stress test.        Nuclear Perfusion Findings    Study Impression Myocardial perfusion imaging indicates a small-sized, mildly severe area of ischemia located in the lateral wall. Impressions are consistent with an intermediate risk study.  Rest Perfusion Defect 1 No rest myocardial perfusion defect noted.  Stress Perfusion Defect 1 There is a small sized defect of mild severity present in the mid lateral wall.  Ventricle Size / Description Left ventricular ejection fraction is normal.  (Calculated EF = 55%). Normal LV cavity size. Normal LV wall motion noted. Normal RV cavity size.        Neuro/Psych  (-) seizures, CVA  GI/Hepatic/Renal/Endo    (+) obesity,     (-) no renal disease, diabetes    Musculoskeletal     (-) neck stiffness  Abdominal    Substance History      OB/GYN          Other   arthritis,                        Anesthesia Plan    ASA 2     general     intravenous induction     Anesthetic plan, risks, benefits, and alternatives have been provided, discussed and informed consent has been obtained with: patient.    Plan discussed with CRNA.

## 2022-07-19 NOTE — PLAN OF CARE
Goal Outcome Evaluation:            Pt POD0 Left total Knee, VSS, A&Ox4.  Ultram for pain.  Pt bradycardic.

## 2022-07-19 NOTE — OP NOTE
Operative Note    Name: Sol Rojas  YOB: 1950  MRN: [id number]  BMI: There is no height or weight on file to calculate BMI.    DATE OF SURGERY: 7/19/2022    PREOPERATIVE DIAGNOSIS: left knee end-stage osteoarthritis    POSTOPERATIVE DIAGNOSIS: left knee end-stage osteoarthritis    PROCEDURE PERFORMED: left total knee replacement with [place] navigation    SURGEON: Dr. Guilherme Smith    ASSISTANT: GARO Dalton    IMPLANTS: Depuy Attune    Estimated Blood Loss: 100 mL  Specimens : none  Complications: none  22 Modifier:  none    DESCRIPTION OF PROCEDURE: The patient was taken to the operating room and placed in the supine position. Preoperative antibiotics were administered. Surgical time out was performed. After adequate induction of anesthesia, the leg was prepped and draped in the usual sterile fashion. The leg was exsanguinated with an Esmarch bandage and the tourniquet inflated to 250 mmHg. A midline incision was performed followed by a medial parapatellar arthrotomy. The patella was subluxed laterally.  A portion of the fat pad, ACL, and anterior horns of the meniscus were excised.  The Vontoo navigation device was affixed and navigated. The distal cut was made. The femur was then sized with a sizing guide. The femoral cutting block was placed and all femoral cuts were performed. The proximal tibia was exposed. [place] navigation protocol was accomplished.  10 millimeters were removed.  We used the extramedullary tibial cutting guide set for removal of 3 mm of bone off the low side. The tibial cut was performed. The posterior horns of the menisci were excised. The posterior osteophytes were removed. Flexion extension blocks were then used to balance the knee. The tibial cut surface was then sized with the sizing templates and the tibial and femoral trial were then placed. The tray was aligned with the middle third of the tubercle.    Attention was then placed to the patella. The  patella was balanced to track centrally through range of motion.  It measured 24 and patella resurfacing was performed.   At this point all trial components were removed, the knee was copiously irrigated with pulsed lavage, and the knee was injected with anesthetic cocktail solution. The cut surfaces were then dried with clean lap sponges, and the components were cemented, first the tibia, followed by femur. The knee was held in full extension and all excess cement was removed. The knee was held still until the cement had completely hardened. We then placed the trial polyethylene spacer which resulted in full extension and excellent flexion-extension balance. We placed the final polyethylene spacer.   The knee was then copiously irrigated with the full 3 liters. The tourniquet was then released. There was excellent hemostasis. We closed the knee in multiple layers in standard fashion.  A sterile dressing was applied. At the end of the case, the sponge and needle counts were reported as being correct. There were no known complications. The patient was then transported to the recovery room.    The surgical assistant performed retraction, suction, hemostasis, and specific limb positioning and manipulation required for accuracy of joint placement, and assistance in wound closure and dressing application.       Guilherme Smith M.D.

## 2022-07-19 NOTE — ANESTHESIA PROCEDURE NOTES
Airway  Urgency: elective    Date/Time: 7/19/2022 9:57 AM    General Information and Staff    Patient location during procedure: OR  Anesthesiologist: Mc Mcfarlane MD    Indications and Patient Condition  Indications for airway management: airway protection    Preoxygenated: yes  MILS maintained throughout  Mask difficulty assessment: 1 - vent by mask    Final Airway Details  Final airway type: endotracheal airway      Successful airway: ETT  Cuffed: yes   Successful intubation technique: direct laryngoscopy  Endotracheal tube insertion site: oral  Blade: Bacilio  Blade size: 3  ETT size (mm): 7.0  Cormack-Lehane Classification: grade I - full view of glottis  Placement verified by: chest auscultation   Measured from: lips  ETT/EBT  to lips (cm): 22  Number of attempts at approach: 1  Assessment: lips, teeth, and gum same as pre-op and atraumatic intubation

## 2022-07-19 NOTE — THERAPY EVALUATION
"Patient Name: Sol Rojas  : 1950    MRN: [id number]                              Today's Date: 2022       Admit Date: 2022    Visit Dx:     ICD-10-CM ICD-9-CM   1. Arthritis of left knee  M17.12 716.96     Patient Active Problem List   Diagnosis   • Pain in left shin   • Hyperglycemia   • H/O total hip arthroplasty, left   • Preoperative clearance   • Abnormal electrocardiogram (ECG) (EKG)    • SOB (shortness of breath)   • Abnormal cardiac function test   • Primary hypertension   • Arthritis of left knee     Past Medical History:   Diagnosis Date   • Ankle sprain     Left ankle   • Arthritis     OSTEO   • Bilateral knee pain    • Chest pain     SAW A CARDIOLOGIST--PER PT, EVERYTHING WAS \"OKAY.\"   • Disease of thyroid gland     POLYP   • Dry eye syndrome of both eyes    • Hip arthrosis    • Hypertension    • Knee swelling    • Left hip pain    • Left knee pain    • Obesity    • Pulmonary hypertension (HCC)      Past Surgical History:   Procedure Laterality Date   • CATARACT EXTRACTION Bilateral    • COLONOSCOPY N/A 2021    Procedure: COLONOSCOPY TO CECUM AND INTO TI;  Surgeon: Louie Smith MD;  Location: [facility] ENDOSCOPY;  Service: Gastroenterology;  Laterality: N/A;  pre: history of polyps  post: diverticulosis   • COLONOSCOPY W/ BIOPSIES AND POLYPECTOMY      BENIGN   • HERNIA REPAIR      UMBILICAL HERNIA REPAIR   • LEG SURGERY Left     cellulitis   • TOTAL HIP ARTHROPLASTY Left 01/15/2019    Procedure: LEFT TOTAL HIP ARTHROPLASTY;  Surgeon: Hieu Orosco MD;  Location: [facility] OR;  Service: Orthopedics   • US GUIDED FINE NEEDLE ASPIRATION  04/15/2021      General Information     Row Name 22 1551          Physical Therapy Time and Intention    Document Type evaluation  -SM     Mode of Treatment individual therapy;physical therapy  -SM     Row Name 22 1551          General Information    Patient Profile Reviewed yes  -SM     Prior Level of Function independent:  -SM  "    [facility] Name 07/19/22 1551          Living Environment    People in Home spouse  -[facility] Name 07/19/22 1551          Home Main Entrance    Number of Stairs, Main Entrance none  -[facility] Name 07/19/22 1551          Cognition    Orientation Status (Cognition) oriented x 4  -[facility] Name 07/19/22 1551          Safety Issues, Functional Mobility    Impairments Affecting Function (Mobility) strength;pain;endurance/activity tolerance  -           User Key  (r) = Recorded By, (t) = Taken By, (c) = Cosigned By    Initials Name Provider Type     Romelia Moulton PT Physical Therapist               Mobility     [facility] Name 07/19/22 1552          Bed Mobility    Bed Mobility supine-sit;sit-supine  -     Supine-Sit [place] (Bed Mobility) minimum assist (75% patient effort)  -     Sit-Supine [place] (Bed Mobility) minimum assist (75% patient effort)  -SM     Row Name 07/19/22 1552          Bed-Chair Transfer    Bed-Chair [place] (Transfers) not tested  -     Comment, (Bed-Chair Transfer) not tested due to no chair in room  -SM     Row Name 07/19/22 1552          Sit-Stand Transfer    Sit-Stand [place] (Transfers) contact guard;verbal cues  -     Assistive Device (Sit-Stand Transfers) walker, front-wheeled  -[facility] Name 07/19/22 1552          Gait/Stairs (Locomotion)    [place] Level (Gait) contact guard;verbal cues  -     Assistive Device (Gait) walker, front-wheeled  -     Ambulated day of surgery or within 4 hours of PACU discharge yes  -     Distance in Feet (Gait) 30ft  -     Deviations/Abnormal Patterns (Gait) antalgic;pasquale decreased;gait speed decreased  -     Bilateral Gait Deviations heel strike decreased  -           User Key  (r) = Recorded By, (t) = Taken By, (c) = Cosigned By    Initials Name Provider Type     Romelia Moulton PT Physical Therapist               Obj/Interventions     [facility] Name 07/19/22 1553          Range of Motion Comprehensive     Comment, General Range of Motion L knee 0-80  -[facility] Name 07/19/22 1553          Strength Comprehensive (MMT)    Comment, General Manual Muscle Testing (MMT) Assessment Generalized post op weakness  -[facility] Name 07/19/22 1553          Motor Skills    Motor Skills motor control/coordination interventions  -     Therapeutic Exercise --  TKA post op protocl x10  -[facility] Name 07/19/22 1553          Balance    Balance Assessment sitting static balance;sitting dynamic balance;sit to stand dynamic balance;standing static balance;standing dynamic balance  -     Static Sitting Balance standby assist  -     Dynamic Sitting Balance contact guard  -     Position, Sitting Balance sitting edge of bed  -     Sit to Stand Dynamic Balance contact guard;verbal cues  -     Static Standing Balance contact guard  -     Dynamic Standing Balance contact guard;verbal cues  -     Position/Device Used, Standing Balance supported;walker, rolling  -     Balance Interventions sitting;standing;sit to stand;supported;static;dynamic  -           User Key  (r) = Recorded By, (t) = Taken By, (c) = Cosigned By    Initials Name Provider Type     Romelia Moulton, PT Physical Therapist               Goals/Plan     Row Name 07/19/22 1559          Bed Mobility Goal 1 (PT)    Activity/Assistive Device (Bed Mobility Goal 1, PT) bed mobility activities, all  -SM     [place] Level/Cues Needed (Bed Mobility Goal 1, PT) supervision required  -     Time Frame (Bed Mobility Goal 1, PT) 1 week  -SM     Row Name 07/19/22 1559          Transfer Goal 1 (PT)    Activity/Assistive Device (Transfer Goal 1, PT) transfers, all  -     [place] Level/Cues Needed (Transfer Goal 1, PT) standby assist  -     Time Frame (Transfer Goal 1, PT) 1 week  -SM     Row Name 07/19/22 1559          Gait Training Goal 1 (PT)    Activity/Assistive Device (Gait Training Goal 1, PT) gait (walking locomotion);walker, rolling  -      [place] Level (Gait Training Goal 1, PT) standby assist  -SM     Distance (Gait Training Goal 1, PT) 150ft  -SM     Time Frame (Gait Training Goal 1, PT) 1 week  -SM           User Key  (r) = Recorded By, (t) = Taken By, (c) = Cosigned By    Initials Name Provider Type     Rmoelia Moulton, MICHAEL Physical Therapist               Clinical Impression     Row Name 07/19/22 6192          Pain    Pretreatment Pain Rating 7/10  -SM     Posttreatment Pain Rating 9/10  -SM     Pain Location - Side/Orientation Left  -     Pain Location - knee  -     Pain Intervention(s) Rest;Repositioned;Nursing Notified  -     Row Name 07/19/22 8754          Plan of Care Review    Plan of Care Reviewed With patient;spouse  -     Outcome Evaluation Patient is a 72 y.o female POD0 L TKA. Patient lives at home with her  with no steps to enter. Patient reports she does not use an AD and is independent at baseline. Patient reports no equipment needs at d/c. Today patient completed all bed mobility with Jennifer.  Patient ambulated 30ft with rwx and CGA. Ambulation distance limited by feeling dizzy and nauseous. Patient returned back to bed at end of evaluation due to no chair being present in room. Patient educated in TKA post op protocol x10. Patient would benefit from continued skilled PT to address deficits in strength, ROM, and activity endurance. PT recommends home with assist and  HHPT at d/c.  -[facility] Name 07/19/22 4852          Therapy Assessment/Plan (PT)    Rehab Potential (PT) good, to achieve stated therapy goals  -     Criteria for Skilled Interventions Met (PT) yes  -SM     Therapy Frequency (PT) daily  -SM     Row Name 07/19/22 7268          Vital Signs    Pre Patient Position Supine  -SM     Intra Patient Position Standing  -SM     Post Patient Position Supine  -SM     [facility] Name 07/19/22 7204          Positioning and Restraints    Pre-Treatment Position in bed  -SM     Post Treatment Position bed  -SM     In  Bed notified nsg;exit alarm on;encouraged to call for assist;call light within reach  -           User Key  (r) = Recorded By, (t) = Taken By, (c) = Cosigned By    Initials Name Provider Type    Romelia Hoffmann PT Physical Therapist               Outcome Measures     Row Name 07/19/22 1600 07/19/22 3517       How much help from another person do you currently need...    Turning from your back to your side while in flat bed without using bedrails? 3  -SM 3  -SH    Moving from lying on back to sitting on the side of a flat bed without bedrails? 3  -SM 3  -SH    Moving to and from a bed to a chair (including a wheelchair)? 3  -SM 3  -SH    Standing up from a chair using your arms (e.g., wheelchair, bedside chair)? 3  -SM 3  -SH    Climbing 3-5 steps with a railing? 2  -SM 2  -SH    To walk in hospital room? 3  -SM 3  -SH    AM-PAC 6 Clicks Score (PT) 17  -SM 17  -SH    Highest level of mobility 5 --> Static standing  -SM 5 --> Static standing  -SH    Row Name 07/19/22 1600          Functional Assessment    Outcome Measure Options AM-PAC 6 Clicks Basic Mobility (PT)  -           User Key  (r) = Recorded By, (t) = Taken By, (c) = Cosigned By    Initials Name Provider Type    Jacquelyn Oreilly, RN Registered Nurse    Romelia Hoffmann PT Physical Therapist                             Physical Therapy Education                 Title: PT OT SLP Therapies (Done)     Topic: Physical Therapy (Done)     Point: Mobility training (Done)     Learning Progress Summary           Patient Acceptance, E, VU by  at 7/19/2022 1600                   Point: Home exercise program (Done)     Learning Progress Summary           Patient Acceptance, E, VU by  at 7/19/2022 1600                   Point: Body mechanics (Done)     Learning Progress Summary           Patient Acceptance, E, VU by  at 7/19/2022 1600                   Point: Precautions (Done)     Learning Progress Summary           Patient Acceptance, E, VU by  SM at 7/19/2022 1600                               User Key     Initials Effective Dates Name Provider Type Discipline     05/02/22 -  Romelia Moulton PT Physical Therapist PT              PT Recommendation and Plan     Plan of Care Reviewed With: patient, spouse  Outcome Evaluation: Patient is a 72 y.o female POD0 L TKA. Patient lives at home with her  with no steps to enter. Patient reports she does not use an AD and is independent at baseline. Patient reports no equipment needs at d/c. Today patient completed all bed mobility with Jennifer.  Patient ambulated 30ft with rwx and CGA. Ambulation distance limited by feeling dizzy and nauseous. Patient returned back to bed at end of evaluation due to no chair being present in room. Patient educated in TKA post op protocol x10. Patient would benefit from continued skilled PT to address deficits in strength, ROM, and activity endurance. PT recommends home with assist and  HHPT at d/c.     Time Calculation:    PT Charges     Row Name 07/19/22 1601             Time Calculation    Start Time 1528  -SM      Stop Time 1547  -SM      Time Calculation (min) 19 min  -SM      PT Received On 07/19/22  -      PT - Next Appointment 07/20/22  -      PT Goal Re-Cert Due Date 07/26/22  -              Time Calculation- PT    Total Timed Code Minutes- PT 8 minute(s)  -SM              Timed Charges    92405 - PT Therapeutic Exercise Minutes 8  -SM              Total Minutes    Timed Charges Total Minutes 8  -SM       Total Minutes 8  -SM            User Key  (r) = Recorded By, (t) = Taken By, (c) = Cosigned By    Initials Name Provider Type     Romelia Moulton PT Physical Therapist              Therapy Charges for Today     Code Description Service Date Service Provider Modifiers Qty    91362581590 HC PT THER PROC EA 15 MIN 7/19/2022 Romelia Moulton, PT GP 1    86703895886 HC PT EVAL LOW COMPLEXITY 3 7/19/2022 Romelia Moulton, PT GP 1          PT G-Codes  Outcome  Measure Options: AM-PAC 6 Clicks Basic Mobility (PT)  AM-[facility] 6 Clicks Score (PT): 17    Romelia Muolton, PT  7/19/2022

## 2022-07-20 ENCOUNTER — HOME HEALTH ADMISSION (OUTPATIENT)
Dept: HOME HEALTH SERVICES | Facility: HOME HEALTHCARE | Age: 72
End: 2022-07-20

## 2022-07-20 VITALS
SYSTOLIC BLOOD PRESSURE: 113 MMHG | RESPIRATION RATE: 16 BRPM | HEART RATE: 65 BPM | OXYGEN SATURATION: 92 % | DIASTOLIC BLOOD PRESSURE: 63 MMHG | TEMPERATURE: 97 F

## 2022-07-20 LAB
HCT VFR BLD AUTO: 36.7 % (ref 34–46.6)
HGB BLD-MCNC: 12.2 G/DL (ref 12–15.9)

## 2022-07-20 PROCEDURE — 85018 HEMOGLOBIN: CPT | Performed by: NURSE PRACTITIONER

## 2022-07-20 PROCEDURE — 99024 POSTOP FOLLOW-UP VISIT: CPT | Performed by: NURSE PRACTITIONER

## 2022-07-20 PROCEDURE — G0378 HOSPITAL OBSERVATION PER HR: HCPCS

## 2022-07-20 PROCEDURE — 85014 HEMATOCRIT: CPT | Performed by: NURSE PRACTITIONER

## 2022-07-20 PROCEDURE — P9612 CATHETERIZE FOR URINE SPEC: HCPCS

## 2022-07-20 PROCEDURE — 97110 THERAPEUTIC EXERCISES: CPT

## 2022-07-20 RX ORDER — ONDANSETRON 4 MG/1
4 TABLET, FILM COATED ORAL EVERY 6 HOURS PRN
Qty: 10 TABLET | Refills: 0 | Status: SHIPPED | OUTPATIENT
Start: 2022-07-20 | End: 2022-08-01 | Stop reason: SDUPTHER

## 2022-07-20 RX ORDER — PSEUDOEPHEDRINE HCL 30 MG
100 TABLET ORAL 2 TIMES DAILY
Qty: 60 CAPSULE | Refills: 0 | Status: SHIPPED | OUTPATIENT
Start: 2022-07-20

## 2022-07-20 RX ORDER — POLYETHYLENE GLYCOL 3350 17 G/17G
17 POWDER, FOR SOLUTION ORAL DAILY
Qty: 238 G | Refills: 0 | Status: SHIPPED | OUTPATIENT
Start: 2022-07-21 | End: 2022-08-03

## 2022-07-20 RX ORDER — ASPIRIN 81 MG/1
81 TABLET ORAL EVERY 12 HOURS SCHEDULED
Qty: 60 TABLET | Refills: 0 | Status: SHIPPED | OUTPATIENT
Start: 2022-07-20 | End: 2022-07-29 | Stop reason: ALTCHOICE

## 2022-07-20 RX ORDER — FAMOTIDINE 40 MG/1
40 TABLET, FILM COATED ORAL DAILY
Qty: 30 TABLET | Refills: 0 | Status: SHIPPED | OUTPATIENT
Start: 2022-07-21

## 2022-07-20 RX ORDER — TRAMADOL HYDROCHLORIDE 50 MG/1
TABLET ORAL
Qty: 46 TABLET | Refills: 0 | Status: SHIPPED | OUTPATIENT
Start: 2022-07-20 | End: 2022-07-28 | Stop reason: SDUPTHER

## 2022-07-20 RX ADMIN — POTASSIUM CHLORIDE 20 MEQ: 1.5 POWDER, FOR SOLUTION ORAL at 08:28

## 2022-07-20 RX ADMIN — ACETAMINOPHEN 1000 MG: 500 TABLET ORAL at 04:00

## 2022-07-20 RX ADMIN — TRAMADOL HYDROCHLORIDE 100 MG: 50 TABLET, COATED ORAL at 04:19

## 2022-07-20 RX ADMIN — FAMOTIDINE 40 MG: 20 TABLET ORAL at 08:28

## 2022-07-20 RX ADMIN — DOCUSATE SODIUM 100 MG: 100 CAPSULE, LIQUID FILLED ORAL at 08:28

## 2022-07-20 RX ADMIN — TRAMADOL HYDROCHLORIDE 100 MG: 50 TABLET, COATED ORAL at 09:55

## 2022-07-20 RX ADMIN — LOSARTAN POTASSIUM: 50 TABLET, FILM COATED ORAL at 08:29

## 2022-07-20 RX ADMIN — ASPIRIN 81 MG: 81 TABLET, COATED ORAL at 08:28

## 2022-07-20 RX ADMIN — FERROUS SULFATE TAB 325 MG (65 MG ELEMENTAL FE) 325 MG: 325 (65 FE) TAB at 08:28

## 2022-07-20 NOTE — DISCHARGE SUMMARY
Orthopedic Discharge Summary      Patient: Sol Rojas  YOB: 1950  Medical Record Number: [id number]    Attending Physician: Guilherme Smith MD  Consulting Physician(s):   Consults     Date and Time Order Name Status Description    7/20/2022 11:16 AM Inpatient Urology Consult Completed           Date of Admission: 7/19/2022  8:05 AM  Date of Discharge: 7/20/2022    Discharge Diagnosis: CA TOTAL KNEE ARTHROPLASTY [24866] (LEFT TOTAL KNEE ARTHROPLASTY WITH SANTIAGO NAVIGATION),   Acute Blood Loss Anemia, stable  Post-operative Pain  Limited mobility, requires use of walker and assistance when OOB.  Post-op urinary retention    Presenting Problem/History of Present Illness: Arthritis of left knee [M17.12]    Allergies:   Allergies   Allergen Reactions   • Augmentin [Amoxicillin-Pot Clavulanate] GI Intolerance   • Hydrocodone Nausea Only and GI Intolerance   • Latex Hives   • Penicillins Itching, Nausea Only and GI Intolerance   • Percocet [Oxycodone-Acetaminophen] GI Intolerance       Discharge Medications       Discharge Medications      New Medications      Instructions Start Date   Adult Aspirin Regimen 81 MG EC tablet  Generic drug: aspirin   81 mg, Oral, Every 12 Hours Scheduled      docusate sodium 100 MG capsule  Commonly known as: COLACE   100 mg, Oral, 2 Times Daily      famotidine 40 MG tablet  Commonly known as: PEPCID   40 mg, Oral, Daily   Start Date: July 21, 2022     ondansetron 4 MG tablet  Commonly known as: ZOFRAN   4 mg, Oral, Every 6 Hours PRN      polyethylene glycol 17 GM/SCOOP powder  Commonly known as: MIRALAX   Mix 17 g (one capful) in liquid and drink by mouth Daily for 10 days.   Start Date: July 21, 2022     traMADol 50 MG tablet  Commonly known as: ULTRAM   Take 1-2 tablets PO every 4-6 hours as needed for pain         Continue These Medications      Instructions Start Date   carvedilol 3.125 MG tablet  Commonly known as: COREG   3.125 mg, Oral, 2 Times Daily With  "Meals      ferrous sulfate 325 (65 FE) MG tablet   325 mg, Oral, Daily With Breakfast      KLOR-CON 20 MEQ CR tablet  Generic drug: potassium chloride   20 mEq, Oral, Daily      olmesartan-hydrochlorothiazide 40-25 MG per tablet  Commonly known as: BENICAR HCT   1 tablet, Oral, Daily      polyethyl glycol-propyl glycol 0.4-0.3 % solution ophthalmic solution (artificial tears)  Commonly known as: SYSTANE   Every 1 Hour PRN         Stop These Medications    Magnesium 400 MG capsule     multivitamin tablet tablet  Commonly known as: THERAGRAN     RA Probiotic Digestive Care capsule              Past Medical History:   Diagnosis Date   • Ankle sprain     Left ankle   • Arthritis     OSTEO   • Bilateral knee pain    • Chest pain     SAW A CARDIOLOGIST--PER PT, EVERYTHING WAS \"OKAY.\"   • Disease of thyroid gland     POLYP   • Dry eye syndrome of both eyes    • Hip arthrosis    • Hypertension    • Knee swelling    • Left hip pain    • Left knee pain    • Obesity    • Pulmonary hypertension (HCC)         Past Surgical History:   Procedure Laterality Date   • CATARACT EXTRACTION Bilateral    • COLONOSCOPY N/A 04/13/2021    Procedure: COLONOSCOPY TO CECUM AND INTO TI;  Surgeon: Louie Smith MD;  Location: [facility] ENDOSCOPY;  Service: Gastroenterology;  Laterality: N/A;  pre: history of polyps  post: diverticulosis   • COLONOSCOPY W/ BIOPSIES AND POLYPECTOMY      BENIGN   • HERNIA REPAIR      UMBILICAL HERNIA REPAIR   • LEG SURGERY Left     cellulitis   • TOTAL HIP ARTHROPLASTY Left 01/15/2019    Procedure: LEFT TOTAL HIP ARTHROPLASTY;  Surgeon: Hieu Orosco MD;  Location: [facility] MAIN OR;  Service: Orthopedics   • TOTAL KNEE ARTHROPLASTY Left 7/19/2022    Procedure: LEFT TOTAL KNEE ARTHROPLASTY WITH SANTIAGO NAVIGATION;  Surgeon: Guilherme Smith MD;  Location: [facility] OR OSC;  Service: Orthopedics;  Laterality: Left;   • US GUIDED FINE NEEDLE ASPIRATION  04/15/2021        Social History     Occupational History   • Not on " file   Tobacco Use   • Smoking status: Never Smoker   • Smokeless tobacco: Never Used   Vaping Use   • Vaping Use: Never used   Substance and Sexual Activity   • Alcohol use: No   • Drug use: No   • Sexual activity: Not Currently     Partners: Male     Birth control/protection: Abstinence      Social History     Social History Narrative   • Not on file        Family History   Problem Relation Age of Onset   • Stroke Mother    • Cancer Father    • Malig Hyperthermia Neg Hx          Physical Exam: 72 y.o. female   There is no height or weight on file to calculate BMI.  No height and weight on file for this encounter.  Vitals:    07/20/22 0700   BP: 113/63   Pulse: 65   Resp: 16   Temp: 97 °F (36.1 °C)   SpO2: 92%         General Appearance:    Alert, cooperative, in no acute distress                      Vitals:    07/19/22 1904 07/19/22 2236 07/20/22 0257 07/20/22 0700   BP: 136/72 112/58 120/63 113/63   BP Location: Left arm Left arm Left arm Right arm   Patient Position: Sitting Lying Lying Lying   Pulse: (!) 46 58 (!) 47 65   Resp: 16 16 16 16   Temp: 97.6 °F (36.4 °C) 97 °F (36.1 °C) 97 °F (36.1 °C) 97 °F (36.1 °C)   TempSrc: Oral Oral Oral Oral   SpO2: 94% 98% 99% 92%        DIAGNOSTIC TESTS:   Admission on 07/19/2022   Component Date Value Ref Range Status   • Hemoglobin 07/20/2022 12.2  12.0 - 15.9 g/dL Final   • Hematocrit 07/20/2022 36.7  34.0 - 46.6 % Final       Imaging Results (Last 72 Hours)     Procedure Component Value Units Date/Time    XR Knee 1 or 2 View Left [[id number]] Collected: 07/19/22 1221     Updated: 07/19/22 1229    Narrative:      TWO-VIEW PORTABLE LEFT KNEE     HISTORY: Knee replacement for osteoarthritis     FINDINGS: The patient has had recent total knee replacement and the  alignment appears satisfactory.     This report was finalized on 7/19/2022 12:26 PM by Dr. Wilbur Lacey M.D.             Hospital Course:  72 y.o. female admitted to [facility] to services of Guilherme Smith  MD ADRYAN with Arthritis of left knee [M17.12] on 7/19/2022 and underwent NM TOTAL KNEE ARTHROPLASTY [65488] (LEFT TOTAL KNEE ARTHROPLASTY WITH SANTIAGO NAVIGATION)  Per Guilherme Smith MD. Antibiotic and VTE prophylaxis were per SCIP protocols. Post-operatively the patient transferred to the post-operative floor where the patient underwent mobilization therapy that included active as well as passive ROM exercises. Opioids were titrated to achieve appropriate pain management to allow for participation in mobilization exercises. Vital signs are now stable. On the day of discharge the wound was clean, dry and intact and calf was soft and nontender and Homans sign was negative. Operative extremity neurovascular status remains intact.   Appropriate education re: incision care, activity levels, medications, and follow up visits was completed and all questions were answered. The patient is now deemed stable for discharge.    Condition on Discharge:  Stable    Total Joint Replacement Discharge Instructions: Patient is to continue with physical therapy exercises twice daily and continue working with the physical therapist as ordered  and should be up walking every 2 hours during the day in addition to the physical therapy exercises. Patient may weight bear as tolerated unless otherwise specified. Continue to ice regularly. Do frequent ankle pumping exercises while you are sitting with legs elevated.  Patient also instructed on deep breathing, coughing, and using  incentive spirometer during hospitalization and encouraged to continue to use at home regularly.        VI. FOLLOW-UP VISITS:  ? Follow up in the office with Dr Guilherme Smith in 2 weeks - If already scheduled (see Future Appointments) for date and time, if not yet scheduled, patient to call the office at [phone] to schedule. Prescriptions were given for pain medication, nausea, constipation, and blood thinner therapy.    ? If you have any concerns or suspected  complications prior to your follow up visit, please call your surgeon's office. Do not wait until your appointment time if you suspect complications. These will need to be addressed in the office promptly.      Future Appointments   Date Time Provider Department Center   8/4/2022  1:30 PM Aileen Ulrich APRN [facility] [address] [facility]   11/21/2022 11:45 AM Deepak Myers MD [facility]     Additional Instructions for the Follow-ups that You Need to Schedule     Ambulatory Referral to Home Health   As directed      Face to Face Visit Date: 7/20/2022    Follow-up provider for Plan of Care?: I will be treating the patient on an ongoing basis.  Please send me the Plan of Care for signature.    Follow-up provider: GUILHERME LAFLEUR [[id number]]    Reason/Clinical Findings: post surgical    Describe mobility limitations that make leaving home difficult: Requires the assistance of another to leave home    Nursing/Therapeutic Services Requested: Physical Therapy    PT orders: Total joint pathway    Frequency: 1 Week 1         Discharge Follow-up with Specialty: Orthopedics; 2 Weeks   As directed      Specialty: Orthopedics    Follow Up: 2 Weeks    Follow Up Details: Return to the office to see Dr. Guilherme Lafleur               Discharge Disposition Plan:today to home, home health and when cleared by physical therapy as safe for discharge    Date: 7/20/2022    KELLY Mcrae      Much of this encounter note is an electronic transcription/translation of spoken language to printed text. The electronic translation of spoken language may permit erroneous, or at times, nonsensical words or phrases to be inadvertently transcribed; Although I have reviewed the note for such errors, some may still exist

## 2022-07-20 NOTE — DISCHARGE PLACEMENT REQUEST
"Sol Rojas (72 y.o. Female)             Date of Birth   1950    Social Security Number       Address   [address] [place] [address]    Home Phone   [phone]    MRN   [id number]       Christianity   Amish    Marital Status                               Admission Date   7/19/22    Admission Type   Elective    Admitting Provider   Guilherme Smith MD    Attending Provider   Guilherme Smith MD    Department, Room/Bed   [place] [id number]       Discharge Date       Discharge Disposition   Home or Self Care    Discharge Destination                               Attending Provider: Guilherme Smith MD    Allergies: Augmentin [Amoxicillin-pot Clavulanate], Hydrocodone, Latex, Penicillins, Percocet [Oxycodone-acetaminophen]    Isolation: None   Infection: None   Code Status: CPR   Advance Care Planning Activity    Ht: 167.6 cm (66\")   Wt: 96.8 kg (213 lb 4.8 oz)    Admission Cmt: None   Principal Problem: Arthritis of left knee [M17.12]                 Active Insurance as of 7/19/2022     Primary Coverage     Payor Plan Insurance Group Employer/Plan Group    MEDICARE MEDICARE A & B      Payor Plan Address Payor Plan Phone Number Payor Plan Fax Number Effective Dates    [address] [phone]  1/1/2017 - None Entered    [facility] [id number]       Subscriber Name Subscriber Birth Date Member ID       SOL ROJAS 1950 [id number]           Secondary Coverage     Payor Plan Insurance Group Employer/Plan Group    MISC MC SUP   MISC MC SUP              NGN     Coverage Address Coverage Phone Number Coverage Fax Number Effective Dates    [address] [phone]  7/1/2020 - None Entered    [facility] [id number]       Subscriber Name Subscriber Birth Date Member ID       SOL ROJAS 1950 [id number]                 Emergency Contacts      (Rel.) Home Phone Work Phone Mobile Phone    BobRehan daugherty (Spouse) [phone] -- " [phone]

## 2022-07-20 NOTE — PROGRESS NOTES
[facility] to provide home PT.  Noted order in epic.  Patient is hoping to D/C today.  Spoke with patient and all info is correct on facesheet.  Prefers her mobile for visit scheduling.

## 2022-07-20 NOTE — PROGRESS NOTES
Orthopedic Total Joint Progress Note        Patient: Sol Rojas    Date of Admission: 7/19/2022  8:05 AM    YOB: 1950    Medical Record Number: [id number]    Attending Physician: Guilherme Smith MD      POD # 1 Day Post-Op Procedure(s) (LRB):  LEFT TOTAL KNEE ARTHROPLASTY WITH SANTIAGO NAVIGATION (Left)       Systemic or Specific Complaints: The patient has had a relatively normal postoperative course.  The patient has had no current complaints. The patient has had improving normal postoperative pain.  The patient has had no issues with the wound..  Patient up sitting in bed eating breakfast upon arrival.  She states she is doing pretty well, however she has not peed on her own.  She says they did a straight cath to help her urinate.  She is hoping to urinate by herself today.  She does not remember working with physical therapy yesterday.  She states her pain is well controlled with her pain medication.  She would still like to go home if possible today.  She has no other complaints at this time.      Allergies:   Allergies   Allergen Reactions   • Augmentin [Amoxicillin-Pot Clavulanate] GI Intolerance   • Hydrocodone Nausea Only and GI Intolerance   • Latex Hives   • Penicillins Itching, Nausea Only and GI Intolerance   • Percocet [Oxycodone-Acetaminophen] GI Intolerance       Medications:   Current Medications:  Scheduled Meds:acetaminophen, 1,000 mg, Oral, Q8H  aspirin, 81 mg, Oral, Q12H  carvedilol, 3.125 mg, Oral, BID With Meals  docusate sodium, 100 mg, Oral, BID  famotidine, 40 mg, Oral, Daily  ferrous sulfate, 325 mg, Oral, Daily With Breakfast  losartan-HCTZ 100-25 combo dose, , Oral, Daily  polyethylene glycol, 17 g, Oral, Daily  potassium chloride, 20 mEq, Oral, Daily      Continuous Infusions:lactated ringers, 9 mL/hr, Last Rate: 100 mL/hr (07/19/22 1299)  lactated ringers, 100 mL/hr, Last Rate: 100 mL/hr (07/19/22 1494)      PRN Meds:.bisacodyl  •  bisacodyl  •  HYDROmorphone  "**AND** naloxone  •  ondansetron **OR** ondansetron  •  traMADol  •  traMADol      Physical Exam: 72 y.o. female   Wt Readings from Last 3 Encounters:   07/12/22 96.8 kg (213 lb 4.8 oz)   07/07/22 97.3 kg (214 lb 8 oz)   05/20/22 95.9 kg (211 lb 6.4 oz)     Ht Readings from Last 3 Encounters:   07/12/22 167.6 cm (66\")   07/07/22 167.6 cm (66\")   05/20/22 167.6 cm (66\")     There is no height or weight on file to calculate BMI.    Vitals:    07/19/22 1904 07/19/22 2236 07/20/22 0257 07/20/22 0700   BP: 136/72 112/58 120/63 113/63   BP Location: Left arm Left arm Left arm Right arm   Patient Position: Sitting Lying Lying Lying   Pulse: (!) 46 58 (!) 47 65   Resp: 16 16 16 16   Temp: 97.6 °F (36.4 °C) 97 °F (36.1 °C) 97 °F (36.1 °C) 97 °F (36.1 °C)   TempSrc: Oral Oral Oral Oral   SpO2: 94% 98% 99% 92%        General Appearance:    General: alert and oriented         Abdomen/:     soft non-tender, non-distended, voiding straight cath performed        Extremities:   Operative extremity neurovascular status intact. ROM appropriate.  Incision intact w/out signs or symptoms of infection.  No cyanosis, calf is soft and nontender.  Ace bandage and cast padding removed.  Optifoam dressing clean dry and intact, small area of dried bloody drainage in the middle of the dressing, no signs of active bleeding.     Activity: Mobilizing Per P.T.   Weight Bearing: As Tolerated    Diagnostic Tests:   Admission on 07/19/2022   Component Date Value Ref Range Status   • Hemoglobin 07/20/2022 12.2  12.0 - 15.9 g/dL Final   • Hematocrit 07/20/2022 36.7  34.0 - 46.6 % Final       Imaging Results (Last 72 Hours)     Procedure Component Value Units Date/Time    XR Knee 1 or 2 View Left [[id number]] Collected: 07/19/22 1221     Updated: 07/19/22 1229    Narrative:      TWO-VIEW PORTABLE LEFT KNEE     HISTORY: Knee replacement for osteoarthritis     FINDINGS: The patient has had recent total knee replacement and the  alignment appears " satisfactory.     This report was finalized on 7/19/2022 12:26 PM by Dr. Wilbur Lacey M.D.             Personally viewed ortho images and report     Assessment:  - Doing well 1 Day Post-Op following total joint replacement  - Acute Blood Loss Anemia, stable  - Post-operative Pain  - Limited mobility, requires use of walker and assistance when OOB.  - Postop urinary retention - urology consult placed    Patient Active Problem List   Diagnosis   • Pain in left shin   • Hyperglycemia   • H/O total hip arthroplasty, left   • Preoperative clearance   • Abnormal electrocardiogram (ECG) (EKG)    • SOB (shortness of breath)   • Abnormal cardiac function test   • Primary hypertension   • Arthritis of left knee        Plan:    - Consults: none, may need urology consult if unable to void own  - Continue to monitor labs and/or v/s, for tolerance to post op blood loss.  - Continue efforts to increase mobilization.  - Continue Pain Control Measures.  - Continue incisional Care.  - DVT prophylaxis - aspirin  - Follow up in office with Guilherme Smith M.D. In 2 weeks.    Discharge Plan:today to home, home health and when cleared by physical therapy as safe for discharge and patient has urinated on her own    Date: 7/20/2022  KELLY Mcrae

## 2022-07-20 NOTE — THERAPY TREATMENT NOTE
"Acute Care - Physical Therapy Treatment Note  [facility]     Patient Name: Sol Rojas  : 1950  MRN: [id number]  Today's Date: 2022      Visit Dx:     ICD-10-CM ICD-9-CM   1. Arthritis of left knee  M17.12 716.96     Patient Active Problem List   Diagnosis   • Pain in left shin   • Hyperglycemia   • H/O total hip arthroplasty, left   • Preoperative clearance   • Abnormal electrocardiogram (ECG) (EKG)    • SOB (shortness of breath)   • Abnormal cardiac function test   • Primary hypertension   • Arthritis of left knee     Past Medical History:   Diagnosis Date   • Ankle sprain     Left ankle   • Arthritis     OSTEO   • Bilateral knee pain    • Chest pain     SAW A CARDIOLOGIST--PER PT, EVERYTHING WAS \"OKAY.\"   • Disease of thyroid gland     POLYP   • Dry eye syndrome of both eyes    • Hip arthrosis    • Hypertension    • Knee swelling    • Left hip pain    • Left knee pain    • Obesity    • Pulmonary hypertension (HCC)      Past Surgical History:   Procedure Laterality Date   • CATARACT EXTRACTION Bilateral    • COLONOSCOPY N/A 2021    Procedure: COLONOSCOPY TO CECUM AND INTO TI;  Surgeon: Louie Smith MD;  Location: [facility] ENDOSCOPY;  Service: Gastroenterology;  Laterality: N/A;  pre: history of polyps  post: diverticulosis   • COLONOSCOPY W/ BIOPSIES AND POLYPECTOMY      BENIGN   • HERNIA REPAIR      UMBILICAL HERNIA REPAIR   • LEG SURGERY Left     cellulitis   • TOTAL HIP ARTHROPLASTY Left 01/15/2019    Procedure: LEFT TOTAL HIP ARTHROPLASTY;  Surgeon: Hieu Orosco MD;  Location: [facility] OR;  Service: Orthopedics   • US GUIDED FINE NEEDLE ASPIRATION  04/15/2021     PT Assessment (last 12 hours)     PT Evaluation and Treatment     Row Name 22 0933          Physical Therapy Time and Intention    Subjective Information no complaints  -     Document Type therapy note (daily note)  -LH     Mode of Treatment individual therapy;physical therapy  -     Patient Effort excellent  " -     Row Name 07/20/22 0933          General Information    Patient Profile Reviewed yes  -     Patient/Family/Caregiver Comments/Observations pt supine in bed no acute distress, family bedside  -     Prior Level of Function independent:  -     Benefits Reviewed patient and family:  -     Row Name 07/20/22 0933          Pain    Pretreatment Pain Rating 5/10  -     Posttreatment Pain Rating 8/10  -     Pain Location - Side/Orientation Left  -     Pain Location - hip  -     Pain Intervention(s) Medication (See MAR);Repositioned;Cold pack  -[facility] Name 07/20/22 0933          Cognition    Orientation Status (Cognition) oriented x 4  -[facility] Name 07/20/22 0933          Bed Mobility    Supine-Sit [place] (Bed Mobility) standby assist  -[facility] Name 07/20/22 0933          Transfers    Transfers sit-stand transfer;stand-sit transfer  -     Sit-Stand [place] (Transfers) contact guard;verbal cues  -     Stand-Sit [place] (Transfers) contact guard;verbal cues  -[facility] Name 07/20/22 0933          Sit-Stand Transfer    Assistive Device (Sit-Stand Transfers) walker, front-wheeled  -[facility] Name 07/20/22 0933          Stand-Sit Transfer    Assistive Device (Stand-Sit Transfers) walker, front-wheeled  -[facility] Name 07/20/22 0933          Gait/Stairs (Locomotion)    [place] Level (Gait) contact guard  -     Assistive Device (Gait) walker, front-wheeled  -     Distance in Feet (Gait) 80  -     Pattern (Gait) step-to;step-through  -     Deviations/Abnormal Patterns (Gait) antalgic;pasquale decreased;gait speed decreased  -     Bilateral Gait Deviations heel strike decreased  -     Comment, (Gait/Stairs) pt has no LUISANA however educated on proper stair navigation, pt receptive  -     Row Name 07/20/22 0933          Motor Skills    Therapeutic Exercise --  TKR Protocol 15 reps  -     Row Name             Wound 07/19/22 1018 Left anterior knee Incision    Wound  - Properties Group Placement Date: 07/19/22  -AG Placement Time: 1018  -AG Present on Hospital Admission: N  -AG Side: Left  -AG Orientation: anterior  -AG Location: knee  -AG Primary Wound Type: Incision  -AG Additional Comments: exofin, optifoam, ace  -AG     Retired Wound - Properties Group Placement Date: 07/19/22  -AG Placement Time: 1018  -AG Present on Hospital Admission: N  -AG Side: Left  -AG Orientation: anterior  -AG Location: knee  -AG Primary Wound Type: Incision  -AG Additional Comments: exofin, optifoam, ace  -AG     Retired Wound - Properties Group Date first assessed: 07/19/22  -AG Time first assessed: 1018  -AG Present on Hospital Admission: N  -AG Side: Left  -AG Location: knee  -AG Primary Wound Type: Incision  -AG Additional Comments: exofin, optifoam, ace  -AG     Row Name 07/20/22 0933          Plan of Care Review    Plan of Care Reviewed With patient;family  -     Outcome Evaluation Pt tolerated increased gait distance 80ft CGA rwx WBAT, completed TKR therex protocol good tolerance. Pt to DC home today rec [facility].  -     Row Name 07/20/22 0933          Positioning and Restraints    Pre-Treatment Position in bed  -     Post Treatment Position chair  -     In Chair reclined;call light within reach;encouraged to call for assist;exit alarm on;with family/caregiver  -           User Key  (r) = Recorded By, (t) = Taken By, (c) = Cosigned By    Initials Name Provider Type     Elida Momin, PT Physical Therapist     Sheyla Hampton, RN Registered Nurse                Physical Therapy Education                 Title: PT OT SLP Therapies (Done)     Topic: Physical Therapy (Done)     Point: Mobility training (Done)     Learning Progress Summary           Patient Acceptance, E, NR,DU,VU by  at 7/20/2022 0937    Acceptance, E, VU by  at 7/19/2022 1600   Family Acceptance, E, NR,DU,VU by  at 7/20/2022 0937                   Point: Home exercise program (Done)     Learning Progress Summary            Patient Acceptance, E, NR,DU,VU by  at 7/20/2022 0937    Acceptance, E, VU by  at 7/19/2022 1600   Family Acceptance, E, NR,DU,VU by  at 7/20/2022 0937                   Point: Body mechanics (Done)     Learning Progress Summary           Patient Acceptance, E, NR,DU,VU by  at 7/20/2022 0937    Acceptance, E, VU by  at 7/19/2022 1600   Family Acceptance, E, NR,DU,VU by  at 7/20/2022 0937                   Point: Precautions (Done)     Learning Progress Summary           Patient Acceptance, E, NR,DU,VU by  at 7/20/2022 0937    Acceptance, E, VU by  at 7/19/2022 1600   Family Acceptance, E, NR,DU,VU by  at 7/20/2022 0937                               User Key     Initials Effective Dates Name Provider Type Discipline     06/16/21 -  Elida Momin, PT Physical Therapist PT     05/02/22 -  Romelia Moulton, MICHAEL Physical Therapist PT              PT Recommendation and Plan     Plan of Care Reviewed With: patient, family  Outcome Evaluation: Pt tolerated increased gait distance 80ft CGA rwx WBAT, completed TKR therex protocol good tolerance. Pt to DC home today [facility].   Outcome Measures     Row Name 07/20/22 0900             How much help from another person do you currently need...    Turning from your back to your side while in flat bed without using bedrails? 4  -LH      Moving from lying on back to sitting on the side of a flat bed without bedrails? 4  -LH      Moving to and from a bed to a chair (including a wheelchair)? 3  -LH      Standing up from a chair using your arms (e.g., wheelchair, bedside chair)? 3  -LH      Climbing 3-5 steps with a railing? 3  -LH      To walk in hospital room? 3  -      AM-PAC 6 Clicks Score (PT) 20  -              Functional Assessment    Outcome Measure Options AM-PAC 6 Clicks Basic Mobility (PT)  -            User Key  (r) = Recorded By, (t) = Taken By, (c) = Cosigned By    Initials Name Provider Type     Elida Momin, PT Physical Therapist                  Time Calculation:    PT Charges     Row Name 07/20/22 0938             Time Calculation    Start Time 0919  -      Stop Time 0931  -      Time Calculation (min) 12 min  -      PT Received On 07/20/22  -            User Key  (r) = Recorded By, (t) = Taken By, (c) = Cosigned By    Initials Name Provider Type     Elida Momin, PT Physical Therapist              Therapy Charges for Today     Code Description Service Date Service Provider Modifiers Qty    92537203194 HC PT THER PROC EA 15 MIN 7/20/2022 Elida Momin, PT GP 1          PT G-Codes  Outcome Measure Options: AM-PAC 6 Clicks Basic Mobility (PT)  AM-PAC 6 Clicks Score (PT): 20    Elida Momin, PT  7/20/2022

## 2022-07-20 NOTE — NURSING NOTE
Patient being discharged home with family. Méndez catheter was placed s\t retention during her stay.  was arranged to see her, and care for it at home. Education provided to patient via teach back. IV was removed intact, and patient left with all belongings via private car with family.

## 2022-07-20 NOTE — CONSULTS
"           [facility]      Patient Identification:  NAME:  Sol Rojas  Age:  72 y.o.   Sex:  female   :  1950   MRN:  [id number]       Chief complaint: Unable to urinate    History of present illness: Very pleasant 72-year-old female no prior bladder issues.  She denies any incontinence.  She denies any frequency urgency.  She is never seen urology for.  No history any UTIs.  No prior gross hematuria.  She gets up once a night.  She denies nocturnal enuresis.  She is having trouble voiding since her knee surgery yesterday.  That she has been requiring intermittent catheterization.  She feels the pressure to urinate she just cannot make yourself ago.  Her bowels been regular.  Her last bowel movement was about 2 days ago.      Past medical history:  Past Medical History:   Diagnosis Date   • Ankle sprain     Left ankle   • Arthritis     OSTEO   • Bilateral knee pain    • Chest pain     SAW A CARDIOLOGIST--PER PT, EVERYTHING WAS \"OKAY.\"   • Disease of thyroid gland     POLYP   • Dry eye syndrome of both eyes    • Hip arthrosis    • Hypertension    • Knee swelling    • Left hip pain    • Left knee pain    • Obesity    • Pulmonary hypertension (HCC)        Past surgical history:  Past Surgical History:   Procedure Laterality Date   • CATARACT EXTRACTION Bilateral    • COLONOSCOPY N/A 2021    Procedure: COLONOSCOPY TO CECUM AND INTO TI;  Surgeon: Louie Smith MD;  Location: [facility] ENDOSCOPY;  Service: Gastroenterology;  Laterality: N/A;  pre: history of polyps  post: diverticulosis   • COLONOSCOPY W/ BIOPSIES AND POLYPECTOMY      BENIGN   • HERNIA REPAIR      UMBILICAL HERNIA REPAIR   • LEG SURGERY Left     cellulitis   • TOTAL HIP ARTHROPLASTY Left 01/15/2019    Procedure: LEFT TOTAL HIP ARTHROPLASTY;  Surgeon: Hieu Orosco MD;  Location: [facility] OR;  Service: Orthopedics   • TOTAL KNEE ARTHROPLASTY Left 2022    Procedure: LEFT TOTAL KNEE ARTHROPLASTY WITH Capablue NAVIGATION; "  Surgeon: Guilherme Smith MD;  Location: [facility];  Service: Orthopedics;  Laterality: Left;   • US GUIDED FINE NEEDLE ASPIRATION  04/15/2021       Allergies:  Augmentin [amoxicillin-pot clavulanate], Hydrocodone, Latex, Penicillins, and Percocet [oxycodone-acetaminophen]    Home medications:  Medications Prior to Admission   Medication Sig Dispense Refill Last Dose   • carvedilol (COREG) 3.125 MG tablet Take 3.125 mg by mouth 2 (two) times a day with meals.   7/19/2022 at Unknown time   • ferrous sulfate 325 (65 FE) MG tablet Take 325 mg by mouth Daily With Breakfast.   7/18/2022 at Unknown time   • KLOR-CON 20 MEQ CR tablet Take 20 mEq by mouth Daily.   7/18/2022 at Unknown time   • Lactobacillus Rhamnosus, GG, ( Probiotic Digestive Care) capsule Take 1 capsule by mouth Daily.   7/18/2022 at Unknown time   • Multiple Vitamins-Minerals (MULTIVITAMIN PO) Take 1 tablet by mouth Daily.   7/18/2022 at Unknown time   • olmesartan-hydrochlorothiazide (BENICAR HCT) 40-25 MG per tablet Take 1 tablet by mouth daily.   7/18/2022 at Unknown time   • polyethyl glycol-propyl glycol (SYSTANE) 0.4-0.3 % solution ophthalmic solution (artificial tears) Every 1 (One) Hour As Needed.   7/18/2022 at Unknown time   • Magnesium 400 MG capsule Take  by mouth Daily.          Hospital medications:  acetaminophen, 1,000 mg, Oral, Q8H  aspirin, 81 mg, Oral, Q12H  carvedilol, 3.125 mg, Oral, BID With Meals  docusate sodium, 100 mg, Oral, BID  famotidine, 40 mg, Oral, Daily  ferrous sulfate, 325 mg, Oral, Daily With Breakfast  losartan-HCTZ 100-25 combo dose, , Oral, Daily  polyethylene glycol, 17 g, Oral, Daily  potassium chloride, 20 mEq, Oral, Daily      lactated ringers, 9 mL/hr, Last Rate: 100 mL/hr (07/19/22 6594)  lactated ringers, 100 mL/hr, Last Rate: 100 mL/hr (07/19/22 0534)      bisacodyl  •  bisacodyl  •  HYDROmorphone **AND** naloxone  •  ondansetron **OR** ondansetron  •  traMADol  •  traMADol    Family history:  Family  Otc Regimen: Zeasorb AF powder sprinkle in shoes qd Detail Level: Simple History   Problem Relation Age of Onset   • Stroke Mother    • Cancer Father    • Malig Hyperthermia Neg Hx        Social history:  Social History     Tobacco Use   • Smoking status: Never Smoker   • Smokeless tobacco: Never Used   Vaping Use   • Vaping Use: Never used   Substance Use Topics   • Alcohol use: No   • Drug use: No       Review of systems:    Negative 12-system ROS except for the following: As above.      Objective:  TMax 24 hours:   Temp (24hrs), Av.3 °F (36.3 °C), Min:96.8 °F (36 °C), Max:98.2 °F (36.8 °C)      Vitals Ranges:   Temp:  [96.8 °F (36 °C)-98.2 °F (36.8 °C)] 97 °F (36.1 °C)  Heart Rate:  [39-65] 65  Resp:  [12-16] 16  BP: (109-142)/(58-78) 113/63    Intake/Output Last 3 shifts:  I/O last 3 completed shifts:  In: 900 [P.O.:100; I.V.:800]  Out: 500 [Urine:500]     Physical Exam:       General Appearance:    Alert, cooperative, in no acute distress   Head:    Normocephalic, without obvious abnormality, atraumatic   Eyes:          PERRL, conjunctivae and corneas clear   Ears:    Normal external inspection   Throat:   No oral lesions, oral mucosa moist   Neck:   Supple, no LAD, trachea midline   Back:     No CVA tenderness   Lungs:     Respirations unlabored, symmetric excursion    Heart:    RRR, intact peripheral pulses   Abdomen:    Benign   :   Deferred   Extremities:   No edema, no deformity   Skin:   No bleeding, bruising or rashes   Neuro/Psych:   Orientation intact, mood/affect pleasant, no focal findings       Results review:   I reviewed the patient's new clinical results.    Data review:  Lab Results (last 24 hours)     Procedure Component Value Units Date/Time    Hemoglobin & Hematocrit, Blood [[id number]]  (Normal) Collected: 22    Specimen: Blood Updated: 22     Hemoglobin 12.2 g/dL      Hematocrit 36.7 %            Imaging:  Imaging Results (Last 24 Hours)     ** No results found for the last 24 hours. 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    Plan:     For 1 more shot to void and if unsuccessful will place a Méndez since she is good to be going home and she can go home with this and then arrange for home health to come to her house and remove the catheter on Monday morning.    Okay to discharge today if she is voiding and her residuals less than 250 cc or if Méndez catheter was placed.    Zachary Pop MD  07/20/22  12:58 EDT   Discontinue Regimen: terbinafine HCl 250 mg tablet Qd\\nSig: one tab by mouth once daily for 4 weeks

## 2022-07-20 NOTE — PLAN OF CARE
Goal Outcome Evaluation:  Plan of Care Reviewed With: patient, family           Outcome Evaluation: Pt tolerated increased gait distance 80ft CGA rwx WBAT, completed TKR therex protocol good tolerance. Pt to DC home today rec [facility].    .Patient was wearing a face mask during this therapy encounter. Therapist used appropriate personal protective equipment including eye protection, mask, and gloves.  Mask used was standard procedure mask. Appropriate PPE was worn during the entire therapy session. Hand hygiene was completed before and after therapy session. Patient is not in enhanced droplet precautions.

## 2022-07-21 ENCOUNTER — HOME CARE VISIT (OUTPATIENT)
Dept: HOME HEALTH SERVICES | Facility: HOME HEALTHCARE | Age: 72
End: 2022-07-21

## 2022-07-21 ENCOUNTER — TELEPHONE (OUTPATIENT)
Dept: ORTHOPEDIC SURGERY | Facility: CLINIC | Age: 72
End: 2022-07-21

## 2022-07-21 VITALS
RESPIRATION RATE: 18 BRPM | DIASTOLIC BLOOD PRESSURE: 62 MMHG | OXYGEN SATURATION: 94 % | TEMPERATURE: 97.3 F | HEART RATE: 60 BPM | SYSTOLIC BLOOD PRESSURE: 110 MMHG

## 2022-07-21 PROCEDURE — G0151 HHCP-SERV OF PT,EA 15 MIN: HCPCS

## 2022-07-21 NOTE — CASE COMMUNICATION
SOC OASIS completed today. Plan for 2wk1, 3wk1, 1wk1 then OP PT at [facility].   Pt is s/p L TKA by Dr Smith on 7/19/2022.   PMHX: HTN, OA, post-op urinary retention with a talbert catheter, L SHAMEKA, umbilical hernia.   PLOF: independent, living in 1st floor apt with spouse, no AD, driving, no hx of falls.   L knee dressing intact, 1+ edema in distal L LE.   Plan for next visit: review pain and edema management, review and progress HE P, increase L KNEE ROM beyond 40-60 degrees AROM, beyond 80 degrees PROM, gait training with rolling walker.  v/o from [facility] to add SN 1wk1 to remove catheter on 7/25/2022, report to urology, and ensure pt has an MD f/u with urology.

## 2022-07-21 NOTE — CASE COMMUNICATION
Can we add SN order 1wk1 effective next week for her catheter removal? Patient was told in the hospital a nurse would visit Monday to remove her catheter.     Thank you  Andie Marrero   [phone]

## 2022-07-21 NOTE — TELEPHONE ENCOUNTER
Received a call from the home physical therapist.  She was calling to inquire about when the patient's Méndez catheter was supposed to be removed.  Patient did have postop urinary retention and was seen by urology who recommended sending her home with a Méndez and removing the Méndez on Monday.  Have left orders for them to go ahead and remove the Méndez as instructed by urology however any further voiding issues they need to contact urology.  Have also advised home health to sure the patient does follow-up with urology per Aileen Ulrcih

## 2022-07-21 NOTE — CASE MANAGEMENT/SOCIAL WORK
Case Management Discharge Note      Final Note: Home with [facility]    Provided Post Acute Provider List?: N/A  N/A Provider List Comment: Patient requested [facility]  Provided Post Acute Provider Quality & Resource List?: N/A  N/A Quality & Resource List Comment: Patient requested [facility]    Selected Continued Care - Discharged on 7/20/2022 Admission date: 7/19/2022 - Discharge disposition: Home or Self Care    Destination    No services have been selected for the patient.              Durable Medical Equipment    No services have been selected for the patient.              Dialysis/Infusion    No services have been selected for the patient.              Home Medical Care Coordination complete.    Service Provider Selected Services Address Phone Fax Patient Preferred    [facility]  Home Health Services [address] [address] [phone] --          Therapy    No services have been selected for the patient.              Community Resources    No services have been selected for the patient.              Community & DME    No services have been selected for the patient.                       Final Discharge Disposition Code: 06 - home with home health care

## 2022-07-21 NOTE — HOME HEALTH
Pt is s/p L TKA by Dr Smith on 7/19/2022.   PMHX: HTN, OA, post-op urinary retention with a talbert catheter, L SHAMEKA, umbilical hernia.   PLOF: independent, living in 1st floor apt with spouse, no AD, driving, no hx of falls.   L knee dressing intact, 1+ edema in distal L LE.   Plan for next visit: review pain and edema management, review and progress HEP, increase L KNEE ROM beyond 40-60 degrees AROM, beyond 80 degrees PROM, gait training with rolling walker.  v/o from [facility] to add SN 1wk1 to remove catheter on 7/25/2022, report to urology, and ensure pt has an MD f/u with urology.

## 2022-07-22 ENCOUNTER — TELEPHONE (OUTPATIENT)
Dept: ORTHOPEDIC SURGERY | Facility: CLINIC | Age: 72
End: 2022-07-22

## 2022-07-22 NOTE — TELEPHONE ENCOUNTER
[facility] is who saw her in the hospital. I would try calling them to see if they want to see her or the other option is to call home health and let them know she is having issues maybe they can come out sooner.

## 2022-07-22 NOTE — TELEPHONE ENCOUNTER
Spoke to Radha at  she is going to speak to the clinic manager  about sending  nursing to begin start of care today.

## 2022-07-22 NOTE — TELEPHONE ENCOUNTER
Hub staff attempted to follow warm transfer process and was unsuccessful     Caller: Sol Rojas    Relationship to patient: Self    Best call back number: [phone]    Patient is needing: PATIENT IS HAVING AN ISSUE WITH HER ALONSO CATHETER- SHE HAS A STRONG URGE TO URINATE OR THAT HER BLADDER IS FULL PATIENT DOESN'T THINK THE ALONSO IS DRAINING.. PLEASE CALL AND ADVISE ASAP-- HOME HEALTH ISNT SCHEDULED TO COME UNTIL Monday.

## 2022-07-22 NOTE — TELEPHONE ENCOUNTER
CALLED AND TALK TO PATIENT. SHE STATED HER ALONSO BAG IS FILLING WITH URINE. SHE STATED THAT SHE FEELS LIKE IT IS LEAKING IN HER PRIVATE AREA. OR COMING OUT. SHE FEELS WET. I TOLD HER IF SHE FELT ANYTHING WORSE SHE COULD GO TO THE ER .

## 2022-07-23 ENCOUNTER — HOME CARE VISIT (OUTPATIENT)
Dept: HOME HEALTH SERVICES | Facility: HOME HEALTHCARE | Age: 72
End: 2022-07-23

## 2022-07-23 VITALS
OXYGEN SATURATION: 95 % | TEMPERATURE: 97.5 F | DIASTOLIC BLOOD PRESSURE: 84 MMHG | RESPIRATION RATE: 18 BRPM | SYSTOLIC BLOOD PRESSURE: 139 MMHG | HEART RATE: 67 BPM

## 2022-07-23 PROCEDURE — G0157 HHC PT ASSISTANT EA 15: HCPCS

## 2022-07-23 NOTE — HOME HEALTH
SUBJECTIVE: Patient has no questions and reports no new concerns since last visit. Good adherence to HEP reported.    EDEMA: Moderate non-pitting. Calf is soft & not tender. Ashley's negative.     WOUNDS: Dressing intact, no new drainage.    SIGNS/SYMPTOMS OF INFECTION: No    FALLS SINCE LAST VISIT: No

## 2022-07-25 ENCOUNTER — HOME CARE VISIT (OUTPATIENT)
Dept: HOME HEALTH SERVICES | Facility: HOME HEALTHCARE | Age: 72
End: 2022-07-25

## 2022-07-25 VITALS
DIASTOLIC BLOOD PRESSURE: 70 MMHG | SYSTOLIC BLOOD PRESSURE: 120 MMHG | TEMPERATURE: 96.9 F | OXYGEN SATURATION: 96 % | HEART RATE: 75 BPM

## 2022-07-25 PROCEDURE — G0151 HHCP-SERV OF PT,EA 15 MIN: HCPCS

## 2022-07-25 PROCEDURE — G0299 HHS/HOSPICE OF RN EA 15 MIN: HCPCS

## 2022-07-25 NOTE — HOME HEALTH
Pt reports she is not so good.  She has not had a BM yet. Catheter comes out today.  Plans to shower tomorrow with assistance from daughter.   She has 14 pain pills left.  She only took one because she thought she would run out.  But pain remains high 7-10/10.  Recommend 2 pills if pain is that high and call to request refill.  She verbalized understanding.  She is taking stool softeners but has not been eating much.   Plan for next visit: continue HEP as tolerated, continue gait progression to reciprocal pattern as tolerated, progress L KNEE ROM and strength, review pain/edema management.

## 2022-07-26 VITALS
HEART RATE: 80 BPM | SYSTOLIC BLOOD PRESSURE: 120 MMHG | DIASTOLIC BLOOD PRESSURE: 80 MMHG | OXYGEN SATURATION: 98 % | RESPIRATION RATE: 18 BRPM | TEMPERATURE: 98.2 F

## 2022-07-26 NOTE — HOME HEALTH
Ambulating with walker assist.  VSS.  Deflated balloon and removed f/c.  Pt tolerated well.  Patient to schedule f/u with [facility] and is agreeable.  Spoke with patient later in afternoon.  Reported she has urinated without difficulty.  No further SN visits needed.

## 2022-07-27 ENCOUNTER — HOME CARE VISIT (OUTPATIENT)
Dept: HOME HEALTH SERVICES | Facility: HOME HEALTHCARE | Age: 72
End: 2022-07-27

## 2022-07-27 VITALS
HEART RATE: 81 BPM | DIASTOLIC BLOOD PRESSURE: 64 MMHG | TEMPERATURE: 97.7 F | SYSTOLIC BLOOD PRESSURE: 130 MMHG | OXYGEN SATURATION: 96 %

## 2022-07-27 PROCEDURE — G0151 HHCP-SERV OF PT,EA 15 MIN: HCPCS

## 2022-07-27 NOTE — HOME HEALTH
Pt reports she is nausea. She still has not had a BM but has gas. Still not eating well.  Plans to take milk of magnesia tonight.   Catheter was removed Monday and reports no problems since.   NO FALLS  NO MED CHANGES    She continues to exhibit very antalgic gait with decreased carryover in regards to progressing to reciprocal pattern.  Her L knee ROM is gradually improving.  She did tolerate L LE open chain standing HEP today.  But unable to tolerate WB on L LE to complete R open chain exercises.  Negative Ashley's.  Most of her pain is in L anterior proximal knee.       Plan for next visit: continue gait training as tolerated with walker, continue to progress HEP as tolerated, increase L knee ROM as tolerated.     Note sent to MD to request pain med refill and to inform of no BM yet.

## 2022-07-27 NOTE — CASE COMMUNICATION
Pt has 5 Tramadol remaining.  Can she get a refill?   She reports L knee pain 7-10/10.  Still no BM but states she plans to take milk of magnesia today.  She does have gas.      Thank you  Andie Marrero PT   [phone]

## 2022-07-28 DIAGNOSIS — M17.12 ARTHRITIS OF LEFT KNEE: ICD-10-CM

## 2022-07-28 RX ORDER — TRAMADOL HYDROCHLORIDE 50 MG/1
TABLET ORAL
Qty: 46 TABLET | Refills: 0 | Status: SHIPPED | OUTPATIENT
Start: 2022-07-28

## 2022-07-28 NOTE — TELEPHONE ENCOUNTER
Caller: BobSol    Relationship: Self    Best call back number: [phone]    Requested Prescriptions:   Requested Prescriptions     Pending Prescriptions Disp Refills   • traMADol (ULTRAM) 50 MG tablet 46 tablet 0     Sig: Take 1-2 tablets PO every 4-6 hours as needed for pain        Pharmacy where request should be sent:  [facility] ON FILE IS CORRECT    Additional details provided by patient: PATIENT WILL RUN OUT TODAY 7.28.22    Does the patient have less than a 3 day supply:  [x] Yes  [] No    Krystyna Lindsey Rep   07/28/22 09:57 EDT

## 2022-07-29 ENCOUNTER — HOSPITAL ENCOUNTER (OUTPATIENT)
Dept: CARDIOLOGY | Facility: HOSPITAL | Age: 72
Discharge: HOME | End: 2022-07-29
Admitting: ORTHOPAEDIC SURGERY

## 2022-07-29 ENCOUNTER — HOME CARE VISIT (OUTPATIENT)
Dept: HOME HEALTH SERVICES | Facility: HOME HEALTHCARE | Age: 72
End: 2022-07-29

## 2022-07-29 ENCOUNTER — TELEPHONE (OUTPATIENT)
Dept: ORTHOPEDIC SURGERY | Facility: CLINIC | Age: 72
End: 2022-07-29

## 2022-07-29 VITALS
DIASTOLIC BLOOD PRESSURE: 70 MMHG | TEMPERATURE: 96.9 F | OXYGEN SATURATION: 96 % | SYSTOLIC BLOOD PRESSURE: 130 MMHG | HEART RATE: 62 BPM | RESPIRATION RATE: 16 BRPM

## 2022-07-29 DIAGNOSIS — M79.89 PAIN AND SWELLING OF LEFT LOWER EXTREMITY: ICD-10-CM

## 2022-07-29 DIAGNOSIS — M79.605 PAIN AND SWELLING OF LEFT LOWER EXTREMITY: Primary | ICD-10-CM

## 2022-07-29 DIAGNOSIS — M79.89 PAIN AND SWELLING OF LEFT LOWER EXTREMITY: Primary | ICD-10-CM

## 2022-07-29 DIAGNOSIS — M79.605 PAIN AND SWELLING OF LEFT LOWER EXTREMITY: ICD-10-CM

## 2022-07-29 LAB
BH CV LOW VAS LEFT GASTRONEMIUS VESSEL: 1
BH CV LOWER VASCULAR LEFT COMMON FEMORAL AUGMENT: NORMAL
BH CV LOWER VASCULAR LEFT COMMON FEMORAL COMPETENT: NORMAL
BH CV LOWER VASCULAR LEFT COMMON FEMORAL COMPRESS: NORMAL
BH CV LOWER VASCULAR LEFT COMMON FEMORAL PHASIC: NORMAL
BH CV LOWER VASCULAR LEFT COMMON FEMORAL SPONT: NORMAL
BH CV LOWER VASCULAR LEFT DISTAL FEMORAL COMPRESS: NORMAL
BH CV LOWER VASCULAR LEFT GASTRONEMIUS COMPRESS: NORMAL
BH CV LOWER VASCULAR LEFT GASTRONEMIUS THROMBUS: NORMAL
BH CV LOWER VASCULAR LEFT GREATER SAPH AK COMPRESS: NORMAL
BH CV LOWER VASCULAR LEFT GREATER SAPH BK COMPRESS: NORMAL
BH CV LOWER VASCULAR LEFT LESSER SAPH COMPRESS: NORMAL
BH CV LOWER VASCULAR LEFT MID FEMORAL AUGMENT: NORMAL
BH CV LOWER VASCULAR LEFT MID FEMORAL COMPETENT: NORMAL
BH CV LOWER VASCULAR LEFT MID FEMORAL COMPRESS: NORMAL
BH CV LOWER VASCULAR LEFT MID FEMORAL PHASIC: NORMAL
BH CV LOWER VASCULAR LEFT MID FEMORAL SPONT: NORMAL
BH CV LOWER VASCULAR LEFT PERONEAL COMPRESS: NORMAL
BH CV LOWER VASCULAR LEFT POPLITEAL AUGMENT: NORMAL
BH CV LOWER VASCULAR LEFT POPLITEAL COMPETENT: NORMAL
BH CV LOWER VASCULAR LEFT POPLITEAL COMPRESS: NORMAL
BH CV LOWER VASCULAR LEFT POPLITEAL PHASIC: NORMAL
BH CV LOWER VASCULAR LEFT POPLITEAL SPONT: NORMAL
BH CV LOWER VASCULAR LEFT POSTERIOR TIBIAL COMPRESS: NORMAL
BH CV LOWER VASCULAR LEFT PROFUNDA FEMORAL COMPRESS: NORMAL
BH CV LOWER VASCULAR LEFT PROXIMAL FEMORAL COMPRESS: NORMAL
BH CV LOWER VASCULAR LEFT SAPHENOFEMORAL JUNCTION COMPRESS: NORMAL
BH CV LOWER VASCULAR RIGHT COMMON FEMORAL AUGMENT: NORMAL
BH CV LOWER VASCULAR RIGHT COMMON FEMORAL COMPETENT: NORMAL
BH CV LOWER VASCULAR RIGHT COMMON FEMORAL COMPRESS: NORMAL
BH CV LOWER VASCULAR RIGHT COMMON FEMORAL PHASIC: NORMAL
BH CV LOWER VASCULAR RIGHT COMMON FEMORAL SPONT: NORMAL
MAXIMAL PREDICTED HEART RATE: 150 BPM
STRESS TARGET HR: 128 BPM

## 2022-07-29 PROCEDURE — G0151 HHCP-SERV OF PT,EA 15 MIN: HCPCS

## 2022-07-29 PROCEDURE — 93971 EXTREMITY STUDY: CPT

## 2022-07-29 NOTE — TELEPHONE ENCOUNTER
I spoke with the home physical therapist.  She is concerned because the patient is progressing slowly with PT.  She still using an antalgic gait.  She is having difficulty getting or doing a reciprocating gait.  Patient does have increased swelling in her leg.  She also is complaining of calf pain.  I will send her for Doppler to rule out possible DVT

## 2022-07-29 NOTE — CASE COMMUNICATION
Pt reports 7-10/10 pain in L LE anterior knee and calf region. She reports pain in calf with weight bearing and 1+ edema observed in L anterior shin.  L calf is very sensitive to touch today. This is a change since last visit on Wed.  She does not tolerate elevating above heart because her toes become numb.  Call placed to MD office today.     Andie Marrero PT  [phone]

## 2022-07-29 NOTE — TELEPHONE ENCOUNTER
Call from the vascular lab.  Patient's Doppler is positive for an acute gastroc calf vein thrombosis.  I have spoke with the patient.  Have advised her that she discontinue her aspirin.  Start her on Eliquis 5 mg every 12 hours.  Patient understands that she will be on the Eliquis for minimum of 6 weeks but could be as long as 12 weeks.  She also understands that we will plan to repeat her Doppler in about 6 weeks to evaluate the status of her clot.  Patient has been instructed to avoid having her leg hanging in dependent positions for long periods of time.  She is to hold off on any PT tonight and can resume physical therapy tomorrow after she has had 2 doses of her Eliquis.  When the patient is sitting she should be aggressive with ankle pumps.  New prescription was sent to [facility] pharmacy at [phone] for Eliquis 5 mg, #60, directions are to take 1 tablet p.o. every 12 hours refill x1 per Dr. Smith

## 2022-07-29 NOTE — HOME HEALTH
Pt reports she is not good.  Pain in L LE in anterior knee and calf 7-10/10. 1-2+ edema in L LE and sensitive to touch.  She reports pain in L calf with WB.  Call placed to MD office and spoke to [place].      Pt continues to amb with antalgic gait pattern despite instruction to focus on reciprocal pattern.  She is relying heavily on the walker.  Slow to progress.      Doppler ordered.      Plan for next visit: DC OASIS, review and progress HEP, gait training as tolerated

## 2022-07-29 NOTE — TELEPHONE ENCOUNTER
Hub staff attempted to follow warm transfer process and was unsuccessful     Caller: Sol Rojas    Relationship to patient: Self    Best call back number: [phone]    Patient is needing: PATIENT CALLED IN WITH NADJA FROM HOME HEALTH STATING HER LEG IS SWOLLEN AND SHE IS BARELY WEIGHT BEARING. PLEASE GIVE NADJA A CALL .[phone]

## 2022-07-29 NOTE — PROGRESS NOTES
Called with preliminary and tried to talk to Dr. Laguna. PENNY Carrion was very adamant and had me give results to her. She said doctor transferred phone to her to take result.  Preliminary left lower venous is positive for acute calf DVT. She spoke to patient on the phone here with instructions.

## 2022-08-01 ENCOUNTER — TELEPHONE (OUTPATIENT)
Dept: ORTHOPEDIC SURGERY | Facility: CLINIC | Age: 72
End: 2022-08-01

## 2022-08-01 ENCOUNTER — HOME CARE VISIT (OUTPATIENT)
Dept: HOME HEALTH SERVICES | Facility: HOME HEALTHCARE | Age: 72
End: 2022-08-01

## 2022-08-01 VITALS
DIASTOLIC BLOOD PRESSURE: 78 MMHG | RESPIRATION RATE: 16 BRPM | SYSTOLIC BLOOD PRESSURE: 120 MMHG | HEART RATE: 70 BPM | TEMPERATURE: 97 F | OXYGEN SATURATION: 96 %

## 2022-08-01 PROCEDURE — G0151 HHCP-SERV OF PT,EA 15 MIN: HCPCS

## 2022-08-01 RX ORDER — ONDANSETRON 4 MG/1
4 TABLET, FILM COATED ORAL EVERY 6 HOURS PRN
Qty: 12 TABLET | Refills: 0 | Status: SHIPPED | OUTPATIENT
Start: 2022-08-01 | End: 2022-08-04

## 2022-08-01 NOTE — TELEPHONE ENCOUNTER
Caller: PATIENT    Relationship to patient: SELF    Best call back number: [phone]    Patient is needing: PATIENT WAS CALLING TO CHECK ON THE STATUS OF HER REFERRAL TO PHYSICAL THERAPY. PATIENT STATED SHE CALLED [facility] AND THEY SAID THEY DID NOT RECEIVE THE ORDERS FOR PHYSICAL THERAPY YET. I TRIED TO WARM TRANSFER CALL TO OFFICE BUT RECEIVED NO ANSWER. THANK YOU!

## 2022-08-01 NOTE — TELEPHONE ENCOUNTER
I sent in a prescription for more zofran. She needs to try to eat something small about every 2 hours with protein in it to help with the nausea and appetite as well. Thank you!

## 2022-08-01 NOTE — TELEPHONE ENCOUNTER
Patient informed of [facility] Prior message verbalizing understanding and with no further questions or concerns.

## 2022-08-01 NOTE — HOME HEALTH
Pt started Eliquis Friday night due to L LE acute DVT.  She reports she is having nausea.  Note sent to MD office.   Starting OP PT at [facility] this week.    L calf is soft and dressing remains intact.

## 2022-08-01 NOTE — CASE COMMUNICATION
Pt reports she is continuing to have nausea/decreased appetite.  She does not have any more Zofran.

## 2022-08-03 ENCOUNTER — TREATMENT (OUTPATIENT)
Dept: PHYSICAL THERAPY | Facility: CLINIC | Age: 72
End: 2022-08-03

## 2022-08-03 DIAGNOSIS — Z96.652 STATUS POST TOTAL LEFT KNEE REPLACEMENT: ICD-10-CM

## 2022-08-03 DIAGNOSIS — G89.29 CHRONIC PAIN OF LEFT KNEE: Primary | ICD-10-CM

## 2022-08-03 DIAGNOSIS — M17.12 OSTEOARTHRITIS OF LEFT KNEE, UNSPECIFIED OSTEOARTHRITIS TYPE: ICD-10-CM

## 2022-08-03 DIAGNOSIS — M25.562 CHRONIC PAIN OF LEFT KNEE: Primary | ICD-10-CM

## 2022-08-03 DIAGNOSIS — Z74.09 IMPAIRED FUNCTIONAL MOBILITY AND ACTIVITY TOLERANCE: ICD-10-CM

## 2022-08-03 PROCEDURE — G0180 MD CERTIFICATION HHA PATIENT: HCPCS | Performed by: ORTHOPAEDIC SURGERY

## 2022-08-03 PROCEDURE — 97530 THERAPEUTIC ACTIVITIES: CPT | Performed by: PHYSICAL THERAPIST

## 2022-08-03 PROCEDURE — 97161 PT EVAL LOW COMPLEX 20 MIN: CPT | Performed by: PHYSICAL THERAPIST

## 2022-08-03 PROCEDURE — 97110 THERAPEUTIC EXERCISES: CPT | Performed by: PHYSICAL THERAPIST

## 2022-08-03 NOTE — PROGRESS NOTES
Physical Therapy Initial Evaluation and Plan of Care    Patient: Sol Rojas   : 1950  Diagnosis/ICD-10 Code:  Chronic pain of left knee [M25.562, G89.29]  Referring practitioner: Guilherme Smith MD  Date of Initial Visit: 8/3/2022  Today's Date: 8/3/2022  Patient seen for 4 session         Visit Diagnoses:    ICD-10-CM ICD-9-CM   1. Chronic pain of left knee  M25.562 719.46    G89.29 338.29   2. Osteoarthritis of left knee, unspecified osteoarthritis type  M17.12 715.96   3. Impaired functional mobility and activity tolerance  Z74.09 V49.89   4. Status post total left knee replacement  Z96.652 V43.65         Subjective Questionnaire: LEFS: 20      Subjective Evaluation    History of Present Illness  Date of surgery: 2022  Mechanism of injury: No SHAHIDA - pain for about 3 yrs; Advanced OA per X-rays; some buckling B knees; had prior PT for both knees at this clinic 2+ yrs ago; injections; 3 visits here recently for pre-hab for TKA; Hx of left SHAMEKA; no other significant PMH reported;  Had DVT following surgery - on Eloquis      Patient Occupation: NA   Precautions and Work Restrictions: NAPain  Current pain ratin  At best pain ratin  At worst pain rating: 10  Location: all around left knee and up to hip  Quality: dull ache, throbbing and tight  Relieving factors: rest, medications and ice (elevate)  Aggravating factors: ambulation, squatting, prolonged positioning, standing, sleeping and stairs    Social Support  Lives in: apartment (first floor)  Lives with: spouse    Diagnostic Tests  X-ray: abnormal    Treatments  Previous treatment: physical therapy and injection treatment  Current treatment: medication and physical therapy  Patient Goals  Patient goals for therapy: decreased pain, increased motion, increased strength and independence with ADLs/IADLs             Objective          Observations     Additional Knee Observation Details  Incision covered by bandage with minimal staining; no  obvious signs infection    Tenderness     Additional Tenderness Details  Medial and lateral left knee    Active Range of Motion   Left Knee   Flexion: 102 degrees   Extensor lag: 15 degrees     Patellar Mobility   Left Knee Hypomobile in the left medial, left lateral, left superior and left inferior patellar tendon(s).     Strength/Myotome Testing     Left Knee   Flexion: 4  Extension: 3-    Right Knee   Flexion: 5  Extension: 5    Tests     Additional Tests Details  Calf soft but mildly tender - Neg Homans    Ambulation     Ambulation: Level Surfaces   Ambulation with assistive device: independent (2-wheeled walker)    Additional Level Surfaces Ambulation Details  Needed cues to keep from advancing too far into walker; dec stance time LLE          Assessment & Plan     Assessment  Impairments: abnormal gait, abnormal or restricted ROM, activity intolerance, impaired physical strength and pain with function  Functional Limitations: sleeping, walking, uncomfortable because of pain, standing and stooping  Assessment details: 73 yo F presents 2 wks s/p TKA with mod-intense pain, nausea and treated for DVT.  Limited and painful ROM, obvious weakness, gait deficits - all leading to limited ADLs.  Needs gradually progressive rehab to restore functional mobility, strength, gait and minimize pain and edema to return to routine ADLs  Prognosis: good    Goals  Plan Goals: STGs 2 Weeks  1. Inc AROM to >5-110 ° for ease with ADLs  2. Amb level surface 100 ft and 4 inch steps with AD/UE support safely and independently    3.  Able to safely resume light household ADLs  4. Demos asha for and compliance with initial HEP    LTGs 6 weeks  1. Independent with HEP and joint protection principles  2. Ambulates level surface  500 ft and  6 inch steps with min to no antalgia with/without AD  3. AROM 2-120 for ease with ADLs (in/out of car, tub, etc)  4. Strength > 4/5 and stability sufficient to allow safe return to routine  ADLs    Plan  Therapy options: will be seen for skilled therapy services  Planned modality interventions: cryotherapy and electrical stimulation/Russian stimulation  Planned therapy interventions: manual therapy, neuromuscular re-education, stretching, strengthening, therapeutic activities, home exercise program, body mechanics training and gait training  Frequency: 3x week (reduce as progresses if appropriate)  Duration in weeks: 6  Treatment plan discussed with: patient        History # of Personal Factors and/or Comorbidities: MODERATE (1-2)  Examination of Body System(s): # of elements: LOW (1-2)  Clinical Presentation: STABLE   Clinical Decision Making: LOW       Timed:         Manual Therapy:    0     mins  39275;     Therapeutic Exercise:    20     mins  19944;     Neuromuscular Ang:    0    mins  39081;    Therapeutic Activity:     10     mins  79580;         Un-Timed:  Electrical Stimulation:    0     mins  67827 ( );  Dry Needling     0     mins self-pay  Traction     0     mins 91694  Low Eval     20     Mins  54044  Mod Eval     0     Mins  68801  High Eval                       0     Mins  58096        Timed Treatment:   30   mins   Total Treatment:     50   mins          PT: Veronica Rose PT     License Number: [place] [id number]  Electronically signed by Veronica Rose PT, 08/03/22, 11:08 AM EDT    Certification Period: 8/3/2022 thru 10/31/2022  I certify that the therapy services are furnished while this patient is under my care.  The services outlined above are required by this patient, and will be reviewed every 90 days.         Physician Signature:__________________________________________________    PHYSICIAN: Guilherme Smith MD  NPI: [id number]                                      DATE:      Please sign and return via fax to [email or web address] . Thank you, [facility].

## 2022-08-04 ENCOUNTER — OFFICE VISIT (OUTPATIENT)
Dept: ORTHOPEDIC SURGERY | Facility: CLINIC | Age: 72
End: 2022-08-04

## 2022-08-04 VITALS — WEIGHT: 213 LBS | TEMPERATURE: 98.2 F | BODY MASS INDEX: 34.23 KG/M2 | HEIGHT: 66 IN

## 2022-08-04 DIAGNOSIS — I82.462 ACUTE DEEP VEIN THROMBOSIS (DVT) OF CALF MUSCLE VEIN OF LEFT LOWER EXTREMITY: Primary | ICD-10-CM

## 2022-08-04 DIAGNOSIS — Z96.652 S/P TKR (TOTAL KNEE REPLACEMENT), LEFT: Primary | ICD-10-CM

## 2022-08-04 PROCEDURE — 99024 POSTOP FOLLOW-UP VISIT: CPT | Performed by: NURSE PRACTITIONER

## 2022-08-04 PROCEDURE — 73560 X-RAY EXAM OF KNEE 1 OR 2: CPT | Performed by: NURSE PRACTITIONER

## 2022-08-04 RX ORDER — ONDANSETRON 8 MG/1
8 TABLET, ORALLY DISINTEGRATING ORAL EVERY 8 HOURS PRN
Qty: 10 TABLET | Refills: 0 | Status: SHIPPED | OUTPATIENT
Start: 2022-08-04

## 2022-08-04 NOTE — PROGRESS NOTES
Sol Rojas : 1950 MRN: [id number] DATE: 2022  Body mass index is 34.38 kg/m².  Vitals:    22 1346   Temp: 98.2 °F (36.8 °C)       DIAGNOSIS: 2 week follow up left total knee arthroplasty    SUBJECTIVE:Patient returns today for 2 week follow up of left total knee replacement.  Patient states she has been struggling.  She has been nauseous and has been able to eat very little.  She says the Zofran is not helping very much.  She throws up acid.  She also was found to have a blood clot in her leg last week, she has been started on Eliquis and will need a repeat ultrasound in 6 weeks, she is aware of this.    OBJECTIVE:   Exam:. The incision is healing appropriately. No sign of infection. Range of motion is progressing as expected, ROM approximately 10 degrees short of full extension and approximately 90 degrees of flexion. The calf is soft and tender to palpation.    DIAGNOSTIC STUDIES  2V AP&Lat of the left knee were done in the office today  Indication, findings and comparison: images were reviewed for evaluation of recent knee replacement. They demonstrate a well positioned, well aligned knee replacement without complicating factors noted. In comparison with previous films there has been interval implant placement.    ASSESSMENT: 2 week status post left knee replacement expected healing.    PLAN: 1) dressing removed, exofin fusion in place -removed top one third of Exofin fusion tape, instructed to work off in the shower to soften the skin prior to removal.   2) Zofran 8 mg dissolving tablets for nausea   3) try to eat something small such as a few crackers at least once an hour to keep something in the stomach, also can try protein shakes, try to drink Gatorade or Pedialyte to keep yourself hydrated if you are vomiting.   4) Order given for PT and pain medicine (as needed)   5) Continue ice PRN   6) Continue taking Eliquis prescribed for blood clot for 6 weeks.    7) repeat Doppler of left  lower extremity in 6 weeks.   8) Follow up in 4 weeks with repeat Xrays of left knee (2 views)   9) Wait for 3 months after surgery for routine teeth cleaning and continue with taking a dose of antibiotics before dental procedures for 2 yrs post total joint replacement.    Aileen Ulrich, APRN  8/4/2022      Much of this encounter note is an electronic transcription/translation of spoken language to printed text. The electronic translation of spoken language may permit erroneous, or at times, nonsensical words or phrases to be inadvertently transcribed; Although I have reviewed the note for such errors, some may still exist

## 2022-08-05 ENCOUNTER — TELEPHONE (OUTPATIENT)
Dept: ORTHOPEDIC SURGERY | Facility: CLINIC | Age: 72
End: 2022-08-05

## 2022-08-05 RX ORDER — PROMETHAZINE HYDROCHLORIDE 12.5 MG/1
12.5 TABLET ORAL EVERY 8 HOURS PRN
Qty: 12 TABLET | Refills: 0 | Status: SHIPPED | OUTPATIENT
Start: 2022-08-05

## 2022-08-05 NOTE — TELEPHONE ENCOUNTER
Provider:  CLEMENT MENDOZA  Caller:  AKBAR DELA CRUZ  Relationship to Patient:  SELF  Pharmacy: [facility] [phone]  Phone Number: [phone]  Reason for Call: PATIENT WAS SEEN YESTERDAY AND THE NAUSEA MEDICATION GIVEN IS NOT HELPING. PATIENT SAYS SHE WAS SUPPOSED TO CALL BACK IF DID NOT HELP.  When was the patient last seen:  8/4/22

## 2022-08-05 NOTE — TELEPHONE ENCOUNTER
Please let her know I sent over a few phenergan tablets like we talked about. Please remind her these can make her sleepy. So be careful. And keep trying to eat something small every few hours, as well as hydrate. Thank you

## 2022-08-08 ENCOUNTER — TREATMENT (OUTPATIENT)
Dept: PHYSICAL THERAPY | Facility: CLINIC | Age: 72
End: 2022-08-08

## 2022-08-08 ENCOUNTER — TELEPHONE (OUTPATIENT)
Dept: ORTHOPEDIC SURGERY | Facility: CLINIC | Age: 72
End: 2022-08-08

## 2022-08-08 DIAGNOSIS — Z96.652 STATUS POST TOTAL LEFT KNEE REPLACEMENT: ICD-10-CM

## 2022-08-08 DIAGNOSIS — G89.29 CHRONIC PAIN OF LEFT KNEE: Primary | ICD-10-CM

## 2022-08-08 DIAGNOSIS — M17.12 OSTEOARTHRITIS OF LEFT KNEE, UNSPECIFIED OSTEOARTHRITIS TYPE: ICD-10-CM

## 2022-08-08 DIAGNOSIS — M25.562 CHRONIC PAIN OF LEFT KNEE: Primary | ICD-10-CM

## 2022-08-08 DIAGNOSIS — Z74.09 IMPAIRED FUNCTIONAL MOBILITY AND ACTIVITY TOLERANCE: ICD-10-CM

## 2022-08-08 PROCEDURE — 97530 THERAPEUTIC ACTIVITIES: CPT | Performed by: PHYSICAL THERAPIST

## 2022-08-08 PROCEDURE — 97110 THERAPEUTIC EXERCISES: CPT | Performed by: PHYSICAL THERAPIST

## 2022-08-08 NOTE — TELEPHONE ENCOUNTER
Spoke with patient she will be making an appoint with pcp to discuss nausea she is still having from surgery.

## 2022-08-08 NOTE — TELEPHONE ENCOUNTER
Has she noticed any pattern with the nausea. I know she said at her appointment it does not correlate with pain medications, but does it happen when she takes any of the other medications? And is she try to eat something small every 2 hours to keep something in her stomach like we discussed. If she has no correlation for the nausea I would recommend calling her primary care.

## 2022-08-08 NOTE — PROGRESS NOTES
Physical Therapy Daily Treatment Note      Visit # 5      Subjective   Not able to do the ex much due to nausea and vomiting.  MD changed my meds but still not helping.    Objective   See Exercise, Manual, and Modality Logs for complete treatment.         Assessment/Plan    Limited compliance with HEP due to GI upsset.  Was able to lightly progress ex in clinic but fatigues easily.    Continue per POC as asha.             Manual Therapy:    0     mins  33028;  Therapeutic Exercise:    29     mins  61789;     Neuromuscular Ang:    0    mins  64464;    Therapeutic Activity:     12     mins  88581;         Timed Treatment:   41   mins   Total Treatment:     41   mins    Veronica Rose PT  Physical Therapist  [place] License #[id number]

## 2022-08-11 ENCOUNTER — TREATMENT (OUTPATIENT)
Dept: PHYSICAL THERAPY | Facility: CLINIC | Age: 72
End: 2022-08-11

## 2022-08-11 DIAGNOSIS — Z74.09 IMPAIRED FUNCTIONAL MOBILITY AND ACTIVITY TOLERANCE: ICD-10-CM

## 2022-08-11 DIAGNOSIS — M25.562 CHRONIC PAIN OF LEFT KNEE: Primary | ICD-10-CM

## 2022-08-11 DIAGNOSIS — G89.29 CHRONIC PAIN OF LEFT KNEE: Primary | ICD-10-CM

## 2022-08-11 DIAGNOSIS — Z96.652 STATUS POST TOTAL LEFT KNEE REPLACEMENT: ICD-10-CM

## 2022-08-11 DIAGNOSIS — M17.12 OSTEOARTHRITIS OF LEFT KNEE, UNSPECIFIED OSTEOARTHRITIS TYPE: ICD-10-CM

## 2022-08-11 PROCEDURE — 97110 THERAPEUTIC EXERCISES: CPT | Performed by: PHYSICAL THERAPIST

## 2022-08-11 PROCEDURE — 97530 THERAPEUTIC ACTIVITIES: CPT | Performed by: PHYSICAL THERAPIST

## 2022-08-11 NOTE — PROGRESS NOTES
Physical Therapy Daily Treatment Note      Visit # 6      Subjective   About the same.  Stiffens up easily.    Objective   See Exercise, Manual, and Modality Logs for complete treatment.     93 seated; 105 supine after heel slides    Assessment/Plan    Continues with mod-severe pain and stiffness.  ROM improving but very slowly, minimally.  Limited WB tolerance LE.    contniue to progress per POC as asha             Manual Therapy:    0     mins  90340;  Therapeutic Exercise:    34     mins  55808;     Neuromuscular Ang:    0    mins  25116;    Therapeutic Activity:     16     mins  05997;     Gait Trainin     mins  09805;     Ultrasound:     0     mins  77823;    Work Hardening           0      mins 65855  Iontophoresis               0   mins 80338  Estim   0 min 77051      Timed Treatment:   50   mins   Total Treatment:     50   mins    Veronica Rose PT  Physical Therapist  [place] License #[id number]

## 2022-08-16 ENCOUNTER — TREATMENT (OUTPATIENT)
Dept: PHYSICAL THERAPY | Facility: CLINIC | Age: 72
End: 2022-08-16

## 2022-08-16 DIAGNOSIS — Z74.09 IMPAIRED FUNCTIONAL MOBILITY AND ACTIVITY TOLERANCE: ICD-10-CM

## 2022-08-16 DIAGNOSIS — G89.29 CHRONIC PAIN OF LEFT KNEE: Primary | ICD-10-CM

## 2022-08-16 DIAGNOSIS — Z96.652 STATUS POST TOTAL LEFT KNEE REPLACEMENT: ICD-10-CM

## 2022-08-16 DIAGNOSIS — M25.562 CHRONIC PAIN OF LEFT KNEE: Primary | ICD-10-CM

## 2022-08-16 DIAGNOSIS — M17.12 OSTEOARTHRITIS OF LEFT KNEE, UNSPECIFIED OSTEOARTHRITIS TYPE: ICD-10-CM

## 2022-08-16 PROCEDURE — 97530 THERAPEUTIC ACTIVITIES: CPT | Performed by: PHYSICAL THERAPIST

## 2022-08-16 PROCEDURE — 97110 THERAPEUTIC EXERCISES: CPT | Performed by: PHYSICAL THERAPIST

## 2022-08-16 NOTE — PROGRESS NOTES
Physical Therapy Daily Treatment Note      Visit # 7      Subjective   Got a [employer] pedal bike for home.  Doing OK.    Objective   See Exercise, Manual, and Modality Logs for complete treatment.   8-105    Assessment/Plan    ROM improving but still limited. Tolerated progression of several ex indicating improving strength.  Still ambulating into clinic with walker but ambulated during session without AD.      Continue to progress ROM and WB as asha             Manual Therapy:    0     mins  46346;  Therapeutic Exercise:    35     mins  97469;     Neuromuscular Ang:    0    mins  92328;    Therapeutic Activity:     16     mins  08819;     Gait Trainin     mins  51546;     Ultrasound:     0     mins  61712;    Work Hardening           0      mins 21849  Iontophoresis               0   mins 71895  Estim   0 min 17493      Timed Treatment:   51   mins   Total Treatment:     51   mins    Veronica Rose PT  Physical Therapist  [place] License #[id number]

## 2022-08-19 ENCOUNTER — TREATMENT (OUTPATIENT)
Dept: PHYSICAL THERAPY | Facility: CLINIC | Age: 72
End: 2022-08-19

## 2022-08-19 DIAGNOSIS — G89.29 CHRONIC PAIN OF LEFT KNEE: Primary | ICD-10-CM

## 2022-08-19 DIAGNOSIS — M17.12 OSTEOARTHRITIS OF LEFT KNEE, UNSPECIFIED OSTEOARTHRITIS TYPE: ICD-10-CM

## 2022-08-19 DIAGNOSIS — M25.562 CHRONIC PAIN OF LEFT KNEE: Primary | ICD-10-CM

## 2022-08-19 DIAGNOSIS — Z74.09 IMPAIRED FUNCTIONAL MOBILITY AND ACTIVITY TOLERANCE: ICD-10-CM

## 2022-08-19 PROCEDURE — 97110 THERAPEUTIC EXERCISES: CPT | Performed by: PHYSICAL THERAPIST

## 2022-08-19 PROCEDURE — 97530 THERAPEUTIC ACTIVITIES: CPT | Performed by: PHYSICAL THERAPIST

## 2022-08-19 NOTE — PROGRESS NOTES
Physical Therapy Daily Treatment Note      Visit # 8      Subjective   Knee is stiff this morning    Objective   See Exercise, Manual, and Modality Logs for complete treatment.     7-105      Assessment/Plan    Good patella mobility but tightness distal quad and scar.  ROM slowly improving.  Gait improving gradually.    Continue per POC             Manual Therapy:    6     mins  94831;  Therapeutic Exercise:    34     mins  73264;     Neuromuscular Ang:    0    mins  65499;    Therapeutic Activity:     10     mins  04952;     Gait Trainin     mins  60849;     Ultrasound:     0     mins  71671;    Work Hardening           0      mins 56651  Iontophoresis               0   mins 01852  Estim   0 min 53980      Timed Treatment:   50   mins   Total Treatment:     50   mins    Veronica Rose PT  Physical Therapist  [place] License #[id number]

## 2022-08-22 ENCOUNTER — TREATMENT (OUTPATIENT)
Dept: PHYSICAL THERAPY | Facility: CLINIC | Age: 72
End: 2022-08-22

## 2022-08-22 DIAGNOSIS — M25.562 CHRONIC PAIN OF LEFT KNEE: Primary | ICD-10-CM

## 2022-08-22 DIAGNOSIS — Z96.652 STATUS POST TOTAL LEFT KNEE REPLACEMENT: ICD-10-CM

## 2022-08-22 DIAGNOSIS — Z74.09 IMPAIRED FUNCTIONAL MOBILITY AND ACTIVITY TOLERANCE: ICD-10-CM

## 2022-08-22 DIAGNOSIS — M17.12 OSTEOARTHRITIS OF LEFT KNEE, UNSPECIFIED OSTEOARTHRITIS TYPE: ICD-10-CM

## 2022-08-22 DIAGNOSIS — G89.29 CHRONIC PAIN OF LEFT KNEE: Primary | ICD-10-CM

## 2022-08-22 PROCEDURE — 97110 THERAPEUTIC EXERCISES: CPT | Performed by: PHYSICAL THERAPIST

## 2022-08-22 PROCEDURE — 97530 THERAPEUTIC ACTIVITIES: CPT | Performed by: PHYSICAL THERAPIST

## 2022-08-22 PROCEDURE — 97140 MANUAL THERAPY 1/> REGIONS: CPT | Performed by: PHYSICAL THERAPIST

## 2022-08-22 NOTE — PROGRESS NOTES
Physical Therapy Daily Treatment Note      Visit # 9      Subjective   Sore and stiff in the mornings.    Objective   See Exercise, Manual, and Modality Logs for complete treatment.   6-108    Assessment/Plan    Gradual improvement in ROM but still requires notable effort to achieve. Added low level step acitivity    Try prone hang             Manual Therapy:    9     mins  43727;  Therapeutic Exercise:    34     mins  39489;     Neuromuscular Ang:    0    mins  03789;    Therapeutic Activity:     12     mins  10519;         Timed Treatment:   55   mins   Total Treatment:     55   mins    Veronica Rose PT  Physical Therapist  [place] License #[id number]

## 2022-08-24 ENCOUNTER — TREATMENT (OUTPATIENT)
Dept: PHYSICAL THERAPY | Facility: CLINIC | Age: 72
End: 2022-08-24

## 2022-08-24 DIAGNOSIS — M25.562 CHRONIC PAIN OF LEFT KNEE: Primary | ICD-10-CM

## 2022-08-24 DIAGNOSIS — M17.12 OSTEOARTHRITIS OF LEFT KNEE, UNSPECIFIED OSTEOARTHRITIS TYPE: ICD-10-CM

## 2022-08-24 DIAGNOSIS — Z96.652 STATUS POST TOTAL LEFT KNEE REPLACEMENT: ICD-10-CM

## 2022-08-24 DIAGNOSIS — G89.29 CHRONIC PAIN OF LEFT KNEE: Primary | ICD-10-CM

## 2022-08-24 DIAGNOSIS — Z74.09 IMPAIRED FUNCTIONAL MOBILITY AND ACTIVITY TOLERANCE: ICD-10-CM

## 2022-08-24 PROCEDURE — 97110 THERAPEUTIC EXERCISES: CPT | Performed by: PHYSICAL THERAPIST

## 2022-08-24 PROCEDURE — 97140 MANUAL THERAPY 1/> REGIONS: CPT | Performed by: PHYSICAL THERAPIST

## 2022-08-24 PROCEDURE — 97530 THERAPEUTIC ACTIVITIES: CPT | Performed by: PHYSICAL THERAPIST

## 2022-08-24 NOTE — PROGRESS NOTES
Physical Therapy Daily Treatment Note      Visit # 10      Subjective   Had a rough night     Objective   See Exercise, Manual, and Modality Logs for complete treatment.   5-109    Assessment/Plan    Extension gradually improving - responded well to prone hang activity.  Flexion also slowly improving with increased squat.  Gait improved as well    Continue to progress per POC as asha             Manual Therapy:    12     mins  20400;  Therapeutic Exercise:    31     mins  48091;     Neuromuscular Ang:    0    mins  10176;    Therapeutic Activity:     15    mins  24318;     Gait Trainin     mins  65287;           Timed Treatment: 63     mins   Total Treatment:   63     mins    Veronica Rose PT  Physical Therapist  [place] License #[id number]

## 2022-08-26 ENCOUNTER — TREATMENT (OUTPATIENT)
Dept: PHYSICAL THERAPY | Facility: CLINIC | Age: 72
End: 2022-08-26

## 2022-08-26 DIAGNOSIS — M25.562 CHRONIC PAIN OF LEFT KNEE: Primary | ICD-10-CM

## 2022-08-26 DIAGNOSIS — M17.12 OSTEOARTHRITIS OF LEFT KNEE, UNSPECIFIED OSTEOARTHRITIS TYPE: ICD-10-CM

## 2022-08-26 DIAGNOSIS — Z74.09 IMPAIRED FUNCTIONAL MOBILITY AND ACTIVITY TOLERANCE: ICD-10-CM

## 2022-08-26 DIAGNOSIS — G89.29 CHRONIC PAIN OF LEFT KNEE: Primary | ICD-10-CM

## 2022-08-26 DIAGNOSIS — Z96.652 STATUS POST TOTAL LEFT KNEE REPLACEMENT: ICD-10-CM

## 2022-08-26 PROCEDURE — 97530 THERAPEUTIC ACTIVITIES: CPT | Performed by: PHYSICAL THERAPIST

## 2022-08-26 PROCEDURE — 97110 THERAPEUTIC EXERCISES: CPT | Performed by: PHYSICAL THERAPIST

## 2022-08-26 PROCEDURE — 97140 MANUAL THERAPY 1/> REGIONS: CPT | Performed by: PHYSICAL THERAPIST

## 2022-08-26 NOTE — PROGRESS NOTES
Physical Therapy Daily Treatment Note      Visit # 11      Subjective   Sore and stiff    Objective   See Exercise, Manual, and Modality Logs for complete treatment.   4-109    Assessment/Plan    Continues with gradual improvement with increasing mobility, improved gait and tolerance for WB.    Continue to progress as asha             Manual Therapy:    14     mins  49539;  Therapeutic Exercise:    30     mins  02013;     Neuromuscular Ang:    0    mins  89788;    Therapeutic Activity:     15     mins  21766;           Timed Treatment:   59   mins   Total Treatment:     59   mins    Veronica Rose PT  Physical Therapist  [place] License #[id number]

## 2022-08-29 ENCOUNTER — TREATMENT (OUTPATIENT)
Dept: PHYSICAL THERAPY | Facility: CLINIC | Age: 72
End: 2022-08-29

## 2022-08-29 DIAGNOSIS — Z96.652 STATUS POST TOTAL LEFT KNEE REPLACEMENT: ICD-10-CM

## 2022-08-29 DIAGNOSIS — G89.29 CHRONIC PAIN OF LEFT KNEE: Primary | ICD-10-CM

## 2022-08-29 DIAGNOSIS — Z74.09 IMPAIRED FUNCTIONAL MOBILITY AND ACTIVITY TOLERANCE: ICD-10-CM

## 2022-08-29 DIAGNOSIS — M17.12 OSTEOARTHRITIS OF LEFT KNEE, UNSPECIFIED OSTEOARTHRITIS TYPE: ICD-10-CM

## 2022-08-29 DIAGNOSIS — M25.562 CHRONIC PAIN OF LEFT KNEE: Primary | ICD-10-CM

## 2022-08-29 PROCEDURE — 97110 THERAPEUTIC EXERCISES: CPT | Performed by: PHYSICAL THERAPIST

## 2022-08-29 PROCEDURE — 97530 THERAPEUTIC ACTIVITIES: CPT | Performed by: PHYSICAL THERAPIST

## 2022-08-29 NOTE — PROGRESS NOTES
Physical Therapy Daily Treatment Note      Visit # 12      Subjective   Had my best day yet yesterday.    Objective   See Exercise, Manual, and Modality Logs for complete treatment.       Assessment/Plan    Gait continues to improve, including without AD.  Pain gradually improving as well as ROM/flexibility.    Assess for ret to MD             Manual Therapy:    8     mins  01996;  Therapeutic Exercise:    28     mins  26158;     Neuromuscular Ang:    0    mins  47511;    Therapeutic Activity:     12     mins  74176;           Timed Treatment:   48   mins   Total Treatment:     48   mins    Veronica Rose PT  Physical Therapist  [place] License #[id number]

## 2022-08-31 ENCOUNTER — TREATMENT (OUTPATIENT)
Dept: PHYSICAL THERAPY | Facility: CLINIC | Age: 72
End: 2022-08-31

## 2022-08-31 DIAGNOSIS — Z96.652 STATUS POST TOTAL LEFT KNEE REPLACEMENT: ICD-10-CM

## 2022-08-31 DIAGNOSIS — M25.562 CHRONIC PAIN OF LEFT KNEE: Primary | ICD-10-CM

## 2022-08-31 DIAGNOSIS — M17.12 OSTEOARTHRITIS OF LEFT KNEE, UNSPECIFIED OSTEOARTHRITIS TYPE: ICD-10-CM

## 2022-08-31 DIAGNOSIS — G89.29 CHRONIC PAIN OF LEFT KNEE: Primary | ICD-10-CM

## 2022-08-31 DIAGNOSIS — Z74.09 IMPAIRED FUNCTIONAL MOBILITY AND ACTIVITY TOLERANCE: ICD-10-CM

## 2022-08-31 PROCEDURE — 97530 THERAPEUTIC ACTIVITIES: CPT | Performed by: PHYSICAL THERAPIST

## 2022-08-31 PROCEDURE — 97110 THERAPEUTIC EXERCISES: CPT | Performed by: PHYSICAL THERAPIST

## 2022-08-31 PROCEDURE — 97140 MANUAL THERAPY 1/> REGIONS: CPT | Performed by: PHYSICAL THERAPIST

## 2022-08-31 NOTE — PROGRESS NOTES
Physical Therapy MD Note          2022  Guilherme Smith MD    Re: Sol Rojas  ________________________________________________________________    Ms. Sol Rojas, has attended 10 PT sessions since her surgery.  Treatment has consisted of: there-ex/act, HEP, manual and pt education     S: Ms. Sol Rojas states: pain is up and down - don't need pain meds every day but did this morning.  Feels stiff.          Subjective     Objective          Observations     Additional Knee Observation Details  Incision healing well with no signs infection    Tenderness     Additional Tenderness Details  Medial and lateral left knee    Active Range of Motion   Left Knee   Flexion: 110 degrees   Extensor la degrees     Patellar Mobility   Left Knee Patellar tendons within functional limits include the medial, lateral, superior and inferior.     Strength/Myotome Testing     Left Knee   Flexion: 4+  Extension: 4+    Right Knee   Flexion: 5  Extension: 5    Tests     Additional Tests Details  Calf soft and non-tender    Ambulation     Ambulation: Level Surfaces   Ambulation with assistive device: independent (small base quad cane)    Additional Level Surfaces Ambulation Details  Mild antalgia LLE - needing occasional cues for proper heel-toe gait      See Exercise, Manual, and Modality Logs for complete treatment.       Assessment/Plan    Continues with gradual improvement in ROM, flexibility, strength and gait.  Still with deficits with all. Pain fluctuates.  All STGs met and working towards LTGs.  Recommend continued PT.    To MD             Manual Therapy:    10     mins  87710;  Therapeutic Exercise:    30     mins  83898;     Neuromuscular Ang:    0    mins  38743;    Therapeutic Activity:     14     mins  78918;     Gait Trainin     mins  94975;     Ultrasound:     0     mins  94844;    Work Hardening           0      mins 12462  E-stim                0   mins 60172    Timed Treatment:   54   mins    Total Treatment:     54   mins    Veronica Rose, PT  Physical Therapist  [place] #[id number]

## 2022-09-01 ENCOUNTER — OFFICE VISIT (OUTPATIENT)
Dept: ORTHOPEDIC SURGERY | Facility: CLINIC | Age: 72
End: 2022-09-01

## 2022-09-01 VITALS — HEIGHT: 66 IN | TEMPERATURE: 98.2 F | WEIGHT: 220.3 LBS | BODY MASS INDEX: 35.41 KG/M2

## 2022-09-01 DIAGNOSIS — Z96.652 STATUS POST TOTAL LEFT KNEE REPLACEMENT: ICD-10-CM

## 2022-09-01 DIAGNOSIS — Z96.652 S/P TKR (TOTAL KNEE REPLACEMENT), LEFT: Primary | ICD-10-CM

## 2022-09-01 PROCEDURE — 99024 POSTOP FOLLOW-UP VISIT: CPT | Performed by: ORTHOPAEDIC SURGERY

## 2022-09-01 PROCEDURE — 73562 X-RAY EXAM OF KNEE 3: CPT | Performed by: ORTHOPAEDIC SURGERY

## 2022-09-01 NOTE — PROGRESS NOTES
Patient follows up today status post left total knee replacement 7/19/2022.  Says she is doing better.  She did have a blood clots been on medicine for it.  She has a noninvasive vascular study scheduled for September 9 she is negative for it about 110 today she has large soft tissue envelope in her leg but the incision is dry and nondraining calves are symmetric she walks with a cane.  AP lateral sunrise view left knee x-ray taken the office today with comparison views for postop follow-up show well aligned total knee postop total knee recommendations given questions answered.  See her back in 2 months with x-rays sooner if she is having problems

## 2022-09-07 ENCOUNTER — TREATMENT (OUTPATIENT)
Dept: PHYSICAL THERAPY | Facility: CLINIC | Age: 72
End: 2022-09-07

## 2022-09-07 DIAGNOSIS — G89.29 CHRONIC PAIN OF LEFT KNEE: Primary | ICD-10-CM

## 2022-09-07 DIAGNOSIS — M17.12 OSTEOARTHRITIS OF LEFT KNEE, UNSPECIFIED OSTEOARTHRITIS TYPE: ICD-10-CM

## 2022-09-07 DIAGNOSIS — Z74.09 IMPAIRED FUNCTIONAL MOBILITY AND ACTIVITY TOLERANCE: ICD-10-CM

## 2022-09-07 DIAGNOSIS — Z96.652 STATUS POST TOTAL LEFT KNEE REPLACEMENT: ICD-10-CM

## 2022-09-07 DIAGNOSIS — M25.562 CHRONIC PAIN OF LEFT KNEE: Primary | ICD-10-CM

## 2022-09-07 PROCEDURE — 97530 THERAPEUTIC ACTIVITIES: CPT | Performed by: PHYSICAL THERAPIST

## 2022-09-07 PROCEDURE — 97110 THERAPEUTIC EXERCISES: CPT | Performed by: PHYSICAL THERAPIST

## 2022-09-07 PROCEDURE — 97140 MANUAL THERAPY 1/> REGIONS: CPT | Performed by: PHYSICAL THERAPIST

## 2022-09-07 NOTE — PROGRESS NOTES
Physical Therapy Daily Treatment Note      Visit # 14      Subjective   MD said I am doing good.    Objective   See Exercise, Manual, and Modality Logs for complete treatment.   4-111    Assessment/Plan    ROM gradually improving.  Good tolerance for new and revised ex.  Motivated.    Continue to progress per POC as asha             Manual Therapy:    10     mins  64421;  Therapeutic Exercise:    29     mins  22955;     Neuromuscular Ang:    0    mins  92187;    Therapeutic Activity:     20     mins  97977;           Timed Treatment:   59   mins   Total Treatment:     59   mins    Veronica Rose PT  Physical Therapist  [place] License #[id number]

## 2022-09-09 ENCOUNTER — HOSPITAL ENCOUNTER (OUTPATIENT)
Dept: CARDIOLOGY | Facility: HOSPITAL | Age: 72
Discharge: HOME | End: 2022-09-09
Admitting: NURSE PRACTITIONER

## 2022-09-09 DIAGNOSIS — I82.462 ACUTE DEEP VEIN THROMBOSIS (DVT) OF CALF MUSCLE VEIN OF LEFT LOWER EXTREMITY: ICD-10-CM

## 2022-09-09 LAB
BH CV LOWER VASCULAR LEFT COMMON FEMORAL AUGMENT: NORMAL
BH CV LOWER VASCULAR LEFT COMMON FEMORAL COMPETENT: NORMAL
BH CV LOWER VASCULAR LEFT COMMON FEMORAL COMPRESS: NORMAL
BH CV LOWER VASCULAR LEFT COMMON FEMORAL PHASIC: NORMAL
BH CV LOWER VASCULAR LEFT COMMON FEMORAL SPONT: NORMAL
BH CV LOWER VASCULAR LEFT DISTAL FEMORAL COMPRESS: NORMAL
BH CV LOWER VASCULAR LEFT GASTRONEMIUS COMPRESS: NORMAL
BH CV LOWER VASCULAR LEFT GREATER SAPH AK COMPRESS: NORMAL
BH CV LOWER VASCULAR LEFT GREATER SAPH BK COMPRESS: NORMAL
BH CV LOWER VASCULAR LEFT LESSER SAPH COMPRESS: NORMAL
BH CV LOWER VASCULAR LEFT MID FEMORAL AUGMENT: NORMAL
BH CV LOWER VASCULAR LEFT MID FEMORAL COMPETENT: NORMAL
BH CV LOWER VASCULAR LEFT MID FEMORAL COMPRESS: NORMAL
BH CV LOWER VASCULAR LEFT MID FEMORAL PHASIC: NORMAL
BH CV LOWER VASCULAR LEFT MID FEMORAL SPONT: NORMAL
BH CV LOWER VASCULAR LEFT PERONEAL COMPRESS: NORMAL
BH CV LOWER VASCULAR LEFT POPLITEAL AUGMENT: NORMAL
BH CV LOWER VASCULAR LEFT POPLITEAL COMPETENT: NORMAL
BH CV LOWER VASCULAR LEFT POPLITEAL COMPRESS: NORMAL
BH CV LOWER VASCULAR LEFT POPLITEAL PHASIC: NORMAL
BH CV LOWER VASCULAR LEFT POPLITEAL SPONT: NORMAL
BH CV LOWER VASCULAR LEFT POSTERIOR TIBIAL COMPRESS: NORMAL
BH CV LOWER VASCULAR LEFT PROFUNDA FEMORAL COMPRESS: NORMAL
BH CV LOWER VASCULAR LEFT PROXIMAL FEMORAL COMPRESS: NORMAL
BH CV LOWER VASCULAR LEFT SAPHENOFEMORAL JUNCTION COMPRESS: NORMAL
BH CV LOWER VASCULAR RIGHT COMMON FEMORAL AUGMENT: NORMAL
BH CV LOWER VASCULAR RIGHT COMMON FEMORAL COMPETENT: NORMAL
BH CV LOWER VASCULAR RIGHT COMMON FEMORAL COMPRESS: NORMAL
BH CV LOWER VASCULAR RIGHT COMMON FEMORAL PHASIC: NORMAL
BH CV LOWER VASCULAR RIGHT COMMON FEMORAL SPONT: NORMAL
MAXIMAL PREDICTED HEART RATE: 150 BPM
STRESS TARGET HR: 128 BPM

## 2022-09-09 PROCEDURE — 93971 EXTREMITY STUDY: CPT

## 2022-09-14 ENCOUNTER — TREATMENT (OUTPATIENT)
Dept: PHYSICAL THERAPY | Facility: CLINIC | Age: 72
End: 2022-09-14

## 2022-09-14 DIAGNOSIS — Z96.652 STATUS POST TOTAL LEFT KNEE REPLACEMENT: ICD-10-CM

## 2022-09-14 DIAGNOSIS — Z74.09 IMPAIRED FUNCTIONAL MOBILITY AND ACTIVITY TOLERANCE: ICD-10-CM

## 2022-09-14 DIAGNOSIS — M17.12 OSTEOARTHRITIS OF LEFT KNEE, UNSPECIFIED OSTEOARTHRITIS TYPE: ICD-10-CM

## 2022-09-14 DIAGNOSIS — G89.29 CHRONIC PAIN OF LEFT KNEE: Primary | ICD-10-CM

## 2022-09-14 DIAGNOSIS — M25.562 CHRONIC PAIN OF LEFT KNEE: Primary | ICD-10-CM

## 2022-09-14 PROCEDURE — 97110 THERAPEUTIC EXERCISES: CPT | Performed by: PHYSICAL THERAPIST

## 2022-09-14 NOTE — PROGRESS NOTES
Physical Therapy Daily Treatment Note      Patient: Sol Rojas   : 1950  Referring practitioner: Guilherme Smith MD  Date of Initial Visit: Type: THERAPY  Noted: 2022  Today's Date: 2022  Patient seen for 15 sessions       Visit Diagnoses:    ICD-10-CM ICD-9-CM   1. Chronic pain of left knee  M25.562 719.46    G89.29 338.29   2. Osteoarthritis of left knee, unspecified osteoarthritis type  M17.12 715.96   3. Impaired functional mobility and activity tolerance  Z74.09 V49.89   4. Status post total left knee replacement  Z96.652 V43.65       Subjective Evaluation    History of Present Illness    Subjective comment: doing well. not sure about going for a R TKA down the road due to anesthesia foggyness after this TKA. hasn't started back at the gym in her apt but agrees she should give the bike and treadmill a try soon. still not much of an appetite due to PONV       Objective   See Exercise, Manual, and Modality Logs for complete treatment.       Assessment & Plan     Assessment    Assessment details: Very cautious about new ex and moving bike seat but did well by end of session. Flexion improved from 107 last rx to 112. Ext however still very tight with 20 degree ext lag.   P: pt hoping to dc soon vs. Go to next clinic so need to make sure she is 100% on teach back of HEP and is indep with her gym routine, shimon for knee ext.             Timed:         Manual Therapy:         mins  63499;     Therapeutic Exercise:    60     mins  91999;     Neuromuscular Ang:        mins  35452;    Therapeutic Activity:          mins  60056;     Gait Training:           mins  26798;     Ultrasound:          mins  75315;    Ionto                                   mins   51584  Self Care                            mins   55379  Canalith Repos         mins 80597      Un-Timed:  Electrical Stimulation:         mins  48603 ( );  Dry Needling          mins self-pay  Traction          mins 84401      Timed  Treatment:   60   mins   Total Treatment:     60   mins    Zorre Zeno Kimura, PT  [place] License: [id number]    [place] License:  [id number]

## 2022-09-16 ENCOUNTER — TREATMENT (OUTPATIENT)
Dept: PHYSICAL THERAPY | Facility: CLINIC | Age: 72
End: 2022-09-16

## 2022-09-16 DIAGNOSIS — G89.29 CHRONIC PAIN OF LEFT KNEE: Primary | ICD-10-CM

## 2022-09-16 DIAGNOSIS — Z96.652 STATUS POST TOTAL LEFT KNEE REPLACEMENT: ICD-10-CM

## 2022-09-16 DIAGNOSIS — M17.12 OSTEOARTHRITIS OF LEFT KNEE, UNSPECIFIED OSTEOARTHRITIS TYPE: ICD-10-CM

## 2022-09-16 DIAGNOSIS — M25.562 CHRONIC PAIN OF LEFT KNEE: Primary | ICD-10-CM

## 2022-09-16 DIAGNOSIS — Z74.09 IMPAIRED FUNCTIONAL MOBILITY AND ACTIVITY TOLERANCE: ICD-10-CM

## 2022-09-16 PROCEDURE — 97110 THERAPEUTIC EXERCISES: CPT | Performed by: PHYSICAL THERAPIST

## 2022-09-16 NOTE — PROGRESS NOTES
Physical Therapy Daily Treatment Note      Patient: Sol Rojas   : 1950  Referring practitioner: Guilherme Smith MD  Date of Initial Visit: Type: THERAPY  Noted: 2022  Today's Date: 2022  Patient seen for 16 sessions       Visit Diagnoses:    ICD-10-CM ICD-9-CM   1. Chronic pain of left knee  M25.562 719.46    G89.29 338.29   2. Osteoarthritis of left knee, unspecified osteoarthritis type  M17.12 715.96   3. Impaired functional mobility and activity tolerance  Z74.09 V49.89   4. Status post total left knee replacement  Z96.652 V43.65       Subjective Evaluation    History of Present Illness    Subjective comment: did okay after last session. used ice at home. hasn't started back in her gym just yet. did more propping for knee ext the past 2 days.        Objective   See Exercise, Manual, and Modality Logs for complete treatment.       Assessment & Plan     Assessment    Assessment details: Did well today gaining a bit more flex and ext. -17 ext so needs to work harder on this. Plan to add more wt to sustained ext stretches, and/or add more manual rx  Less anxious about pushing herself today.   Question how she is sleeping which might be affecting ext loss.           Timed:         Manual Therapy:         mins  28195;     Therapeutic Exercise:    45     mins  09193;     Neuromuscular Ang:        mins  91541;    Therapeutic Activity:          mins  98984;     Gait Training:           mins  75427;     Ultrasound:          mins  51401;    Ionto                                   mins   80569  Self Care                            mins   62947  Canalith Repos         mins 00376      Un-Timed:  Electrical Stimulation:         mins  93465 ( );  Dry Needling          mins self-pay  Traction          mins 88812      Timed Treatment:   45   mins   Total Treatment:     45   mins    Zorre Zeno Kimura, PT  [place] License: [id number]    [place] License:  [id number]

## 2022-09-20 ENCOUNTER — TREATMENT (OUTPATIENT)
Dept: PHYSICAL THERAPY | Facility: CLINIC | Age: 72
End: 2022-09-20

## 2022-09-20 DIAGNOSIS — Z74.09 IMPAIRED FUNCTIONAL MOBILITY AND ACTIVITY TOLERANCE: ICD-10-CM

## 2022-09-20 DIAGNOSIS — G89.29 CHRONIC PAIN OF LEFT KNEE: Primary | ICD-10-CM

## 2022-09-20 DIAGNOSIS — M17.12 OSTEOARTHRITIS OF LEFT KNEE, UNSPECIFIED OSTEOARTHRITIS TYPE: ICD-10-CM

## 2022-09-20 DIAGNOSIS — M25.562 CHRONIC PAIN OF LEFT KNEE: Primary | ICD-10-CM

## 2022-09-20 DIAGNOSIS — Z96.652 STATUS POST TOTAL LEFT KNEE REPLACEMENT: ICD-10-CM

## 2022-09-20 PROCEDURE — 97140 MANUAL THERAPY 1/> REGIONS: CPT | Performed by: PHYSICAL THERAPIST

## 2022-09-20 PROCEDURE — 97110 THERAPEUTIC EXERCISES: CPT | Performed by: PHYSICAL THERAPIST

## 2022-09-20 PROCEDURE — 97530 THERAPEUTIC ACTIVITIES: CPT | Performed by: PHYSICAL THERAPIST

## 2022-09-20 NOTE — PROGRESS NOTES
Physical Therapy Daily Treatment Note      Visit # 17      Subjective   Not much pain but soreness. More stable.     Objective   See Exercise, Manual, and Modality Logs for complete treatment.   4 (with effort and after stretching) - 113    Assessment/Plan    Gradual improvement in flexion mobility.  Extension slower to improve but noting less tension in HS with extension and quad strength improving.    Continue to progress as asha per POC             Manual Therapy:    10     mins  91086;  Therapeutic Exercise:    34     mins  68908;     Neuromuscular Ang:    0    mins  65483;    Therapeutic Activity:     18     mins  87479;     Gait Trainin     mins  69692;     Ultrasound:     0     mins  34626;    Work Hardening           0      mins 50315  Iontophoresis               0   mins 01495  Estim   0 min 10651      Timed Treatment:   62   mins   Total Treatment:     62   mins    Veronica Rose PT  Physical Therapist  [place] License #[id number]

## 2022-09-21 ENCOUNTER — TREATMENT (OUTPATIENT)
Dept: PHYSICAL THERAPY | Facility: CLINIC | Age: 72
End: 2022-09-21

## 2022-09-21 DIAGNOSIS — Z74.09 IMPAIRED FUNCTIONAL MOBILITY AND ACTIVITY TOLERANCE: ICD-10-CM

## 2022-09-21 DIAGNOSIS — M17.12 OSTEOARTHRITIS OF LEFT KNEE, UNSPECIFIED OSTEOARTHRITIS TYPE: ICD-10-CM

## 2022-09-21 DIAGNOSIS — M25.562 CHRONIC PAIN OF LEFT KNEE: Primary | ICD-10-CM

## 2022-09-21 DIAGNOSIS — G89.29 CHRONIC PAIN OF LEFT KNEE: Primary | ICD-10-CM

## 2022-09-21 PROCEDURE — 97110 THERAPEUTIC EXERCISES: CPT | Performed by: PHYSICAL THERAPIST

## 2022-09-21 PROCEDURE — 97530 THERAPEUTIC ACTIVITIES: CPT | Performed by: PHYSICAL THERAPIST

## 2022-09-21 NOTE — PROGRESS NOTES
Physical Therapy Daily Treatment Note      Visit # 18      Subjective   Was fine yesterday after icing at home    Objective   See Exercise, Manual, and Modality Logs for complete treatment.   4-114    Assessment/Plan    ROM and strength slowly improving but ROM still requiring moderate effort.  Improving stability with gait.    Continue to progress as asha             Manual Therapy:    5     mins  62729;  Therapeutic Exercise:    29     mins  13368;     Neuromuscular Ang:    0    mins  56702;    Therapeutic Activity:     12     mins  66083;     Gait Trainin     mins  37987;     Ultrasound:     0     mins  18568;    Work Hardening           0      mins 80806  Iontophoresis               0   mins 98504  Estim   0 min 77230      Timed Treatment:   46   mins   Total Treatment:     46   mins    Veronica Rose PT  Physical Therapist  [place] License #[id number]

## 2022-09-23 ENCOUNTER — TREATMENT (OUTPATIENT)
Dept: PHYSICAL THERAPY | Facility: CLINIC | Age: 72
End: 2022-09-23

## 2022-09-23 DIAGNOSIS — M25.562 CHRONIC PAIN OF LEFT KNEE: Primary | ICD-10-CM

## 2022-09-23 DIAGNOSIS — Z74.09 IMPAIRED FUNCTIONAL MOBILITY AND ACTIVITY TOLERANCE: ICD-10-CM

## 2022-09-23 DIAGNOSIS — M17.12 OSTEOARTHRITIS OF LEFT KNEE, UNSPECIFIED OSTEOARTHRITIS TYPE: ICD-10-CM

## 2022-09-23 DIAGNOSIS — G89.29 CHRONIC PAIN OF LEFT KNEE: Primary | ICD-10-CM

## 2022-09-23 DIAGNOSIS — Z96.652 STATUS POST TOTAL LEFT KNEE REPLACEMENT: ICD-10-CM

## 2022-09-23 PROCEDURE — 97110 THERAPEUTIC EXERCISES: CPT | Performed by: PHYSICAL THERAPIST

## 2022-09-23 PROCEDURE — 97530 THERAPEUTIC ACTIVITIES: CPT | Performed by: PHYSICAL THERAPIST

## 2022-09-23 PROCEDURE — 97112 NEUROMUSCULAR REEDUCATION: CPT | Performed by: PHYSICAL THERAPIST

## 2022-09-23 NOTE — PROGRESS NOTES
Physical Therapy Daily Treatment Note      Visit # 19      Subjective   Drove myself here today - it was fine.     Objective   See Exercise, Manual, and Modality Logs for complete treatment.     LEFS - 36    4-114 with effort    Assessment/Plan    Continues with gradual improvement in strength and ADLs.  ROM may be plateauing.    Continue with focus on mobility and strength           Manual Therapy:    0     mins  85352;  Therapeutic Exercise:    33     mins  85991;     Neuromuscular Ang:    8    mins  28593;    Therapeutic Activity:     14     mins  01996;     Gait Trainin     mins  42040;     Ultrasound:     0     mins  96335;    Work Hardening           0      mins 51651  Iontophoresis               0   mins 78275  Estim   0 min 67339      Timed Treatment:   55   mins   Total Treatment:     55   mins    Veronica Rose PT  Physical Therapist  [place] License #[id number]

## 2022-09-27 ENCOUNTER — TREATMENT (OUTPATIENT)
Dept: PHYSICAL THERAPY | Facility: CLINIC | Age: 72
End: 2022-09-27

## 2022-09-27 DIAGNOSIS — M17.12 OSTEOARTHRITIS OF LEFT KNEE, UNSPECIFIED OSTEOARTHRITIS TYPE: ICD-10-CM

## 2022-09-27 DIAGNOSIS — Z74.09 IMPAIRED FUNCTIONAL MOBILITY AND ACTIVITY TOLERANCE: ICD-10-CM

## 2022-09-27 DIAGNOSIS — G89.29 CHRONIC PAIN OF LEFT KNEE: Primary | ICD-10-CM

## 2022-09-27 DIAGNOSIS — M25.562 CHRONIC PAIN OF LEFT KNEE: Primary | ICD-10-CM

## 2022-09-27 DIAGNOSIS — Z96.652 STATUS POST TOTAL LEFT KNEE REPLACEMENT: ICD-10-CM

## 2022-09-27 PROCEDURE — 97112 NEUROMUSCULAR REEDUCATION: CPT | Performed by: PHYSICAL THERAPIST

## 2022-09-27 PROCEDURE — 97110 THERAPEUTIC EXERCISES: CPT | Performed by: PHYSICAL THERAPIST

## 2022-09-27 PROCEDURE — 97530 THERAPEUTIC ACTIVITIES: CPT | Performed by: PHYSICAL THERAPIST

## 2022-09-27 NOTE — PROGRESS NOTES
Physical Therapy Daily Treatment Note      Visit # 20      Subjective   Doing OK.  Stopped taking pain meds because one of the side effects is tight muscles. Taking Aleve instead prn.    Objective   See Exercise, Manual, and Modality Logs for complete treatment.   3.5-118    Assessment/Plan    Improved ROM today with less noted mm tension but still lacking full extension.  Gait improved.    Continue to progress astol             Manual Therapy:    4     mins  92479;  Therapeutic Exercise:    33     mins  44191;     Neuromuscular Ang:    8    mins  67111;    Therapeutic Activity:     10     mins  44284;     Gait Trainin     mins  72983;     Ultrasound:     0     mins  63173;    Work Hardening           0      mins 76724  Iontophoresis               0   mins 93304  Estim   0 min 09900      Timed Treatment:   55   mins   Total Treatment:     55   mins    Veronica Rose PT  Physical Therapist  [place] License #[id number]

## 2022-09-28 ENCOUNTER — TREATMENT (OUTPATIENT)
Dept: PHYSICAL THERAPY | Facility: CLINIC | Age: 72
End: 2022-09-28

## 2022-09-28 DIAGNOSIS — Z74.09 IMPAIRED FUNCTIONAL MOBILITY AND ACTIVITY TOLERANCE: ICD-10-CM

## 2022-09-28 DIAGNOSIS — Z96.652 STATUS POST TOTAL LEFT KNEE REPLACEMENT: ICD-10-CM

## 2022-09-28 DIAGNOSIS — M25.562 CHRONIC PAIN OF LEFT KNEE: Primary | ICD-10-CM

## 2022-09-28 DIAGNOSIS — M17.12 OSTEOARTHRITIS OF LEFT KNEE, UNSPECIFIED OSTEOARTHRITIS TYPE: ICD-10-CM

## 2022-09-28 DIAGNOSIS — G89.29 CHRONIC PAIN OF LEFT KNEE: Primary | ICD-10-CM

## 2022-09-28 PROCEDURE — 97110 THERAPEUTIC EXERCISES: CPT | Performed by: PHYSICAL THERAPIST

## 2022-09-28 PROCEDURE — 97112 NEUROMUSCULAR REEDUCATION: CPT | Performed by: PHYSICAL THERAPIST

## 2022-09-28 PROCEDURE — 97530 THERAPEUTIC ACTIVITIES: CPT | Performed by: PHYSICAL THERAPIST

## 2022-09-28 NOTE — PROGRESS NOTES
Physical Therapy Daily Treatment Note      Visit # 21      Subjective   Doing ok    Objective   See Exercise, Manual, and Modality Logs for complete treatment.     3-116  Assessment/Plan    Decreasing mm tension to allow increased extension.     Reassess next session             Manual Therapy:    2     mins  61849;  Therapeutic Exercise:    33     mins  40452;     Neuromuscular Ang:    8    mins  40442;    Therapeutic Activity:     10     mins  43842;           Timed Treatment:   53   mins   Total Treatment:     53   mins    Veronica Rose PT  Physical Therapist  [place] License #[id number]

## 2022-09-30 ENCOUNTER — TREATMENT (OUTPATIENT)
Dept: PHYSICAL THERAPY | Facility: CLINIC | Age: 72
End: 2022-09-30

## 2022-09-30 DIAGNOSIS — Z96.652 STATUS POST TOTAL LEFT KNEE REPLACEMENT: ICD-10-CM

## 2022-09-30 DIAGNOSIS — M25.562 CHRONIC PAIN OF LEFT KNEE: Primary | ICD-10-CM

## 2022-09-30 DIAGNOSIS — Z74.09 IMPAIRED FUNCTIONAL MOBILITY AND ACTIVITY TOLERANCE: ICD-10-CM

## 2022-09-30 DIAGNOSIS — G89.29 CHRONIC PAIN OF LEFT KNEE: Primary | ICD-10-CM

## 2022-09-30 DIAGNOSIS — M17.12 OSTEOARTHRITIS OF LEFT KNEE, UNSPECIFIED OSTEOARTHRITIS TYPE: ICD-10-CM

## 2022-09-30 PROCEDURE — 97110 THERAPEUTIC EXERCISES: CPT | Performed by: PHYSICAL THERAPIST

## 2022-09-30 PROCEDURE — 97530 THERAPEUTIC ACTIVITIES: CPT | Performed by: PHYSICAL THERAPIST

## 2022-09-30 PROCEDURE — 97112 NEUROMUSCULAR REEDUCATION: CPT | Performed by: PHYSICAL THERAPIST

## 2022-09-30 NOTE — PROGRESS NOTES
Re-Assessment / Re-Certification        Patient: Sol Rojas   : 1950  Diagnosis/ICD-10 Code:  Chronic pain of left knee [M25.562, G89.29]  Referring practitioner: Guilherme Smith MD  Date of Initial Evaluation:  Type: THERAPY  Noted: 2022  Patient seen for 22 sessions      Subjective:   Sol Rojas reports: still have a nagging pain at times and still tightness but not as much as before.  Tweaked it a little this morning not lifting my foot high enough walking.    Subjective Questionnaire: LEFS: 36  Clinical Progress: improved  Home Program Compliance: Yes  Treatment has included: therapeutic exercise, neuromuscular re-education, manual therapy and therapeutic activity    Subjective   Objective          Observations     Additional Knee Observation Details  Incision healing well     Tenderness   Left Knee   Tenderness in the inferior patella and superior patella.     Active Range of Motion   Left Knee   Flexion: 115 degrees   Extension: Left knee active extension: lacking 3.     Patellar Mobility   Left Knee Patellar tendons within functional limits include the medial, lateral, superior and inferior.     Strength/Myotome Testing     Left Knee   Flexion: 5  Extension: 5    Right Knee   Flexion: 5  Extension: 5    Tests     Additional Tests Details  Calf soft and non-tender    Ambulation     Ambulation: Level Surfaces   Ambulation without assistive device: independent    Additional Level Surfaces Ambulation Details  Minimal antalgia LLE      Assessment/Plan  Progress toward previous goals: Partially Met          Recommendations: Continue as planned  Timeframe: 1 month - decreased frequency  Prognosis to achieve goals: good    PT Signature: Veronica Rose PT      Based upon review of the patient's progress and continued therapy plan, it is my medical opinion that Sol Rojas should continue physical therapy treatment at [facility]  [address] [place] [address]  [phone].    Signature: __________________________________  Guilherme Smith MD    Manual Therapy:    3     mins  30045;  Therapeutic Exercise:    28     mins  27816;     Neuromuscular Ang:    8    mins  45286;    Therapeutic Activity:     14     mins  35929;     Gait Trainin     mins  73320;     Ultrasound:     0     mins  06871;    Work Hardening           0      mins 32452  Iontophoresis               0   mins 09400    Timed Treatment:   53   mins   Total Treatment:     53   mins

## 2022-10-04 ENCOUNTER — TREATMENT (OUTPATIENT)
Dept: PHYSICAL THERAPY | Facility: CLINIC | Age: 72
End: 2022-10-04

## 2022-10-04 DIAGNOSIS — Z96.652 STATUS POST TOTAL LEFT KNEE REPLACEMENT: ICD-10-CM

## 2022-10-04 DIAGNOSIS — Z74.09 IMPAIRED FUNCTIONAL MOBILITY AND ACTIVITY TOLERANCE: ICD-10-CM

## 2022-10-04 DIAGNOSIS — M17.12 OSTEOARTHRITIS OF LEFT KNEE, UNSPECIFIED OSTEOARTHRITIS TYPE: ICD-10-CM

## 2022-10-04 DIAGNOSIS — M25.562 CHRONIC PAIN OF LEFT KNEE: Primary | ICD-10-CM

## 2022-10-04 DIAGNOSIS — G89.29 CHRONIC PAIN OF LEFT KNEE: Primary | ICD-10-CM

## 2022-10-04 PROCEDURE — 97110 THERAPEUTIC EXERCISES: CPT | Performed by: PHYSICAL THERAPIST

## 2022-10-04 NOTE — PROGRESS NOTES
Physical Therapy Daily Treatment Note      Patient: Sol Rojas   : 1950  Referring practitioner: Guilherme Smith MD  Date of Initial Visit: Type: THERAPY  Noted: 2022  Today's Date: 10/4/2022  Patient seen for 23 sessions       Visit Diagnoses:    ICD-10-CM ICD-9-CM   1. Chronic pain of left knee  M25.562 719.46    G89.29 338.29   2. Osteoarthritis of left knee, unspecified osteoarthritis type  M17.12 715.96   3. Impaired functional mobility and activity tolerance  Z74.09 V49.89   4. Status post total left knee replacement  Z96.652 V43.65       Subjective Evaluation    History of Present Illness    Subjective comment: The left knee is achy       Objective   See Exercise, Manual, and Modality Logs for complete treatment.       Assessment & Plan     Assessment    Assessment details: Ms. Cheek is doing quite well with her exercise program. Her extension is improving but she does not have full extension as of yet.  Her pain levels are low.           Timed:         Manual Therapy:    0     mins  27132;     Therapeutic Exercise:    28     mins  19813;     Neuromuscular Ang:    0    mins  11396;    Therapeutic Activity:     0     mins  93564;     Gait Trainin     mins  83743;     Ultrasound:     0     mins  76563;    Ionto                               0    mins   61736        Timed Treatment:   28   mins   Total Treatment:     28   mins    Dayanara Odonnell PT  [place] License: [id number]

## 2022-10-06 ENCOUNTER — TREATMENT (OUTPATIENT)
Dept: PHYSICAL THERAPY | Facility: CLINIC | Age: 72
End: 2022-10-06

## 2022-10-06 DIAGNOSIS — Z74.09 IMPAIRED FUNCTIONAL MOBILITY AND ACTIVITY TOLERANCE: ICD-10-CM

## 2022-10-06 DIAGNOSIS — M25.562 CHRONIC PAIN OF LEFT KNEE: Primary | ICD-10-CM

## 2022-10-06 DIAGNOSIS — M17.12 OSTEOARTHRITIS OF LEFT KNEE, UNSPECIFIED OSTEOARTHRITIS TYPE: ICD-10-CM

## 2022-10-06 DIAGNOSIS — Z96.652 STATUS POST TOTAL LEFT KNEE REPLACEMENT: ICD-10-CM

## 2022-10-06 DIAGNOSIS — G89.29 CHRONIC PAIN OF LEFT KNEE: Primary | ICD-10-CM

## 2022-10-06 PROCEDURE — 97110 THERAPEUTIC EXERCISES: CPT | Performed by: PHYSICAL THERAPIST

## 2022-10-06 NOTE — PROGRESS NOTES
Physical Therapy Daily Progress Note      Visit # 24      Subjective Evaluation    History of Present Illness    Subjective comment: Pt reports a pain levle of 5/10.       Objective   See Exercise, Manual, and Modality Logs for complete treatment.       Assessment & Plan     Assessment    Assessment details: Pt tolerated treattment with no c/o increased pain in (L) knee.  Pt needed minimal vc fo exercise performance.  Pt's HEP was updated and given to her.                     Manual Therapy:    0     mins  69790;  Therapeutic Exercise:    39(47)     mins  87662;     Neuromuscular Ang:    0    mins  03134;    Therapeutic Activity:     0     mins  57018;     Gait Trainin     mins  83114;     Ultrasound:     0     mins  50974;    Work Hardening           0      mins 38285  Iontophoresis               0   mins 08831  E-Stim                          _0_ mins 96805 ( )    Timed Treatment:   39   mins   Total Treatment:     47   mins    Rolando Baig PTA  Physical Therapist Assistant

## 2022-10-26 NOTE — TELEPHONE ENCOUNTER
PS Dr. Ivette Munguia about Mri results You can call to check on her and see if the talbert bag is filling with urine at all. And if she is having any other symptoms, and if home health reached out if they will come today or tomorrow?

## 2022-11-02 ENCOUNTER — OFFICE VISIT (OUTPATIENT)
Dept: ORTHOPEDIC SURGERY | Facility: CLINIC | Age: 72
End: 2022-11-02

## 2022-11-02 VITALS — BODY MASS INDEX: 35.36 KG/M2 | HEIGHT: 66 IN | TEMPERATURE: 96.7 F | WEIGHT: 220 LBS

## 2022-11-02 DIAGNOSIS — Z96.652 S/P TKR (TOTAL KNEE REPLACEMENT), LEFT: Primary | ICD-10-CM

## 2022-11-02 PROCEDURE — 99213 OFFICE O/P EST LOW 20 MIN: CPT | Performed by: ORTHOPAEDIC SURGERY

## 2022-11-02 PROCEDURE — 73562 X-RAY EXAM OF KNEE 3: CPT | Performed by: ORTHOPAEDIC SURGERY

## 2022-11-02 RX ORDER — PANTOPRAZOLE SODIUM 40 MG/1
40 TABLET, DELAYED RELEASE ORAL DAILY
COMMUNITY
Start: 2022-08-05

## 2022-11-02 RX ORDER — DULOXETIN HYDROCHLORIDE 30 MG/1
CAPSULE, DELAYED RELEASE ORAL
COMMUNITY
Start: 2022-10-21

## 2022-11-02 RX ORDER — GABAPENTIN 300 MG/1
CAPSULE ORAL
COMMUNITY
Start: 2022-10-18

## 2022-11-02 NOTE — PROGRESS NOTES
Patient is seen back today about 3 and half months status post a left total knee replacement.  She is doing generally well with it.  Gets a little bit of soreness in it but she is improved.  Complaining of little lateral numbness and explained that to her.  Exam today shows a healed scar no unusual swelling she has full extension flexes back about 100 2530 degrees she has good stability and transfers and walks well.  X-ray AP lateral sunrise view left knee taken the office today for postop follow-up with comparison view shows good alignment position of her components.  I talked her about antibiotic prophylaxis which she was aware of from the left total hip she had in the past.  Says she has an appointment to have her right knee injected in the future and we continued those treatments and advised a home program of physical therapy exercises to address that which she says she is doing.  If he gets bad enough she may want to get that that 1 replaced as well.  Staceyosis is status post left total knee replacement

## 2022-11-21 ENCOUNTER — OFFICE VISIT (OUTPATIENT)
Dept: CARDIOLOGY | Facility: CLINIC | Age: 72
End: 2022-11-21

## 2022-11-21 VITALS
HEART RATE: 68 BPM | DIASTOLIC BLOOD PRESSURE: 79 MMHG | WEIGHT: 197 LBS | HEIGHT: 66 IN | BODY MASS INDEX: 31.66 KG/M2 | SYSTOLIC BLOOD PRESSURE: 122 MMHG

## 2022-11-21 DIAGNOSIS — R94.31 ABNORMAL ELECTROCARDIOGRAM (ECG) (EKG): ICD-10-CM

## 2022-11-21 DIAGNOSIS — I10 PRIMARY HYPERTENSION: ICD-10-CM

## 2022-11-21 DIAGNOSIS — Z01.818 PREOPERATIVE CLEARANCE: Primary | ICD-10-CM

## 2022-11-21 DIAGNOSIS — R06.02 SOB (SHORTNESS OF BREATH): ICD-10-CM

## 2022-11-21 PROCEDURE — 99213 OFFICE O/P EST LOW 20 MIN: CPT | Performed by: INTERNAL MEDICINE

## 2022-11-21 NOTE — PROGRESS NOTES
"6MO   Subjective:        Sol Rojas is a 72 y.o. female who here for follow up    CC  Follow-up hypertension shortness of breath  HPI  72-year-old female with abnormal EKG hypertension shortness of breath here for the follow-up with no complaints of chest pains tightness heaviness or the pressure sensation     Problems Addressed this Visit        Cardiac and Vasculature    Abnormal electrocardiogram (ECG) (EKG)     Primary hypertension       Health Encounters    Preoperative clearance - Primary       Pulmonary and Pneumonias    SOB (shortness of breath)   Diagnoses       Codes Comments    Preoperative clearance    -  Primary ICD-10-CM: Z01.818  ICD-9-CM: V72.84     SOB (shortness of breath)     ICD-10-CM: R06.02  ICD-9-CM: 786.05     Primary hypertension     ICD-10-CM: I10  ICD-9-CM: 401.9     Abnormal electrocardiogram (ECG) (EKG)      ICD-10-CM: R94.31  ICD-9-CM: 794.31         .    The following portions of the patient's history were reviewed and updated as appropriate: allergies, current medications, past family history, past medical history, past social history, past surgical history and problem list.    Past Medical History:   Diagnosis Date   • Ankle sprain     Left ankle   • Arthritis     OSTEO   • Bilateral knee pain    • Chest pain     SAW A CARDIOLOGIST--PER PT, EVERYTHING WAS \"OKAY.\"   • Disease of thyroid gland     POLYP   • Dry eye syndrome of both eyes    • Hip arthrosis    • Hypertension    • Knee swelling    • Left hip pain    • Left knee pain    • Obesity    • Pulmonary hypertension (HCC)      reports that she has never smoked. She has never used smokeless tobacco. She reports that she does not drink alcohol and does not use drugs.   Family History   Problem Relation Age of Onset   • Stroke Mother    • Cancer Father    • Malig Hyperthermia Neg Hx        Review of Systems  Constitutional: No wt loss, fever, fatigue  Gastrointestinal: No nausea, abdominal pain  Behavioral/Psych: No insomnia or " "anxiety   Cardiovascular no chest pains or tightness in the chest  Objective:       Physical Exam  /79   Pulse 68   Ht 167.6 cm (66\")   Wt 89.4 kg (197 lb)   LMP  (LMP Unknown)   BMI 31.80 kg/m²   General appearance: No acute changes   Neck: Trachea midline; NECK, supple, no thyromegaly or lymphadenopathy   Lungs: Normal size and shape, normal breath sounds, equal distribution of air, no rales and rhonchi   CV: S1-S2 regular, no murmurs, no rub, no gallop   Abdomen: Soft, nontender; no masses , no abnormal abdominal sounds   Extremities: No deformity , normal color , no peripheral edema   Skin: Normal temperature, turgor and texture; no rash, ulcers          Procedures      Echocardiogram:        Current Outpatient Medications:   •  carvedilol (COREG) 3.125 MG tablet, Take 3.125 mg by mouth 2 (two) times a day with meals., Disp: , Rfl:   •  ferrous sulfate 325 (65 FE) MG tablet, Take 325 mg by mouth Daily With Breakfast., Disp: , Rfl:   •  KLOR-CON 20 MEQ CR tablet, Take 20 mEq by mouth Daily., Disp: , Rfl:   •  olmesartan-hydrochlorothiazide (BENICAR HCT) 40-25 MG per tablet, Take 1 tablet by mouth daily., Disp: , Rfl:   •  polyethyl glycol-propyl glycol (SYSTANE) 0.4-0.3 % solution ophthalmic solution (artificial tears), Every 1 (One) Hour As Needed., Disp: , Rfl:   •  apixaban (ELIQUIS) 5 MG tablet tablet, Take 1 tablet by mouth Every 12 (Twelve) Hours., Disp: 60 tablet, Rfl: 1  •  docusate sodium 100 MG capsule, Take 1 capsule by mouth 2 (Two) Times a Day., Disp: 60 capsule, Rfl: 0  •  DULoxetine (CYMBALTA) 30 MG capsule, , Disp: , Rfl:   •  famotidine (PEPCID) 40 MG tablet, Take 1 tablet by mouth Daily., Disp: 30 tablet, Rfl: 0  •  gabapentin (NEURONTIN) 300 MG capsule, , Disp: , Rfl:   •  ondansetron ODT (Zofran ODT) 8 MG disintegrating tablet, Place 1 tablet on the tongue Every 8 (Eight) Hours As Needed for Nausea or Vomiting., Disp: 10 tablet, Rfl: 0  •  pantoprazole (PROTONIX) 40 MG EC tablet, " Take 1 tablet by mouth Daily., Disp: , Rfl:   •  promethazine (PHENERGAN) 12.5 MG tablet, Take 1 tablet by mouth Every 8 (Eight) Hours As Needed for Nausea or Vomiting., Disp: 12 tablet, Rfl: 0  •  traMADol (ULTRAM) 50 MG tablet, Take 1-2 tablets PO every 4-6 hours as needed for pain, Disp: 46 tablet, Rfl: 0   Assessment:        Patient Active Problem List   Diagnosis   • Pain in left shin   • Hyperglycemia   • H/O total hip arthroplasty, left   • Preoperative clearance   • Abnormal electrocardiogram (ECG) (EKG)    • SOB (shortness of breath)   • Abnormal cardiac function test   • Primary hypertension   • Arthritis of left knee               Plan:            ICD-10-CM ICD-9-CM   1. Preoperative clearance  Z01.818 V72.84   2. SOB (shortness of breath)  R06.02 786.05   3. Primary hypertension  I10 401.9   4. Abnormal electrocardiogram (ECG) (EKG)   R94.31 794.31     1. Preoperative clearance  *    2. SOB (shortness of breath)  Multifactorial    3. Primary hypertension  Blood pressure control    4. Abnormal electrocardiogram (ECG) (EKG)   No angina pectoris       1 yr  COUNSELING:    Sol Dennis was given to patient for the following topics: diagnostic results, risk factor reductions, impressions, risks and benefits of treatment options and importance of treatment compliance .       SMOKING COUNSELING:        Dictated using Dragon dictation

## 2023-01-17 ENCOUNTER — TRANSCRIBE ORDERS (OUTPATIENT)
Dept: ADMINISTRATIVE | Facility: HOSPITAL | Age: 73
End: 2023-01-17
Payer: MEDICARE

## 2023-01-17 DIAGNOSIS — E04.1 THYROID NODULE: Primary | ICD-10-CM

## 2023-01-27 ENCOUNTER — CLINICAL SUPPORT (OUTPATIENT)
Dept: ORTHOPEDIC SURGERY | Facility: CLINIC | Age: 73
End: 2023-01-27
Payer: MEDICARE

## 2023-01-27 VITALS — TEMPERATURE: 97.8 F | BODY MASS INDEX: 31.66 KG/M2 | WEIGHT: 197 LBS | HEIGHT: 66 IN

## 2023-01-27 DIAGNOSIS — M17.11 ARTHRITIS OF KNEE, RIGHT: Primary | ICD-10-CM

## 2023-01-27 PROCEDURE — 20610 DRAIN/INJ JOINT/BURSA W/O US: CPT | Performed by: NURSE PRACTITIONER

## 2023-01-27 RX ORDER — METHYLPREDNISOLONE ACETATE 80 MG/ML
80 INJECTION, SUSPENSION INTRA-ARTICULAR; INTRALESIONAL; INTRAMUSCULAR; SOFT TISSUE
Status: COMPLETED | OUTPATIENT
Start: 2023-01-27 | End: 2023-01-27

## 2023-01-27 RX ORDER — LIDOCAINE HYDROCHLORIDE 10 MG/ML
1 INJECTION, SOLUTION EPIDURAL; INFILTRATION; INTRACAUDAL; PERINEURAL
Status: COMPLETED | OUTPATIENT
Start: 2023-01-27 | End: 2023-01-27

## 2023-01-27 RX ADMIN — METHYLPREDNISOLONE ACETATE 80 MG: 80 INJECTION, SUSPENSION INTRA-ARTICULAR; INTRALESIONAL; INTRAMUSCULAR; SOFT TISSUE at 08:20

## 2023-01-27 RX ADMIN — LIDOCAINE HYDROCHLORIDE 1 ML: 10 INJECTION, SOLUTION EPIDURAL; INFILTRATION; INTRACAUDAL; PERINEURAL at 08:20

## 2023-01-27 NOTE — PROGRESS NOTES
Sol Rojas is here today for worsening Right knee pain. Knee injection was discussed with the patient in detail, including indication, risks, benefits, and alternatives. Verbal consent was given for the procedure. Injection site was aseptically prepared.      KNEE Injection Procedure Note:    Large Joint Arthrocentesis: R knee  Date/Time: 1/27/2023 8:20 AM  Consent given by: patient  Site marked: site marked  Timeout: Immediately prior to procedure a time out was called to verify the correct patient, procedure, equipment, support staff and site/side marked as required   Supporting Documentation  Indications: pain, joint swelling and diagnostic evaluation   Procedure Details  Location: knee - R knee  Preparation: Patient was prepped and draped in the usual sterile fashion  Needle gauge: 21G.  Medications administered: 80 mg methylPREDNISolone acetate 80 MG/ML; 1 mL lidocaine PF 1% 1 %  Patient tolerance: patient tolerated the procedure well with no immediate complications      Sol Rojas tolerated the procedure well. A Bandage was applied to the injection site. At the conclusion of the injection I discussed the importance of continued quad strengthening exercises on a daily basis. I will see the patient back if the symptoms should fail to improve or worsen.    Aileen Ulrich, APRN  1/27/2023    Dictated Utilizing Dragon Dictation

## 2023-04-05 ENCOUNTER — TELEPHONE (OUTPATIENT)
Dept: ORTHOPEDIC SURGERY | Facility: CLINIC | Age: 73
End: 2023-04-05
Payer: MEDICARE

## 2023-04-17 ENCOUNTER — HOSPITAL ENCOUNTER (OUTPATIENT)
Dept: ULTRASOUND IMAGING | Facility: HOSPITAL | Age: 73
Discharge: HOME | End: 2023-04-17
Admitting: OTOLARYNGOLOGY
Payer: MEDICARE

## 2023-04-17 DIAGNOSIS — E04.1 THYROID NODULE: ICD-10-CM

## 2023-04-17 PROCEDURE — 76536 US EXAM OF HEAD AND NECK: CPT

## 2023-05-02 ENCOUNTER — CLINICAL SUPPORT (OUTPATIENT)
Dept: ORTHOPEDIC SURGERY | Facility: CLINIC | Age: 73
End: 2023-05-02
Payer: MEDICARE

## 2023-05-02 VITALS — TEMPERATURE: 97.5 F | BODY MASS INDEX: 31.66 KG/M2 | WEIGHT: 197 LBS | HEIGHT: 66 IN

## 2023-05-02 DIAGNOSIS — M17.11 ARTHRITIS OF KNEE, RIGHT: ICD-10-CM

## 2023-05-02 DIAGNOSIS — R52 PAIN: Primary | ICD-10-CM

## 2023-05-02 RX ORDER — LIDOCAINE HYDROCHLORIDE 10 MG/ML
1 INJECTION, SOLUTION EPIDURAL; INFILTRATION; INTRACAUDAL; PERINEURAL
Status: COMPLETED | OUTPATIENT
Start: 2023-05-02 | End: 2023-05-02

## 2023-05-02 RX ORDER — METHYLPREDNISOLONE ACETATE 80 MG/ML
80 INJECTION, SUSPENSION INTRA-ARTICULAR; INTRALESIONAL; INTRAMUSCULAR; SOFT TISSUE
Status: COMPLETED | OUTPATIENT
Start: 2023-05-02 | End: 2023-05-02

## 2023-05-02 RX ADMIN — LIDOCAINE HYDROCHLORIDE 1 ML: 10 INJECTION, SOLUTION EPIDURAL; INFILTRATION; INTRACAUDAL; PERINEURAL at 10:10

## 2023-05-02 RX ADMIN — METHYLPREDNISOLONE ACETATE 80 MG: 80 INJECTION, SUSPENSION INTRA-ARTICULAR; INTRALESIONAL; INTRAMUSCULAR; SOFT TISSUE at 10:10

## 2023-05-02 NOTE — PROGRESS NOTES
Sol Rojas is here today for worsening Right knee pain. Knee injection was discussed with the patient in detail, including indication, risks, benefits, and alternatives. Verbal consent was given for the procedure. Injection site was aseptically prepared.    Radiology:   AP, lateral, 40 degree PA of the right knee obtained in the office today due to pain, with comparison views shows advanced tricompartmental degenerative changes, most significantly  medial and patellofemoral compartment with osteophyte formation and subchondral sclerosis      KNEE Injection Procedure Note:    Large Joint Arthrocentesis: L knee  Date/Time: 5/2/2023 10:10 AM  Consent given by: patient  Site marked: site marked  Timeout: Immediately prior to procedure a time out was called to verify the correct patient, procedure, equipment, support staff and site/side marked as required   Supporting Documentation  Indications: pain   Procedure Details  Location: knee - L knee  Preparation: Patient was prepped and draped in the usual sterile fashion  Needle gauge: 21G.  Medications administered: 80 mg methylPREDNISolone acetate 80 MG/ML; 1 mL lidocaine PF 1% 1 %  Patient tolerance: patient tolerated the procedure well with no immediate complications      Sol Rojas tolerated the procedure well. A Bandage was applied to the injection site. At the conclusion of the injection I discussed the importance of continued quad strengthening exercises on a daily basis. I will see the patient back if the symptoms should fail to improve or worsen.    Aileen Ulrich, APRN  5/2/2023    Dictated Utilizing Dragon Dictation

## 2023-08-10 ENCOUNTER — CLINICAL SUPPORT (OUTPATIENT)
Dept: ORTHOPEDIC SURGERY | Facility: CLINIC | Age: 73
End: 2023-08-10
Payer: MEDICARE

## 2023-08-10 VITALS — WEIGHT: 206 LBS | HEIGHT: 66 IN | TEMPERATURE: 97.3 F | BODY MASS INDEX: 33.11 KG/M2

## 2023-08-10 DIAGNOSIS — M17.11 ARTHRITIS OF KNEE, RIGHT: Primary | ICD-10-CM

## 2023-08-10 RX ORDER — METHYLPREDNISOLONE ACETATE 80 MG/ML
80 INJECTION, SUSPENSION INTRA-ARTICULAR; INTRALESIONAL; INTRAMUSCULAR; SOFT TISSUE
Status: COMPLETED | OUTPATIENT
Start: 2023-08-10 | End: 2023-08-10

## 2023-08-10 RX ORDER — LIDOCAINE HYDROCHLORIDE 10 MG/ML
2 INJECTION, SOLUTION EPIDURAL; INFILTRATION; INTRACAUDAL; PERINEURAL
Status: COMPLETED | OUTPATIENT
Start: 2023-08-10 | End: 2023-08-10

## 2023-08-10 RX ADMIN — METHYLPREDNISOLONE ACETATE 80 MG: 80 INJECTION, SUSPENSION INTRA-ARTICULAR; INTRALESIONAL; INTRAMUSCULAR; SOFT TISSUE at 15:30

## 2023-08-10 RX ADMIN — LIDOCAINE HYDROCHLORIDE 2 ML: 10 INJECTION, SOLUTION EPIDURAL; INFILTRATION; INTRACAUDAL; PERINEURAL at 15:30

## 2023-08-10 NOTE — PROGRESS NOTES
Sol Rojas is here today for worsening Right knee pain. Knee injection was discussed with the patient in detail, including indication, risks, benefits, and alternatives. Verbal consent was given for the procedure. Injection site was aseptically prepared.      KNEE Injection Procedure Note:    Large Joint Arthrocentesis: R knee  Date/Time: 8/10/2023 3:30 PM  Consent given by: patient  Site marked: site marked  Timeout: Immediately prior to procedure a time out was called to verify the correct patient, procedure, equipment, support staff and site/side marked as required   Supporting Documentation  Indications: pain, joint swelling and diagnostic evaluation   Procedure Details  Location: knee - R knee  Preparation: Patient was prepped and draped in the usual sterile fashion  Needle gauge: 21G.  Medications administered: 80 mg methylPREDNISolone acetate 80 MG/ML; 2 mL lidocaine PF 1% 1 %  Patient tolerance: patient tolerated the procedure well with no immediate complications     Sol Rojas tolerated the procedure well. A Bandage was applied to the injection site. At the conclusion of the injection I discussed the importance of continued quad strengthening exercises on a daily basis. I will see the patient back if the symptoms should fail to improve or worsen.    Aileen Ulrich, APRN  8/10/2023    Dictated Utilizing Dragon Dictation

## 2023-11-10 ENCOUNTER — OFFICE VISIT (OUTPATIENT)
Dept: CARDIOLOGY | Facility: CLINIC | Age: 73
End: 2023-11-10
Payer: MEDICARE

## 2023-11-10 VITALS
DIASTOLIC BLOOD PRESSURE: 85 MMHG | BODY MASS INDEX: 31.82 KG/M2 | HEART RATE: 56 BPM | SYSTOLIC BLOOD PRESSURE: 131 MMHG | HEIGHT: 66 IN | WEIGHT: 198 LBS

## 2023-11-10 DIAGNOSIS — I10 PRIMARY HYPERTENSION: Primary | ICD-10-CM

## 2023-11-10 DIAGNOSIS — I34.0 MITRAL VALVE INSUFFICIENCY, UNSPECIFIED ETIOLOGY: ICD-10-CM

## 2023-11-10 PROCEDURE — 3075F SYST BP GE 130 - 139MM HG: CPT | Performed by: NURSE PRACTITIONER

## 2023-11-10 PROCEDURE — 3079F DIAST BP 80-89 MM HG: CPT | Performed by: NURSE PRACTITIONER

## 2023-11-10 PROCEDURE — 93000 ELECTROCARDIOGRAM COMPLETE: CPT | Performed by: NURSE PRACTITIONER

## 2023-11-10 PROCEDURE — 99213 OFFICE O/P EST LOW 20 MIN: CPT | Performed by: NURSE PRACTITIONER

## 2023-11-10 RX ORDER — DIPHENHYDRAMINE HYDROCHLORIDE 25 MG/1
TABLET ORAL
COMMUNITY

## 2023-11-10 RX ORDER — MULTIVITAMIN WITH IRON
TABLET ORAL
COMMUNITY

## 2023-11-10 NOTE — PROGRESS NOTES
" Subjective:        Sol Rojas is a 73 y.o. female who here for follow up    No chief complaint on file.      HPI     This is a 73-year-old female, who is known to this provider.  She has a history to include osteoarthritis, chest pain, disease of thyroid gland, hypertension, pulmonary hypertension and obesity.    Echo was unremarkable 2022.  Stress test at that time showed a small size, mildly severe area of ischemia located in the lateral wall.  Medical management was suggested.      The following portions of the patient's history were reviewed and updated as appropriate: allergies, current medications, past family history, past medical history, past social history, past surgical history and problem list.    Past Medical History:   Diagnosis Date    Ankle sprain     Left ankle    Arthritis     OSTEO    Bilateral knee pain     Chest pain     SAW A CARDIOLOGIST--PER PT, EVERYTHING WAS \"OKAY.\"    Disease of thyroid gland     POLYP    Dry eye syndrome of both eyes     Hip arthrosis     Hypertension     Knee swelling     Left hip pain     Left knee pain     Obesity     Pulmonary hypertension          reports that she has never smoked. She has never used smokeless tobacco. She reports that she does not drink alcohol and does not use drugs.     Family History   Problem Relation Age of Onset    Stroke Mother     Cancer Father     Malig Hyperthermia Neg Hx        ROS     Review of Systems  Constitutional: No wt loss, fever, fatigue  Gastrointestinal: No nausea, abdominal pain  Behavioral/Psych: No insomnia or anxiety  Cardiovascular denies chest pain, and shortness of breath.      Objective:           Constitutional:       Appearance: Well-developed.   Neck:      Vascular: No JVD.      Trachea: No tracheal deviation.   Pulmonary:      Effort: Pulmonary effort is normal. No respiratory distress.      Breath sounds: Normal breath sounds. No stridor. No decreased breath sounds. No wheezing. No rhonchi. No rales.   Chest: "      Chest wall: Not tender to palpatation.   Cardiovascular:      Normal rate. Regular rhythm.      No gallop.    Pulses:     Intact distal pulses.   Edema:     Peripheral edema absent.   Abdominal:      General: Bowel sounds are normal. There is no distension.      Palpations: Abdomen is soft.      Tenderness: There is no abdominal tenderness.   Skin:     General: Skin is warm.   Neurological:      Mental Status: Alert and oriented to person, place, and time.      Deep Tendon Reflexes: Reflexes are normal and symmetric.           ECG 12 Lead    Date/Time: 11/10/2023 10:34 AM  Performed by: Ayse Billingsley APRN    Authorized by: Ayse Billingsley APRN  Comparison: compared with previous ECG from 4/8/2022  Similar to previous ECG  Rate: normal  BPM: 60    Clinical impression: non-specific ECG          Interpretation Summary    Saline test results are negative.  Calculated left ventricular EF = 56.1% Estimated left ventricular EF was in agreement with the calculated left ventricular EF.  Left ventricular diastolic function was normal.  There is no evidence of pericardial effusion.    Interpretation Summary       Findings consistent with an equivocal ECG stress test.  Left ventricular ejection fraction is normal. (Calculated EF = 55%).  Myocardial perfusion imaging indicates a small-sized, mildly severe area of ischemia located in the lateral wall.  Impressions are consistent with an intermediate risk study.     Asymptomatic for chest pain. Specificity of study reduced secondary to Significant motion artifact due to poor exercise tolerance,  ECG is equivocal for ischemia.   Ectopy: Occasional PAC's in recovery.   Blood pressure response:  Appropriate for Beta-blocker therapy, Baseline 120/64, Peak (post Lexiscan) 140/82, Recovery 138//60  Pharmacologic study due to inability to tolerate increasing speed and grade of treadmill due to orthopedic, mobility  Issues and Beta-blocker therapy.  Participated in  Low Level exercise and tolerance is poor.     Supervised by:  Irais MENDOZA.    Current Outpatient Medications:     carvedilol (COREG) 3.125 MG tablet, Take 1 tablet by mouth 2 (Two) Times a Day With Meals., Disp: , Rfl:     ferrous sulfate 325 (65 FE) MG tablet, Take 1 tablet by mouth Daily With Breakfast., Disp: , Rfl:     KLOR-CON 20 MEQ CR tablet, Take 1 tablet by mouth Daily., Disp: , Rfl:     olmesartan-hydrochlorothiazide (BENICAR HCT) 40-25 MG per tablet, Take 1 tablet by mouth Daily., Disp: , Rfl:     polyethyl glycol-propyl glycol (SYSTANE) 0.4-0.3 % solution ophthalmic solution (artificial tears), Every 1 (One) Hour As Needed., Disp: , Rfl:      Assessment:        Patient Active Problem List   Diagnosis    Pain in left shin    Hyperglycemia    H/O total hip arthroplasty, left    Preoperative clearance    Abnormal electrocardiogram (ECG) (EKG)     SOB (shortness of breath)    Abnormal cardiac function test    Primary hypertension    Arthritis of left knee               Plan:   1.  Hypertension: Today in the office blood pressure is stable. She said she is able to exercise on treadmill.    Educated patient on exercising for at least 30 minutes a day for 2 to 3 days a week. Importance of controlling hypertension and blood pressure checkup on the regular basis has been explained. Hypertension as a silent killer has been discussed. Risk reduction of the weight and regular exercises to control the hypertension has been explained.    2. History of shortness of breath which has been multifactorial.    3. History of abnormal ECG: Denies angina pectoris.    4. Mv and tv regurg: mild, we will monitor. No symptoms, echo in 2-3 years.             No diagnosis found.    There are no diagnoses linked to this encounter.    COUNSELING: more Dennis was given to patient for the following topics: diagnostic results, risk factor reductions, impressions, risks and benefits of treatment options and  importance of treatment compliance .       SMOKING COUNSELING:  francoisies    [unfilled]    Sincerely,   KELLY Grant  [facility]  11/10/23  09:30 EST    EMR Dragon/Transcription disclaimer: Dictated utilizing Dragon Dictation

## 2023-11-14 ENCOUNTER — CLINICAL SUPPORT (OUTPATIENT)
Dept: ORTHOPEDIC SURGERY | Facility: CLINIC | Age: 73
End: 2023-11-14
Payer: MEDICARE

## 2023-11-14 VITALS — BODY MASS INDEX: 32.3 KG/M2 | TEMPERATURE: 98.4 F | HEIGHT: 66 IN | WEIGHT: 201 LBS

## 2023-11-14 DIAGNOSIS — R52 PAIN: Primary | ICD-10-CM

## 2023-11-14 DIAGNOSIS — M17.11 ARTHRITIS OF KNEE, RIGHT: ICD-10-CM

## 2023-11-14 PROCEDURE — 20610 DRAIN/INJ JOINT/BURSA W/O US: CPT | Performed by: NURSE PRACTITIONER

## 2023-11-14 PROCEDURE — 73562 X-RAY EXAM OF KNEE 3: CPT | Performed by: NURSE PRACTITIONER

## 2023-11-14 RX ORDER — METHYLPREDNISOLONE ACETATE 80 MG/ML
80 INJECTION, SUSPENSION INTRA-ARTICULAR; INTRALESIONAL; INTRAMUSCULAR; SOFT TISSUE
Status: COMPLETED | OUTPATIENT
Start: 2023-11-14 | End: 2023-11-14

## 2023-11-14 RX ORDER — LIDOCAINE HYDROCHLORIDE 10 MG/ML
2 INJECTION, SOLUTION EPIDURAL; INFILTRATION; INTRACAUDAL; PERINEURAL
Status: COMPLETED | OUTPATIENT
Start: 2023-11-14 | End: 2023-11-14

## 2023-11-14 RX ADMIN — LIDOCAINE HYDROCHLORIDE 2 ML: 10 INJECTION, SOLUTION EPIDURAL; INFILTRATION; INTRACAUDAL; PERINEURAL at 14:57

## 2023-11-14 RX ADMIN — METHYLPREDNISOLONE ACETATE 80 MG: 80 INJECTION, SUSPENSION INTRA-ARTICULAR; INTRALESIONAL; INTRAMUSCULAR; SOFT TISSUE at 14:57

## 2023-11-14 NOTE — PROGRESS NOTES
Sol Rojas is here today for worsening Right knee pain. Knee injection was discussed with the patient in detail, including indication, risks, benefits, and alternatives. Verbal consent was given for the procedure. Injection site was aseptically prepared.    Radiology:   AP, lateral, 40 degree PA of the right knee obtained in the office today due to pain, with comparison views shows advanced tricompartmental degenerative changes, most significantly  medial compartment with osteophyte formation and subchondral sclerosis      KNEE Injection Procedure Note:    Large Joint Arthrocentesis: R knee  Date/Time: 11/14/2023 2:57 PM  Consent given by: patient  Site marked: site marked  Timeout: Immediately prior to procedure a time out was called to verify the correct patient, procedure, equipment, support staff and site/side marked as required   Supporting Documentation  Indications: pain, joint swelling and diagnostic evaluation   Procedure Details  Location: knee - R knee  Preparation: Patient was prepped and draped in the usual sterile fashion  Needle gauge: 21G.  Medications administered: 80 mg methylPREDNISolone acetate 80 MG/ML; 2 mL lidocaine PF 1% 1 %  Patient tolerance: patient tolerated the procedure well with no immediate complications     Sol Rojas tolerated the procedure well. A Bandage was applied to the injection site. At the conclusion of the injection I discussed the importance of continued quad strengthening exercises on a daily basis. I will see the patient back if the symptoms should fail to improve or worsen.    Aileen Ulrich, APRN  11/14/2023    Dictated Utilizing Dragon Dictation

## 2024-02-15 ENCOUNTER — CLINICAL SUPPORT (OUTPATIENT)
Dept: ORTHOPEDIC SURGERY | Facility: CLINIC | Age: 74
End: 2024-02-15
Payer: MEDICARE

## 2024-02-15 VITALS — BODY MASS INDEX: 32.45 KG/M2 | TEMPERATURE: 98.4 F | HEIGHT: 66 IN | WEIGHT: 201.9 LBS

## 2024-02-15 DIAGNOSIS — M17.11 ARTHRITIS OF KNEE, RIGHT: ICD-10-CM

## 2024-02-15 DIAGNOSIS — M25.512 LEFT SHOULDER PAIN, UNSPECIFIED CHRONICITY: Primary | ICD-10-CM

## 2024-02-15 RX ORDER — METHYLPREDNISOLONE ACETATE 80 MG/ML
80 INJECTION, SUSPENSION INTRA-ARTICULAR; INTRALESIONAL; INTRAMUSCULAR; SOFT TISSUE
Status: COMPLETED | OUTPATIENT
Start: 2024-02-15 | End: 2024-02-15

## 2024-02-15 RX ORDER — LIDOCAINE HYDROCHLORIDE 10 MG/ML
2 INJECTION, SOLUTION EPIDURAL; INFILTRATION; INTRACAUDAL; PERINEURAL
Status: COMPLETED | OUTPATIENT
Start: 2024-02-15 | End: 2024-02-15

## 2024-02-15 RX ADMIN — LIDOCAINE HYDROCHLORIDE 2 ML: 10 INJECTION, SOLUTION EPIDURAL; INFILTRATION; INTRACAUDAL; PERINEURAL at 15:14

## 2024-02-15 RX ADMIN — METHYLPREDNISOLONE ACETATE 80 MG: 80 INJECTION, SUSPENSION INTRA-ARTICULAR; INTRALESIONAL; INTRAMUSCULAR; SOFT TISSUE at 15:14

## 2024-03-07 NOTE — PROGRESS NOTES
New Shoulder      Patient: Sol Rojas        YOB: 1950    Medical Record Number: [id number]        Chief Complaints: Left shoulder pain      History of Present Illness: This is a 74-year-old female is right-hand-dominant presents with left shoulder pain has been ongoing for 1 to 2 months she is an in-house consult no history injury change in activity she has significant night pain she cannot get her bra and has marked limitation range of motion no history of similar symptoms she has tried heat and Tylenol with no lasting relief her past medical history is remarkable for those issues listed below and reviewed by me    Allergies:   Allergies   Allergen Reactions    Augmentin [Amoxicillin-Pot Clavulanate] GI Intolerance    Hydrocodone Nausea Only and GI Intolerance    Latex Hives    Penicillins Itching, Nausea Only and GI Intolerance    Percocet [Oxycodone-Acetaminophen] GI Intolerance       Medications:   Home Medications:  Current Outpatient Medications on File Prior to Visit   Medication Sig    carvedilol (COREG) 3.125 MG tablet Take 1 tablet by mouth 2 (Two) Times a Day With Meals.    ferrous sulfate 325 (65 FE) MG tablet Take 1 tablet by mouth Daily With Breakfast.    fluconazole (DIFLUCAN) 100 MG tablet Take 1 tablet by mouth Daily.    KLOR-CON 20 MEQ CR tablet Take 1 tablet by mouth Daily.    Magnesium 250 MG tablet Take  by mouth.    olmesartan-hydrochlorothiazide (BENICAR HCT) 40-25 MG per tablet Take 1 tablet by mouth Daily.    polyethyl glycol-propyl glycol (SYSTANE) 0.4-0.3 % solution ophthalmic solution (artificial tears) Every 1 (One) Hour As Needed.    vitamin D3 125 MCG (5000 UT) capsule capsule Take 1 capsule by mouth Daily.    Biotin 5 MG capsule Take  by mouth. (Patient not taking: Reported on 11/14/2023)     No current facility-administered medications on file prior to visit.     Current Medications:  Scheduled Meds:  Continuous Infusions:No current facility-administered  "medications for this visit.    PRN Meds:.    Past Medical History:   Diagnosis Date    Ankle sprain     Left ankle    Arthritis     OSTEO    Bilateral knee pain     Chest pain     SAW A CARDIOLOGIST--PER PT, EVERYTHING WAS \"OKAY.\"    Disease of thyroid gland     POLYP    Dry eye syndrome of both eyes     Hip arthrosis     Hypertension     Knee swelling     Left hip pain     Left knee pain     Obesity     Pulmonary hypertension         Past Surgical History:   Procedure Laterality Date    CATARACT EXTRACTION Bilateral     COLONOSCOPY N/A 04/13/2021    Procedure: COLONOSCOPY TO CECUM AND INTO TI;  Surgeon: Louie Smith MD;  Location: [facility] ENDOSCOPY;  Service: Gastroenterology;  Laterality: N/A;  pre: history of polyps  post: diverticulosis    COLONOSCOPY W/ BIOPSIES AND POLYPECTOMY      BENIGN    HERNIA REPAIR      UMBILICAL HERNIA REPAIR    JOINT REPLACEMENT  1/15/19    KNEE SURGERY  7/19/22    LEG SURGERY Left     cellulitis    TOTAL HIP ARTHROPLASTY Left 01/15/2019    Procedure: LEFT TOTAL HIP ARTHROPLASTY;  Surgeon: Hieu Orosco MD;  Location: [facility] MAIN OR;  Service: Orthopedics    TOTAL KNEE ARTHROPLASTY Left 07/19/2022    Procedure: LEFT TOTAL KNEE ARTHROPLASTY WITH SANTIAGO NAVIGATION;  Surgeon: Guilherme Smith MD;  Location: [facility] OR OSC;  Service: Orthopedics;  Laterality: Left;    US GUIDED FINE NEEDLE ASPIRATION  04/15/2021        Social History     Occupational History    Not on file   Tobacco Use    Smoking status: Never     Passive exposure: Never    Smokeless tobacco: Never   Vaping Use    Vaping status: Never Used   Substance and Sexual Activity    Alcohol use: No    Drug use: No    Sexual activity: Not Currently     Partners: Male     Birth control/protection: Abstinence      Social History     Social History Narrative    Not on file        Family History   Problem Relation Age of Onset    Stroke Mother     Cancer Father     Malig Hyperthermia Neg Hx              Review of Systems: " "    Review of Systems      Physical Exam: 74 y.o. female  General Appearance:    Alert, cooperative, in no acute distress                   Vitals:    03/11/24 1346   Temp: 98 °F (36.7 °C)   Weight: 91.9 kg (202 lb 11.2 oz)   Height: 167.6 cm (66\")   PainSc:   9      Patient is alert and read ×3 no acute distress appears her above-listed at height weight and age.  Affect is normal respiratory rate is normal unlabored. Heart rate regular rate rhythm, sclera, dentition and hearing are normal for the purpose of this exam.    Ortho Exam  Physical exam of the left shoulder reveals no overlying skin changes no lymphedema no lymphadenopathy.  Patient has active flexion 150 with mild symptoms passively I can get them to about 160 abduction is similar external rotation is to 0 and internal rotation to their buttocks with  symptoms.  Patient has good rotator cuff strength 4+ over 5 with isometric strength testing with pain.  Patient has a positive impingement and a positive Kang sign.  Patient has good cervical range of motion which is full and asymptomatic no radicular symptoms.  Patient has a normal elbow exam.  Good distal pulses are present   Large Joint Arthrocentesis: L glenohumeral  Date/Time: 3/11/2024 2:02 PM  Consent given by: patient  Site marked: site marked  Timeout: Immediately prior to procedure a time out was called to verify the correct patient, procedure, equipment, support staff and site/side marked as required   Supporting Documentation  Indications: pain   Procedure Details  Location: shoulder - L glenohumeral  Preparation: Patient was prepped and draped in the usual sterile fashion  Needle gauge: 21G.  Approach: posterior  Medications administered: 1 mL methylPREDNISolone acetate 80 MG/ML; 2 mL lidocaine PF 1% 1 %  Patient tolerance: patient tolerated the procedure well with no immediate complications              Radiology:   AP, Scapular Y and Axillary Lateral of the left shoulder were /reviewed to " evauate shoulder pain.  She does have some acromioclavicular arthritis otherwise no acute bony pathology  Imaging Results (Most Recent)       None          Assessment/Plan: Left shoulder pain I think this is capsulitis we talked about that I think she would benefit from an injection as a diagnostic and therapeutic tool I will start her into physical therapy she should fail to improve might consider other means of testing I will see her back in 5 weeks  Cortisone Injection. See procedure note.  Cortisone Injection for DIAGNOSTIC and THERAPUTIC purposes.

## 2024-03-11 ENCOUNTER — OFFICE VISIT (OUTPATIENT)
Dept: ORTHOPEDIC SURGERY | Facility: CLINIC | Age: 74
End: 2024-03-11
Payer: MEDICARE

## 2024-03-11 VITALS — BODY MASS INDEX: 32.58 KG/M2 | TEMPERATURE: 98 F | WEIGHT: 202.7 LBS | HEIGHT: 66 IN

## 2024-03-11 DIAGNOSIS — M75.02 ADHESIVE CAPSULITIS OF LEFT SHOULDER: Primary | ICD-10-CM

## 2024-03-11 RX ORDER — FLUCONAZOLE 100 MG/1
1 TABLET ORAL DAILY
COMMUNITY
Start: 2024-02-20

## 2024-03-11 RX ADMIN — LIDOCAINE HYDROCHLORIDE 2 ML: 10 INJECTION, SOLUTION EPIDURAL; INFILTRATION; INTRACAUDAL; PERINEURAL at 14:02

## 2024-03-11 RX ADMIN — METHYLPREDNISOLONE ACETATE 1 ML: 80 INJECTION, SUSPENSION INTRA-ARTICULAR; INTRALESIONAL; INTRAMUSCULAR; SOFT TISSUE at 14:02

## 2024-03-12 RX ORDER — LIDOCAINE HYDROCHLORIDE 10 MG/ML
2 INJECTION, SOLUTION EPIDURAL; INFILTRATION; INTRACAUDAL; PERINEURAL
Status: COMPLETED | OUTPATIENT
Start: 2024-03-11 | End: 2024-03-11

## 2024-03-12 RX ORDER — METHYLPREDNISOLONE ACETATE 80 MG/ML
1 INJECTION, SUSPENSION INTRA-ARTICULAR; INTRALESIONAL; INTRAMUSCULAR; SOFT TISSUE
Status: COMPLETED | OUTPATIENT
Start: 2024-03-11 | End: 2024-03-11

## 2024-04-02 ENCOUNTER — TRANSCRIBE ORDERS (OUTPATIENT)
Dept: ADMINISTRATIVE | Facility: HOSPITAL | Age: 74
End: 2024-04-02
Payer: MEDICARE

## 2024-04-02 DIAGNOSIS — E04.1 THYROID NODULE: Primary | ICD-10-CM

## 2024-04-15 ENCOUNTER — OFFICE VISIT (OUTPATIENT)
Dept: ORTHOPEDIC SURGERY | Facility: CLINIC | Age: 74
End: 2024-04-15
Payer: MEDICARE

## 2024-04-15 VITALS — BODY MASS INDEX: 32.83 KG/M2 | WEIGHT: 204.3 LBS | TEMPERATURE: 96.6 F | HEIGHT: 66 IN

## 2024-04-15 DIAGNOSIS — M77.9 CAPSULITIS: ICD-10-CM

## 2024-04-15 DIAGNOSIS — M75.42 IMPINGEMENT SYNDROME OF LEFT SHOULDER: Primary | ICD-10-CM

## 2024-04-15 PROCEDURE — 99213 OFFICE O/P EST LOW 20 MIN: CPT | Performed by: ORTHOPAEDIC SURGERY

## 2024-04-15 PROCEDURE — 1159F MED LIST DOCD IN RCRD: CPT | Performed by: ORTHOPAEDIC SURGERY

## 2024-04-15 PROCEDURE — 20610 DRAIN/INJ JOINT/BURSA W/O US: CPT | Performed by: ORTHOPAEDIC SURGERY

## 2024-04-15 PROCEDURE — 1160F RVW MEDS BY RX/DR IN RCRD: CPT | Performed by: ORTHOPAEDIC SURGERY

## 2024-04-15 RX ORDER — LIDOCAINE HYDROCHLORIDE 10 MG/ML
2 INJECTION, SOLUTION EPIDURAL; INFILTRATION; INTRACAUDAL; PERINEURAL
Status: COMPLETED | OUTPATIENT
Start: 2024-04-15 | End: 2024-04-15

## 2024-04-15 RX ORDER — METHYLPREDNISOLONE ACETATE 80 MG/ML
80 INJECTION, SUSPENSION INTRA-ARTICULAR; INTRALESIONAL; INTRAMUSCULAR; SOFT TISSUE
Status: COMPLETED | OUTPATIENT
Start: 2024-04-15 | End: 2024-04-15

## 2024-04-15 RX ORDER — BACITRACIN 500 [USP'U]/G
OINTMENT OPHTHALMIC
COMMUNITY
Start: 2024-03-29

## 2024-04-15 RX ORDER — DOXYCYCLINE 100 MG/1
TABLET ORAL
COMMUNITY
Start: 2024-03-28

## 2024-04-15 RX ADMIN — METHYLPREDNISOLONE ACETATE 80 MG: 80 INJECTION, SUSPENSION INTRA-ARTICULAR; INTRALESIONAL; INTRAMUSCULAR; SOFT TISSUE at 11:11

## 2024-04-15 RX ADMIN — LIDOCAINE HYDROCHLORIDE 2 ML: 10 INJECTION, SOLUTION EPIDURAL; INFILTRATION; INTRACAUDAL; PERINEURAL at 11:11

## 2024-04-15 NOTE — PROGRESS NOTES
Patient: Sol Rojas  YOB: 1950  Date of Service: 4/15/2024    Chief Complaints: Left shoulder pain    Subjective:    History of Present Illness: Pt is seen in the office today with complaints of left shoulder pain I last saw her in early March we thought she has some capsulitis we injected her she did therapy she states she is definitely better but still having some pain        Allergies:   Allergies   Allergen Reactions    Augmentin [Amoxicillin-Pot Clavulanate] GI Intolerance    Hydrocodone Nausea Only and GI Intolerance    Latex Hives    Penicillins Itching, Nausea Only and GI Intolerance    Percocet [Oxycodone-Acetaminophen] GI Intolerance       Medications:   Home Medications:  Current Outpatient Medications on File Prior to Visit   Medication Sig    Biotin 5 MG capsule Take  by mouth. (Patient not taking: Reported on 11/14/2023)    carvedilol (COREG) 3.125 MG tablet Take 1 tablet by mouth 2 (Two) Times a Day With Meals.    ferrous sulfate 325 (65 FE) MG tablet Take 1 tablet by mouth Daily With Breakfast.    fluconazole (DIFLUCAN) 100 MG tablet Take 1 tablet by mouth Daily.    KLOR-CON 20 MEQ CR tablet Take 1 tablet by mouth Daily.    Magnesium 250 MG tablet Take  by mouth.    olmesartan-hydrochlorothiazide (BENICAR HCT) 40-25 MG per tablet Take 1 tablet by mouth Daily.    polyethyl glycol-propyl glycol (SYSTANE) 0.4-0.3 % solution ophthalmic solution (artificial tears) Every 1 (One) Hour As Needed.    vitamin D3 125 MCG (5000 UT) capsule capsule Take 1 capsule by mouth Daily.     No current facility-administered medications on file prior to visit.     Current Medications:  Scheduled Meds:  Continuous Infusions:No current facility-administered medications for this visit.    PRN Meds:.    I have reviewed the patient's medical history in detail and updated the computerized patient record.  Review and summarization of old records include:    Past Medical History:   Diagnosis Date    Ankle  "sprain     Left ankle    Arthritis     OSTEO    Bilateral knee pain     Chest pain     SAW A CARDIOLOGIST--PER PT, EVERYTHING WAS \"OKAY.\"    Disease of thyroid gland     POLYP    Dry eye syndrome of both eyes     Hip arthrosis     Hypertension     Knee swelling     Left hip pain     Left knee pain     Obesity     Pulmonary hypertension         Past Surgical History:   Procedure Laterality Date    CATARACT EXTRACTION Bilateral     COLONOSCOPY N/A 04/13/2021    Procedure: COLONOSCOPY TO CECUM AND INTO TI;  Surgeon: Louie Smith MD;  Location: [facility] ENDOSCOPY;  Service: Gastroenterology;  Laterality: N/A;  pre: history of polyps  post: diverticulosis    COLONOSCOPY W/ BIOPSIES AND POLYPECTOMY      BENIGN    HERNIA REPAIR      UMBILICAL HERNIA REPAIR    JOINT REPLACEMENT  1/15/19    KNEE SURGERY  7/19/22    LEG SURGERY Left     cellulitis    TOTAL HIP ARTHROPLASTY Left 01/15/2019    Procedure: LEFT TOTAL HIP ARTHROPLASTY;  Surgeon: Hieu Orosco MD;  Location: [facility] MAIN OR;  Service: Orthopedics    TOTAL KNEE ARTHROPLASTY Left 07/19/2022    Procedure: LEFT TOTAL KNEE ARTHROPLASTY WITH SANTIAGO NAVIGATION;  Surgeon: Guilherme Smith MD;  Location: [facility] OR OSC;  Service: Orthopedics;  Laterality: Left;    US GUIDED FINE NEEDLE ASPIRATION  04/15/2021        Social History     Occupational History    Not on file   Tobacco Use    Smoking status: Never     Passive exposure: Never    Smokeless tobacco: Never   Vaping Use    Vaping status: Never Used   Substance and Sexual Activity    Alcohol use: No    Drug use: No    Sexual activity: Not Currently     Partners: Male     Birth control/protection: Abstinence      Social History     Social History Narrative    Not on file        Family History   Problem Relation Age of Onset    Stroke Mother     Cancer Father     Malig Hyperthermia Neg Hx        ROS: 14 point review of systems was performed and was negative except for documented findings in HPI and today's encounter. "     Allergies:   Allergies   Allergen Reactions    Augmentin [Amoxicillin-Pot Clavulanate] GI Intolerance    Hydrocodone Nausea Only and GI Intolerance    Latex Hives    Penicillins Itching, Nausea Only and GI Intolerance    Percocet [Oxycodone-Acetaminophen] GI Intolerance     Constitutional:  Denies fever, shaking or chills   Eyes:  Denies change in visual acuity   HENT:  Denies nasal congestion or sore throat   Respiratory:  Denies cough or shortness of breath   Cardiovascular:  Denies chest pain or severe LE edema   GI:  Denies abdominal pain, nausea, vomiting, bloody stools or diarrhea   Musculoskeletal:  Numbness, tingling, or loss of motor function only as noted above in history of present illness.  : Denies painful urination or hematuria  Integument:  Denies rash, lesion or ulceration   Neurologic:  Denies headache or focal weakness  Endocrine:  Denies lymphadenopathy  Psych:  Denies confusion or change in mental status   Hem:  Denies active bleeding      Physical Exam: 74 y.o. female  Wt Readings from Last 3 Encounters:   03/11/24 91.9 kg (202 lb 11.2 oz)   02/15/24 91.6 kg (201 lb 14.4 oz)   11/14/23 91.2 kg (201 lb)       There is no height or weight on file to calculate BMI.    There were no vitals filed for this visit.  Vital signs reviewed.   General Appearance:    Alert, cooperative, in no acute distress                    Ortho exam      Physical exam of the left shoulder reveals no overlying skin changes no lymphedema no lymphadenopathy.  Patient has active flexion 175 with mild symptoms abduction is similar external rotation is to 50 and internal rotation to the lower lumbar spine with mild symptoms.  Patient has good rotator cuff strength 4+ over 5 with isometric strength testing with pain.  Patient has a positive impingement and a positive Kang sign.  Patient has good cervical range of motion which is full and asymptomatic no radicular symptoms.  Patient has a normal elbow exam.  Good distal  pulses are presentPatient has pain with overhead activity and a positive Neer sign and a positive empty can sign  They have a positive drop arm any definitive painful arc            Assessment: Left shoulder pain I do think she had some capsulitis which is in which is improved she has a little more pain today with stressing of her cuff I think a subacromial injection a great diagnostic and therapeutic tool have her continue with her physical therapy and I will see her back in 4 weeks if she is not better we will get an MRI    Plan:   Follow up as indicated.  Ice, elevate, and rest as needed.  Discussed conservative measures of pain control including ice, bracing.  Also talked about the importance of strengthening and maintaining ideal body weight    Edyta Wolff M.D.    Large Joint Arthrocentesis: L subacromial bursa  Date/Time: 4/15/2024 11:11 AM  Consent given by: patient  Site marked: site marked  Timeout: Immediately prior to procedure a time out was called to verify the correct patient, procedure, equipment, support staff and site/side marked as required   Supporting Documentation  Indications: pain   Procedure Details  Location: shoulder - L subacromial bursa  Preparation: Patient was prepped and draped in the usual sterile fashion  Needle gauge: 21G.  Approach: posterior  Medications administered: 80 mg methylPREDNISolone acetate 80 MG/ML; 2 mL lidocaine PF 1% 1 %  Patient tolerance: patient tolerated the procedure well with no immediate complications

## 2024-04-24 ENCOUNTER — HOSPITAL ENCOUNTER (OUTPATIENT)
Dept: ULTRASOUND IMAGING | Facility: HOSPITAL | Age: 74
Discharge: HOME | End: 2024-04-24
Admitting: OTOLARYNGOLOGY
Payer: MEDICARE

## 2024-04-24 DIAGNOSIS — E04.1 THYROID NODULE: ICD-10-CM

## 2024-04-24 PROCEDURE — 76536 US EXAM OF HEAD AND NECK: CPT

## 2024-05-07 ENCOUNTER — OFFICE (AMBULATORY)
Dept: URBAN - METROPOLITAN AREA CLINIC 66 | Facility: CLINIC | Age: 74
End: 2024-05-07
Payer: COMMERCIAL

## 2024-05-07 VITALS
SYSTOLIC BLOOD PRESSURE: 128 MMHG | DIASTOLIC BLOOD PRESSURE: 76 MMHG | HEART RATE: 59 BPM | WEIGHT: 204 LBS | HEIGHT: 66 IN | RESPIRATION RATE: 20 BRPM

## 2024-05-07 DIAGNOSIS — K76.89 OTHER SPECIFIED DISEASES OF LIVER: ICD-10-CM

## 2024-05-07 PROBLEM — K57.30 DVRTCLOS OF LG INT W/O PERFORATION OR ABSCESS W/O BLEEDING: Status: ACTIVE | Noted: 2017-12-14

## 2024-05-07 PROCEDURE — 99203 OFFICE O/P NEW LOW 30 MIN: CPT | Performed by: NURSE PRACTITIONER

## 2024-05-11 ENCOUNTER — APPOINTMENT (OUTPATIENT)
Dept: GENERAL RADIOLOGY | Facility: HOSPITAL | Age: 74
End: 2024-05-11
Payer: MEDICARE

## 2024-05-11 ENCOUNTER — HOSPITAL ENCOUNTER (OUTPATIENT)
Facility: HOSPITAL | Age: 74
Setting detail: OBSERVATION
Discharge: HOME | End: 2024-05-12
Attending: EMERGENCY MEDICINE | Admitting: EMERGENCY MEDICINE
Payer: MEDICARE

## 2024-05-11 DIAGNOSIS — R55 SYNCOPE, UNSPECIFIED SYNCOPE TYPE: Primary | ICD-10-CM

## 2024-05-11 DIAGNOSIS — I10 PRIMARY HYPERTENSION: ICD-10-CM

## 2024-05-11 DIAGNOSIS — R29.898 RIGHT LEG WEAKNESS: ICD-10-CM

## 2024-05-11 LAB
ALBUMIN SERPL-MCNC: 3.7 G/DL (ref 3.5–5.2)
ALBUMIN/GLOB SERPL: 1.3 G/DL
ALP SERPL-CCNC: 80 U/L (ref 39–117)
ALT SERPL W P-5'-P-CCNC: 11 U/L (ref 1–33)
ANION GAP SERPL CALCULATED.3IONS-SCNC: 10.1 MMOL/L (ref 5–15)
AST SERPL-CCNC: 17 U/L (ref 1–32)
BASOPHILS # BLD AUTO: 0.01 10*3/MM3 (ref 0–0.2)
BASOPHILS NFR BLD AUTO: 0.2 % (ref 0–1.5)
BILIRUB SERPL-MCNC: 0.5 MG/DL (ref 0–1.2)
BUN SERPL-MCNC: 16 MG/DL (ref 8–23)
BUN/CREAT SERPL: 17 (ref 7–25)
CALCIUM SPEC-SCNC: 9.2 MG/DL (ref 8.6–10.5)
CHLORIDE SERPL-SCNC: 103 MMOL/L (ref 98–107)
CO2 SERPL-SCNC: 27.9 MMOL/L (ref 22–29)
CREAT SERPL-MCNC: 0.94 MG/DL (ref 0.57–1)
DEPRECATED RDW RBC AUTO: 43 FL (ref 37–54)
EGFRCR SERPLBLD CKD-EPI 2021: 64.6 ML/MIN/1.73
EOSINOPHIL # BLD AUTO: 0.04 10*3/MM3 (ref 0–0.4)
EOSINOPHIL NFR BLD AUTO: 0.8 % (ref 0.3–6.2)
ERYTHROCYTE [DISTWIDTH] IN BLOOD BY AUTOMATED COUNT: 13 % (ref 12.3–15.4)
GLOBULIN UR ELPH-MCNC: 2.8 GM/DL
GLUCOSE BLDC GLUCOMTR-MCNC: 94 MG/DL (ref 70–130)
GLUCOSE SERPL-MCNC: 111 MG/DL (ref 65–99)
HCT VFR BLD AUTO: 39 % (ref 34–46.6)
HGB BLD-MCNC: 12.8 G/DL (ref 12–15.9)
HOLD SPECIMEN: NORMAL
HOLD SPECIMEN: NORMAL
IMM GRANULOCYTES # BLD AUTO: 0.02 10*3/MM3 (ref 0–0.05)
IMM GRANULOCYTES NFR BLD AUTO: 0.4 % (ref 0–0.5)
LYMPHOCYTES # BLD AUTO: 1.87 10*3/MM3 (ref 0.7–3.1)
LYMPHOCYTES NFR BLD AUTO: 38 % (ref 19.6–45.3)
MAGNESIUM SERPL-MCNC: 2.1 MG/DL (ref 1.6–2.4)
MCH RBC QN AUTO: 29.6 PG (ref 26.6–33)
MCHC RBC AUTO-ENTMCNC: 32.8 G/DL (ref 31.5–35.7)
MCV RBC AUTO: 90.1 FL (ref 79–97)
MONOCYTES # BLD AUTO: 0.55 10*3/MM3 (ref 0.1–0.9)
MONOCYTES NFR BLD AUTO: 11.2 % (ref 5–12)
NEUTROPHILS NFR BLD AUTO: 2.43 10*3/MM3 (ref 1.7–7)
NEUTROPHILS NFR BLD AUTO: 49.4 % (ref 42.7–76)
NRBC BLD AUTO-RTO: 0 /100 WBC (ref 0–0.2)
PLATELET # BLD AUTO: 211 10*3/MM3 (ref 140–450)
PMV BLD AUTO: 8.7 FL (ref 6–12)
POTASSIUM SERPL-SCNC: 4.3 MMOL/L (ref 3.5–5.2)
PROT SERPL-MCNC: 6.5 G/DL (ref 6–8.5)
QT INTERVAL: 416 MS
QTC INTERVAL: 413 MS
RBC # BLD AUTO: 4.33 10*6/MM3 (ref 3.77–5.28)
SODIUM SERPL-SCNC: 141 MMOL/L (ref 136–145)
TROPONIN T SERPL HS-MCNC: 13 NG/L
TROPONIN T SERPL HS-MCNC: 16 NG/L
WBC NRBC COR # BLD AUTO: 4.92 10*3/MM3 (ref 3.4–10.8)
WHOLE BLOOD HOLD COAG: NORMAL
WHOLE BLOOD HOLD SPECIMEN: NORMAL

## 2024-05-11 PROCEDURE — 96375 TX/PRO/DX INJ NEW DRUG ADDON: CPT

## 2024-05-11 PROCEDURE — G0378 HOSPITAL OBSERVATION PER HR: HCPCS

## 2024-05-11 PROCEDURE — 84484 ASSAY OF TROPONIN QUANT: CPT | Performed by: EMERGENCY MEDICINE

## 2024-05-11 PROCEDURE — 84484 ASSAY OF TROPONIN QUANT: CPT | Performed by: NURSE PRACTITIONER

## 2024-05-11 PROCEDURE — 25010000002 METOCLOPRAMIDE PER 10 MG: Performed by: NURSE PRACTITIONER

## 2024-05-11 PROCEDURE — 82948 REAGENT STRIP/BLOOD GLUCOSE: CPT

## 2024-05-11 PROCEDURE — 83735 ASSAY OF MAGNESIUM: CPT | Performed by: EMERGENCY MEDICINE

## 2024-05-11 PROCEDURE — 80053 COMPREHEN METABOLIC PANEL: CPT | Performed by: EMERGENCY MEDICINE

## 2024-05-11 PROCEDURE — 93010 ELECTROCARDIOGRAM REPORT: CPT | Performed by: INTERNAL MEDICINE

## 2024-05-11 PROCEDURE — 93005 ELECTROCARDIOGRAM TRACING: CPT | Performed by: EMERGENCY MEDICINE

## 2024-05-11 PROCEDURE — 25010000002 ONDANSETRON PER 1 MG: Performed by: EMERGENCY MEDICINE

## 2024-05-11 PROCEDURE — 99285 EMERGENCY DEPT VISIT HI MDM: CPT

## 2024-05-11 PROCEDURE — 71045 X-RAY EXAM CHEST 1 VIEW: CPT

## 2024-05-11 PROCEDURE — 36415 COLL VENOUS BLD VENIPUNCTURE: CPT

## 2024-05-11 PROCEDURE — 85025 COMPLETE CBC W/AUTO DIFF WBC: CPT | Performed by: EMERGENCY MEDICINE

## 2024-05-11 RX ORDER — METOCLOPRAMIDE HYDROCHLORIDE 5 MG/ML
10 INJECTION INTRAMUSCULAR; INTRAVENOUS EVERY 6 HOURS PRN
Status: DISCONTINUED | OUTPATIENT
Start: 2024-05-11 | End: 2024-05-12 | Stop reason: HOSPADM

## 2024-05-11 RX ORDER — FERROUS SULFATE 325(65) MG
325 TABLET ORAL
Status: DISCONTINUED | OUTPATIENT
Start: 2024-05-12 | End: 2024-05-12 | Stop reason: HOSPADM

## 2024-05-11 RX ORDER — MELATONIN
5000 DAILY
Status: DISCONTINUED | OUTPATIENT
Start: 2024-05-11 | End: 2024-05-12 | Stop reason: HOSPADM

## 2024-05-11 RX ORDER — NITROGLYCERIN 0.4 MG/1
0.4 TABLET SUBLINGUAL
Status: DISCONTINUED | OUTPATIENT
Start: 2024-05-11 | End: 2024-05-12 | Stop reason: HOSPADM

## 2024-05-11 RX ORDER — ONDANSETRON 2 MG/ML
4 INJECTION INTRAMUSCULAR; INTRAVENOUS ONCE
Status: COMPLETED | OUTPATIENT
Start: 2024-05-11 | End: 2024-05-11

## 2024-05-11 RX ORDER — MOXIFLOXACIN 5 MG/ML
SOLUTION/ DROPS OPHTHALMIC
Status: ON HOLD | COMMUNITY
Start: 2024-04-17 | End: 2024-05-11

## 2024-05-11 RX ORDER — SODIUM CHLORIDE 9 MG/ML
40 INJECTION, SOLUTION INTRAVENOUS AS NEEDED
Status: DISCONTINUED | OUTPATIENT
Start: 2024-05-11 | End: 2024-05-12 | Stop reason: HOSPADM

## 2024-05-11 RX ORDER — SODIUM CHLORIDE 0.9 % (FLUSH) 0.9 %
10 SYRINGE (ML) INJECTION AS NEEDED
Status: DISCONTINUED | OUTPATIENT
Start: 2024-05-11 | End: 2024-05-12 | Stop reason: HOSPADM

## 2024-05-11 RX ORDER — SODIUM CHLORIDE 0.9 % (FLUSH) 0.9 %
10 SYRINGE (ML) INJECTION EVERY 12 HOURS SCHEDULED
Status: DISCONTINUED | OUTPATIENT
Start: 2024-05-11 | End: 2024-05-12 | Stop reason: HOSPADM

## 2024-05-11 RX ORDER — HYDROCHLOROTHIAZIDE 25 MG/1
25 TABLET ORAL
Status: DISCONTINUED | OUTPATIENT
Start: 2024-05-11 | End: 2024-05-12 | Stop reason: HOSPADM

## 2024-05-11 RX ORDER — MOXIFLOXACIN 5 MG/ML
1 SOLUTION/ DROPS OPHTHALMIC EVERY 8 HOURS SCHEDULED
Status: DISCONTINUED | OUTPATIENT
Start: 2024-05-11 | End: 2024-05-12 | Stop reason: HOSPADM

## 2024-05-11 RX ORDER — CARVEDILOL 3.12 MG/1
3.12 TABLET ORAL 2 TIMES DAILY WITH MEALS
Status: DISCONTINUED | OUTPATIENT
Start: 2024-05-11 | End: 2024-05-12 | Stop reason: HOSPADM

## 2024-05-11 RX ORDER — LOSARTAN POTASSIUM 100 MG/1
100 TABLET ORAL
Status: DISCONTINUED | OUTPATIENT
Start: 2024-05-11 | End: 2024-05-12 | Stop reason: HOSPADM

## 2024-05-11 RX ORDER — POTASSIUM CHLORIDE 750 MG/1
20 TABLET, FILM COATED, EXTENDED RELEASE ORAL DAILY
Status: DISCONTINUED | OUTPATIENT
Start: 2024-05-11 | End: 2024-05-12 | Stop reason: HOSPADM

## 2024-05-11 RX ADMIN — ONDANSETRON 4 MG: 2 INJECTION INTRAMUSCULAR; INTRAVENOUS at 17:04

## 2024-05-11 RX ADMIN — METOCLOPRAMIDE 10 MG: 5 INJECTION, SOLUTION INTRAMUSCULAR; INTRAVENOUS at 18:07

## 2024-05-11 NOTE — ED NOTES
Pt arrives via EMS from home; Syncopal episode at home while at table lasting about 1 minute - unresponsive per patient . Wasn't feeling well after, went to restroom and N/V/D.

## 2024-05-11 NOTE — ED PROVIDER NOTES
EMERGENCY DEPARTMENT ENCOUNTER  Room Number:  102/1  PCP: Reyes, Rosenberg Acosta, MD  Independent Historians: Patient, EMS who brought patient      HPI:  Chief Complaint: had concerns including Syncope.     A complete HPI/ROS/PMH/PSH/SH/FH are unobtainable due to:   Chronic or social conditions impacting patient care (Social Determinants of Health):       Context: Sol Rojas is a 74 y.o. female with a medical history of hypertension, arthritis who presents to the ED c/o acute syncope.  Patient was sitting down at the table after eating.  Her  noticed some abnormal breathing pattern as you seem to be snoring.  He went back to check on her and she was not very responsive.  He shook her for a minute or 2 and then she kind of came around and was confused.  She states that she felt just somewhat poorly before but denies palpitations chest pain or other specific complaint.  Afterwards she felt the need to defecate and did have a fairly large BM that was semiformed.  She did not notice any blood.  Currently denies chest pain, abdominal pain or headache.  She feels a little bit weak all over but denies specific complaints.  She states she was in her usual health today and has not had recent illness or sickness.      Review of prior external notes (non-ED) -and- Review of prior external test results outside of this encounter:   I reviewed prior medical records note the patient was hospitalized in 2016 with syncope related to orthostatic hypotension.  She also did have hypermagnesemia and some renal failure.      Prescription drug monitoring program review:         PAST MEDICAL HISTORY  Active Ambulatory Problems     Diagnosis Date Noted    Pain in left shin 05/01/2016    Hyperglycemia 05/01/2016    H/O total hip arthroplasty, left 01/15/2019    Preoperative clearance 04/26/2022    Abnormal electrocardiogram (ECG) (EKG)  04/26/2022    SOB (shortness of breath) 04/26/2022    Abnormal cardiac function test  05/05/2022    Primary hypertension 05/05/2022    Arthritis of left knee 05/20/2022     Resolved Ambulatory Problems     Diagnosis Date Noted    Syncope 04/30/2016    Dehydration 04/30/2016    Azotemia 05/01/2016    Hypoxemia 05/01/2016    Hypermagnesemia 05/01/2016    Orthostatic hypotension 05/01/2016    ARF (acute renal failure) 05/01/2016     Past Medical History:   Diagnosis Date    Ankle sprain     Arthritis     Bilateral knee pain     Chest pain     Disease of thyroid gland     Dry eye syndrome of both eyes     Hip arthrosis     Hypertension     Knee swelling     Left hip pain     Left knee pain     Obesity     Pulmonary hypertension          PAST SURGICAL HISTORY  Past Surgical History:   Procedure Laterality Date    CATARACT EXTRACTION Bilateral     COLONOSCOPY N/A 04/13/2021    Procedure: COLONOSCOPY TO CECUM AND INTO TI;  Surgeon: Louie Smith MD;  Location: [facility] ENDOSCOPY;  Service: Gastroenterology;  Laterality: N/A;  pre: history of polyps  post: diverticulosis    COLONOSCOPY W/ BIOPSIES AND POLYPECTOMY      BENIGN    HERNIA REPAIR      UMBILICAL HERNIA REPAIR    JOINT REPLACEMENT  1/15/19    KNEE SURGERY  7/19/22    LEG SURGERY Left     cellulitis    TOTAL HIP ARTHROPLASTY Left 01/15/2019    Procedure: LEFT TOTAL HIP ARTHROPLASTY;  Surgeon: Hieu Orosco MD;  Location: [facility] MAIN OR;  Service: Orthopedics    TOTAL KNEE ARTHROPLASTY Left 07/19/2022    Procedure: LEFT TOTAL KNEE ARTHROPLASTY WITH SANTIAGO NAVIGATION;  Surgeon: Guilherme Smith MD;  Location: [facility] OR OSC;  Service: Orthopedics;  Laterality: Left;    US GUIDED FINE NEEDLE ASPIRATION  04/15/2021         FAMILY HISTORY  Family History   Problem Relation Age of Onset    Stroke Mother     Cancer Father     Malig Hyperthermia Neg Hx          SOCIAL HISTORY  Social History     Socioeconomic History    Marital status:    Tobacco Use    Smoking status: Never     Passive exposure: Never    Smokeless tobacco: Never   Vaping Use     Vaping status: Never Used   Substance and Sexual Activity    Alcohol use: No    Drug use: No    Sexual activity: Not Currently     Partners: Male     Birth control/protection: Abstinence         ALLERGIES  Augmentin [amoxicillin-pot clavulanate], Hydrocodone, Latex, Penicillins, and Percocet [oxycodone-acetaminophen]      REVIEW OF SYSTEMS  Review of Systems   Constitutional:  Negative for fever.   Respiratory:  Negative for shortness of breath.    Cardiovascular:  Negative for chest pain.   Neurological:  Positive for syncope.   All other systems reviewed and are negative.    Included in HPI  All systems reviewed and negative except for those discussed in HPI.      PHYSICAL EXAM    I have reviewed the triage vital signs and nursing notes.    ED Triage Vitals [05/11/24 1529]   Temp Heart Rate Resp BP SpO2   98.7 °F (37.1 °C) 50 18 108/57 96 %      Temp src Heart Rate Source Patient Position BP Location FiO2 (%)   Oral Monitor -- -- --       Physical Exam  GENERAL: Alert and pleasant female in no obvious distress.  Triage vitals reviewed notable for pulse of 50 which is slightly bradycardic.  Blood pressure, temperature and O2 sats benign  SKIN: Warm, dry  HENT: Normocephalic, atraumatic  EYES: no scleral icterus  CV: regular rhythm, regular rate  RESPIRATORY: normal effort, lungs clear-no murmur  ABDOMEN: soft, nontender, nondistended  MUSCULOSKELETAL: no deformity  NEURO: alert, moves all extremities, follows commands  Strength-face normal bilaterally, arms normal bilaterally, legs-4 out of 5 right leg, 5 out of 5 left leg  Sensation and coordination grossly intact  Mentation and speech normal  Vision grossly normal      LAB RESULTS  Recent Results (from the past 24 hour(s))   Comprehensive Metabolic Panel    Collection Time: 05/11/24  3:49 PM    Specimen: Blood   Result Value Ref Range    Glucose 111 (H) 65 - 99 mg/dL    BUN 16 8 - 23 mg/dL    Creatinine 0.94 0.57 - 1.00 mg/dL    Sodium 141 136 - 145 mmol/L     Potassium 4.3 3.5 - 5.2 mmol/L    Chloride 103 98 - 107 mmol/L    CO2 27.9 22.0 - 29.0 mmol/L    Calcium 9.2 8.6 - 10.5 mg/dL    Total Protein 6.5 6.0 - 8.5 g/dL    Albumin 3.7 3.5 - 5.2 g/dL    ALT (SGPT) 11 1 - 33 U/L    AST (SGOT) 17 1 - 32 U/L    Alkaline Phosphatase 80 39 - 117 U/L    Total Bilirubin 0.5 0.0 - 1.2 mg/dL    Globulin 2.8 gm/dL    A/G Ratio 1.3 g/dL    BUN/Creatinine Ratio 17.0 7.0 - 25.0    Anion Gap 10.1 5.0 - 15.0 mmol/L    eGFR 64.6 >60.0 mL/min/1.73   Magnesium    Collection Time: 05/11/24  3:49 PM    Specimen: Blood   Result Value Ref Range    Magnesium 2.1 1.6 - 2.4 mg/dL   Single High Sensitivity Troponin T    Collection Time: 05/11/24  3:49 PM    Specimen: Blood   Result Value Ref Range    HS Troponin T 16 (H) <14 ng/L   Green Top (Gel)    Collection Time: 05/11/24  3:49 PM   Result Value Ref Range    Extra Tube Hold for add-ons.    Lavender Top    Collection Time: 05/11/24  3:49 PM   Result Value Ref Range    Extra Tube hold for add-on    Gold Top - SST    Collection Time: 05/11/24  3:49 PM   Result Value Ref Range    Extra Tube Hold for add-ons.    Light Blue Top    Collection Time: 05/11/24  3:49 PM   Result Value Ref Range    Extra Tube Hold for add-ons.    CBC Auto Differential    Collection Time: 05/11/24  3:49 PM    Specimen: Blood   Result Value Ref Range    WBC 4.92 3.40 - 10.80 10*3/mm3    RBC 4.33 3.77 - 5.28 10*6/mm3    Hemoglobin 12.8 12.0 - 15.9 g/dL    Hematocrit 39.0 34.0 - 46.6 %    MCV 90.1 79.0 - 97.0 fL    MCH 29.6 26.6 - 33.0 pg    MCHC 32.8 31.5 - 35.7 g/dL    RDW 13.0 12.3 - 15.4 %    RDW-SD 43.0 37.0 - 54.0 fl    MPV 8.7 6.0 - 12.0 fL    Platelets 211 140 - 450 10*3/mm3    Neutrophil % 49.4 42.7 - 76.0 %    Lymphocyte % 38.0 19.6 - 45.3 %    Monocyte % 11.2 5.0 - 12.0 %    Eosinophil % 0.8 0.3 - 6.2 %    Basophil % 0.2 0.0 - 1.5 %    Immature Grans % 0.4 0.0 - 0.5 %    Neutrophils, Absolute 2.43 1.70 - 7.00 10*3/mm3    Lymphocytes, Absolute 1.87 0.70 - 3.10  10*3/mm3    Monocytes, Absolute 0.55 0.10 - 0.90 10*3/mm3    Eosinophils, Absolute 0.04 0.00 - 0.40 10*3/mm3    Basophils, Absolute 0.01 0.00 - 0.20 10*3/mm3    Immature Grans, Absolute 0.02 0.00 - 0.05 10*3/mm3    nRBC 0.0 0.0 - 0.2 /100 WBC   ECG 12 Lead ED Triage Standing Order; Syncope    Collection Time: 05/11/24  4:06 PM   Result Value Ref Range    QT Interval 416 ms    QTC Interval 413 ms   POC Glucose Once    Collection Time: 05/11/24  5:01 PM    Specimen: Blood   Result Value Ref Range    Glucose 94 70 - 130 mg/dL   High Sensitivity Troponin T    Collection Time: 05/11/24  5:24 PM    Specimen: Blood   Result Value Ref Range    HS Troponin T 13 <14 ng/L   CBC Auto Differential    Collection Time: 05/12/24  4:21 AM    Specimen: Arm, Left; Blood   Result Value Ref Range    WBC 4.47 3.40 - 10.80 10*3/mm3    RBC 3.91 3.77 - 5.28 10*6/mm3    Hemoglobin 11.8 (L) 12.0 - 15.9 g/dL    Hematocrit 35.2 34.0 - 46.6 %    MCV 90.0 79.0 - 97.0 fL    MCH 30.2 26.6 - 33.0 pg    MCHC 33.5 31.5 - 35.7 g/dL    RDW 13.0 12.3 - 15.4 %    RDW-SD 42.5 37.0 - 54.0 fl    MPV 9.0 6.0 - 12.0 fL    Platelets 206 140 - 450 10*3/mm3    Neutrophil % 57.3 42.7 - 76.0 %    Lymphocyte % 31.5 19.6 - 45.3 %    Monocyte % 10.1 5.0 - 12.0 %    Eosinophil % 0.7 0.3 - 6.2 %    Basophil % 0.2 0.0 - 1.5 %    Immature Grans % 0.2 0.0 - 0.5 %    Neutrophils, Absolute 2.56 1.70 - 7.00 10*3/mm3    Lymphocytes, Absolute 1.41 0.70 - 3.10 10*3/mm3    Monocytes, Absolute 0.45 0.10 - 0.90 10*3/mm3    Eosinophils, Absolute 0.03 0.00 - 0.40 10*3/mm3    Basophils, Absolute 0.01 0.00 - 0.20 10*3/mm3    Immature Grans, Absolute 0.01 0.00 - 0.05 10*3/mm3    nRBC 0.0 0.0 - 0.2 /100 WBC   Comprehensive Metabolic Panel    Collection Time: 05/12/24  4:21 AM    Specimen: Arm, Left; Blood   Result Value Ref Range    Glucose 97 65 - 99 mg/dL    BUN 12 8 - 23 mg/dL    Creatinine 0.83 0.57 - 1.00 mg/dL    Sodium 139 136 - 145 mmol/L    Potassium 3.9 3.5 - 5.2 mmol/L     Chloride 106 98 - 107 mmol/L    CO2 26.0 22.0 - 29.0 mmol/L    Calcium 8.6 8.6 - 10.5 mg/dL    Total Protein 5.9 (L) 6.0 - 8.5 g/dL    Albumin 3.3 (L) 3.5 - 5.2 g/dL    ALT (SGPT) 13 1 - 33 U/L    AST (SGOT) 11 1 - 32 U/L    Alkaline Phosphatase 67 39 - 117 U/L    Total Bilirubin 0.5 0.0 - 1.2 mg/dL    Globulin 2.6 gm/dL    A/G Ratio 1.3 g/dL    BUN/Creatinine Ratio 14.5 7.0 - 25.0    Anion Gap 7.0 5.0 - 15.0 mmol/L    eGFR 75.0 >60.0 mL/min/1.73   Adult Transthoracic Echo Complete W/ Cont if Necessary Per Protocol    Collection Time: 05/12/24 11:11 AM   Result Value Ref Range    EF(MOD-bp) 61.3 %    LVIDd 5.1 cm    LVIDs 3.3 cm    IVSd 0.90 cm    LVPWd 0.80 cm    FS 35.6 %    IVS/LVPW 1.13 cm    ESV(cubed) 35.5 ml    LV Sys Vol (BSA corrected) 19.3 cm2    EDV(cubed) 132.7 ml    LV Jansen Vol (BSA corrected) 53.4 cm2    LV mass(C)d 151.8 grams    LVOT area 3.2 cm2    LVOT diam 2.02 cm    EDV(MOD-sp2) 107.0 ml    EDV(MOD-sp4) 108.0 ml    ESV(MOD-sp2) 41.0 ml    ESV(MOD-sp4) 39.0 ml    SV(MOD-sp2) 66.0 ml    SV(MOD-sp4) 69.0 ml    SVi(MOD-SP2) 32.6 ml/m2    SVi(MOD-SP4) 34.1 ml/m2    SVi (LVOT) 52.9 ml/m2    EF(MOD-sp2) 61.7 %    EF(MOD-sp4) 63.9 %    MV E max keanu 44.6 cm/sec    MV A max keanu 70.3 cm/sec    MV dec time 0.35 sec    MV E/A 0.63     Pulm A Revs Dur 0.12 sec    MV A dur 0.15 sec    LA ESV Index (BP) 35.6 ml/m2    Med Peak E' Keanu 4.8 cm/sec    Lat Peak E' Keanu 9.6 cm/sec    TR max keanu 225.3 cm/sec    Avg E/e' ratio 6.19     SV(LVOT) 107.0 ml    SV(RVOT) 45.2 ml    Qp/Qs 0.42     RV Base 2.6 cm    RV Mid 2.01 cm    RV Length 7.9 cm    TAPSE (>1.6) 2.04 cm    RV S' 9.1 cm/sec    Pulm Sys Keanu 39.8 cm/sec    Pulm Jansen Keanu 30.4 cm/sec    Pulm S/D 1.31     Pulm A Revs Keanu 29.6 cm/sec    LV V1 max 143.0 cm/sec    LV V1 max PG 8.2 mmHg    LV V1 mean PG 4.0 mmHg    LV V1 VTI 33.5 cm    Ao pk keanu 151.0 cm/sec    Ao max PG 9.1 mmHg    Ao mean PG 5.0 mmHg    Ao V2 VTI 36.4 cm    DANIA(I,D) 2.9 cm2    AI P1/2t 831.9 msec     MV max PG 3.9 mmHg    MV mean PG 1.27 mmHg    MV V2 VTI 37.0 cm    MV P1/2t 108.6 msec    MVA(P1/2t) 2.03 cm2    MVA(VTI) 2.9 cm2    MV dec slope 234.3 cm/sec2    MR max leonel 351.8 cm/sec    MR max PG 49.5 mmHg    TR max PG 20.3 mmHg    RVOT diam 1.85 cm    RV V1 max PG 1.76 mmHg    RV V1 max 66.4 cm/sec    RV V1 VTI 16.8 cm    PA V2 max 82.8 cm/sec    PA acc time 0.10 sec    PI end-d leonel 93.0 cm/sec    Ao root diam 2.9 cm    ACS 1.74 cm    Sinus 3.2 cm    STJ 2.6 cm    Dimensionless Index 0.90 (DI)    RVSP(TR) 23 mmHg    RAP systole 3 mmHg    Ascending aorta 3.1 cm    Aortic arch 2.7 cm    Abdo Ao Diam 3.1 cm         RADIOLOGY  CT Angiogram Head, CT Angiogram Neck    Result Date: 5/12/2024  NONCONTRAST CRANIAL CT SCAN, CT ANGIOGRAM NECK, CT ANGIOGRAM HEAD.  HISTORY: Syncope and right leg weakness.  COMPARISON: May 12, 2024.  TECHNIQUE:  Radiation dose reduction techniques were utilized, including automated exposure control and exposure modulation based on body size. Initially, a routine noncontrast cranial CT performed from the skull base through the vertex region. After review, thin-section contrast enhanced CT angiogram images obtained from the aortic arch through the calvarial vertex utilizing angiographic technique. Multi projection 3-D MIP reformatted images were supplemented and reviewed.   FINDINGS CRANIAL CT: No acute hemorrhage or midline shift is demonstrated..  Ventricular size and configuration is within normal limits for age.. There is some minimal microangiopathic change. Postcontrast imaging does not show any evidence of venous occlusion or abnormal enhancement. Bone window images demonstrate some mucosal thickening within the ethmoid sinuses. Mucous retention cyst is noted within the right maxillary sinus. Mastoid air cells are clear..   CERVICAL CAROTID CT ANGIOGRAM:  FINDINGS: There is normal arch anatomy. There is enlargement of the main pulmonary artery, which can be seen in setting of pulmonary  arterial hypertension. The common carotid arteries are widely patent bilaterally. The internal carotid arteries are patent bilaterally. There is some tortuosity of the cervical right internal carotid artery,, which results in 2 focal areas of mild to moderate narrowing within the vessel. Thyroid gland is heterogeneous. This would be better assessed with dedicated thyroid ultrasound. Vertebral arteries are patent throughout their courses. Right is slightly larger in caliber. Images through the lung apices are clear.  NASCET criteria utilized in stenosis measurements. stenosis mild, 0-49%.   CRANIAL CTA ANGIOGRAM:  FINDINGS: The intracranial internal carotid arteries are patent. There is an anterior communicating artery. Anterior cerebral arteries appear normal, as are the middle cerebral arteries. The intracranial vertebral arteries are patent. Basilar artery is normal. Posterior cerebral arteries are normal bilaterally..     Radiation dose reduction techniques were utilized, including automated exposure control and exposure modulation based on body size.       1. No acute intracranial findings. 2. Tortuosity of the cervical right internal carotid artery, which may be resulting in 2 focal areas of mild to moderate stenosis. 3. No intracranial stenosis or occlusion..   Radiation dose reduction techniques were utilized, including automated exposure control and exposure modulation based on body size.   This report was finalized on 5/12/2024 5:55 AM by Dr. Cara Ireland M.D on Workstation: [id number]      MRI Brain With & Without Contrast    Result Date: 5/12/2024  BRAIN MRI WITH AND WITHOUT CONTRAST  HISTORY: r leg weakness, seizure vs syncope; R55-Syncope and collapse; R29.898-Other symptoms and signs involving the musculoskeletal system; I10-Essential (primary) hypertension  COMPARISON: None.  FINDINGS:  Multiplanar images of the head were obtained without and with gadolinium. No areas of restricted diffusion  are seen to suggest acute infarct. The ventricles are normal in size. There is minimal periventricular and deep white matter microangiopathic change. There is no midline shift or mass effect. Intracranial flow voids appear intact. No abnormality is seen on susceptibility weighted imaging. No abnormal enhancement is seen following contrast administration. There is no evidence of venous occlusion. There is some mucosal thickening within the ethmoid sinuses.      1. No acute intracranial abnormality.  No abnormal enhancement.   This report was finalized on 5/12/2024 3:12 AM by Dr. Cara Ireland M.D on Workstation: [id number]      XR Chest 1 View    Result Date: 5/11/2024  Portable chest radiograph  HISTORY: Syncope  TECHNIQUE: Single AP portable radiograph of the chest  COMPARISON: Chest radiograph 1/8/2019      FINDINGS AND IMPRESSION: No pulm consolidation, pleural effusion or pneumothorax is seen. Cardiac silhouette is at the upper limits of normal borderline large in size. Moderate asymmetric elevation of the diaphragm, as before.  This report was finalized on 5/11/2024 7:28 PM by Dr. Regulo Turner M.D on Workstation: [id number]         MEDICATIONS GIVEN IN ER  Medications   sodium chloride 0.9 % flush 10 mL (has no administration in time range)   sodium chloride 0.9 % flush 10 mL (10 mL Intravenous Not Given 5/11/24 2100)   sodium chloride 0.9 % flush 10 mL (has no administration in time range)   sodium chloride 0.9 % infusion 40 mL (has no administration in time range)   nitroglycerin (NITROSTAT) SL tablet 0.4 mg (has no administration in time range)   carvedilol (COREG) tablet 3.125 mg ( Oral Dose Auto Held 5/27/24 1800)   potassium chloride (K-DUR,KLOR-CON) ER tablet 20 mEq (20 mEq Oral Given 5/12/24 0816)   ferrous sulfate tablet 325 mg (325 mg Oral Given 5/12/24 0817)   cholecalciferol (VITAMIN D3) tablet 5,000 Units (5,000 Units Oral Given 5/12/24 0816)   moxifloxacin (VIGAMOX) 0.5 % ophthalmic solution  0.05 mL ( Both Eyes Canceled Entry 5/12/24 0600)   losartan (COZAAR) tablet 100 mg (100 mg Oral Given 5/12/24 0816)     And   hydroCHLOROthiazide tablet 25 mg (25 mg Oral Given 5/12/24 0816)   metoclopramide (REGLAN) injection 10 mg (10 mg Intravenous Given 5/11/24 1807)   LORazepam (ATIVAN) tablet 0.5 mg (0.5 mg Oral Not Given 5/12/24 0100)   ondansetron (ZOFRAN) injection 4 mg (4 mg Intravenous Given 5/11/24 1704)   diphenhydrAMINE (BENADRYL) injection 25 mg (25 mg Intravenous Given 5/12/24 0018)   LORazepam (ATIVAN) tablet 1 mg (1 mg Oral Given 5/12/24 0027)   gadobenate dimeglumine (MULTIHANCE) injection 19 mL (19 mL Intravenous Given 5/12/24 0121)   iopamidol (ISOVUE-370) 76 % injection 95 mL (95 mL Intravenous Given 5/12/24 0137)         ORDERS PLACED DURING THIS VISIT:  Orders Placed This Encounter   Procedures    CT Angiogram Head    CT Angiogram Neck    MRI Brain With & Without Contrast    XR Chest 1 View    [place] Draw    Comprehensive Metabolic Panel    Magnesium    Single High Sensitivity Troponin T    CBC Auto Differential    CBC Auto Differential    Comprehensive Metabolic Panel    High Sensitivity Troponin T    High Sensitivity Troponin T 2Hr    Diet: Cardiac; Healthy Heart (2-3 Na+); Fluid Consistency: Thin (IDDSI 0)    Undress & Gown    Continuous Pulse Oximetry    Vital Signs    Orthostatic Blood Pressure    Orthostatic Vitals (Blood Pressure & Heart Rate)    Vital Signs    Intake & Output    Weigh Patient    Neuro Checks    Oral Care    Place Sequential Compression Device    Maintain Sequential Compression Device    Maintain IV Access    Telemetry - Place Orders & Notify Provider of Results When Patient Experiences Acute Chest Pain, Dysrhythmia or Respiratory Distress    May Be Off Telemetry for Tests    Up With Assistance    Orthostatic Blood Pressure    Code Status and Medical Interventions:    Inpatient Neurology Consult Stroke    Inpatient Neurology Consult General    Inpatient Cardiology  Consult    PT Consult: Eval & Treat Discharge Placement Assessment    Oxygen Therapy- Nasal Cannula; Titrate 1-6 LPM Per SpO2; 90 - 95%    Holter Monitor - 72 Hour Up To 15 Days    POC Glucose Once    POC Glucose Once    ECG 12 Lead ED Triage Standing Order; Syncope    Telemetry Scan    Telemetry Scan    Telemetry Scan    Telemetry Scan    Telemetry Scan    Adult Transthoracic Echo Complete W/ Cont if Necessary Per Protocol    Insert Peripheral IV    Insert Peripheral IV    Initiate ED Observation Status    CBC & Differential    Green Top (Gel)    Lavender Top    Gold Top - SST    Light Blue Top         OUTPATIENT MEDICATION MANAGEMENT:  Current Facility-Administered Medications Ordered in Epic   Medication Dose Route Frequency Provider Last Rate Last Admin    [Held by provider] carvedilol (COREG) tablet 3.125 mg  3.125 mg Oral BID With Meals Noa Mota APRN        cholecalciferol (VITAMIN D3) tablet 5,000 Units  5,000 Units Oral Daily Noa Mota APRN   5,000 Units at 05/12/24 0816    ferrous sulfate tablet 325 mg  325 mg Oral Daily With Breakfast Noa Mota APRN   325 mg at 05/12/24 0817    losartan (COZAAR) tablet 100 mg  100 mg Oral Q24H Noa Mota APRN   100 mg at 05/12/24 0816    And    hydroCHLOROthiazide tablet 25 mg  25 mg Oral Q24H Noa Mota APRN   25 mg at 05/12/24 0816    LORazepam (ATIVAN) tablet 0.5 mg  0.5 mg Oral Once Tamara Dc APRN        metoclopramide (REGLAN) injection 10 mg  10 mg Intravenous Q6H PRN Noa Mota APRN   10 mg at 05/11/24 1807    moxifloxacin (VIGAMOX) 0.5 % ophthalmic solution 0.05 mL  1 drop Both Eyes Q8H Noa Mota APRN        nitroglycerin (NITROSTAT) SL tablet 0.4 mg  0.4 mg Sublingual Q5 Min PRN Noa Mota APRN        potassium chloride (K-DUR,KLOR-CON) ER tablet 20 mEq  20 mEq Oral Daily Noa Mota APRN   20 mEq at 05/12/24 0816    sodium chloride 0.9 % flush 10 mL  10 mL Intravenous PRN Noa Mota APRN        sodium chloride 0.9 % flush  10 mL  10 mL Intravenous Q12H Noa Mota, KELLY        sodium chloride 0.9 % flush 10 mL  10 mL Intravenous PRN Noa Mota, APRN        sodium chloride 0.9 % infusion 40 mL  40 mL Intravenous PRN Noa Mota APRN         No current [facility]-ordered outpatient medications on file.         PROCEDURES  Procedures            PROGRESS, DATA ANALYSIS, CONSULTS, AND MEDICAL DECISION MAKING  All labs have been independently interpreted by me.  All radiology studies have been reviewed by me. All EKG's have been independently viewed and interpreted by me.  Discussion below represents my analysis of pertinent findings related to patient's condition, differential diagnosis, treatment plan and final disposition.    Differential diagnosis includes but is not limited to vasovagal syncope, hypotension, seizure, TIA.      ED Course as of 05/12/24 1130   Sat May 11, 2024   1610 EKG independently interpreted by me    Time 4:06 PM  Sinus 59  Normal P waves and TX intervals  QRS-normal axis, normal QRS  ST, T waves-nonspecific changes  Not significant change when compared to 2021 [DB]   1637 Patient did get blood earlier this morning and this may have been vasovagal syncope or hypotensive syncope.  She also could have been bradycardic as her pulse is running in the 50s.  Patient was somnolent and somewhat confused afterwards would bring up the question of possible seizure.  She does have some very mild right leg weakness on exam and would beg the question of possible seizure or TIA.  Patient is not a tPA candidate as her NIH would be 1 related to right leg weakness. [DB]   1638 I will contact stroke neurology to discuss workup of this patient.  I do not think she is a good candidate for tPA or thrombectomy given benign NIH of 1.     [DB]   1638 Labs reviewed are notable for normal CBC without evidence of infection or anemia.    Chemistries are benign without evidence of dehydration or electrolyte disturbance.    High-sensitivity  troponin mildly elevated at 16 of unclear clinical significance, doubt acute coronary syndrome.  Will trend. [DB]      ED Course User Index  [DB] Rehan Gill MD             AS OF 11:30 EDT VITALS:    BP - 123/68  HR - 54  TEMP - 98.2 °F (36.8 °C) (Oral)  O2 SATS - 99%    COMPLEXITY OF CARE  74-year-old female brought in after syncopal episode at home lasting several minutes.    Please see ED course above for my independent interpretation and evaluation of this patient.  I did review EKG labs as listed above.  Patient did have some mild right lower extremity weakness and we considered nonspecific syncope, seizure with possible Ian's paralysis and stroke/TIA in the differential diagnosis.      DIAGNOSIS  Final diagnoses:   Syncope, unspecified syncope type   Right leg weakness   Primary hypertension         DISPOSITION  ED Disposition       ED Disposition   Decision to Admit    Condition   --    Comment   --                Please note that portions of this document were completed with a voice recognition program.    Note Disclaimer: At [facility], we believe that sharing information builds trust and better relationships. You are receiving this note because you recently visited [facility]. It is possible you will see health information before a provider has talked with you about it. This kind of information can be easy to misunderstand. To help you fully understand what it means for your health, we urge you to discuss this note with your provider.         Rehan Gill MD  05/12/24 0146

## 2024-05-11 NOTE — H&P
" [facility]   HISTORY AND PHYSICAL    Patient Name: Sol Rojas  : 1950  MRN: [id number]  Primary Care Physician:  Reyes, Rosenberg Acosta, MD  Date of admission: 2024    Subjective   Subjective     Chief Complaint:   Chief Complaint   Patient presents with    Syncope         HPI:    Sol Rojas is a pleasant afebrile 74 y.o. black female with a past medical history of   hypertension, obesity and pulmonary hypertension.     She presents to the Emergency Department at [facility] today with complaint of losing consciousness.  She has been admitted to the ED observation unit for further testing and evaluation.    Patient states she woke up this morning and donated blood, this is not abnormal for her and she was feeling well when she woke up and afterward.  She states she went shopping and around 2 PM while sitting she had a \"sensation of something, over me\" she reports she regained consciousness and reported feeling very nauseous and had multiple episodes of nausea, vomiting and diarrhea.  She states she felt profoundly weak thereafter.    Prior to the episode she denies any chest pain, shortness of breath, palpitations. She denies any recent medication changes. She had a right eye infection and was on doxycycline a month ago, no diarrhea since. She reports she had a syncopal episode with a similar presentation. Today she is noted to have new right leg weakness. She denies any sensory changes.       Review of Systems   All systems were reviewed and negative except for: what is mentioned above    Personal History     Past Medical History:   Diagnosis Date    Ankle sprain     Left ankle    Arthritis     OSTEO    Bilateral knee pain     Chest pain     SAW A CARDIOLOGIST--PER PT, EVERYTHING WAS \"OKAY.\"    Disease of thyroid gland     POLYP    Dry eye syndrome of both eyes     Hip arthrosis     Hypertension     Knee swelling     Left hip pain     Left knee pain     Obesity     " Pulmonary hypertension        Past Surgical History:   Procedure Laterality Date    CATARACT EXTRACTION Bilateral     COLONOSCOPY N/A 04/13/2021    Procedure: COLONOSCOPY TO CECUM AND INTO TI;  Surgeon: Louie Smith MD;  Location: [facility] ENDOSCOPY;  Service: Gastroenterology;  Laterality: N/A;  pre: history of polyps  post: diverticulosis    COLONOSCOPY W/ BIOPSIES AND POLYPECTOMY      BENIGN    HERNIA REPAIR      UMBILICAL HERNIA REPAIR    JOINT REPLACEMENT  1/15/19    KNEE SURGERY  7/19/22    LEG SURGERY Left     cellulitis    TOTAL HIP ARTHROPLASTY Left 01/15/2019    Procedure: LEFT TOTAL HIP ARTHROPLASTY;  Surgeon: Hieu Orosco MD;  Location: [facility] MAIN OR;  Service: Orthopedics    TOTAL KNEE ARTHROPLASTY Left 07/19/2022    Procedure: LEFT TOTAL KNEE ARTHROPLASTY WITH SANTIAGO NAVIGATION;  Surgeon: Guilherme Smith MD;  Location: [facility] OR OSC;  Service: Orthopedics;  Laterality: Left;    US GUIDED FINE NEEDLE ASPIRATION  04/15/2021       Family History: family history includes Cancer in her father; Stroke in her mother. Otherwise pertinent FHx was reviewed and not pertinent to current issue.    Social History:  reports that she has never smoked. She has never been exposed to tobacco smoke. She has never used smokeless tobacco. She reports that she does not drink alcohol and does not use drugs.    Home Medications:  Biotin, Magnesium, bacitracin, carvedilol, doxycycline, ferrous sulfate, fluconazole, moxifloxacin, olmesartan-hydrochlorothiazide, polyethyl glycol-propyl glycol, potassium chloride, and vitamin D3    Allergies:  Allergies   Allergen Reactions    Augmentin [Amoxicillin-Pot Clavulanate] GI Intolerance    Hydrocodone Nausea Only and GI Intolerance    Latex Hives    Penicillins Itching, Nausea Only and GI Intolerance    Percocet [Oxycodone-Acetaminophen] GI Intolerance       Objective   Objective     Vitals:   Temp:  [98.7 °F (37.1 °C)] 98.7 °F (37.1 °C)  Heart Rate:  [50-92] 84  Resp:  [18]  18  BP: (100-136)/(57-94) 111/81  Physical Exam    Constitutional: Awake, alert   Eyes: PERRLA, sclerae anicteric, no conjunctival injection   HENT: NCAT, mucous membranes moist   Neck: Supple, no thyromegaly, no lymphadenopathy, trachea midline   Respiratory: Clear to auscultation bilaterally, nonlabored respirations    Cardiovascular: RRR, no murmurs, rubs, or gallops, palpable pedal pulses bilaterally   Gastrointestinal: Positive bowel sounds, soft, nontender, nondistended   Musculoskeletal: No bilateral ankle edema, no clubbing or cyanosis to extremities   Psychiatric: Appropriate affect, cooperative   Neurologic: Oriented x 3, right leg weakness 2/4, otherwise LUE RUE LLE 5/5 strength, no facial droop, no sensory changes, speech clear, gait not tested   Skin: No rashes     Result Review    Result Review:  I have personally reviewed the results from the time of this admission to 5/11/2024 17:48 EDT and agree with these findings:  [x]  Laboratory list / accordion  []  Microbiology  [x]  Radiology  [x]  EKG/Telemetry   []  Cardiology/Vascular   []  Pathology  []  Old records  []  Other:  Most notable findings include: hs troponin 16, , EKG 5/11/24 15:49 sinus rhythm 59      Assessment & Plan   Assessment / Plan     Brief Patient Summary:  Sol Rojas is a 74 y.o. female who is being evaluated for syncopal episode that occurred today.  She reports she was feeling well and lost consciousness.  This was not witnessed.  She did not have any tongue biting urinary injury.  She has no history of seizures.  She was on antibiotics but this was over a month ago.  She had nausea vomiting and diarrhea after the episode.  She is right leg weakness which she reports is new today.  She has not had any chest pain or palpitations.  She did have an elevated high-sensitivity troponin    Active Hospital Problems:  Active Hospital Problems    Diagnosis     Syncope      Plan:       Syncope  Elevated high-sensitivity  troponin  High-sensitivity troponin 16, trend  EKG without evidence of ischemia  Chest x-ray pending  Consult to cardiology  Orthostatic vitals pending    Syncope versus seizure  Right leg weakness  CTA head and neck pending  MRI brain pending  Consult to physical therapy    Hypertension  Hold Coreg for cardiology evaluation  Continue olmesartan 40mg/HCTZ 25mg-> Hospital auto substitution to losartan 100mg/25mg HCTZ    DVT prophylaxis:  Mechanical DVT prophylaxis orders are present.        CODE STATUS:    Level Of Support Discussed With: Patient  Code Status (Patient has no pulse and is not breathing): CPR (Attempt to Resuscitate)  Medical Interventions (Patient has pulse or is breathing): Full Support    Admission Status:  I believe this patient meets observation status.    Electronically signed by KELLY Benz, 05/11/24, 5:14 PM EDT.        75 minutes has been spent by [facility] providers in the care of this patient while under observation status      I have worn appropriate PPE during this patient encounter, sanitized my hands both with entering and exiting patient's room.    I have discussed plan of care with patient including advance care plan and/or surrogate decision maker.  Patient advises that their  Rehan will be their primary surrogate decision maker

## 2024-05-12 ENCOUNTER — APPOINTMENT (OUTPATIENT)
Dept: CT IMAGING | Facility: HOSPITAL | Age: 74
End: 2024-05-12
Payer: MEDICARE

## 2024-05-12 ENCOUNTER — APPOINTMENT (OUTPATIENT)
Dept: CARDIOLOGY | Facility: HOSPITAL | Age: 74
End: 2024-05-12
Payer: MEDICARE

## 2024-05-12 ENCOUNTER — APPOINTMENT (OUTPATIENT)
Dept: MRI IMAGING | Facility: HOSPITAL | Age: 74
End: 2024-05-12
Payer: MEDICARE

## 2024-05-12 VITALS
WEIGHT: 200 LBS | RESPIRATION RATE: 16 BRPM | DIASTOLIC BLOOD PRESSURE: 73 MMHG | SYSTOLIC BLOOD PRESSURE: 131 MMHG | HEART RATE: 51 BPM | HEIGHT: 67 IN | TEMPERATURE: 97.9 F | OXYGEN SATURATION: 100 % | BODY MASS INDEX: 31.39 KG/M2

## 2024-05-12 PROBLEM — R55 SYNCOPE: Status: RESOLVED | Noted: 2024-05-11 | Resolved: 2024-05-12

## 2024-05-12 LAB
ALBUMIN SERPL-MCNC: 3.3 G/DL (ref 3.5–5.2)
ALBUMIN/GLOB SERPL: 1.3 G/DL
ALP SERPL-CCNC: 67 U/L (ref 39–117)
ALT SERPL W P-5'-P-CCNC: 13 U/L (ref 1–33)
ANION GAP SERPL CALCULATED.3IONS-SCNC: 7 MMOL/L (ref 5–15)
AORTIC ARCH: 2.7 CM
AORTIC DIMENSIONLESS INDEX: 0.9 (DI)
ASCENDING AORTA: 3.1 CM
AST SERPL-CCNC: 11 U/L (ref 1–32)
BASOPHILS # BLD AUTO: 0.01 10*3/MM3 (ref 0–0.2)
BASOPHILS NFR BLD AUTO: 0.2 % (ref 0–1.5)
BH CV ECHO MEAS - ACS: 1.74 CM
BH CV ECHO MEAS - AI P1/2T: 831.9 MSEC
BH CV ECHO MEAS - AO MAX PG: 9.1 MMHG
BH CV ECHO MEAS - AO MEAN PG: 5 MMHG
BH CV ECHO MEAS - AO ROOT DIAM: 2.9 CM
BH CV ECHO MEAS - AO V2 MAX: 151 CM/SEC
BH CV ECHO MEAS - AO V2 VTI: 36.4 CM
BH CV ECHO MEAS - AVA(I,D): 2.9 CM2
BH CV ECHO MEAS - EDV(CUBED): 132.7 ML
BH CV ECHO MEAS - EDV(MOD-SP2): 107 ML
BH CV ECHO MEAS - EDV(MOD-SP4): 108 ML
BH CV ECHO MEAS - EF(MOD-BP): 61.3 %
BH CV ECHO MEAS - EF(MOD-SP2): 61.7 %
BH CV ECHO MEAS - EF(MOD-SP4): 63.9 %
BH CV ECHO MEAS - ESV(CUBED): 35.5 ML
BH CV ECHO MEAS - ESV(MOD-SP2): 41 ML
BH CV ECHO MEAS - ESV(MOD-SP4): 39 ML
BH CV ECHO MEAS - FS: 35.6 %
BH CV ECHO MEAS - IVS/LVPW: 1.13 CM
BH CV ECHO MEAS - IVSD: 0.9 CM
BH CV ECHO MEAS - LAT PEAK E' VEL: 9.6 CM/SEC
BH CV ECHO MEAS - LV DIASTOLIC VOL/BSA (35-75): 53.4 CM2
BH CV ECHO MEAS - LV MASS(C)D: 151.8 GRAMS
BH CV ECHO MEAS - LV MAX PG: 8.2 MMHG
BH CV ECHO MEAS - LV MEAN PG: 4 MMHG
BH CV ECHO MEAS - LV SYSTOLIC VOL/BSA (12-30): 19.3 CM2
BH CV ECHO MEAS - LV V1 MAX: 143 CM/SEC
BH CV ECHO MEAS - LV V1 VTI: 33.5 CM
BH CV ECHO MEAS - LVIDD: 5.1 CM
BH CV ECHO MEAS - LVIDS: 3.3 CM
BH CV ECHO MEAS - LVOT AREA: 3.2 CM2
BH CV ECHO MEAS - LVOT DIAM: 2.02 CM
BH CV ECHO MEAS - LVPWD: 0.8 CM
BH CV ECHO MEAS - MED PEAK E' VEL: 4.8 CM/SEC
BH CV ECHO MEAS - MR MAX PG: 49.5 MMHG
BH CV ECHO MEAS - MR MAX VEL: 351.8 CM/SEC
BH CV ECHO MEAS - MV A DUR: 0.15 SEC
BH CV ECHO MEAS - MV A MAX VEL: 70.3 CM/SEC
BH CV ECHO MEAS - MV DEC SLOPE: 234.3 CM/SEC2
BH CV ECHO MEAS - MV DEC TIME: 0.35 SEC
BH CV ECHO MEAS - MV E MAX VEL: 44.6 CM/SEC
BH CV ECHO MEAS - MV E/A: 0.63
BH CV ECHO MEAS - MV MAX PG: 3.9 MMHG
BH CV ECHO MEAS - MV MEAN PG: 1.27 MMHG
BH CV ECHO MEAS - MV P1/2T: 108.6 MSEC
BH CV ECHO MEAS - MV V2 VTI: 37 CM
BH CV ECHO MEAS - MVA(P1/2T): 2.03 CM2
BH CV ECHO MEAS - MVA(VTI): 2.9 CM2
BH CV ECHO MEAS - PA ACC TIME: 0.1 SEC
BH CV ECHO MEAS - PA V2 MAX: 82.8 CM/SEC
BH CV ECHO MEAS - PI END-D VEL: 93 CM/SEC
BH CV ECHO MEAS - PULM A REVS DUR: 0.12 SEC
BH CV ECHO MEAS - PULM A REVS VEL: 29.6 CM/SEC
BH CV ECHO MEAS - PULM DIAS VEL: 30.4 CM/SEC
BH CV ECHO MEAS - PULM S/D: 1.31
BH CV ECHO MEAS - PULM SYS VEL: 39.8 CM/SEC
BH CV ECHO MEAS - QP/QS: 0.42
BH CV ECHO MEAS - RAP SYSTOLE: 3 MMHG
BH CV ECHO MEAS - RV MAX PG: 1.76 MMHG
BH CV ECHO MEAS - RV V1 MAX: 66.4 CM/SEC
BH CV ECHO MEAS - RV V1 VTI: 16.8 CM
BH CV ECHO MEAS - RVOT DIAM: 1.85 CM
BH CV ECHO MEAS - RVSP: 23 MMHG
BH CV ECHO MEAS - SUP REN AO DIAM: 3.1 CM
BH CV ECHO MEAS - SV(LVOT): 107 ML
BH CV ECHO MEAS - SV(MOD-SP2): 66 ML
BH CV ECHO MEAS - SV(MOD-SP4): 69 ML
BH CV ECHO MEAS - SV(RVOT): 45.2 ML
BH CV ECHO MEAS - SVI(LVOT): 52.9 ML/M2
BH CV ECHO MEAS - SVI(MOD-SP2): 32.6 ML/M2
BH CV ECHO MEAS - SVI(MOD-SP4): 34.1 ML/M2
BH CV ECHO MEAS - TAPSE (>1.6): 2.04 CM
BH CV ECHO MEAS - TR MAX PG: 20.3 MMHG
BH CV ECHO MEAS - TR MAX VEL: 225.3 CM/SEC
BH CV ECHO MEASUREMENTS AVERAGE E/E' RATIO: 6.19
BH CV XLRA - RV BASE: 2.6 CM
BH CV XLRA - RV LENGTH: 7.9 CM
BH CV XLRA - RV MID: 2.01 CM
BH CV XLRA - TDI S': 9.1 CM/SEC
BILIRUB SERPL-MCNC: 0.5 MG/DL (ref 0–1.2)
BUN SERPL-MCNC: 12 MG/DL (ref 8–23)
BUN/CREAT SERPL: 14.5 (ref 7–25)
CALCIUM SPEC-SCNC: 8.6 MG/DL (ref 8.6–10.5)
CHLORIDE SERPL-SCNC: 106 MMOL/L (ref 98–107)
CO2 SERPL-SCNC: 26 MMOL/L (ref 22–29)
CREAT SERPL-MCNC: 0.83 MG/DL (ref 0.57–1)
DEPRECATED RDW RBC AUTO: 42.5 FL (ref 37–54)
EGFRCR SERPLBLD CKD-EPI 2021: 75 ML/MIN/1.73
EOSINOPHIL # BLD AUTO: 0.03 10*3/MM3 (ref 0–0.4)
EOSINOPHIL NFR BLD AUTO: 0.7 % (ref 0.3–6.2)
ERYTHROCYTE [DISTWIDTH] IN BLOOD BY AUTOMATED COUNT: 13 % (ref 12.3–15.4)
GLOBULIN UR ELPH-MCNC: 2.6 GM/DL
GLUCOSE SERPL-MCNC: 97 MG/DL (ref 65–99)
HCT VFR BLD AUTO: 35.2 % (ref 34–46.6)
HGB BLD-MCNC: 11.8 G/DL (ref 12–15.9)
IMM GRANULOCYTES # BLD AUTO: 0.01 10*3/MM3 (ref 0–0.05)
IMM GRANULOCYTES NFR BLD AUTO: 0.2 % (ref 0–0.5)
LEFT ATRIUM VOLUME INDEX: 35.6 ML/M2
LYMPHOCYTES # BLD AUTO: 1.41 10*3/MM3 (ref 0.7–3.1)
LYMPHOCYTES NFR BLD AUTO: 31.5 % (ref 19.6–45.3)
MCH RBC QN AUTO: 30.2 PG (ref 26.6–33)
MCHC RBC AUTO-ENTMCNC: 33.5 G/DL (ref 31.5–35.7)
MCV RBC AUTO: 90 FL (ref 79–97)
MONOCYTES # BLD AUTO: 0.45 10*3/MM3 (ref 0.1–0.9)
MONOCYTES NFR BLD AUTO: 10.1 % (ref 5–12)
NEUTROPHILS NFR BLD AUTO: 2.56 10*3/MM3 (ref 1.7–7)
NEUTROPHILS NFR BLD AUTO: 57.3 % (ref 42.7–76)
NRBC BLD AUTO-RTO: 0 /100 WBC (ref 0–0.2)
PLATELET # BLD AUTO: 206 10*3/MM3 (ref 140–450)
PMV BLD AUTO: 9 FL (ref 6–12)
POTASSIUM SERPL-SCNC: 3.9 MMOL/L (ref 3.5–5.2)
PROT SERPL-MCNC: 5.9 G/DL (ref 6–8.5)
RBC # BLD AUTO: 3.91 10*6/MM3 (ref 3.77–5.28)
SINUS: 3.2 CM
SODIUM SERPL-SCNC: 139 MMOL/L (ref 136–145)
STJ: 2.6 CM
WBC NRBC COR # BLD AUTO: 4.47 10*3/MM3 (ref 3.4–10.8)

## 2024-05-12 PROCEDURE — G0378 HOSPITAL OBSERVATION PER HR: HCPCS

## 2024-05-12 PROCEDURE — 70498 CT ANGIOGRAPHY NECK: CPT

## 2024-05-12 PROCEDURE — 96375 TX/PRO/DX INJ NEW DRUG ADDON: CPT

## 2024-05-12 PROCEDURE — 80053 COMPREHEN METABOLIC PANEL: CPT | Performed by: NURSE PRACTITIONER

## 2024-05-12 PROCEDURE — 93306 TTE W/DOPPLER COMPLETE: CPT | Performed by: INTERNAL MEDICINE

## 2024-05-12 PROCEDURE — A9577 INJ MULTIHANCE: HCPCS | Performed by: EMERGENCY MEDICINE

## 2024-05-12 PROCEDURE — 93306 TTE W/DOPPLER COMPLETE: CPT

## 2024-05-12 PROCEDURE — 25510000001 IOPAMIDOL PER 1 ML: Performed by: EMERGENCY MEDICINE

## 2024-05-12 PROCEDURE — 25010000002 DIPHENHYDRAMINE PER 50 MG: Performed by: NURSE PRACTITIONER

## 2024-05-12 PROCEDURE — 0 GADOBENATE DIMEGLUMINE 529 MG/ML SOLUTION: Performed by: EMERGENCY MEDICINE

## 2024-05-12 PROCEDURE — 93246 EXT ECG>7D<15D RECORDING: CPT

## 2024-05-12 PROCEDURE — 99214 OFFICE O/P EST MOD 30 MIN: CPT

## 2024-05-12 PROCEDURE — 85025 COMPLETE CBC W/AUTO DIFF WBC: CPT | Performed by: NURSE PRACTITIONER

## 2024-05-12 PROCEDURE — 70553 MRI BRAIN STEM W/O & W/DYE: CPT

## 2024-05-12 PROCEDURE — 99203 OFFICE O/P NEW LOW 30 MIN: CPT | Performed by: PHYSICIAN ASSISTANT

## 2024-05-12 PROCEDURE — 70496 CT ANGIOGRAPHY HEAD: CPT

## 2024-05-12 RX ORDER — LORAZEPAM 1 MG/1
1 TABLET ORAL ONCE
Status: COMPLETED | OUTPATIENT
Start: 2024-05-12 | End: 2024-05-12

## 2024-05-12 RX ORDER — LORAZEPAM 0.5 MG/1
0.5 TABLET ORAL ONCE
Status: DISCONTINUED | OUTPATIENT
Start: 2024-05-12 | End: 2024-05-12 | Stop reason: HOSPADM

## 2024-05-12 RX ORDER — CARVEDILOL 3.12 MG/1
3.12 TABLET ORAL DAILY
Qty: 30 TABLET | Refills: 0 | Status: SHIPPED | OUTPATIENT
Start: 2024-05-12

## 2024-05-12 RX ORDER — DIPHENHYDRAMINE HYDROCHLORIDE 50 MG/ML
25 INJECTION INTRAMUSCULAR; INTRAVENOUS ONCE
Status: COMPLETED | OUTPATIENT
Start: 2024-05-12 | End: 2024-05-12

## 2024-05-12 RX ORDER — LORAZEPAM 1 MG/1
1 TABLET ORAL EVERY 6 HOURS PRN
Status: DISCONTINUED | OUTPATIENT
Start: 2024-05-12 | End: 2024-05-12

## 2024-05-12 RX ADMIN — IOPAMIDOL 95 ML: 755 INJECTION, SOLUTION INTRAVENOUS at 01:37

## 2024-05-12 RX ADMIN — FERROUS SULFATE TAB 325 MG (65 MG ELEMENTAL FE) 325 MG: 325 (65 FE) TAB at 08:17

## 2024-05-12 RX ADMIN — DIPHENHYDRAMINE HYDROCHLORIDE 25 MG: 50 INJECTION, SOLUTION INTRAMUSCULAR; INTRAVENOUS at 00:18

## 2024-05-12 RX ADMIN — GADOBENATE DIMEGLUMINE 19 ML: 529 INJECTION, SOLUTION INTRAVENOUS at 01:21

## 2024-05-12 RX ADMIN — HYDROCHLOROTHIAZIDE 25 MG: 25 TABLET ORAL at 08:16

## 2024-05-12 RX ADMIN — POTASSIUM CHLORIDE 20 MEQ: 750 TABLET, EXTENDED RELEASE ORAL at 08:16

## 2024-05-12 RX ADMIN — LORAZEPAM 1 MG: 1 TABLET ORAL at 00:27

## 2024-05-12 RX ADMIN — LOSARTAN POTASSIUM 100 MG: 100 TABLET, FILM COATED ORAL at 08:16

## 2024-05-12 RX ADMIN — Medication 5000 UNITS: at 08:16

## 2024-05-12 NOTE — PROGRESS NOTES
Patient Care Team:  Reyes, Rosenberg Acosta, MD as PCP - General (Family Medicine)     NOEL RAY H&P Attestation Note    I supervised care provided by the midlevel provider. We have discussed this patient's history, physical exam, and treatment plan. I have reviewed the midlevel provider's note and I agree with the midlevel provider's findings and plan of care.   SHARED VISIT: This visit was performed by BOTH a physician and an APC. The substantive portion of the medical decision making was performed by this attesting physician who made or approved the management plan and takes responsibility for patient management.   I have personally had a face to face encounter with the patient.   My personal findings are documented below:    History:  74-year-old female with history of hypertension and pulmonary hypertension presents with syncope.  Patient had given blood earlier in the day.  Patient was at baseline health prior to this.  Patient was noticed to have some right leg weakness in the ED.  Stroke neurology consulted from the emergency department.  EKG showed no evidence of ischemia, high-sensitivity troponin 16, chest x-ray unremarkable, CTA head and neck as well as MRI brain imaging ordered and pending.    Physical Exam:  General: No acute distress.  HENT: NCAT, PERRL, Nares patent.  Eyes: no scleral icterus.  Neck: trachea midline, no ROM limitations.  CV: regular rhythm, regular rate.  Respiratory: normal effort, CTAB.  Abdomen: soft, nondistended, NTTP, no rebound tenderness, no guarding or rigidity.  Musculoskeletal: no deformity.  Neuro: alert, moves all extremities, follows commands.  Skin: warm, dry.    Assessment and Plan:  Patient admitted to the observation unit, concern for syncope versus seizure versus TIA/CVA.  Obtaining completion of stroke workup including CTA head and neck imaging, MRI brain imaging, stroke neurology and cardiology following, trending troponins, monitoring.

## 2024-05-12 NOTE — CONSULTS
"Neurology Consult Note    Consult Date: 5/12/2024    Referring MD: Himanshu Hernández MD    Reason for Consult I have been asked to see the patient in neurological consultation to render advice and opinion regarding syncope    Sol Rojas is a 74 y.o. female with past medical history of hypertension, prior DVT, osteoarthritis with knee replacement who comes to the emergency room yesterday after syncopal event.  She had donated blood around 11 AM.  She went shopping thereafter and then was at home putting away groceries.  She made herself a sandwich sat down to eat and suddenly felt something \"coming over her\".  She describes being diaphoretic and losing consciousness.  Her son was present and said that she was snoring very loudly.  When she came to she did not recognize him and had to immediately go to the restroom.  She had BM and vomiting and then was too weak to ambulate.  While in the ER she noticed that her right leg was weaker on direct testing.  For this she was admitted to the observation for further evaluation and neurology consult.      MRI brain with and without was unremarkable.  CTA negative for LVO or significant stenosis there tortuosity and possibly moderate stenosis of the right ICA.  Vital signs taking sitting and standing with no drop or heart rate change consistent with orthostasis.  No prior history of seizure.  No family history of epilepsy.  No head injury.  She has had a syncopal event in the past several years ago related to orthostasis on standing and she to low scribes problem with electrolytes and muscle cramping.  She is feeling back to herself this morning and ambulating around the room without problem.      Social history: Lives with .  2 children 1 living in a level 1 living in [place].  Retired nurse.  Previously lived in [place] now lives in the level past 15 years    Family history: Mother had stroke in 70s, father cancer unsure what type, asthma    Past " Admitted 5/8/2020  Discharged 5/12/2020  TCM appointment 5/14/2020  TCM call 5/13 and 5/14   "Medical/Surgical Hx:  Past Medical History:   Diagnosis Date    Ankle sprain     Left ankle    Arthritis     OSTEO    Bilateral knee pain     Chest pain     SAW A CARDIOLOGIST--PER PT, EVERYTHING WAS \"OKAY.\"    Disease of thyroid gland     POLYP    Dry eye syndrome of both eyes     Hip arthrosis     Hypertension     Knee swelling     Left hip pain     Left knee pain     Obesity     Pulmonary hypertension      Past Surgical History:   Procedure Laterality Date    CATARACT EXTRACTION Bilateral     COLONOSCOPY N/A 04/13/2021    Procedure: COLONOSCOPY TO CECUM AND INTO TI;  Surgeon: Louie Smith MD;  Location: [facility] ENDOSCOPY;  Service: Gastroenterology;  Laterality: N/A;  pre: history of polyps  post: diverticulosis    COLONOSCOPY W/ BIOPSIES AND POLYPECTOMY      BENIGN    HERNIA REPAIR      UMBILICAL HERNIA REPAIR    JOINT REPLACEMENT  1/15/19    KNEE SURGERY  7/19/22    LEG SURGERY Left     cellulitis    TOTAL HIP ARTHROPLASTY Left 01/15/2019    Procedure: LEFT TOTAL HIP ARTHROPLASTY;  Surgeon: Hieu Orosco MD;  Location: [facility] MAIN OR;  Service: Orthopedics    TOTAL KNEE ARTHROPLASTY Left 07/19/2022    Procedure: LEFT TOTAL KNEE ARTHROPLASTY WITH SANTIAGO NAVIGATION;  Surgeon: Guilherme Smith MD;  Location: [facility] OR OSC;  Service: Orthopedics;  Laterality: Left;    US GUIDED FINE NEEDLE ASPIRATION  04/15/2021       Medications On Admission  Medications Prior to Admission   Medication Sig Dispense Refill Last Dose    bacitracin 500 UNIT/GM ophthalmic ointment        Biotin 5 MG capsule Take  by mouth. (Patient not taking: Reported on 11/14/2023)       carvedilol (COREG) 3.125 MG tablet Take 1 tablet by mouth 2 (Two) Times a Day With Meals.       doxycycline (ADOXA) 100 MG tablet        ferrous sulfate 325 (65 FE) MG tablet Take 1 tablet by mouth Daily With Breakfast.       fluconazole (DIFLUCAN) 100 MG tablet Take 1 tablet by mouth Daily. (Patient not taking: Reported on 4/15/2024)       KLOR-CON 20 MEQ CR " "tablet Take 1 tablet by mouth Daily.       Magnesium 250 MG tablet Take  by mouth.       olmesartan-hydrochlorothiazide (BENICAR HCT) 40-25 MG per tablet Take 1 tablet by mouth Daily.       polyethyl glycol-propyl glycol (SYSTANE) 0.4-0.3 % solution ophthalmic solution (artificial tears) Every 1 (One) Hour As Needed.       vitamin D3 125 MCG (5000 UT) capsule capsule Take 1 capsule by mouth Daily.          Allergies:  Allergies   Allergen Reactions    Augmentin [Amoxicillin-Pot Clavulanate] GI Intolerance    Hydrocodone Nausea Only and GI Intolerance    Latex Hives    Penicillins Itching, Nausea Only and GI Intolerance    Percocet [Oxycodone-Acetaminophen] GI Intolerance       Social Hx:  Social History     Socioeconomic History    Marital status:    Tobacco Use    Smoking status: Never     Passive exposure: Never    Smokeless tobacco: Never   Vaping Use    Vaping status: Never Used   Substance and Sexual Activity    Alcohol use: No    Drug use: No    Sexual activity: Not Currently     Partners: Male     Birth control/protection: Abstinence       Family Hx:  Family History   Problem Relation Age of Onset    Stroke Mother     Cancer Father     Malig Hyperthermia Neg Hx        Review of systems  Constitutional: [No fevers, chills]  Eye: [No vision loss, diplopia]  HEENT: [no hearing loss, vertigo]  Cardiovascular: [No Chest pain, palpitations]  Neurologic: [No weakness, numbness]  Psychiatric: [No anxiety, depression]    All other systems reviewed and are negative    Exam    /68 (BP Location: Left arm, Patient Position: Lying)   Pulse 54   Temp 98.2 °F (36.8 °C) (Oral)   Resp 16   Ht 170.7 cm (67.2\")   Wt 91 kg (200 lb 9.9 oz)   LMP  (LMP Unknown)   SpO2 99%   Breastfeeding No   BMI 31.23 kg/m²   gen: NAD, vitals reviewed  Eyes: Clear conjunctiva  HEENT: no nuchal rigidity  CVS: RRR, S1, S2  MS: oriented x3, remote memory intact, normal attention/concentration, language intact, no neglect, normal " fund of knowledge  CN: visual acuity grossly normal, visual fields full, PERRL, EOMI, facial sensation equal, no facial droop, hearing symmetric, palate elevates symmetrically, shoulder shrug equal, tongue midline  Motor: 5/5 throughout upper and lower extremities, no pronator drift, normal tone  Sensation: intact to crude touch and temperature throughout  Reflexes: 2+ throughout upper and lower extremities, downgoing plantars  Coordination: no dysmetria with finger to nose bilaterally      DATA:    Lab Results   Component Value Date    GLUCOSE 97 05/12/2024    CALCIUM 8.6 05/12/2024     05/12/2024    K 3.9 05/12/2024    CO2 26.0 05/12/2024     05/12/2024    BUN 12 05/12/2024    CREATININE 0.83 05/12/2024    EGFRIFAFRI 61 05/23/2021    BCR 14.5 05/12/2024    ANIONGAP 7.0 05/12/2024     Lab Results   Component Value Date    WBC 4.47 05/12/2024    HGB 11.8 (L) 05/12/2024    HCT 35.2 05/12/2024    MCV 90.0 05/12/2024     05/12/2024     Lab Results   Component Value Date    LDL 29 04/30/2016    LDL 37 06/01/2014     Lab Results   Component Value Date    HGBA1C 5.60 04/30/2016     Lab Results   Component Value Date    INR 0.99 01/08/2019    INR 0.95 04/30/2016    INR 0.9 06/01/2014    PROTIME 12.9 01/08/2019    PROTIME 12.5 04/30/2016    PROTIME 10.4 06/01/2014       Lab review:     Imaging review:   CTA h/n  Narrative & Impression   NONCONTRAST CRANIAL CT SCAN, CT ANGIOGRAM NECK, CT ANGIOGRAM HEAD.     HISTORY: Syncope and right leg weakness.     COMPARISON: May 12, 2024.     TECHNIQUE:  Radiation dose reduction techniques were utilized, including  automated exposure control and exposure modulation based on body size.   Initially, a routine noncontrast cranial CT performed from the skull  base through the vertex region. After review, thin-section contrast  enhanced CT angiogram images obtained from the aortic arch through the  calvarial vertex utilizing angiographic technique. Multi projection  3-D  MIP reformatted images were supplemented and reviewed.        FINDINGS CRANIAL CT: No acute hemorrhage or midline shift is  demonstrated..  Ventricular size and configuration is within normal  limits for age.. There is some minimal microangiopathic change.  Postcontrast imaging does not show any evidence of venous occlusion or  abnormal enhancement. Bone window images demonstrate some mucosal  thickening within the ethmoid sinuses. Mucous retention cyst is noted  within the right maxillary sinus. Mastoid air cells are clear..        CERVICAL CAROTID CT ANGIOGRAM:     FINDINGS: There is normal arch anatomy. There is enlargement of the main  pulmonary artery, which can be seen in setting of pulmonary arterial  hypertension. The common carotid arteries are widely patent bilaterally.  The internal carotid arteries are patent bilaterally. There is some  tortuosity of the cervical right internal carotid artery,, which results  in 2 focal areas of mild to moderate narrowing within the vessel.  Thyroid gland is heterogeneous. This would be better assessed with  dedicated thyroid ultrasound. Vertebral arteries are patent throughout  their courses. Right is slightly larger in caliber. Images through the  lung apices are clear.     NASCET criteria utilized in stenosis measurements. stenosis mild, 0-49%.        CRANIAL CTA ANGIOGRAM:     FINDINGS: The intracranial internal carotid arteries are patent. There  is an anterior communicating artery. Anterior cerebral arteries appear  normal, as are the middle cerebral arteries. The intracranial vertebral  arteries are patent. Basilar artery is normal. Posterior cerebral  arteries are normal bilaterally..             Radiation dose reduction techniques were utilized, including automated  exposure control and exposure modulation based on body size.        IMPRESSION:  1. No acute intracranial findings.  2. Tortuosity of the cervical right internal carotid artery, which may  be  resulting in 2 focal areas of mild to moderate stenosis.  3. No intracranial stenosis or occlusion..    MRI brain     FINDINGS:  Multiplanar images of the head were obtained without and with  gadolinium. No areas of restricted diffusion are seen to suggest acute  infarct. The ventricles are normal in size. There is minimal  periventricular and deep white matter microangiopathic change. There is  no midline shift or mass effect. Intracranial flow voids appear intact.  No abnormality is seen on susceptibility weighted imaging. No abnormal  enhancement is seen following contrast administration. There is no  evidence of venous occlusion. There is some mucosal thickening within  the ethmoid sinuses.     IMPRESSION:  1. No acute intracranial abnormality.  No abnormal enhancement.                 Diagnoses:  1 syncope  2 Osteoarthritis  3 HTN    Impression: 74 y.o. female with past medical history of hypertension, prior DVT, osteoarthritis with knee replacement who comes to the emergency room yesterday after syncopal event.  She was sitting, eating a sandwich when she suddenly felt presyncopal diaphoretic.  LOC less than a minute.  She denies ictal phenomenon.  She did have nausea vomiting diarrhea after consistent with a vasovagal event.  She has not been orthostatic and blood donation 4 hours prior to event so no provocation raising thought of cardiogenic etiology.  Worthwhile to Zio monitor consider cardiology workup, syncopal precautions hereafter.    Her neurologic workup unremarkable and no further inpatient recommendations from our team      Thank you for this consultation.  Discussed above plan with neuro attending, Dr. Bledsoe and Obs team who agree with above plan.  Neurology team is available for concerns or questions.

## 2024-05-12 NOTE — SIGNIFICANT NOTE
05/12/24 1159   OTHER   Discipline physical therapist   Rehab Time/Intention   Session Not Performed other (see comments)  (Discussed with patient at bedside. She reports that she is much improved and has returned to her baseline mobility. She denies need for acute PT at this time. PT will sign off, please re-order if status changes.)   Therapy Assessment/Plan (PT)   Criteria for Skilled Interventions Met (PT) no;does not meet criteria for skilled intervention;no problems identified which require skilled intervention

## 2024-05-12 NOTE — DISCHARGE INSTRUCTIONS
ED workup has been and not remarkable however your heart rate dropped in the 40s overnight therefore you need to stop taking your carvedilol.  You are being discharged home with a Holter monitor to track your heart rate and rhythm.   Return to the emergency department for symptoms recurrence.

## 2024-05-12 NOTE — PROGRESS NOTES
MD ATTESTATION NOTE    SHARED VISIT: This visit was performed by BOTH a physician and an APC. The substantive portion of the medical decision making was performed by this attesting physician who made or approved the management plan and takes responsibility for patient management. All studies in the APC note (if performed) were independently interpreted by me.     I provided a substantive portion of the care of the patient.  I personally performed the physical exam in its entirety, and below are my findings.      Brief HPI: Patient presents with episode of questionable syncope yesterday.  She donated blood in the morning.  She was at home sitting in a chair behind her .  She could feel a prodrome where she got very sweaty.  She will unresponsive.  She does not know how long it lasted.  She had no associated headache, chest pain, shortness of breath, palpitations.  She now feels back to her baseline self.  Some of the admission documentation states that she had right leg weakness.  I asked her about this and she states that she had generalized weakness and never had any lateralizing symptoms.    PHYSICAL EXAM  ED Triage Vitals   Temp Heart Rate Resp BP SpO2   05/11/24 1529 05/11/24 1529 05/11/24 1529 05/11/24 1529 05/11/24 1529   98.7 °F (37.1 °C) 50 18 108/57 96 %      Temp src Heart Rate Source Patient Position BP Location FiO2 (%)   05/11/24 1529 05/11/24 1529 05/11/24 1649 05/11/24 1813 --   Oral Monitor Lying Left arm          GENERAL: no acute distress  HENT: nares patent  EYES: no scleral icterus  CV: regular rhythm, normal rate  RESPIRATORY: normal effort, clear to auscultation bilaterally  ABDOMEN: soft, nontender  MUSCULOSKELETAL: no deformity  NEURO: alert, no facial asymmetry, EOMI, moves all extremities, follows commands  PSYCH:  calm, cooperative  SKIN: warm, dry    Vital signs and nursing notes reviewed.        Plan:   Sounds more like a syncopal event rather than seizure.  I have low suspicion  for stroke.  Furthermore MRI is negative.  Cardiology consultation pending

## 2024-05-12 NOTE — CONSULTS
"[facility]  Cardiology Consult Note    Patient Identification:  Name: Sol Rojas  Age: 74 y.o.  Sex: female  :  1950  MRN: [id number]             Requesting Physician: Noa MENDOZA    Reason for Consultation / Chief Complaint: syncope, elevated troponin    History of Present Illness:   This is a 74-year-old female who is known with our service with hypertension, pulmonary hypertension.  She presented to [facility] for syncopal episode while sitting at the table.  Workup in ER with CBC unremarkable.  CMP unremarkable.  High-sensitivity troponin 16, repeat 13. EKG sinus rhythm without ischemic ST changes.  Orthostatic vital signs negative. She did have sinus bradycardia upon presentation to ER with HR 50. She was admitted for further management.     Echo 2022 EF 56%, normal LV function.  Stress test 2022 myocardial perfusion imaging indicated a small sized, mildly severe of ischemia located in the lateral wall.    Comorbid cardiac risk factors: age, hypertension    Past Medical History:  Past Medical History:   Diagnosis Date    Ankle sprain     Left ankle    Arthritis     OSTEO    Bilateral knee pain     Chest pain     SAW A CARDIOLOGIST--PER PT, EVERYTHING WAS \"OKAY.\"    Disease of thyroid gland     POLYP    Dry eye syndrome of both eyes     Hip arthrosis     Hypertension     Knee swelling     Left hip pain     Left knee pain     Obesity     Pulmonary hypertension      Past Surgical History:  Past Surgical History:   Procedure Laterality Date    CATARACT EXTRACTION Bilateral     COLONOSCOPY N/A 2021    Procedure: COLONOSCOPY TO CECUM AND INTO TI;  Surgeon: Louie Smith MD;  Location: [facility] ENDOSCOPY;  Service: Gastroenterology;  Laterality: N/A;  pre: history of polyps  post: diverticulosis    COLONOSCOPY W/ BIOPSIES AND POLYPECTOMY      BENIGN    HERNIA REPAIR      UMBILICAL HERNIA REPAIR    JOINT REPLACEMENT  1/15/19    KNEE SURGERY  22    " LEG SURGERY Left     cellulitis    TOTAL HIP ARTHROPLASTY Left 01/15/2019    Procedure: LEFT TOTAL HIP ARTHROPLASTY;  Surgeon: Hieu Orosco MD;  Location: [facility] MAIN OR;  Service: Orthopedics    TOTAL KNEE ARTHROPLASTY Left 07/19/2022    Procedure: LEFT TOTAL KNEE ARTHROPLASTY WITH SANTIAGO NAVIGATION;  Surgeon: Guilherme Smith MD;  Location:  [facility];  Service: Orthopedics;  Laterality: Left;    US GUIDED FINE NEEDLE ASPIRATION  04/15/2021      Allergies:  Allergies   Allergen Reactions    Augmentin [Amoxicillin-Pot Clavulanate] GI Intolerance    Hydrocodone Nausea Only and GI Intolerance    Latex Hives    Penicillins Itching, Nausea Only and GI Intolerance    Percocet [Oxycodone-Acetaminophen] GI Intolerance     Home Meds:  Medications Prior to Admission   Medication Sig Dispense Refill Last Dose    bacitracin 500 UNIT/GM ophthalmic ointment        Biotin 5 MG capsule Take  by mouth. (Patient not taking: Reported on 11/14/2023)       carvedilol (COREG) 3.125 MG tablet Take 1 tablet by mouth 2 (Two) Times a Day With Meals.       doxycycline (ADOXA) 100 MG tablet        ferrous sulfate 325 (65 FE) MG tablet Take 1 tablet by mouth Daily With Breakfast.       fluconazole (DIFLUCAN) 100 MG tablet Take 1 tablet by mouth Daily. (Patient not taking: Reported on 4/15/2024)       KLOR-CON 20 MEQ CR tablet Take 1 tablet by mouth Daily.       Magnesium 250 MG tablet Take  by mouth.       olmesartan-hydrochlorothiazide (BENICAR HCT) 40-25 MG per tablet Take 1 tablet by mouth Daily.       polyethyl glycol-propyl glycol (SYSTANE) 0.4-0.3 % solution ophthalmic solution (artificial tears) Every 1 (One) Hour As Needed.       vitamin D3 125 MCG (5000 UT) capsule capsule Take 1 capsule by mouth Daily.        Current Meds:   Current Facility-Administered Medications   Medication Dose Route Frequency Provider Last Rate Last Admin    [Held by provider] carvedilol (COREG) tablet 3.125 mg  3.125 mg Oral BID With Meals Herth,  KELLY Latham        cholecalciferol (VITAMIN D3) tablet 5,000 Units  5,000 Units Oral Daily [facility]        ferrous sulfate tablet 325 mg  325 mg Oral Daily With Breakfast [facility]        losartan (COZAAR) tablet 100 mg  100 mg Oral Q24H [facility]        And    hydroCHLOROthiazide tablet 25 mg  25 mg Oral Q24H [facility]        LORazepam (ATIVAN) tablet 0.5 mg  0.5 mg Oral Once Tamara Dc APRN        metoclopramide (REGLAN) injection 10 mg  10 mg Intravenous Q6H PRN [facility]   10 mg at 05/11/24 1807    moxifloxacin (VIGAMOX) 0.5 % ophthalmic solution 0.05 mL  1 drop Both Eyes Q8H [facility]        nitroglycerin (NITROSTAT) SL tablet 0.4 mg  0.4 mg Sublingual Q5 Min PRN [facility]        potassium chloride (K-DUR,KLOR-CON) ER tablet 20 mEq  20 mEq Oral Daily [facility]        sodium chloride 0.9 % flush 10 mL  10 mL Intravenous PRN [facility]        sodium chloride 0.9 % flush 10 mL  10 mL Intravenous Q12H [facility]        sodium chloride 0.9 % flush 10 mL  10 mL Intravenous PRN [facility]        sodium chloride 0.9 % infusion 40 mL  40 mL Intravenous PRN [facility]           Social History:   Social History     Tobacco Use    Smoking status: Never     Passive exposure: Never    Smokeless tobacco: Never   Substance Use Topics    Alcohol use: No      Family History:  Family History   Problem Relation Age of Onset    Stroke Mother     Cancer Father     Malig Hyperthermia Neg Hx         Review of Systems    Constitutional: No weakness,fatigue, fever, rigors, chills   Eyes: No vision changes, eye pain   ENT/oropharynx: No difficulty swallowing, sore throat, epistaxis, changes in hearing   Cardiovascular: No chest pain, chest tightness, palpitations, paroxysmal nocturnal dyspnea, orthopnea, diaphoresis, dizziness / syncopal episode   Respiratory: No shortness of breath, dyspnea on exertion, cough, wheezing  hemoptysis   Gastrointestinal: No abdominal pain, nausea, vomiting, diarrhea, bloody stools   Genitourinary: No hematuria, dysuria   Neurological: No headache, tremors, numbness,  one-sided weakness    Musculoskeletal: No cramps, myalgias,  joint pain, joint swelling   Integument: No rash, edema           Constitutional:  Temp:  [97.5 °F (36.4 °C)-98.7 °F (37.1 °C)] 98.2 °F (36.8 °C)  Heart Rate:  [] 54  Resp:  [16-18] 16  BP: ()/(57-94) 123/68    Physical Exam   General:  Appears in no acute distress  Eyes: PERTL,  HEENT:  No JVD. Thyroid not visibly enlarged. No mucosal pallor or cyanosis  Respiratory: Respirations regular and unlabored at rest. BBS with good air entry in all fields. No crackles, rubs or wheezes auscultated  Cardiovascular: S1S2 Regular rate and rhythm. No murmur, rub or gallop auscultated. No carotid bruits. DP/PT pulses    . No pretibial pitting edema  Gastrointestinal: Abdomen soft, flat, non tender. Bowel sounds present. No hepatosplenomegaly. No ascites  Musculoskeletal: LONG x4. No abnormal movements  Extremities: No digital clubbing or cyanosis  Skin: Color pink. Skin warm and dry to touch. No rashes  No xanthoma  Neuro: AAO x3 CN II-XII grossly intact              Cardiographics  ECG:           Echocardiogram:   2022  Interpretation Summary    Saline test results are negative.  Calculated left ventricular EF = 56.1% Estimated left ventricular EF was in agreement with the calculated left ventricular EF.  Left ventricular diastolic function was normal.  There is no evidence of pericardial effusion.    Interpretation Summary       Findings consistent with an equivocal ECG stress test.  Left ventricular ejection fraction is normal. (Calculated EF = 55%).  Myocardial perfusion imaging indicates a small-sized, mildly severe area of ischemia located in the lateral wall.  Impressions are consistent with an intermediate risk study.      Imaging  Chest X-ray:   IMPRESSION:  FINDINGS AND  IMPRESSION:  No pulm consolidation, pleural effusion or pneumothorax is seen. Cardiac  silhouette is at the upper limits of normal borderline large in size.  Moderate asymmetric elevation of the diaphragm, as before.     This report was finalized on 5/11/2024 7:28 PM by Dr. Regulo Turner M.D  on Workstation: [id number]  Lab Review   Results from last 7 days   Lab Units 05/11/24  1724 05/11/24  1549   HSTROP T ng/L 13 16*     Results from last 7 days   Lab Units 05/11/24  1549   MAGNESIUM mg/dL 2.1     Results from last 7 days   Lab Units 05/12/24  0421   SODIUM mmol/L 139   POTASSIUM mmol/L 3.9   BUN mg/dL 12   CREATININE mg/dL 0.83   CALCIUM mg/dL 8.6     @[id number]@  Results from last 7 days   Lab Units 05/12/24  0421 05/11/24  1549   WBC 10*3/mm3 4.47 4.92   HEMOGLOBIN g/dL 11.8* 12.8   HEMATOCRIT % 35.2 39.0   PLATELETS 10*3/mm3 206 211             Assessment:  Syncope  Hypertension  Bradycardia  Borderline elevated troponin      Recommendations / Plan:     This is a 74-year-old female admitted for syncope who is known with our service.  High-sensitivity troponin borderline elevated at 16, repeat was 13.  ECG is sinus rhythm without ischemic changes, unchanged from previous.  Chemistry panel and CBC are unremarkable.  MRI brain negative for acute intracranial abnormality.  She did have bradycardia with a heart rate of 50 upon presentation to ER, and sinus bradycardia overnight. She is currently SR. I will check echo. She has been asymptomatic since admission. Agree with holding coreg. Will need zio at discharge.          Irais Davison, APRN  5/12/2024, 07:38 EDT      EMR Dragon/Transcription:   Dictated utilizing Dragon dictation

## 2024-05-12 NOTE — PROGRESS NOTES
ED OBSERVATION PROGRESS/DISCHARGE SUMMARY    Date of Admission: 5/11/2024   LOS: 0 days   PCP: Reyes, Rosenberg Acosta, MD    Subjective     Hospital Outcome: Sol Rojas is a 74 y.o. female who was admitted to the ED observation unit for further evaluation for syncopal episode.  Patient was at baseline health prior to this.  She reports she was feeling well and lost consciousness.  This was not witnessed. Patient had given blood earlier in the day. She had nausea vomiting and diarrhea after the episode.  Patient was noted to have some right leg weakness in the ED. Stroke neurology consulted from the emergency department. EKG showed no evidence of ischemia, high-sensitivity troponin 16, chest x-ray unremarkable, CTA head and neck as well as MRI brain imaging ordered.     5/12/24: Patient states symptoms have resolved and she feels back to baseline. Vital signs remain stable. Telemetry monitor reports Sinus Bradycardia with rate in the 40's. Carvedilol held on admission. MRI of brain reported no acute intracranial abnormality. CTA of head and neck shows tortuosity cervical right internal carotid artery that may be resulting to focal area of mild to moderate stenosis otherwise no evidence of intracranial stenosis or occlusion.  Cardiology evaluated patient this morning and has ordered echocardiogram which shows EF 61-65% and grade 1 diastolic function.  Patient to be discharged home on Zio patch.    Neurology evaluated patient this morning and has signed off since then.        ROS:  General: no fevers, chills  Respiratory: no cough, dyspnea  Cardiovascular: no chest pain, palpitations  Abdomen: No abdominal pain, nausea, vomiting, or diarrhea  Neurologic: No focal weakness    Objective   Physical Exam:  I have reviewed the vital signs.  Temp:  [97.5 °F (36.4 °C)-98.7 °F (37.1 °C)] 97.5 °F (36.4 °C)  Heart Rate:  [] 45  Resp:  [16-18] 16  BP: ()/(57-94) 125/66  General Appearance:    Alert, cooperative,  no distress  Head:    Normocephalic, atraumatic  Eyes:    Sclerae anicteric  Neck:   Supple, no mass  Lungs: Respirations unlabored  Heart: Regular rate and rhythm, S1 and S2 normal, no murmur, rub or gallop  Abdomen:  Soft, non-tender,  nondistended  Extremities: No clubbing, cyanosis, or edema to lower extremities  Skin: No rashes   Neurologic: Oriented x3, Normal strength to extremities    Results Review:    I have reviewed the labs, radiology results and diagnostic studies.    Results from last 7 days   Lab Units 05/11/24  1549   WBC 10*3/mm3 4.92   HEMOGLOBIN g/dL 12.8   HEMATOCRIT % 39.0   PLATELETS 10*3/mm3 211     Results from last 7 days   Lab Units 05/11/24  1549   SODIUM mmol/L 141   POTASSIUM mmol/L 4.3   CHLORIDE mmol/L 103   CO2 mmol/L 27.9   BUN mg/dL 16   CREATININE mg/dL 0.94   CALCIUM mg/dL 9.2   BILIRUBIN mg/dL 0.5   ALK PHOS U/L 80   ALT (SGPT) U/L 11   AST (SGOT) U/L 17   GLUCOSE mg/dL 111*     Imaging Results (Last 24 Hours)       Procedure Component Value Units Date/Time    MRI Brain With & Without Contrast [[id number]] Collected: 05/12/24 0304     Updated: 05/12/24 0316    Narrative:      BRAIN MRI WITH AND WITHOUT CONTRAST     HISTORY: r leg weakness, seizure vs syncope; R55-Syncope and collapse;  R29.898-Other symptoms and signs involving the musculoskeletal system;  I10-Essential (primary) hypertension     COMPARISON: None.     FINDINGS:  Multiplanar images of the head were obtained without and with  gadolinium. No areas of restricted diffusion are seen to suggest acute  infarct. The ventricles are normal in size. There is minimal  periventricular and deep white matter microangiopathic change. There is  no midline shift or mass effect. Intracranial flow voids appear intact.  No abnormality is seen on susceptibility weighted imaging. No abnormal  enhancement is seen following contrast administration. There is no  evidence of venous occlusion. There is some mucosal thickening within  the  ethmoid sinuses.       Impression:      1. No acute intracranial abnormality.  No abnormal enhancement.         This report was finalized on 5/12/2024 3:12 AM by Dr. Cara Ireland M.D on Workstation: [id number]       CT Angiogram Head [[id number]] Resulted: 05/12/24 0138     Updated: 05/12/24 0149    CT Angiogram Neck [[id number]] Resulted: 05/12/24 0138     Updated: 05/12/24 0149    XR Chest 1 View [[id number]] Collected: 05/11/24 1926     Updated: 05/11/24 1932    Narrative:      Portable chest radiograph     HISTORY: Syncope     TECHNIQUE: Single AP portable radiograph of the chest     COMPARISON: Chest radiograph 1/8/2019       Impression:      FINDINGS AND IMPRESSION:  No pulm consolidation, pleural effusion or pneumothorax is seen. Cardiac  silhouette is at the upper limits of normal borderline large in size.  Moderate asymmetric elevation of the diaphragm, as before.     This report was finalized on 5/11/2024 7:28 PM by Dr. Regulo Turner M.D  on Workstation: [id number]               I have reviewed the medications.  ---------------------------------------------------------------------------------------------  Assessment & Plan   Assessment/Problem List    Syncope      Plan:  Syncope  Elevated high-sensitivity troponin  High-sensitivity troponin 16, 13  EKG without evidence of ischemia  Consult to cardiology  Orthostatic vitals pending     Syncope versus seizure  Right leg weakness  CTA head and neck negative acute  MRI brain negative.  Consult to physical therapy  Consult to neurology     Hypertension  Hold Coreg for cardiology evaluation  Continue olmesartan 40mg/HCTZ 25mg-> Hospital auto substitution to losartan 100mg/25mg HCTZ    Disposition: Home    Follow-up after Discharge: PCP    This note will serve as a discharge summary    Tamara Dc, APRN 05/12/24 04:30 EDT    I have worn appropriate PPE during this patient encounter, sanitized my hands both with entering and exiting patient's room.      50  minutes has been spent by [facility] providers in the care of this patient while under observation status

## 2024-05-14 ENCOUNTER — TELEPHONE (OUTPATIENT)
Dept: CARDIOLOGY | Facility: CLINIC | Age: 74
End: 2024-05-14

## 2024-05-14 NOTE — TELEPHONE ENCOUNTER
Caller: Sol Rojas    Relationship to patient: Self    Best call back number: [phone]     Chief complaint: NA    Type of visit: HFU    Requested date: AROUND THE 4TH OF JUNE     If rescheduling, when is the original appointment: 06.04.24     Additional notes: PT STATES SHE IS CURRENTLY SCHEDULED FOR 06.04.24 AND SHE HAS ANOTHER APPT THAT DAY - SHE IS REQUESTING TO RSD TO ANY OTHER DAY THAT WEEK IF POSSIBLE -  NO AVAILABILITY SHOWING

## 2024-05-15 NOTE — PROGRESS NOTES
"  Patient: Sol Rojas  YOB: 1950  Date of Service: 5/15/2024    Chief Complaints: Left shoulder pain    Subjective:    History of Present Illness: Pt is seen in the office today with complaints of left shoulder pain I last saw her and thought she had capsulitis initially and then impingement she states she is doing great she did physical therapy we did an injection she is overall very happy with where she is        Allergies:   Allergies   Allergen Reactions    Augmentin [Amoxicillin-Pot Clavulanate] GI Intolerance    Hydrocodone Nausea Only and GI Intolerance    Latex Hives    Penicillins Itching, Nausea Only and GI Intolerance    Percocet [Oxycodone-Acetaminophen] GI Intolerance       Medications:   Home Medications:  Current Outpatient Medications on File Prior to Visit   Medication Sig    carvedilol (COREG) 3.125 MG tablet Take 1 tablet by mouth Daily.    ferrous sulfate 325 (65 FE) MG tablet Take 1 tablet by mouth Daily With Breakfast.    KLOR-CON 20 MEQ CR tablet Take 1 tablet by mouth Daily.    Magnesium 250 MG tablet Take  by mouth.    olmesartan-hydrochlorothiazide (BENICAR HCT) 40-25 MG per tablet Take 1 tablet by mouth Daily.    polyethyl glycol-propyl glycol (SYSTANE) 0.4-0.3 % solution ophthalmic solution (artificial tears) Every 1 (One) Hour As Needed.    vitamin D3 125 MCG (5000 UT) capsule capsule Take 1 capsule by mouth Daily.     No current facility-administered medications on file prior to visit.     Current Medications:  Scheduled Meds:  Continuous Infusions:No current facility-administered medications for this visit.    PRN Meds:.    I have reviewed the patient's medical history in detail and updated the computerized patient record.  Review and summarization of old records include:    Past Medical History:   Diagnosis Date    Ankle sprain     Left ankle    Arthritis     OSTEO    Bilateral knee pain     Chest pain     SAW A CARDIOLOGIST--PER PT, EVERYTHING WAS \"OKAY.\"    " Disease of thyroid gland     POLYP    Dry eye syndrome of both eyes     Hip arthrosis     Hypertension     Knee swelling     Left hip pain     Left knee pain     Obesity     Pulmonary hypertension         Past Surgical History:   Procedure Laterality Date    CATARACT EXTRACTION Bilateral     COLONOSCOPY N/A 04/13/2021    Procedure: COLONOSCOPY TO CECUM AND INTO TI;  Surgeon: Louie Smith MD;  Location: [facility] ENDOSCOPY;  Service: Gastroenterology;  Laterality: N/A;  pre: history of polyps  post: diverticulosis    COLONOSCOPY W/ BIOPSIES AND POLYPECTOMY      BENIGN    HERNIA REPAIR      UMBILICAL HERNIA REPAIR    JOINT REPLACEMENT  1/15/19    KNEE SURGERY  7/19/22    LEG SURGERY Left     cellulitis    TOTAL HIP ARTHROPLASTY Left 01/15/2019    Procedure: LEFT TOTAL HIP ARTHROPLASTY;  Surgeon: Hieu Orosco MD;  Location: [facility] MAIN OR;  Service: Orthopedics    TOTAL KNEE ARTHROPLASTY Left 07/19/2022    Procedure: LEFT TOTAL KNEE ARTHROPLASTY WITH SANTIAGO NAVIGATION;  Surgeon: Guilherme Smith MD;  Location:  [facility];  Service: Orthopedics;  Laterality: Left;    US GUIDED FINE NEEDLE ASPIRATION  04/15/2021        Social History     Occupational History    Not on file   Tobacco Use    Smoking status: Never     Passive exposure: Never    Smokeless tobacco: Never   Vaping Use    Vaping status: Never Used   Substance and Sexual Activity    Alcohol use: No    Drug use: No    Sexual activity: Not Currently     Partners: Male     Birth control/protection: Abstinence      Social History     Social History Narrative    Not on file        Family History   Problem Relation Age of Onset    Stroke Mother     Cancer Father     Malig Hyperthermia Neg Hx        ROS: 14 point review of systems was performed and was negative except for documented findings in HPI and today's encounter.     Allergies:   Allergies   Allergen Reactions    Augmentin [Amoxicillin-Pot Clavulanate] GI Intolerance    Hydrocodone Nausea Only and GI  Intolerance    Latex Hives    Penicillins Itching, Nausea Only and GI Intolerance    Percocet [Oxycodone-Acetaminophen] GI Intolerance     Constitutional:  Denies fever, shaking or chills   Eyes:  Denies change in visual acuity   HENT:  Denies nasal congestion or sore throat   Respiratory:  Denies cough or shortness of breath   Cardiovascular:  Denies chest pain or severe LE edema   GI:  Denies abdominal pain, nausea, vomiting, bloody stools or diarrhea   Musculoskeletal:  Numbness, tingling, or loss of motor function only as noted above in history of present illness.  : Denies painful urination or hematuria  Integument:  Denies rash, lesion or ulceration   Neurologic:  Denies headache or focal weakness  Endocrine:  Denies lymphadenopathy  Psych:  Denies confusion or change in mental status   Hem:  Denies active bleeding      Physical Exam: 74 y.o. female  Wt Readings from Last 3 Encounters:   05/12/24 90.7 kg (200 lb)   04/15/24 92.7 kg (204 lb 4.8 oz)   03/11/24 91.9 kg (202 lb 11.2 oz)       There is no height or weight on file to calculate BMI.    There were no vitals filed for this visit.  Vital signs reviewed.   General Appearance:    Alert, cooperative, in no acute distress                    Ortho exam    Physical exam of the left shoulder reveals no overlying skin changes no lymphedema no lymphadenopathy.  The patient can actively flex to 180, abduction is similar external rotation is to 50, internal rotation to the upper lumbar spine.  Rotator cuff strength is 4+ to 5 over 5 with isometric strength testing without symptoms.  The patient has good cervical range of motion full and asymptomatic no radicular symptoms and a normal elbow exam.  Patient has good distal pulses              Assessment: Left shoulder pain initially capsulitis she got her motion back and was more impingement I think clinically subjectively she is doing fine    Plan: She can continue progress her activities and follow-up as  needed  Follow up as indicated.  Ice, elevate, and rest as needed.  Discussed conservative measures of pain control including ice, bracing.  Edyta Wolff M.D.

## 2024-05-16 ENCOUNTER — OFFICE VISIT (OUTPATIENT)
Dept: ORTHOPEDIC SURGERY | Facility: CLINIC | Age: 74
End: 2024-05-16
Payer: MEDICARE

## 2024-05-16 VITALS — HEIGHT: 66 IN | BODY MASS INDEX: 32.85 KG/M2 | TEMPERATURE: 98.4 F | WEIGHT: 204.4 LBS

## 2024-05-16 DIAGNOSIS — M17.11 ARTHRITIS OF KNEE, RIGHT: ICD-10-CM

## 2024-05-16 DIAGNOSIS — R52 PAIN: Primary | ICD-10-CM

## 2024-05-16 RX ORDER — MULTIPLE VITAMINS W/ MINERALS TAB 9MG-400MCG
1 TAB ORAL DAILY
COMMUNITY

## 2024-05-16 RX ORDER — LIDOCAINE HYDROCHLORIDE 10 MG/ML
2 INJECTION, SOLUTION EPIDURAL; INFILTRATION; INTRACAUDAL; PERINEURAL
Status: COMPLETED | OUTPATIENT
Start: 2024-05-16 | End: 2024-05-16

## 2024-05-16 RX ORDER — METHYLPREDNISOLONE ACETATE 80 MG/ML
80 INJECTION, SUSPENSION INTRA-ARTICULAR; INTRALESIONAL; INTRAMUSCULAR; SOFT TISSUE
Status: COMPLETED | OUTPATIENT
Start: 2024-05-16 | End: 2024-05-16

## 2024-05-16 RX ADMIN — METHYLPREDNISOLONE ACETATE 80 MG: 80 INJECTION, SUSPENSION INTRA-ARTICULAR; INTRALESIONAL; INTRAMUSCULAR; SOFT TISSUE at 14:24

## 2024-05-16 RX ADMIN — LIDOCAINE HYDROCHLORIDE 2 ML: 10 INJECTION, SOLUTION EPIDURAL; INFILTRATION; INTRACAUDAL; PERINEURAL at 14:24

## 2024-05-16 NOTE — PROGRESS NOTES
Sol Rojas is here today for worsening Right knee pain. Knee injection was discussed with the patient in detail, including indication, risks, benefits, and alternatives. Verbal consent was given for the procedure. Injection site was aseptically prepared.    Radiology:   AP, lateral, 40 degree PA of the right knee obtained in the office today due to pain, with comparison views shows advanced tricompartmental degenerative changes, most significantly  medial and patellofemoral compartment with bone-on-bone contact, osteophyte formation and subchondral sclerosis      KNEE Injection Procedure Note:    Large Joint Arthrocentesis: R knee  Date/Time: 5/16/2024 2:24 PM  Consent given by: patient  Site marked: site marked  Timeout: Immediately prior to procedure a time out was called to verify the correct patient, procedure, equipment, support staff and site/side marked as required   Supporting Documentation  Indications: pain, joint swelling and diagnostic evaluation   Procedure Details  Location: knee - R knee  Preparation: Patient was prepped and draped in the usual sterile fashion  Needle gauge: 21G.  Medications administered: 80 mg methylPREDNISolone acetate 80 MG/ML; 2 mL lidocaine PF 1% 1 %  Patient tolerance: patient tolerated the procedure well with no immediate complications     Sol Rojas tolerated the procedure well. A Bandage was applied to the injection site. At the conclusion of the injection I discussed the importance of continued quad strengthening exercises on a daily basis. I will see the patient back if the symptoms should fail to improve or worsen.    Aileen Ulrich, KELLY  5/16/2024    Dictated Utilizing Dragon Dictation

## 2024-05-20 ENCOUNTER — OFFICE VISIT (OUTPATIENT)
Dept: ORTHOPEDIC SURGERY | Facility: CLINIC | Age: 74
End: 2024-05-20
Payer: MEDICARE

## 2024-05-20 VITALS — HEIGHT: 66 IN | TEMPERATURE: 98.2 F | WEIGHT: 203.2 LBS | BODY MASS INDEX: 32.66 KG/M2

## 2024-05-20 DIAGNOSIS — R52 PAIN: Primary | ICD-10-CM

## 2024-05-20 PROCEDURE — 99212 OFFICE O/P EST SF 10 MIN: CPT | Performed by: ORTHOPAEDIC SURGERY

## 2024-06-04 ENCOUNTER — OFFICE VISIT (OUTPATIENT)
Dept: CARDIOLOGY | Facility: CLINIC | Age: 74
End: 2024-06-04
Payer: MEDICARE

## 2024-06-04 VITALS
BODY MASS INDEX: 32.78 KG/M2 | HEART RATE: 63 BPM | WEIGHT: 204 LBS | SYSTOLIC BLOOD PRESSURE: 113 MMHG | DIASTOLIC BLOOD PRESSURE: 72 MMHG | HEIGHT: 66 IN

## 2024-06-04 DIAGNOSIS — R55 SYNCOPE AND COLLAPSE: ICD-10-CM

## 2024-06-04 DIAGNOSIS — R94.31 ABNORMAL ELECTROCARDIOGRAM (ECG) (EKG): ICD-10-CM

## 2024-06-04 DIAGNOSIS — R06.83 SNORING: Primary | ICD-10-CM

## 2024-06-04 DIAGNOSIS — I47.29 NSVT (NONSUSTAINED VENTRICULAR TACHYCARDIA): ICD-10-CM

## 2024-06-04 NOTE — PROGRESS NOTES
" Subjective:        Sol Rojas is a 74 y.o. female who here for follow up    Chief Complaint   Patient presents with    Hospital Follow Up Visit       HPI    This is a 74-year-old female who is known with this provider with hypertension, pulmonary hypertension, MR and TR, seen in office today in follow-up after hospitalization.   She was admitted 5/11/2024 through 5/12/2024 for syncope while sitting at the table.  She had donated blood earlier during the day. Echo 5/12/2024 EF 61 to 65%, grade 1 LV diastolic dysfunction mild AR, MR, TR.  Workup was unremarkable and she was discharged home on 2-week Holter monitor that revealed average heart rate 72, minimum heart rate of 40, 14 beat run VT    The following portions of the patient's history were reviewed and updated as appropriate: allergies, current medications, past family history, past medical history, past social history, past surgical history and problem list.    Past Medical History:   Diagnosis Date    Ankle sprain     Left ankle    Arthritis     OSTEO    Bilateral knee pain     Chest pain     SAW A CARDIOLOGIST--PER PT, EVERYTHING WAS \"OKAY.\"    Disease of thyroid gland     POLYP    Dry eye syndrome of both eyes     Frozen shoulder 3/11/24    Hip arthrosis     Hypertension     Knee swelling     Left hip pain     Left knee pain     Obesity     Pulmonary hypertension          reports that she has never smoked. She has never been exposed to tobacco smoke. She has never used smokeless tobacco. She reports that she does not drink alcohol and does not use drugs.     Family History   Problem Relation Age of Onset    Stroke Mother     Cancer Father     Malig Hyperthermia Neg Hx        ROS     Review of Systems  Constitutional: No wt loss, fever, fatigue  Gastrointestinal: No nausea, abdominal pain  Behavioral/Psych: No insomnia or anxiety  Cardiovascular no chest pain or shortness of breath      Objective:           Vitals and nursing note reviewed. "   Constitutional:       Appearance: Well-developed.   HENT:      Head: Normocephalic.      Right Ear: External ear normal.      Left Ear: External ear normal.   Neck:      Vascular: No JVD.   Pulmonary:      Effort: Pulmonary effort is normal. No respiratory distress.      Breath sounds: Normal breath sounds. No stridor. No rales.   Cardiovascular:      Normal rate. Regular rhythm.      No gallop.    Pulses:     Intact distal pulses.   Edema:     Peripheral edema absent.   Abdominal:      General: Bowel sounds are normal. There is no distension.      Palpations: Abdomen is soft.      Tenderness: There is no abdominal tenderness. There is no guarding.   Musculoskeletal: Normal range of motion.         General: No tenderness.      Cervical back: Normal range of motion. Skin:     General: Skin is warm.   Neurological:      Mental Status: Alert and oriented to person, place, and time.      Deep Tendon Reflexes: Reflexes are normal and symmetric.   Psychiatric:         Judgment: Judgment normal.         Procedures    Interpretation Summary         Left ventricular systolic function is normal. Left ventricular ejection fraction appears to be 61 - 65%.    Left ventricular diastolic function is consistent with (grade I) impaired relaxation.    Normal right ventricular cavity size and systolic function noted.    The left atrial cavity is moderately dilated.    Mild aortic valve regurgitation is present.    Calculated right ventricular systolic pressure from tricuspid regurgitation is 23 mmHg.    There is no evidence of pericardial effusion.      Current Outpatient Medications:     carvedilol (COREG) 3.125 MG tablet, Take 1 tablet by mouth Daily., Disp: 30 tablet, Rfl: 0    ferrous sulfate 325 (65 FE) MG tablet, Take 1 tablet by mouth Daily With Breakfast., Disp: , Rfl:     KLOR-CON 20 MEQ CR tablet, Take 1 tablet by mouth Daily., Disp: , Rfl:     Magnesium 250 MG tablet, Take  by mouth., Disp: , Rfl:     multivitamin with  minerals tablet tablet, Take 1 tablet by mouth Daily., Disp: , Rfl:     olmesartan-hydrochlorothiazide (BENICAR HCT) 40-25 MG per tablet, Take 1 tablet by mouth Daily., Disp: , Rfl:     polyethyl glycol-propyl glycol (SYSTANE) 0.4-0.3 % solution ophthalmic solution (artificial tears), Every 1 (One) Hour As Needed., Disp: , Rfl:     vitamin D3 125 MCG (5000 UT) capsule capsule, Take 1 capsule by mouth Daily., Disp: , Rfl:      Assessment:        Patient Active Problem List   Diagnosis    Pain in left shin    Hyperglycemia    H/O total hip arthroplasty, left    Preoperative clearance    Abnormal electrocardiogram (ECG) (EKG)     SOB (shortness of breath)    Abnormal cardiac function test    Primary hypertension    Arthritis of left knee               Plan:   Syncope/abnormal holter/NSVT: holter with nsvt. Discussed loop recorder and she is agreeable to proceed with loop. Continue coreg, unable to increase due to BP. Check stress test.     It was explained to the patient that stress testing carries 85% specificity/sensitivity, and does not rule out future cardiac event.  Risks of the procedure were explained to the patient including shortness of breath, induction of myocardial infarction, and dizziness.  Patient is agreeable to proceeding with stress testing.   I have reviewed loop recorder procedure/risks/benefits (allergy,arrythmia,bleeding,pnuemothorax,CVA,death) with the patient/family. All questions were answered. Pt/family voice understanding and agreement with treatment plan.     2. Snoring/altered breathing while asleep: will refer for sleep study.     3. Hypertension: BP controlled, continue coreg and olmesartan/hctz    Importance of controlling hypertension and blood pressure  checkup on the regular basis has been explained. Hypertension as a silent killer has been discussed. Risk reduction of the weight and regular exercises to control the hypertension has been explained.               No diagnosis  found.    There are no diagnoses linked to this encounter.    COUNSELING:more Dennis was given to patient for the following topics: diagnostic results, risk factor reductions, impressions, risks and benefits of treatment options and importance of treatment compliance .       SMOKING COUNSELING:denies    Loop recorder   Cardiolite  Follow up      Sincerely,   KELLY Cartwright  [facility]  06/04/24  12:58 EDT    EMR Dragon/Transcription disclaimer:   Much of this encounter note is an electronic transcription/translation of spoken language to printed text. The electronic translation of spoken language may permit erroneous, or at times, nonsensical words or phrases to be inadvertently transcribed; Although I have reviewed the note for such errors, some may still exist.

## 2024-06-07 ENCOUNTER — APPOINTMENT (OUTPATIENT)
Dept: CT IMAGING | Facility: HOSPITAL | Age: 74
DRG: 982 | End: 2024-06-07
Payer: MEDICARE

## 2024-06-07 ENCOUNTER — HOSPITAL ENCOUNTER (OUTPATIENT)
Facility: HOSPITAL | Age: 74
Setting detail: HOSPITAL OUTPATIENT SURGERY
Discharge: HOME | DRG: 982 | End: 2024-06-07
Attending: INTERNAL MEDICINE | Admitting: INTERNAL MEDICINE
Payer: MEDICARE

## 2024-06-07 ENCOUNTER — APPOINTMENT (OUTPATIENT)
Dept: GENERAL RADIOLOGY | Facility: HOSPITAL | Age: 74
DRG: 982 | End: 2024-06-07
Payer: MEDICARE

## 2024-06-07 ENCOUNTER — HOSPITAL ENCOUNTER (INPATIENT)
Facility: HOSPITAL | Age: 74
LOS: 5 days | Discharge: HOME | DRG: 982 | End: 2024-06-12
Attending: EMERGENCY MEDICINE | Admitting: HOSPITALIST
Payer: MEDICARE

## 2024-06-07 VITALS
WEIGHT: 204 LBS | RESPIRATION RATE: 16 BRPM | TEMPERATURE: 97.2 F | HEIGHT: 66 IN | BODY MASS INDEX: 32.78 KG/M2 | OXYGEN SATURATION: 98 % | SYSTOLIC BLOOD PRESSURE: 134 MMHG | HEART RATE: 60 BPM | DIASTOLIC BLOOD PRESSURE: 80 MMHG

## 2024-06-07 DIAGNOSIS — R55 SYNCOPE AND COLLAPSE: ICD-10-CM

## 2024-06-07 DIAGNOSIS — R11.2 NAUSEA AND VOMITING, UNSPECIFIED VOMITING TYPE: ICD-10-CM

## 2024-06-07 DIAGNOSIS — I47.29 NSVT (NONSUSTAINED VENTRICULAR TACHYCARDIA): ICD-10-CM

## 2024-06-07 DIAGNOSIS — R10.84 GENERALIZED ABDOMINAL PAIN: ICD-10-CM

## 2024-06-07 DIAGNOSIS — K56.609 SMALL BOWEL OBSTRUCTION: Primary | ICD-10-CM

## 2024-06-07 LAB
ALBUMIN SERPL-MCNC: 3.6 G/DL (ref 3.5–5.2)
ALBUMIN/GLOB SERPL: 1.1 G/DL
ALP SERPL-CCNC: 86 U/L (ref 39–117)
ALT SERPL W P-5'-P-CCNC: 11 U/L (ref 1–33)
ANION GAP SERPL CALCULATED.3IONS-SCNC: 13 MMOL/L (ref 5–15)
AST SERPL-CCNC: 14 U/L (ref 1–32)
BASOPHILS # BLD AUTO: 0.02 10*3/MM3 (ref 0–0.2)
BASOPHILS NFR BLD AUTO: 0.2 % (ref 0–1.5)
BILIRUB SERPL-MCNC: 0.7 MG/DL (ref 0–1.2)
BUN SERPL-MCNC: 19 MG/DL (ref 8–23)
BUN/CREAT SERPL: 19.6 (ref 7–25)
CALCIUM SPEC-SCNC: 9.3 MG/DL (ref 8.6–10.5)
CHLORIDE SERPL-SCNC: 102 MMOL/L (ref 98–107)
CK SERPL-CCNC: 66 U/L (ref 20–180)
CO2 SERPL-SCNC: 28 MMOL/L (ref 22–29)
CREAT SERPL-MCNC: 0.97 MG/DL (ref 0.57–1)
D-LACTATE SERPL-SCNC: 1.1 MMOL/L (ref 0.5–2)
DEPRECATED RDW RBC AUTO: 43.7 FL (ref 37–54)
EGFRCR SERPLBLD CKD-EPI 2021: 62.2 ML/MIN/1.73
EOSINOPHIL # BLD AUTO: 0.02 10*3/MM3 (ref 0–0.4)
EOSINOPHIL NFR BLD AUTO: 0.2 % (ref 0.3–6.2)
ERYTHROCYTE [DISTWIDTH] IN BLOOD BY AUTOMATED COUNT: 13 % (ref 12.3–15.4)
GEN 5 2HR TROPONIN T REFLEX: 14 NG/L
GLOBULIN UR ELPH-MCNC: 3.2 GM/DL
GLUCOSE SERPL-MCNC: 119 MG/DL (ref 65–99)
HCT VFR BLD AUTO: 41.2 % (ref 34–46.6)
HGB BLD-MCNC: 13.1 G/DL (ref 12–15.9)
IMM GRANULOCYTES # BLD AUTO: 0.03 10*3/MM3 (ref 0–0.05)
IMM GRANULOCYTES NFR BLD AUTO: 0.3 % (ref 0–0.5)
LIPASE SERPL-CCNC: 20 U/L (ref 13–60)
LYMPHOCYTES # BLD AUTO: 0.83 10*3/MM3 (ref 0.7–3.1)
LYMPHOCYTES NFR BLD AUTO: 8.6 % (ref 19.6–45.3)
MAGNESIUM SERPL-MCNC: 2.1 MG/DL (ref 1.6–2.4)
MCH RBC QN AUTO: 29.1 PG (ref 26.6–33)
MCHC RBC AUTO-ENTMCNC: 31.8 G/DL (ref 31.5–35.7)
MCV RBC AUTO: 91.6 FL (ref 79–97)
MONOCYTES # BLD AUTO: 0.81 10*3/MM3 (ref 0.1–0.9)
MONOCYTES NFR BLD AUTO: 8.4 % (ref 5–12)
NEUTROPHILS NFR BLD AUTO: 7.9 10*3/MM3 (ref 1.7–7)
NEUTROPHILS NFR BLD AUTO: 82.3 % (ref 42.7–76)
NRBC BLD AUTO-RTO: 0 /100 WBC (ref 0–0.2)
NT-PROBNP SERPL-MCNC: 74 PG/ML (ref 0–900)
PLATELET # BLD AUTO: 243 10*3/MM3 (ref 140–450)
PMV BLD AUTO: 9 FL (ref 6–12)
POTASSIUM SERPL-SCNC: 3.9 MMOL/L (ref 3.5–5.2)
PROT SERPL-MCNC: 6.8 G/DL (ref 6–8.5)
RBC # BLD AUTO: 4.5 10*6/MM3 (ref 3.77–5.28)
SODIUM SERPL-SCNC: 143 MMOL/L (ref 136–145)
TROPONIN T DELTA: 0 NG/L
TROPONIN T SERPL HS-MCNC: 14 NG/L
WBC NRBC COR # BLD AUTO: 9.61 10*3/MM3 (ref 3.4–10.8)

## 2024-06-07 PROCEDURE — 82550 ASSAY OF CK (CPK): CPT | Performed by: EMERGENCY MEDICINE

## 2024-06-07 PROCEDURE — 33285 INSJ SUBQ CAR RHYTHM MNTR: CPT | Performed by: INTERNAL MEDICINE

## 2024-06-07 PROCEDURE — C1764 EVENT RECORDER, CARDIAC: HCPCS | Performed by: INTERNAL MEDICINE

## 2024-06-07 PROCEDURE — 84484 ASSAY OF TROPONIN QUANT: CPT | Performed by: EMERGENCY MEDICINE

## 2024-06-07 PROCEDURE — 80053 COMPREHEN METABOLIC PANEL: CPT | Performed by: EMERGENCY MEDICINE

## 2024-06-07 PROCEDURE — 83690 ASSAY OF LIPASE: CPT | Performed by: EMERGENCY MEDICINE

## 2024-06-07 PROCEDURE — 36415 COLL VENOUS BLD VENIPUNCTURE: CPT

## 2024-06-07 PROCEDURE — 25810000003 SODIUM CHLORIDE 0.9 % SOLUTION: Performed by: EMERGENCY MEDICINE

## 2024-06-07 PROCEDURE — 25010000002 ONDANSETRON PER 1 MG: Performed by: EMERGENCY MEDICINE

## 2024-06-07 PROCEDURE — 0JH602Z INSERTION OF MONITORING DEVICE INTO CHEST SUBCUTANEOUS TISSUE AND FASCIA, OPEN APPROACH: ICD-10-PCS | Performed by: INTERNAL MEDICINE

## 2024-06-07 PROCEDURE — 25510000001 IOPAMIDOL 61 % SOLUTION: Performed by: EMERGENCY MEDICINE

## 2024-06-07 PROCEDURE — 83880 ASSAY OF NATRIURETIC PEPTIDE: CPT | Performed by: EMERGENCY MEDICINE

## 2024-06-07 PROCEDURE — 83735 ASSAY OF MAGNESIUM: CPT | Performed by: EMERGENCY MEDICINE

## 2024-06-07 PROCEDURE — 93010 ELECTROCARDIOGRAM REPORT: CPT | Performed by: INTERNAL MEDICINE

## 2024-06-07 PROCEDURE — 99285 EMERGENCY DEPT VISIT HI MDM: CPT

## 2024-06-07 PROCEDURE — 85025 COMPLETE CBC W/AUTO DIFF WBC: CPT | Performed by: EMERGENCY MEDICINE

## 2024-06-07 PROCEDURE — 71045 X-RAY EXAM CHEST 1 VIEW: CPT

## 2024-06-07 PROCEDURE — 83605 ASSAY OF LACTIC ACID: CPT | Performed by: EMERGENCY MEDICINE

## 2024-06-07 PROCEDURE — 74177 CT ABD & PELVIS W/CONTRAST: CPT

## 2024-06-07 PROCEDURE — 25810000003 SODIUM CHLORIDE 0.9 % SOLUTION: Performed by: INTERNAL MEDICINE

## 2024-06-07 PROCEDURE — 25010000002 MIDAZOLAM PER 1 MG: Performed by: INTERNAL MEDICINE

## 2024-06-07 PROCEDURE — 25010000002 CEFAZOLIN PER 500 MG: Performed by: INTERNAL MEDICINE

## 2024-06-07 PROCEDURE — 93005 ELECTROCARDIOGRAM TRACING: CPT | Performed by: EMERGENCY MEDICINE

## 2024-06-07 DEVICE — ICM LP/RECRD BIOMONITOR3M: Type: IMPLANTABLE DEVICE | Site: CHEST | Status: FUNCTIONAL

## 2024-06-07 RX ORDER — LIDOCAINE HYDROCHLORIDE AND EPINEPHRINE 10; 10 MG/ML; UG/ML
INJECTION, SOLUTION INFILTRATION; PERINEURAL
Status: DISCONTINUED | OUTPATIENT
Start: 2024-06-07 | End: 2024-06-07 | Stop reason: HOSPADM

## 2024-06-07 RX ORDER — PANTOPRAZOLE SODIUM 40 MG/10ML
40 INJECTION, POWDER, LYOPHILIZED, FOR SOLUTION INTRAVENOUS ONCE
Status: COMPLETED | OUTPATIENT
Start: 2024-06-07 | End: 2024-06-07

## 2024-06-07 RX ORDER — SODIUM CHLORIDE 9 MG/ML
100 INJECTION, SOLUTION INTRAVENOUS CONTINUOUS
Status: DISCONTINUED | OUTPATIENT
Start: 2024-06-07 | End: 2024-06-10

## 2024-06-07 RX ORDER — ONDANSETRON 2 MG/ML
4 INJECTION INTRAMUSCULAR; INTRAVENOUS ONCE
Status: COMPLETED | OUTPATIENT
Start: 2024-06-07 | End: 2024-06-07

## 2024-06-07 RX ORDER — SODIUM CHLORIDE 0.9 % (FLUSH) 0.9 %
10 SYRINGE (ML) INJECTION EVERY 12 HOURS SCHEDULED
Status: DISCONTINUED | OUTPATIENT
Start: 2024-06-07 | End: 2024-06-07 | Stop reason: HOSPADM

## 2024-06-07 RX ORDER — FENTANYL CITRATE 50 UG/ML
25 INJECTION, SOLUTION INTRAMUSCULAR; INTRAVENOUS ONCE
Status: DISCONTINUED | OUTPATIENT
Start: 2024-06-07 | End: 2024-06-12 | Stop reason: HOSPADM

## 2024-06-07 RX ORDER — MIDAZOLAM HYDROCHLORIDE 1 MG/ML
INJECTION INTRAMUSCULAR; INTRAVENOUS
Status: DISCONTINUED | OUTPATIENT
Start: 2024-06-07 | End: 2024-06-07 | Stop reason: HOSPADM

## 2024-06-07 RX ORDER — SODIUM CHLORIDE 9 MG/ML
50 INJECTION, SOLUTION INTRAVENOUS CONTINUOUS
Status: DISCONTINUED | OUTPATIENT
Start: 2024-06-07 | End: 2024-06-07 | Stop reason: HOSPADM

## 2024-06-07 RX ORDER — SODIUM CHLORIDE 0.9 % (FLUSH) 0.9 %
10 SYRINGE (ML) INJECTION AS NEEDED
Status: DISCONTINUED | OUTPATIENT
Start: 2024-06-07 | End: 2024-06-12 | Stop reason: HOSPADM

## 2024-06-07 RX ADMIN — PANTOPRAZOLE SODIUM 40 MG: 40 INJECTION, POWDER, FOR SOLUTION INTRAVENOUS at 17:43

## 2024-06-07 RX ADMIN — IOPAMIDOL 85 ML: 612 INJECTION, SOLUTION INTRAVENOUS at 20:23

## 2024-06-07 RX ADMIN — SODIUM CHLORIDE 1000 ML: 9 INJECTION, SOLUTION INTRAVENOUS at 17:41

## 2024-06-07 RX ADMIN — SODIUM CHLORIDE 50 ML/HR: 9 INJECTION, SOLUTION INTRAVENOUS at 08:00

## 2024-06-07 RX ADMIN — ONDANSETRON 4 MG: 2 INJECTION INTRAMUSCULAR; INTRAVENOUS at 17:42

## 2024-06-07 RX ADMIN — SODIUM CHLORIDE 100 ML/HR: 9 INJECTION, SOLUTION INTRAVENOUS at 22:32

## 2024-06-07 NOTE — ED TRIAGE NOTES
Pt to ED via EMS with complaints of SOA and faint. EMS called by . Pt was d/c from outpt cardiology this AM around 1030 where loop recorder was placed. EMS reports pt to be bradycardic and hypotensive. 1 mg Atropine was given. 4 mg Zofran. 500 mL normal saline.   Pts initial heart rate was 43 improved to 90s after intervention.

## 2024-06-07 NOTE — ED PROVIDER NOTES
EMERGENCY DEPARTMENT ENCOUNTER    Room Number:  08/08  PCP: Reyes, Rosenberg Acosta, MD  Independent Historians: Patient and EMS    HPI:  Chief Complaint: Vomiting and abdominal pain    A complete HPI/ROS/PMH/PSH/SH/FH are unobtainable due to: None    Chronic or social conditions impacting patient care (Social Determinants of Health): None      Context: Sol Rojas is a 74 y.o. female with a medical history of htn and abnl holter who presents to the ED c/o acute abdominal pain and vomiting along with some shortness of breath.  Patient remarks that she thinks she had food poisoning.  Patient had loose stool as well but that resolved.  Patient had a loop recorder implanted earlier today for an episode of unresponsiveness that occurred recently.  Family member reports that patient returned a Zio patch after that visit and was called with abnormal episodes of nonsustained V. tach while asleep so was told to have a loop recorder implanted for further characterization.  She says she did well with the procedure but when she got home she ate something and this afternoon started with abdominal pain and vomiting.        Review of prior external notes (non-ED) -and- Review of prior external test results outside of this encounter: I reviewed notes from recent encounter where patient had an episode of unresponsiveness where her  had to shake her vigorously to get her to wake up.  I reviewed cardiology office notes which included a plan for a stress test as well as a loop recorder that was placed today.    She had a recent echocardiogram with results as follows:    •  Left ventricular systolic function is normal. Left ventricular ejection fraction appears to be 61 - 65%.  •  Left ventricular diastolic function is consistent with (grade I) impaired relaxation.  •  Normal right ventricular cavity size and systolic function noted.  •  The left atrial cavity is moderately dilated.  •  Mild aortic valve regurgitation is  present.  •  Calculated right ventricular systolic pressure from tricuspid regurgitation is 23 mmHg.  •  There is no evidence of pericardial effusion.    Prescription drug monitoring program review:     N/A    PAST MEDICAL HISTORY  Active Ambulatory Problems     Diagnosis Date Noted   • Pain in left shin 05/01/2016   • Hyperglycemia 05/01/2016   • H/O total hip arthroplasty, left 01/15/2019   • Preoperative clearance 04/26/2022   • Abnormal electrocardiogram (ECG) (EKG)  04/26/2022   • SOB (shortness of breath) 04/26/2022   • Abnormal cardiac function test 05/05/2022   • Primary hypertension 05/05/2022   • Arthritis of left knee 05/20/2022   • NSVT (nonsustained ventricular tachycardia) 06/04/2024   • Syncope and collapse 06/04/2024     Resolved Ambulatory Problems     Diagnosis Date Noted   • Syncope 04/30/2016   • Dehydration 04/30/2016   • Azotemia 05/01/2016   • Hypoxemia 05/01/2016   • Hypermagnesemia 05/01/2016   • Orthostatic hypotension 05/01/2016   • ARF (acute renal failure) 05/01/2016   • Syncope 05/11/2024     Past Medical History:   Diagnosis Date   • Ankle sprain    • Arthritis    • Bilateral knee pain    • Chest pain    • Disease of thyroid gland    • Dry eye syndrome of both eyes    • Frozen shoulder 3/11/24   • Hip arthrosis    • Hypertension    • Hypertension    • Knee swelling    • Left hip pain    • Left knee pain    • Obesity    • Pulmonary hypertension          PAST SURGICAL HISTORY  Past Surgical History:   Procedure Laterality Date   • CATARACT EXTRACTION Bilateral    • COLONOSCOPY N/A 04/13/2021    Procedure: COLONOSCOPY TO CECUM AND INTO TI;  Surgeon: Louie Smith MD;  Location: [facility] ENDOSCOPY;  Service: Gastroenterology;  Laterality: N/A;  pre: history of polyps  post: diverticulosis   • COLONOSCOPY W/ BIOPSIES AND POLYPECTOMY      BENIGN   • HERNIA REPAIR      UMBILICAL HERNIA REPAIR   • LEG SURGERY Left     cellulitis   • TOTAL HIP ARTHROPLASTY Left 01/15/2019    Procedure: LEFT  TOTAL HIP ARTHROPLASTY;  Surgeon: Hieu Orosco MD;  Location: [facility] MAIN OR;  Service: Orthopedics   • TOTAL KNEE ARTHROPLASTY Left 07/19/2022    Procedure: LEFT TOTAL KNEE ARTHROPLASTY WITH SANTIAGO NAVIGATION;  Surgeon: Guilherme Smith MD;  Location: [facility] OR [facility];  Service: Orthopedics;  Laterality: Left;   • US GUIDED FINE NEEDLE ASPIRATION  04/15/2021    thyroid         FAMILY HISTORY  Family History   Problem Relation Age of Onset   • Stroke Mother    • Cancer Father    • Malig Hyperthermia Neg Hx          SOCIAL HISTORY  Social History     Socioeconomic History   • Marital status:    Tobacco Use   • Smoking status: Never     Passive exposure: Never   • Smokeless tobacco: Never   Vaping Use   • Vaping status: Never Used   Substance and Sexual Activity   • Alcohol use: No   • Drug use: Never   • Sexual activity: Not Currently     Partners: Male     Birth control/protection: Abstinence         ALLERGIES  Augmentin [amoxicillin-pot clavulanate], Hydrocodone, Latex, Penicillins, and Percocet [oxycodone-acetaminophen]        REVIEW OF SYSTEMS  Review of Systems  Included in HPI  All systems reviewed and negative except for those discussed in HPI.      PHYSICAL EXAM    I have reviewed the triage vital signs and nursing notes.    ED Triage Vitals   Temp Heart Rate Resp BP SpO2   06/07/24 1705 06/07/24 1705 06/07/24 1707 06/07/24 1705 06/07/24 1705   96.7 °F (35.9 °C) 96 16 125/91 100 %      Temp src Heart Rate Source Patient Position BP Location FiO2 (%)   -- -- -- -- --              Physical Exam  GENERAL: Pleasant cooperative female, alert, no acute distress  SKIN: Warm, dry  HENT: Normocephalic, atraumatic  EYES: no scleral icterus, EOMI  CV: regular rhythm, regular rate  RESPIRATORY: normal effort, lungs clear, no wheezing, no rhonchi, left mid chest with 4 x 4's in place from procedure done today  ABDOMEN: soft, diffuse tenderness to palpation with no rebound and no guarding,  nondistended  MUSCULOSKELETAL: no deformity  NEURO: alert, moves all extremities, follows commands                                                                   LAB RESULTS  Recent Results (from the past 24 hour(s))   ECG 12 Lead Dyspnea    Collection Time: 06/07/24  5:34 PM   Result Value Ref Range    QT Interval 351 ms    QTC Interval 430 ms   Comprehensive Metabolic Panel    Collection Time: 06/07/24  5:44 PM    Specimen: Arm, Left; Blood   Result Value Ref Range    Glucose 119 (H) 65 - 99 mg/dL    BUN 19 8 - 23 mg/dL    Creatinine 0.97 0.57 - 1.00 mg/dL    Sodium 143 136 - 145 mmol/L    Potassium 3.9 3.5 - 5.2 mmol/L    Chloride 102 98 - 107 mmol/L    CO2 28.0 22.0 - 29.0 mmol/L    Calcium 9.3 8.6 - 10.5 mg/dL    Total Protein 6.8 6.0 - 8.5 g/dL    Albumin 3.6 3.5 - 5.2 g/dL    ALT (SGPT) 11 1 - 33 U/L    AST (SGOT) 14 1 - 32 U/L    Alkaline Phosphatase 86 39 - 117 U/L    Total Bilirubin 0.7 0.0 - 1.2 mg/dL    Globulin 3.2 gm/dL    A/G Ratio 1.1 g/dL    BUN/Creatinine Ratio 19.6 7.0 - 25.0    Anion Gap 13.0 5.0 - 15.0 mmol/L    eGFR 62.2 >60.0 mL/min/1.73   Lipase    Collection Time: 06/07/24  5:44 PM    Specimen: Arm, Left; Blood   Result Value Ref Range    Lipase 20 13 - 60 U/L   BNP    Collection Time: 06/07/24  5:44 PM    Specimen: Arm, Left; Blood   Result Value Ref Range    proBNP 74.0 0.0 - 900.0 pg/mL   High Sensitivity Troponin T    Collection Time: 06/07/24  5:44 PM    Specimen: Arm, Left; Blood   Result Value Ref Range    HS Troponin T 14 (H) <14 ng/L   Lactic Acid, Plasma    Collection Time: 06/07/24  5:44 PM    Specimen: Arm, Left; Blood   Result Value Ref Range    Lactate 1.1 0.5 - 2.0 mmol/L   CK    Collection Time: 06/07/24  5:44 PM    Specimen: Arm, Left; Blood   Result Value Ref Range    Creatine Kinase 66 20 - 180 U/L   Magnesium    Collection Time: 06/07/24  5:44 PM    Specimen: Arm, Left; Blood   Result Value Ref Range    Magnesium 2.1 1.6 - 2.4 mg/dL   CBC Auto Differential     Collection Time: 06/07/24  5:44 PM    Specimen: Arm, Left; Blood   Result Value Ref Range    WBC 9.61 3.40 - 10.80 10*3/mm3    RBC 4.50 3.77 - 5.28 10*6/mm3    Hemoglobin 13.1 12.0 - 15.9 g/dL    Hematocrit 41.2 34.0 - 46.6 %    MCV 91.6 79.0 - 97.0 fL    MCH 29.1 26.6 - 33.0 pg    MCHC 31.8 31.5 - 35.7 g/dL    RDW 13.0 12.3 - 15.4 %    RDW-SD 43.7 37.0 - 54.0 fl    MPV 9.0 6.0 - 12.0 fL    Platelets 243 140 - 450 10*3/mm3    Neutrophil % 82.3 (H) 42.7 - 76.0 %    Lymphocyte % 8.6 (L) 19.6 - 45.3 %    Monocyte % 8.4 5.0 - 12.0 %    Eosinophil % 0.2 (L) 0.3 - 6.2 %    Basophil % 0.2 0.0 - 1.5 %    Immature Grans % 0.3 0.0 - 0.5 %    Neutrophils, Absolute 7.90 (H) 1.70 - 7.00 10*3/mm3    Lymphocytes, Absolute 0.83 0.70 - 3.10 10*3/mm3    Monocytes, Absolute 0.81 0.10 - 0.90 10*3/mm3    Eosinophils, Absolute 0.02 0.00 - 0.40 10*3/mm3    Basophils, Absolute 0.02 0.00 - 0.20 10*3/mm3    Immature Grans, Absolute 0.03 0.00 - 0.05 10*3/mm3    nRBC 0.0 0.0 - 0.2 /100 WBC   High Sensitivity Troponin T 2Hr    Collection Time: 06/07/24  8:01 PM    Specimen: Blood   Result Value Ref Range    HS Troponin T 14 (H) <14 ng/L    Troponin T Delta 0 >=-4 - <+4 ng/L         RADIOLOGY  CT Abdomen Pelvis With Contrast    Result Date: 6/7/2024  CT ABDOMEN AND PELVIS WITH CONTRAST  HISTORY: 74 years of age, female.  abdominal pain, nausea, vomiting and diarrhea.   TECHNIQUE:  CT includes axial imaging from the lung bases to the trochanters with intravenous contrast and without use of oral contrast. Data reconstructed in coronal and sagittal planes. Radiation dose reduction techniques were utilized, including automated exposure control and exposure modulation based on body size.  COMPARISON: CT abdomen and pelvis 05/23/2021.  FINDINGS: There is elevation of the right hemidiaphragm. Within the posterior superior right lobe of the liver there is a 1.5 cm hepatic cyst. Gallbladder, spleen, adrenal glands, kidneys, and pancreas appear within  normal limits.  There is abnormal long segment of small-bowel dilatation and small bowel air-fluid levels are present. Small-bowel loops are dilated to 3.5 cm. Distal small-bowel loops are decompressed and the findings are consistent with small-bowel obstruction.  There is mild free fluid in the pelvis. Colon is mostly decompressed.      Small-bowel obstruction with long segment of abnormally dilated small-bowel loops with air/fluid levels. Distal small-bowel loops and the colon are decompressed. Mild free fluid in the pelvis.  Discussed with Dr. Melo 06/07/2024 at 8:35 p.m.  Radiation dose reduction techniques were utilized, including automated exposure control and exposure modulation based on body size.   This report was finalized on 6/7/2024 8:44 PM by Dr. Osei Dixon M.D on Workstation: [id number]      XR Chest 1 View    Result Date: 6/7/2024  Emergency portable view of the chest on 6/7/2024  CLINICAL HISTORY: Shortness of breath and high blood pressure.  This is correlated to a prior chest x-ray on 5/11/2024 as well as a chest x-ray on 1/18/2019.  FINDINGS: The cardiomediastinal silhouette and the pulmonary vasculature are within normal limits. There is moderate chronic elevation of the right hemidiaphragm. The lungs are clear. The costophrenic angles are sharp.       1.. No active disease is seen in the chest with no change over 2 prior chest x-rays dating back to 1/8/2019. There is moderate chronic elevation the right hemidiaphragm. The etiology of this patient's shortness of breath is not established on this exam.  This report was finalized on 6/7/2024 6:22 PM by Dr. Georges Fan M.D on Workstation: [id number]      EP/CRM Study    Result Date: 6/7/2024  •  Successful loop recorder implantation        MEDICATIONS GIVEN IN ER  Medications   sodium chloride 0.9 % flush 10 mL (has no administration in time range)   fentaNYL citrate (PF) (SUBLIMAZE) injection 25 mcg (0 mcg Intravenous Hold 6/7/24 2212)    sodium chloride 0.9 % infusion (100 mL/hr Intravenous New Bag 6/7/24 2232)   sodium chloride 0.9 % bolus 1,000 mL (0 mL Intravenous Stopped 6/7/24 2235)   ondansetron (ZOFRAN) injection 4 mg (4 mg Intravenous Given 6/7/24 1742)   pantoprazole (PROTONIX) injection 40 mg (40 mg Intravenous Given 6/7/24 1743)   iopamidol (ISOVUE-300) 61 % injection 100 mL (85 mL Intravenous Given 6/7/24 2023)         ORDERS PLACED DURING THIS VISIT:  Orders Placed This Encounter   Procedures   • XR Chest 1 View   • CT Abdomen Pelvis With Contrast   • Comprehensive Metabolic Panel   • Lipase   • BNP   • High Sensitivity Troponin T   • Lactic Acid, Plasma   • CK   • Magnesium   • CBC Auto Differential   • High Sensitivity Troponin T 2Hr   • NPO Diet NPO Type: Strict NPO   • Surgery (on-call MD unless specified)   • LHA (on-call MD unless specified) Details   • ECG 12 Lead Dyspnea   • Telemetry Scan   • Telemetry Scan   • Insert Peripheral IV   • CBC & Differential         OUTPATIENT MEDICATION MANAGEMENT:  Current Facility-Administered Medications Ordered in Epic   Medication Dose Route Frequency Provider Last Rate Last Admin   • fentaNYL citrate (PF) (SUBLIMAZE) injection 25 mcg  25 mcg Intravenous Once Lisbet Melo MD       • sodium chloride 0.9 % flush 10 mL  10 mL Intravenous PRN Lisbet Melo MD       • sodium chloride 0.9 % infusion  100 mL/hr Intravenous Continuous Lisbet Melo  mL/hr at 06/07/24 2232 100 mL/hr at 06/07/24 2232     Current Outpatient Medications Ordered in Epic   Medication Sig Dispense Refill   • carvedilol (COREG) 3.125 MG tablet Take 1 tablet by mouth Daily. 30 tablet 0   • ferrous sulfate 325 (65 FE) MG tablet Take 1 tablet by mouth Daily With Breakfast.     • KLOR-CON 20 MEQ CR tablet Take 1 tablet by mouth Daily.     • Magnesium 250 MG tablet Take  by mouth.     • multivitamin with minerals tablet tablet Take 1 tablet by mouth Daily.     • olmesartan-hydrochlorothiazide  (BENICAR HCT) 40-25 MG per tablet Take 1 tablet by mouth Daily.     • polyethyl glycol-propyl glycol (SYSTANE) 0.4-0.3 % solution ophthalmic solution (artificial tears) Administer 1 drop to both eyes Every 1 (One) Hour As Needed.     • vitamin D3 125 MCG (5000 UT) capsule capsule Take 1 capsule by mouth Daily.           PROCEDURES  Procedures            PROGRESS, DATA ANALYSIS, CONSULTS, AND MEDICAL DECISION MAKING  All labs have been independently interpreted by me.  All radiology studies have been reviewed by me. All EKG's have been independently viewed and interpreted by me.  Discussion below represents my analysis of pertinent findings related to patient's condition, differential diagnosis, treatment plan and final disposition.    Differential diagnosis includes but is not limited to   The differential diagnosis for this patient includes: appendicitis, pancreatitis, cholecystitis/biliary colic, PUD, gastritis, pneumonia, ureteral stone, sbo, hernia, colitis, diverticulitis, AAA, malignancy, gastroenteritis, food intolerances. Abdominal exam is without peritoneal signs. There is no evidence of acute abdomen at this time. The patient is well appearing. I have a low suspicion forvascular catastrophe, bowel obstruction or viscus perforation. Plan serum labs incl troponin, ekg, pain control, IMAGING CT abdomen pelvis as well as chest x-ray, and serial reassessment.        ED Course as of 06/08/24 1016 Fri Jun 07, 2024 1746 EKG ER MD interpretation   Time: 17: 34  Rhythm and rate: Normal sinus rhythm at a rate of 90  Axis: Normal  P waves: Normal  QRS complexes: Normal  ST segments: no elevation nor depressions  T waves: Inverted T wave V1 and lead III   [AR]   1808 I viewed patient's chest x-ray and on my interpretation patient has normal heart size and clear lungs [AR]   0298 I discussed results with patient and she says her abdominal pain is returning.  She denies any nausea and has had no vomiting here in the  ER.  We did discuss the need for NG tube if she did have any return of nausea or vomiting. [AR]   2135 I discussed patient's case with Dr. Carvalho on for surgery who recommended low threshold for placement of NG tube if patient has any return of nausea or vomiting.  Will see in a.m. [AR]   2300 Patient was turned over to me by Dr. Melo.  Pending: Admission [CC]   2330 Spoke with JENNFIER Gagnon with A.  Reviewed history, exam, results, treatments.  She agrees admit the patient to Dr. Lara.      [CC]      ED Course User Index  [AR] Lisbet Melo MD  [CC] Tiffany Whitt PA-C             AS OF 23:11 EDT VITALS:    BP - 112/62  HR - 59  TEMP - 96.7 °F (35.9 °C)  O2 SATS - 95%      COMPLEXITY OF CARE  The patient requires admission.    DIAGNOSIS  Final diagnoses:   Small bowel obstruction   Nausea and vomiting, unspecified vomiting type   Generalized abdominal pain         DISPOSITION  ED Disposition       ED Disposition   Intended Admit    Condition   --    Comment   --                Please note that portions of this document were completed with a voice recognition program.    Note Disclaimer: At [facility], we believe that sharing information builds trust and better relationships. You are receiving this note because you recently visited [facility]. It is possible you will see health information before a provider has talked with you about it. This kind of information can be easy to misunderstand. To help you fully understand what it means for your health, we urge you to discuss this note with your provider.         Lisbet Melo MD  06/07/24 0314       Lisbet Melo MD  06/08/24 1016

## 2024-06-08 ENCOUNTER — APPOINTMENT (OUTPATIENT)
Dept: GENERAL RADIOLOGY | Facility: HOSPITAL | Age: 74
DRG: 982 | End: 2024-06-08
Payer: MEDICARE

## 2024-06-08 PROBLEM — R11.2 NAUSEA & VOMITING: Status: ACTIVE | Noted: 2024-06-08

## 2024-06-08 PROBLEM — E86.0 DEHYDRATION: Status: ACTIVE | Noted: 2024-06-08

## 2024-06-08 PROBLEM — R10.84 GENERALIZED ABDOMINAL PAIN: Status: ACTIVE | Noted: 2024-06-08

## 2024-06-08 LAB
ADV 40+41 DNA STL QL NAA+NON-PROBE: NOT DETECTED
ALBUMIN SERPL-MCNC: 3.5 G/DL (ref 3.5–5.2)
ALBUMIN/GLOB SERPL: 1.3 G/DL
ALP SERPL-CCNC: 74 U/L (ref 39–117)
ALT SERPL W P-5'-P-CCNC: 15 U/L (ref 1–33)
ANION GAP SERPL CALCULATED.3IONS-SCNC: 7 MMOL/L (ref 5–15)
AST SERPL-CCNC: 13 U/L (ref 1–32)
ASTRO TYP 1-8 RNA STL QL NAA+NON-PROBE: NOT DETECTED
BILIRUB SERPL-MCNC: 1 MG/DL (ref 0–1.2)
BUN SERPL-MCNC: 15 MG/DL (ref 8–23)
BUN/CREAT SERPL: 14.6 (ref 7–25)
C CAYETANENSIS DNA STL QL NAA+NON-PROBE: NOT DETECTED
C COLI+JEJ+UPSA DNA STL QL NAA+NON-PROBE: NOT DETECTED
CALCIUM SPEC-SCNC: 8.6 MG/DL (ref 8.6–10.5)
CHLORIDE SERPL-SCNC: 106 MMOL/L (ref 98–107)
CO2 SERPL-SCNC: 30 MMOL/L (ref 22–29)
CREAT SERPL-MCNC: 1.03 MG/DL (ref 0.57–1)
CRYPTOSP DNA STL QL NAA+NON-PROBE: NOT DETECTED
DEPRECATED RDW RBC AUTO: 43.5 FL (ref 37–54)
E HISTOLYT DNA STL QL NAA+NON-PROBE: NOT DETECTED
EAEC PAA PLAS AGGR+AATA ST NAA+NON-PRB: NOT DETECTED
EC STX1+STX2 GENES STL QL NAA+NON-PROBE: NOT DETECTED
EGFRCR SERPLBLD CKD-EPI 2021: 57.9 ML/MIN/1.73
EPEC EAE GENE STL QL NAA+NON-PROBE: NOT DETECTED
ERYTHROCYTE [DISTWIDTH] IN BLOOD BY AUTOMATED COUNT: 12.9 % (ref 12.3–15.4)
ETEC LTA+ST1A+ST1B TOX ST NAA+NON-PROBE: NOT DETECTED
G LAMBLIA DNA STL QL NAA+NON-PROBE: NOT DETECTED
GLOBULIN UR ELPH-MCNC: 2.6 GM/DL
GLUCOSE SERPL-MCNC: 87 MG/DL (ref 65–99)
HCT VFR BLD AUTO: 36.4 % (ref 34–46.6)
HGB BLD-MCNC: 11.7 G/DL (ref 12–15.9)
MCH RBC QN AUTO: 29.5 PG (ref 26.6–33)
MCHC RBC AUTO-ENTMCNC: 32.1 G/DL (ref 31.5–35.7)
MCV RBC AUTO: 91.9 FL (ref 79–97)
NOROVIRUS GI+II RNA STL QL NAA+NON-PROBE: NOT DETECTED
P SHIGELLOIDES DNA STL QL NAA+NON-PROBE: NOT DETECTED
PLATELET # BLD AUTO: 240 10*3/MM3 (ref 140–450)
PMV BLD AUTO: 8.9 FL (ref 6–12)
POTASSIUM SERPL-SCNC: 4 MMOL/L (ref 3.5–5.2)
PROT SERPL-MCNC: 6.1 G/DL (ref 6–8.5)
QT INTERVAL: 351 MS
QTC INTERVAL: 430 MS
RBC # BLD AUTO: 3.96 10*6/MM3 (ref 3.77–5.28)
RVA RNA STL QL NAA+NON-PROBE: NOT DETECTED
S ENT+BONG DNA STL QL NAA+NON-PROBE: NOT DETECTED
SAPO I+II+IV+V RNA STL QL NAA+NON-PROBE: NOT DETECTED
SHIGELLA SP+EIEC IPAH ST NAA+NON-PROBE: NOT DETECTED
SODIUM SERPL-SCNC: 143 MMOL/L (ref 136–145)
V CHOL+PARA+VUL DNA STL QL NAA+NON-PROBE: NOT DETECTED
V CHOLERAE DNA STL QL NAA+NON-PROBE: NOT DETECTED
WBC NRBC COR # BLD AUTO: 6.23 10*3/MM3 (ref 3.4–10.8)
Y ENTEROCOL DNA STL QL NAA+NON-PROBE: NOT DETECTED

## 2024-06-08 PROCEDURE — 80053 COMPREHEN METABOLIC PANEL: CPT | Performed by: NURSE PRACTITIONER

## 2024-06-08 PROCEDURE — 74250 X-RAY XM SM INT 1CNTRST STD: CPT

## 2024-06-08 PROCEDURE — 36415 COLL VENOUS BLD VENIPUNCTURE: CPT | Performed by: NURSE PRACTITIONER

## 2024-06-08 PROCEDURE — 87507 IADNA-DNA/RNA PROBE TQ 12-25: CPT | Performed by: HOSPITALIST

## 2024-06-08 PROCEDURE — 99223 1ST HOSP IP/OBS HIGH 75: CPT | Performed by: STUDENT IN AN ORGANIZED HEALTH CARE EDUCATION/TRAINING PROGRAM

## 2024-06-08 PROCEDURE — 25810000003 SODIUM CHLORIDE 0.9 % SOLUTION: Performed by: EMERGENCY MEDICINE

## 2024-06-08 PROCEDURE — 85027 COMPLETE CBC AUTOMATED: CPT | Performed by: NURSE PRACTITIONER

## 2024-06-08 RX ORDER — BISACODYL 5 MG/1
5 TABLET, DELAYED RELEASE ORAL DAILY PRN
Status: DISCONTINUED | OUTPATIENT
Start: 2024-06-08 | End: 2024-06-12 | Stop reason: HOSPADM

## 2024-06-08 RX ORDER — BISACODYL 10 MG
10 SUPPOSITORY, RECTAL RECTAL DAILY PRN
Status: DISCONTINUED | OUTPATIENT
Start: 2024-06-08 | End: 2024-06-12 | Stop reason: HOSPADM

## 2024-06-08 RX ORDER — AMOXICILLIN 250 MG
2 CAPSULE ORAL 2 TIMES DAILY PRN
Status: DISCONTINUED | OUTPATIENT
Start: 2024-06-08 | End: 2024-06-12 | Stop reason: HOSPADM

## 2024-06-08 RX ORDER — ONDANSETRON 4 MG/1
4 TABLET, ORALLY DISINTEGRATING ORAL EVERY 6 HOURS PRN
Status: DISCONTINUED | OUTPATIENT
Start: 2024-06-08 | End: 2024-06-12 | Stop reason: HOSPADM

## 2024-06-08 RX ORDER — ALUMINA, MAGNESIA, AND SIMETHICONE 2400; 2400; 240 MG/30ML; MG/30ML; MG/30ML
15 SUSPENSION ORAL EVERY 6 HOURS PRN
Status: DISCONTINUED | OUTPATIENT
Start: 2024-06-08 | End: 2024-06-12 | Stop reason: HOSPADM

## 2024-06-08 RX ORDER — ACETAMINOPHEN 325 MG/1
650 TABLET ORAL EVERY 4 HOURS PRN
Status: DISCONTINUED | OUTPATIENT
Start: 2024-06-08 | End: 2024-06-12 | Stop reason: HOSPADM

## 2024-06-08 RX ORDER — FAMOTIDINE 10 MG/ML
20 INJECTION, SOLUTION INTRAVENOUS EVERY 12 HOURS SCHEDULED
Status: DISCONTINUED | OUTPATIENT
Start: 2024-06-08 | End: 2024-06-11

## 2024-06-08 RX ORDER — POLYETHYLENE GLYCOL 3350 17 G/17G
17 POWDER, FOR SOLUTION ORAL DAILY PRN
Status: DISCONTINUED | OUTPATIENT
Start: 2024-06-08 | End: 2024-06-12 | Stop reason: HOSPADM

## 2024-06-08 RX ORDER — ACETAMINOPHEN 650 MG/1
650 SUPPOSITORY RECTAL EVERY 4 HOURS PRN
Status: DISCONTINUED | OUTPATIENT
Start: 2024-06-08 | End: 2024-06-12 | Stop reason: HOSPADM

## 2024-06-08 RX ORDER — MORPHINE SULFATE 2 MG/ML
4 INJECTION, SOLUTION INTRAMUSCULAR; INTRAVENOUS EVERY 4 HOURS PRN
Status: DISCONTINUED | OUTPATIENT
Start: 2024-06-08 | End: 2024-06-12 | Stop reason: HOSPADM

## 2024-06-08 RX ORDER — ONDANSETRON 2 MG/ML
4 INJECTION INTRAMUSCULAR; INTRAVENOUS EVERY 6 HOURS PRN
Status: DISCONTINUED | OUTPATIENT
Start: 2024-06-08 | End: 2024-06-12 | Stop reason: HOSPADM

## 2024-06-08 RX ORDER — CARVEDILOL 3.12 MG/1
3.12 TABLET ORAL DAILY
Status: DISCONTINUED | OUTPATIENT
Start: 2024-06-08 | End: 2024-06-12 | Stop reason: HOSPADM

## 2024-06-08 RX ORDER — SODIUM CHLORIDE 0.9 % (FLUSH) 0.9 %
10 SYRINGE (ML) INJECTION AS NEEDED
Status: DISCONTINUED | OUTPATIENT
Start: 2024-06-08 | End: 2024-06-12 | Stop reason: HOSPADM

## 2024-06-08 RX ORDER — UREA 10 %
2.5 LOTION (ML) TOPICAL NIGHTLY PRN
Status: DISCONTINUED | OUTPATIENT
Start: 2024-06-08 | End: 2024-06-12 | Stop reason: HOSPADM

## 2024-06-08 RX ORDER — HYDROCODONE BITARTRATE AND ACETAMINOPHEN 5; 325 MG/1; MG/1
1 TABLET ORAL EVERY 6 HOURS PRN
Status: DISCONTINUED | OUTPATIENT
Start: 2024-06-08 | End: 2024-06-12 | Stop reason: HOSPADM

## 2024-06-08 RX ORDER — NITROGLYCERIN 0.4 MG/1
0.4 TABLET SUBLINGUAL
Status: DISCONTINUED | OUTPATIENT
Start: 2024-06-08 | End: 2024-06-12 | Stop reason: HOSPADM

## 2024-06-08 RX ORDER — ACETAMINOPHEN 160 MG/5ML
650 SOLUTION ORAL EVERY 4 HOURS PRN
Status: DISCONTINUED | OUTPATIENT
Start: 2024-06-08 | End: 2024-06-12 | Stop reason: HOSPADM

## 2024-06-08 RX ADMIN — SODIUM CHLORIDE 100 ML/HR: 9 INJECTION, SOLUTION INTRAVENOUS at 03:18

## 2024-06-08 RX ADMIN — CARVEDILOL 3.12 MG: 3.12 TABLET, FILM COATED ORAL at 14:56

## 2024-06-08 RX ADMIN — BARIUM SULFATE 366 ML: 960 POWDER, FOR SUSPENSION ORAL at 12:12

## 2024-06-08 RX ADMIN — FAMOTIDINE 20 MG: 10 INJECTION INTRAVENOUS at 20:38

## 2024-06-08 RX ADMIN — FAMOTIDINE 20 MG: 10 INJECTION INTRAVENOUS at 14:56

## 2024-06-08 RX ADMIN — ACETAMINOPHEN 325MG 650 MG: 325 TABLET ORAL at 20:38

## 2024-06-08 NOTE — CONSULTS
General Surgery Consultation    Consulting Physician: Roro Carvalho MD  Referring: Homa Dewitt APRN     Reason for consultation:   SBO    CC:   Abdominal pain, nausea    HPI:   The patient is a 74 y.o. female who presented to the hospital yesterday for loop recorder placement.  She states that for the 2 days prior, she was having some mild intermittent abdominal discomfort.  Following her procedure she went home and had potato salad and developed nausea and vomiting with worsening of her abdominal pain.  She was concerned that she possibly had food poisoning.  She came to the emergency department and was evaluated and underwent CT scan which demonstrated evidence of small bowel obstruction.  She was admitted, made n.p.o. this morning she states that she is feeling better, she denies any nausea or vomiting and notes that she had a large amount of diarrhea this morning without blood in her stool.  She is feeling hungry.  She denies fever or chills.  She has had an open umbilical hernia repair in the past, denies other abdominal surgeries.  Denies any blood thinners.  She also notes that she has had 2 episodes of C. difficile in the past following antibiotic use.    Past Medical History:  Diverticulosis of the sigmoid  Internal hemorrhoids grade 1  Osteoarthritis  Thyroid disease  Cataracts  Obesity, BMI 32.9  Hypertension  Pulmonary hypertension  Mitral regurgitation   Tricuspid regurgitation  Grade 1 left ventricular diastolic dysfunction  Syncope     Past Surgical History:  Bilateral cataracts  Colonoscopy by Dr. Smith in 2021, demonstrated diverticulosis, recommended 5-year surveillance colonoscopy  Open umbilical hernia repair  Leg surgery for cellulitis  Left hip and knee arthroplasties  Thyroid FNA    Medications:  Medications Prior to Admission   Medication Sig Dispense Refill Last Dose    carvedilol (COREG) 3.125 MG tablet Take 1 tablet by mouth Daily. 30 tablet 0     ferrous sulfate 325 (65 FE)  MG tablet Take 1 tablet by mouth Daily With Breakfast.       KLOR-CON 20 MEQ CR tablet Take 1 tablet by mouth Daily.       Magnesium 250 MG tablet Take  by mouth.       multivitamin with minerals tablet tablet Take 1 tablet by mouth Daily.       olmesartan-hydrochlorothiazide (BENICAR HCT) 40-25 MG per tablet Take 1 tablet by mouth Daily.       polyethyl glycol-propyl glycol (SYSTANE) 0.4-0.3 % solution ophthalmic solution (artificial tears) Administer 1 drop to both eyes Every 1 (One) Hour As Needed.       vitamin D3 125 MCG (5000 UT) capsule capsule Take 1 capsule by mouth Daily.          Current Facility-Administered Medications:     acetaminophen (TYLENOL) tablet 650 mg, 650 mg, Oral, Q4H PRN **OR** acetaminophen (TYLENOL) 160 MG/5ML oral solution 650 mg, 650 mg, Oral, Q4H PRN **OR** acetaminophen (TYLENOL) suppository 650 mg, 650 mg, Rectal, Q4H PRN, Homa Dewitt APRN    aluminum-magnesium hydroxide-simethicone (MAALOX MAX) 400-400-40 MG/5ML suspension 15 mL, 15 mL, Oral, Q6H PRN, Homa Dewitt APRN    sennosides-docusate (PERICOLACE) 8.6-50 MG per tablet 2 tablet, 2 tablet, Oral, BID PRN **AND** polyethylene glycol (MIRALAX) packet 17 g, 17 g, Oral, Daily PRN **AND** bisacodyl (DULCOLAX) EC tablet 5 mg, 5 mg, Oral, Daily PRN **AND** bisacodyl (DULCOLAX) suppository 10 mg, 10 mg, Rectal, Daily PRN, Homa Dewitt, KELLY    famotidine (PEPCID) injection 20 mg, 20 mg, Intravenous, Q12H, Lyle Irby MD    fentaNYL citrate (PF) (SUBLIMAZE) injection 25 mcg, 25 mcg, Intravenous, Once, Lisbet Melo MD    melatonin tablet 2.5 mg, 2.5 mg, Oral, Nightly PRN, Homa Dewitt APRN    nitroglycerin (NITROSTAT) SL tablet 0.4 mg, 0.4 mg, Sublingual, Q5 Min PRN, Homa Dewitt, APRN    ondansetron ODT (ZOFRAN-ODT) disintegrating tablet 4 mg, 4 mg, Oral, Q6H PRN **OR** ondansetron (ZOFRAN) injection 4 mg, 4 mg, Intravenous, Q6H PRN, Homa Dewitt, KELLY    [COMPLETED] Insert  Peripheral IV, , , Once **AND** sodium chloride 0.9 % flush 10 mL, 10 mL, Intravenous, PRN, Lisbet Melo MD    sodium chloride 0.9 % flush 10 mL, 10 mL, Intravenous, PRN, Homa Dewitt APRN    sodium chloride 0.9 % infusion, 100 mL/hr, Intravenous, Continuous, Lisbet Melo MD, Last Rate: 100 mL/hr at 06/08/24 0318, 100 mL/hr at 06/08/24 0318    Allergies:   Allergies   Allergen Reactions    Augmentin [Amoxicillin-Pot Clavulanate] GI Intolerance    Hydrocodone Nausea Only and GI Intolerance    Latex Hives    Penicillins Itching, Nausea Only and GI Intolerance    Percocet [Oxycodone-Acetaminophen] GI Intolerance     Social History:   Denies tobacco, alcohol, or recreational drug use  Lives at home with  Rehan    Family History:   Denies family history of colon or rectal cancer  Family History   Problem Relation Age of Onset    Stroke Mother     Cancer Father     Malig Hyperthermia Neg Hx        Review of Systems:  Constitutional: denies any fever or chills  Eyes: denies vision changes, scleral icterus  Cardiovascular: denies chest pain or palpitations   Respiratory: denies cough or shortness of breath  Gastrointestinal:  + abdominal pain, +nausea, +vomiting, +diarrhea, denies constipation, melena, hematochezia  Genitourinary: denies dysuria or hematuria  Musculoskeletal: denies weakness  Neurologic: denies headache, weakness, or numbness  Skin: denies rash or jaundice     Physical Exam:   Vitals:    06/08/24 0753   BP: 118/61   Pulse: 56   Resp: 16   Temp: 97.9 °F (36.6 °C)   SpO2:      GENERAL: alert, interactive, cooperative, comfortable appearing, resting in bed, answering questions appropriately  HEENT: no scleral icterus; moist mucous membranes  NECK: Supple  RESPIRATORY: normal work of breathing on room air  CARDIOVASCULAR: Loop recorder wound covered with gauze, no evidence of surrounding erythema  GASTROINTESTINAL: Abdomen obese, soft, nontender, mildly distended, no rebound or  guarding, well-healed infraumbilical incision  MUSCULOSKELETAL: no cyanosis or edema   NEUROLOGIC: alert and oriented, normal speech, no gross focal deficits   SKIN: warm, no rash, no jaundice      Diagnostic workup:     Pertinent labs:   Results from last 7 days   Lab Units 06/08/24  0508 06/07/24  1744   WBC 10*3/mm3 6.23 9.61   HEMOGLOBIN g/dL 11.7* 13.1   HEMATOCRIT % 36.4 41.2   PLATELETS 10*3/mm3 240 243     Results from last 7 days   Lab Units 06/08/24  0508 06/07/24  1744   SODIUM mmol/L 143 143   POTASSIUM mmol/L 4.0 3.9   CHLORIDE mmol/L 106 102   CO2 mmol/L 30.0* 28.0   BUN mg/dL 15 19   CREATININE mg/dL 1.03* 0.97   CALCIUM mg/dL 8.6 9.3   BILIRUBIN mg/dL 1.0 0.7   ALK PHOS U/L 74 86   ALT (SGPT) U/L 15 11   AST (SGOT) U/L 13 14   GLUCOSE mg/dL 87 119*   Lactic acid 1.1    Imaging:  CT abdomen and pelvis 6/7/2024 images and report reviewed, demonstrates air-fluid levels within the small bowel with a segment with dilation up to 3-1/2 cm, with distal decompressed segments concerning for small bowel obstruction, with mild free fluid in the pelvis    Assessment and plan:   The patient is a 74 y.o. female with history of open umbilical hernia repair who underwent loop recorder placement yesterday who presented to the hospital with worsening abdominal pain and nausea and vomiting.  She was found to have concern for small bowel obstruction based on her workup.    The patient is AVSS, feeling better this morning without any nausea or vomiting.  She did have an episode of diarrhea this morning.  I discussed with her management options for small bowel obstruction including nonoperative management in the setting of prior surgery given that often these obstructions are caused by scar tissue and approximately 80% of patients are able to be managed nonoperatively.  I discussed recommendation for bowel rest, IV fluid resuscitation, pain control, and small bowel follow-through to evaluate her bowel obstruction.  I also  discussed that if she were to worsen, she would require an NG tube for decompression and may require surgery to relieve the obstruction with possible adhesiolysis or bowel resection.  Fortunately, the patient appears to be doing well this morning.  I will proceed with SBFT. Will keep her n.p.o. except meds for now pending her imaging results.    Roro Carvalho M.D.  General, Robotic, and Endoscopic Surgery  [facility]    [address], [address]  [place], [place], [address]  P: [phone]  F: [phone]

## 2024-06-08 NOTE — ED NOTES
".Nursing report ED to floor  Sol Rojas  74 y.o.  female    HPI :  HPI (Adult)  Stated Reason for Visit: SOA, weakness  History Obtained From: EMS    Chief Complaint  Chief Complaint   Patient presents with    Shortness of Breath    Weakness - Generalized       Admitting doctor:   Will Lara MD    Admitting diagnosis:   The primary encounter diagnosis was Small bowel obstruction. Diagnoses of Nausea and vomiting, unspecified vomiting type and Generalized abdominal pain were also pertinent to this visit.    Code status:   Current Code Status       Date Active Code Status Order ID Comments User Context       6/8/2024 0040 CPR (Attempt to Resuscitate) [id number]  Homa Dewitt, APRN ED        Question Answer    Code Status (Patient has no pulse and is not breathing) CPR (Attempt to Resuscitate)    Medical Interventions (Patient has pulse or is breathing) Full Support                    Allergies:   Augmentin [amoxicillin-pot clavulanate], Hydrocodone, Latex, Penicillins, and Percocet [oxycodone-acetaminophen]    Isolation:   No active isolations    Intake and Output    Intake/Output Summary (Last 24 hours) at 6/8/2024 0139  Last data filed at 6/7/2024 2235  Gross per 24 hour   Intake 1500 ml   Output --   Net 1500 ml       Weight:       06/07/24  1759   Weight: 92.5 kg (204 lb)       Most recent vitals:   Vitals:    06/07/24 1757 06/07/24 1759 06/07/24 2001 06/07/24 2101   BP: 117/70  113/64 112/62   Pulse: 93  53 59   Resp:       Temp:       SpO2:   96% 95%   Weight:  92.5 kg (204 lb)     Height:  167.6 cm (66\")         Active LDAs/IV Access:   Lines, Drains & Airways       Active LDAs       Name Placement date Placement time Site Days    Peripheral IV 06/07/24 Left Antecubital 06/07/24  --  Antecubital  1                    Labs (abnormal labs have a star):   Labs Reviewed   COMPREHENSIVE METABOLIC PANEL - Abnormal; Notable for the following components:       Result Value    Glucose 119 (*)     All " other components within normal limits    Narrative:     GFR Normal >60  Chronic Kidney Disease <60  Kidney Failure <15    The GFR formula is only valid for adults with stable renal function between ages 18 and 70.   TROPONIN - Abnormal; Notable for the following components:    HS Troponin T 14 (*)     All other components within normal limits    Narrative:     High Sensitive Troponin T Reference Range:  <14.0 ng/L- Negative Female for AMI  <22.0 ng/L- Negative Male for AMI  >=14 - Abnormal Female indicating possible myocardial injury.  >=22 - Abnormal Male indicating possible myocardial injury.   Clinicians would have to utilize clinical acumen, EKG, Troponin, and serial changes to determine if it is an Acute Myocardial Infarction or myocardial injury due to an underlying chronic condition.        CBC WITH AUTO DIFFERENTIAL - Abnormal; Notable for the following components:    Neutrophil % 82.3 (*)     Lymphocyte % 8.6 (*)     Eosinophil % 0.2 (*)     Neutrophils, Absolute 7.90 (*)     All other components within normal limits   HIGH SENSITIVITIY TROPONIN T 2HR - Abnormal; Notable for the following components:    HS Troponin T 14 (*)     All other components within normal limits    Narrative:     High Sensitive Troponin T Reference Range:  <14.0 ng/L- Negative Female for AMI  <22.0 ng/L- Negative Male for AMI  >=14 - Abnormal Female indicating possible myocardial injury.  >=22 - Abnormal Male indicating possible myocardial injury.   Clinicians would have to utilize clinical acumen, EKG, Troponin, and serial changes to determine if it is an Acute Myocardial Infarction or myocardial injury due to an underlying chronic condition.        LIPASE - Normal   BNP (IN-HOUSE) - Normal    Narrative:     This assay is used as an aid in the diagnosis of individuals suspected of having heart failure. It can be used as an aid in the diagnosis of acute decompensated heart failure (ADHF) in patients presenting with signs and symptoms  of ADHF to the emergency department (ED). In addition, NT-proBNP of <300 pg/mL indicates ADHF is not likely.    Age Range Result Interpretation  NT-proBNP Concentration (pg/mL:      <50             Positive            >450                   Gray                 300-450                    Negative             <300    50-75           Positive            >900                  Gray                300-900                  Negative            <300      >75             Positive            >1800                  Gray                300-1800                  Negative            <300   LACTIC ACID, PLASMA - Normal   CK - Normal   MAGNESIUM - Normal   CBC (NO DIFF)   COMPREHENSIVE METABOLIC PANEL   CBC AND DIFFERENTIAL    Narrative:     The following orders were created for panel order CBC & Differential.  Procedure                               Abnormality         Status                     ---------                               -----------         ------                     CBC Auto Differential[[id number]]        Abnormal            Final result                 Please view results for these tests on the individual orders.       EKG:   ECG 12 Lead Dyspnea   Preliminary Result   HEART RATE= 90  bpm   RR Interval= 667  ms   HI Interval= 174  ms   P Horizontal Axis= -33  deg   P Front Axis= 17  deg   QRSD Interval= 94  ms   QT Interval= 351  ms   QTcB= 430  ms   QRS Axis= 11  deg   T Wave Axis= 0  deg   - BORDERLINE ECG -   Sinus rhythm   Probable left atrial enlargement   Low voltage, precordial leads   Electronically Signed By:    Date and Time of Study: 2024-06-07 17:34:17      Telemetry Scan   Final Result      Telemetry Scan   Final Result          Meds given in ED:   Medications   sodium chloride 0.9 % flush 10 mL (has no administration in time range)   fentaNYL citrate (PF) (SUBLIMAZE) injection 25 mcg (0 mcg Intravenous Hold 6/7/24 2212)   sodium chloride 0.9 % infusion (100 mL/hr Intravenous New Bag 6/7/24 2232)    sodium chloride 0.9 % flush 10 mL (has no administration in time range)   nitroglycerin (NITROSTAT) SL tablet 0.4 mg (has no administration in time range)   acetaminophen (TYLENOL) tablet 650 mg (has no administration in time range)     Or   acetaminophen (TYLENOL) 160 MG/5ML oral solution 650 mg (has no administration in time range)     Or   acetaminophen (TYLENOL) suppository 650 mg (has no administration in time range)   sennosides-docusate (PERICOLACE) 8.6-50 MG per tablet 2 tablet (has no administration in time range)     And   polyethylene glycol (MIRALAX) packet 17 g (has no administration in time range)     And   bisacodyl (DULCOLAX) EC tablet 5 mg (has no administration in time range)     And   bisacodyl (DULCOLAX) suppository 10 mg (has no administration in time range)   melatonin tablet 2.5 mg (has no administration in time range)   ondansetron ODT (ZOFRAN-ODT) disintegrating tablet 4 mg (has no administration in time range)     Or   ondansetron (ZOFRAN) injection 4 mg (has no administration in time range)   aluminum-magnesium hydroxide-simethicone (MAALOX MAX) 400-400-40 MG/5ML suspension 15 mL (has no administration in time range)   sodium chloride 0.9 % bolus 1,000 mL (0 mL Intravenous Stopped 6/7/24 2235)   ondansetron (ZOFRAN) injection 4 mg (4 mg Intravenous Given 6/7/24 1742)   pantoprazole (PROTONIX) injection 40 mg (40 mg Intravenous Given 6/7/24 1743)   iopamidol (ISOVUE-300) 61 % injection 100 mL (85 mL Intravenous Given 6/7/24 2023)       Imaging results:  CT Abdomen Pelvis With Contrast    Result Date: 6/7/2024  Small-bowel obstruction with long segment of abnormally dilated small-bowel loops with air/fluid levels. Distal small-bowel loops and the colon are decompressed. Mild free fluid in the pelvis.  Discussed with Dr. Melo 06/07/2024 at 8:35 p.m.  Radiation dose reduction techniques were utilized, including automated exposure control and exposure modulation based on body size.   This  report was finalized on 6/7/2024 8:44 PM by Dr. Osei Dixon M.D on Workstation: [id number]      XR Chest 1 View    Result Date: 6/7/2024   1.. No active disease is seen in the chest with no change over 2 prior chest x-rays dating back to 1/8/2019. There is moderate chronic elevation the right hemidiaphragm. The etiology of this patient's shortness of breath is not established on this exam.  This report was finalized on 6/7/2024 6:22 PM by Dr. Georges Fan M.D on Workstation: [id number]         Social issues:   Social History     Socioeconomic History    Marital status:    Tobacco Use    Smoking status: Never     Passive exposure: Never    Smokeless tobacco: Never   Vaping Use    Vaping status: Never Used   Substance and Sexual Activity    Alcohol use: No    Drug use: Never    Sexual activity: Not Currently     Partners: Male     Birth control/protection: Abstinence       Peripheral Neurovascular  Peripheral Neurovascular (Adult)  Peripheral Neurovascular WDL: WDL    Neuro Cognitive  Neuro Cognitive (Adult)  Cognitive/Neuro/Behavioral WDL: WDL, orientation  Orientation: oriented x 4    Learning  Learning Assessment (Adult)  Learning Readiness and Ability: no barriers identified    Respiratory  Respiratory WDL  Respiratory WDL: WDL, rhythm/pattern  Rhythm/Pattern, Respiratory: depth regular, pattern regular, unlabored    Abdominal Pain       Pain Assessments  Pain (Adult)  (0-10) Pain Rating: Rest: 0  (0-10) Pain Rating: Activity: 0    NIH Stroke Scale       Hina Mcmahan RN  06/08/24 01:39 EDT

## 2024-06-08 NOTE — PLAN OF CARE
Pt oriented, HR SB 40-50s, Pt reports loop recorder inplanted, incision gauze tegaderm CDI, reports loose BM stool sample sent, small bowel follow through completed      Problem: Adult Inpatient Plan of Care  Goal: Plan of Care Review  Outcome: Ongoing, Progressing  Goal: Patient-Specific Goal (Individualized)  Outcome: Ongoing, Progressing  Goal: Absence of Hospital-Acquired Illness or Injury  Outcome: Ongoing, Progressing  Intervention: Identify and Manage Fall Risk  Recent Flowsheet Documentation  Taken 6/8/2024 1450 by Amber Prajapati RN  Safety Promotion/Fall Prevention:   activity supervised   assistive device/personal items within reach   clutter free environment maintained   fall prevention program maintained   nonskid shoes/slippers when out of bed   room organization consistent   safety round/check completed  Taken 6/8/2024 1400 by Amber Prajapati RN  Safety Promotion/Fall Prevention: patient off unit  Taken 6/8/2024 1200 by Amber Prajapati RN  Safety Promotion/Fall Prevention: patient off unit  Taken 6/8/2024 0855 by Amber Prajapati RN  Safety Promotion/Fall Prevention:   activity supervised   assistive device/personal items within reach   clutter free environment maintained   fall prevention program maintained   nonskid shoes/slippers when out of bed   room organization consistent   safety round/check completed  Intervention: Prevent Skin Injury  Recent Flowsheet Documentation  Taken 6/8/2024 1450 by Amber Prajapati RN  Body Position: position changed independently  Taken 6/8/2024 0855 by Amber Prajapati RN  Body Position: position changed independently  Intervention: Prevent and Manage VTE (Venous Thromboembolism) Risk  Recent Flowsheet Documentation  Taken 6/8/2024 1600 by Amber Prajapati RN  Activity Management: up ad emre  Taken 6/8/2024 1450 by Amber Prajapati RN  Activity Management: up ad emre  Range of Motion: active ROM (range of motion) encouraged  Taken 6/8/2024 1000 by Amber Prajapati  RN  Activity Management: up ad emre  Taken 6/8/2024 0855 by Amber Prajapati RN  Activity Management: up ad emre  VTE Prevention/Management: patient refused intervention  Range of Motion: active ROM (range of motion) encouraged  Intervention: Prevent Infection  Recent Flowsheet Documentation  Taken 6/8/2024 1600 by Amber Prajapati RN  Infection Prevention:   single patient room provided   rest/sleep promoted  Taken 6/8/2024 1450 by Amber Prajapati RN  Infection Prevention:   single patient room provided   rest/sleep promoted  Taken 6/8/2024 1000 by Amber Prajapati RN  Infection Prevention:   single patient room provided   rest/sleep promoted  Taken 6/8/2024 0855 by Amber Prajapati RN  Infection Prevention:   single patient room provided   rest/sleep promoted  Goal: Optimal Comfort and Wellbeing  Outcome: Ongoing, Progressing  Intervention: Provide Person-Centered Care  Recent Flowsheet Documentation  Taken 6/8/2024 1450 by Amber Prajapati RN  Trust Relationship/Rapport: care explained  Taken 6/8/2024 0855 by Amber Prajapati RN  Trust Relationship/Rapport:   care explained   thoughts/feelings acknowledged   reassurance provided   questions encouraged   questions answered   emotional support provided  Goal: Readiness for Transition of Care  Outcome: Ongoing, Progressing  Intervention: Mutually Develop Transition Plan  Recent Flowsheet Documentation  Taken 6/8/2024 1714 by Amber Prajapati RN  Transportation Anticipated: family or friend will provide  Patient/Family Anticipated Services at Transition: none  Patient/Family Anticipates Transition to: home with family     Problem: Pain Acute  Goal: Acceptable Pain Control and Functional Ability  Outcome: Ongoing, Progressing  Intervention: Prevent or Manage Pain  Recent Flowsheet Documentation  Taken 6/8/2024 1450 by Amber Prajapati RN  Medication Review/Management: medications reviewed  Taken 6/8/2024 0855 by Amber Prajapati RN  Medication Review/Management:  medications reviewed  Intervention: Optimize Psychosocial Wellbeing  Recent Flowsheet Documentation  Taken 6/8/2024 1450 by Amber Prajapati RN  Diversional Activities:   smartphone   television  Taken 6/8/2024 0855 by Amber Prajapati RN  Diversional Activities:   television   smartphone     Problem: Fall Injury Risk  Goal: Absence of Fall and Fall-Related Injury  Outcome: Ongoing, Progressing  Intervention: Identify and Manage Contributors  Recent Flowsheet Documentation  Taken 6/8/2024 1450 by Amber Prajapati RN  Medication Review/Management: medications reviewed  Taken 6/8/2024 0855 by Amber Prajapati RN  Medication Review/Management: medications reviewed  Intervention: Promote Injury-Free Environment  Recent Flowsheet Documentation  Taken 6/8/2024 1450 by Amber Prajapati RN  Safety Promotion/Fall Prevention:   activity supervised   assistive device/personal items within reach   clutter free environment maintained   fall prevention program maintained   nonskid shoes/slippers when out of bed   room organization consistent   safety round/check completed  Taken 6/8/2024 1400 by Amber Prajapati RN  Safety Promotion/Fall Prevention: patient off unit  Taken 6/8/2024 1200 by Amber Prajapati RN  Safety Promotion/Fall Prevention: patient off unit  Taken 6/8/2024 0855 by Amber Prajapati RN  Safety Promotion/Fall Prevention:   activity supervised   assistive device/personal items within reach   clutter free environment maintained   fall prevention program maintained   nonskid shoes/slippers when out of bed   room organization consistent   safety round/check completed     Problem: Dysrhythmia  Goal: Normalized Cardiac Rhythm  Outcome: Ongoing, Progressing  Intervention: Monitor and Manage Cardiac Rhythm Effect  Recent Flowsheet Documentation  Taken 6/8/2024 0855 by Amber Prajapati RN  VTE Prevention/Management: patient refused intervention     Problem: Fluid Deficit (Intestinal Obstruction)  Goal: Fluid  Balance  Outcome: Ongoing, Progressing     Problem: Infection (Intestinal Obstruction)  Goal: Absence of Infection Signs and Symptoms  Outcome: Ongoing, Progressing     Problem: Nausea and Vomiting (Intestinal Obstruction)  Goal: Nausea and Vomiting Relief  Outcome: Ongoing, Progressing     Problem: Pain (Intestinal Obstruction)  Goal: Acceptable Pain Control  Outcome: Ongoing, Progressing  Intervention: Monitor and Manage Pain  Recent Flowsheet Documentation  Taken 6/8/2024 3090 by Amber Prajapati, RN  Diversional Activities:   smartphone   television  Taken 6/8/2024 0851 by Amber Prajapati, RN  Diversional Activities:   television   smartphone   Goal Outcome Evaluation:

## 2024-06-08 NOTE — H&P
"HISTORY AND PHYSICAL   [facility]        Date of Admission: 2024  Patient Identification:  Name: Sol Rojas  Age: 74 y.o.  Sex: female  :  1950  MRN: [id number]                     Primary Care Physician: Reyes, Rosenberg Acosta, MD    Chief Complaint: Abdominal pain nausea vomiting    History of Present Illness:   Ms. Rojas is a pleasant 74-year-old female who recently was evaluated by cardiologist prior to admission with plans for Cardiolite loop recorder.  Regardless she is here for noncardiac issues today where she had acute onset of nausea vomiting and acute abdominal pain.  She thought she had some issue with food poisoning from a couple days prior and she has been having loose stools over the last couple days.  She came to the ER due to the above complaints and CT imaging showed concern for small bowel obstruction.  She has had a past history of umbilical hernia repair so that does increase her risk for adhesions.  Her history does not really support further persistent SBO as she has claimed multiple bouts of diarrhea even this morning and she has had resolution of abdominal pain and nausea and vomiting.  She feels hungry and would like to eat.  She denies any fever chills or night sweats.  She denies any confusion chest pain or troubles breathing.  She admits to past history of diverticulosis and C. difficile.    Past Medical History:  Past Medical History:   Diagnosis Date    Ankle sprain     Left ankle    Arthritis     OSTEO    Bilateral knee pain     Chest pain     SAW A CARDIOLOGIST--PER PT, EVERYTHING WAS \"OKAY.\"    Disease of thyroid gland     POLYP -biopsy    Dry eye syndrome of both eyes     Frozen shoulder 3/11/24    Hip arthrosis     Hypertension     Hypertension     Knee swelling     Left hip pain     Left knee pain     Obesity     Pulmonary hypertension      Past Surgical History:  Past Surgical History:   Procedure Laterality Date    CATARACT EXTRACTION Bilateral  "    COLONOSCOPY N/A 04/13/2021    Procedure: COLONOSCOPY TO CECUM AND INTO TI;  Surgeon: Louie Smith MD;  Location: [facility] ENDOSCOPY;  Service: Gastroenterology;  Laterality: N/A;  pre: history of polyps  post: diverticulosis    COLONOSCOPY W/ BIOPSIES AND POLYPECTOMY      BENIGN    HERNIA REPAIR      UMBILICAL HERNIA REPAIR    LEG SURGERY Left     cellulitis    TOTAL HIP ARTHROPLASTY Left 01/15/2019    Procedure: LEFT TOTAL HIP ARTHROPLASTY;  Surgeon: Hieu Orosco MD;  Location: [facility] MAIN OR;  Service: Orthopedics    TOTAL KNEE ARTHROPLASTY Left 07/19/2022    Procedure: LEFT TOTAL KNEE ARTHROPLASTY WITH SANTIAGO NAVIGATION;  Surgeon: Guilherme Smith MD;  Location:  [facility];  Service: Orthopedics;  Laterality: Left;    US GUIDED FINE NEEDLE ASPIRATION  04/15/2021    thyroid      Home Meds:  Medications Prior to Admission   Medication Sig Dispense Refill Last Dose    carvedilol (COREG) 3.125 MG tablet Take 1 tablet by mouth Daily. 30 tablet 0     ferrous sulfate 325 (65 FE) MG tablet Take 1 tablet by mouth Daily With Breakfast.       KLOR-CON 20 MEQ CR tablet Take 1 tablet by mouth Daily.       Magnesium 250 MG tablet Take  by mouth.       multivitamin with minerals tablet tablet Take 1 tablet by mouth Daily.       olmesartan-hydrochlorothiazide (BENICAR HCT) 40-25 MG per tablet Take 1 tablet by mouth Daily.       polyethyl glycol-propyl glycol (SYSTANE) 0.4-0.3 % solution ophthalmic solution (artificial tears) Administer 1 drop to both eyes Every 1 (One) Hour As Needed.       vitamin D3 125 MCG (5000 UT) capsule capsule Take 1 capsule by mouth Daily.          Allergies:  Allergies   Allergen Reactions    Augmentin [Amoxicillin-Pot Clavulanate] GI Intolerance    Hydrocodone Nausea Only and GI Intolerance    Latex Hives    Penicillins Itching, Nausea Only and GI Intolerance    Percocet [Oxycodone-Acetaminophen] GI Intolerance     Immunizations:  Immunization History   Administered Date(s) Administered     "COVID-19 (PFIZER) BIVALENT 12+YRS 10/01/2022    COVID-19 (PFIZER) Purple Cap Monovalent 2021, 04/10/2021, 10/16/2021    COVID-19 F23 (MODERNA) 12YRS+ (SPIKEVAX) 10/27/2023    Covid-19 (Pfizer) Gray Cap Monovalent 2022    FLUAD TRI 65YR+ 2019    Fluad Quad 65+ 2021    Fluzone High-Dose 65+yrs 2020     Social History:   Social History     Social History Narrative    Not on file     Social History     Socioeconomic History    Marital status:    Tobacco Use    Smoking status: Never     Passive exposure: Never    Smokeless tobacco: Never   Vaping Use    Vaping status: Never Used   Substance and Sexual Activity    Alcohol use: No    Drug use: Never    Sexual activity: Not Currently     Partners: Male     Birth control/protection: Abstinence       Family History:  Family History   Problem Relation Age of Onset    Stroke Mother     Cancer Father     Malig Hyperthermia Neg Hx         Review of Systems  See history of present illness and past medical history.  Patient denies fever chills night sweats.  Admits to nausea vomiting abdominal pain and diarrhea.  Denies any chest pain palpitation cough shortness of air or altered mentation dysuria bruising bleeding or loss of consciousness.  Remainder of ROS is negative.    Objective:  T Max 24 hrs: Temp (24hrs), Av.5 °F (36.4 °C), Min:96.7 °F (35.9 °C), Max:98.2 °F (36.8 °C)    Vitals Ranges:   Temp:  [96.7 °F (35.9 °C)-98.2 °F (36.8 °C)] 98.2 °F (36.8 °C)  Heart Rate:  [51-96] 51  Resp:  [12-18] 16  BP: (110-155)/(58-91) 111/58      Exam:  /58 (BP Location: Right arm, Patient Position: Lying)   Pulse 51   Temp 98.2 °F (36.8 °C) (Oral)   Resp 16   Ht 167.6 cm (66\")   Wt 92.7 kg (204 lb 4.8 oz)   LMP  (LMP Unknown)   SpO2 93%   BMI 32.97 kg/m²     General Appearance:    Alert, cooperative, looks way better than I anticipated, she seems to be at baseline, pleasant conversational and nontoxic in appearance, alert noted x 3, no " family present   Head:    Normocephalic, without obvious abnormality, atraumatic   Eyes:    PERRL, conjunctivae/corneas clear, EOM's intact, both eyes   Ears:    Normal external ear canals, both ears   Nose:   Nares normal, septum midline, mucosa normal, no drainage    or sinus tenderness   Throat:   Lips, mucosa, and tongue normal with slightly dry mucous membranes   Neck:   Supple, no JVD       Lungs:     Clear to auscultation bilaterally, respirations unlabored   Chest Wall:    No tenderness or deformity    Heart:    Regular rate and rhythm, S1 and S2 normal   Abdomen:     Soft, nontender, bowel sounds slightly hypoactive but certainly no rebound or guarding   Extremities: Moving all while sitting up in bed, no cyanosis or edema   Pulses:   2+ and symmetric all extremities           Neurologic:   CNII-XII intact, no focal deficits      .    Data Review:  Labs in chart were reviewed.             Imaging Results (All)       Procedure Component Value Units Date/Time    CT Abdomen Pelvis With Contrast [[id number]] Collected: 06/07/24 2041     Updated: 06/07/24 2047    Narrative:      CT ABDOMEN AND PELVIS WITH CONTRAST     HISTORY: 74 years of age, female.  abdominal pain, nausea, vomiting and  diarrhea.       TECHNIQUE:  CT includes axial imaging from the lung bases to the  trochanters with intravenous contrast and without use of oral contrast.  Data reconstructed in coronal and sagittal planes. Radiation dose  reduction techniques were utilized, including automated exposure control  and exposure modulation based on body size.     COMPARISON: CT abdomen and pelvis 05/23/2021.     FINDINGS: There is elevation of the right hemidiaphragm. Within the  posterior superior right lobe of the liver there is a 1.5 cm hepatic  cyst. Gallbladder, spleen, adrenal glands, kidneys, and pancreas appear  within normal limits.     There is abnormal long segment of small-bowel dilatation and small bowel  air-fluid levels are present.  Small-bowel loops are dilated to 3.5 cm.  Distal small-bowel loops are decompressed and the findings are  consistent with small-bowel obstruction.  There is mild free fluid in  the pelvis. Colon is mostly decompressed.       Impression:      Small-bowel obstruction with long segment of abnormally  dilated small-bowel loops with air/fluid levels. Distal small-bowel  loops and the colon are decompressed. Mild free fluid in the pelvis.      Discussed with Dr. Melo 06/07/2024 at 8:35 p.m.     Radiation dose reduction techniques were utilized, including automated  exposure control and exposure modulation based on body size.        This report was finalized on 6/7/2024 8:44 PM by Dr. Osei Dixon M.D on Workstation: [id number]       XR Chest 1 View [[id number]] Collected: 06/07/24 1820     Updated: 06/07/24 1825    Narrative:      Emergency portable view of the chest on 6/7/2024     CLINICAL HISTORY: Shortness of breath and high blood pressure.     This is correlated to a prior chest x-ray on 5/11/2024 as well as a  chest x-ray on 1/18/2019.     FINDINGS: The cardiomediastinal silhouette and the pulmonary vasculature  are within normal limits. There is moderate chronic elevation of the  right hemidiaphragm. The lungs are clear. The costophrenic angles are  sharp.       Impression:         1.. No active disease is seen in the chest with no change over 2 prior  chest x-rays dating back to 1/8/2019. There is moderate chronic  elevation the right hemidiaphragm. The etiology of this patient's  shortness of breath is not established on this exam.     This report was finalized on 6/7/2024 6:22 PM by Dr. Georges Fan M.D on  Workstation: [id number]                 Assessment:  Active Hospital Problems    Diagnosis  POA    **SBO (small bowel obstruction) [K56.609]  Yes    Generalized abdominal pain [R10.84]  Unknown    Nausea & vomiting [R11.2]  Unknown    Dehydration [E86.0]  Unknown    NSVT (nonsustained ventricular  tachycardia) [I47.29]  Yes    Primary hypertension [I10]  Yes      Resolved Hospital Problems   No resolved problems to display.       Plan:    At the time of my exam this morning, surgery is still pending to see in consult and I think this patient's abdomen is pretty much benign though her bowel sounds are a bit hypoactive.  Her history sounds inconsistent with true obstruction though she has had a past history of a umbilical hernia repair perhaps she was obstructing due to former adhesions and perhaps that has let up on its own as her nausea vomiting have all resolved and she tells me she is having loose BMs    At this point in time I will keep her n.p.o. until surgery can evaluate but anticipate probable advancement of liquid diet to give her an oral challenge to see what she can tolerate    Hold p.o. iron for now    Continue as needed pain control    Continue gentle IVF for dehydration    Check stool for GI panel    Continue meds for hypertension-Coreg and hold ARB/HCTZ.  Current BP only 118/61    Add IV Pepcid    Recent evaluation by cardiology noted for NSVT    SCDs for DVT prophylaxis        Further recommendations to follow as clinical course unfolds  Lyle Irby MD  6/8/2024  07:59 EDT

## 2024-06-08 NOTE — PLAN OF CARE
Goal Outcome Evaluation:  Plan of Care Reviewed With: patient      Patient admitted to the floor this morning w SBO.No nausea/vomiting reported.VSS.Room air.IVFs infusing.Strict NPO.G.surgery consult called.Dressing on chest intact and dry.NSR/SB on tele

## 2024-06-08 NOTE — ED NOTES
"Nursing report ED to floor  Sol Rojas  74 y.o.  female    HPI :  HPI (Adult)  Stated Reason for Visit: SOA, weakness  History Obtained From: EMS    Chief Complaint  Chief Complaint   Patient presents with    Shortness of Breath    Weakness - Generalized       Admitting doctor:   Will Lara MD    Admitting diagnosis:   The primary encounter diagnosis was Small bowel obstruction. Diagnoses of Nausea and vomiting, unspecified vomiting type and Generalized abdominal pain were also pertinent to this visit.    Code status:   Current Code Status       Date Active Code Status Order ID Comments User Context       6/8/2024 0040 CPR (Attempt to Resuscitate) [id number]  Homa Dewitt, APRN ED        Question Answer    Code Status (Patient has no pulse and is not breathing) CPR (Attempt to Resuscitate)    Medical Interventions (Patient has pulse or is breathing) Full Support                    Allergies:   Augmentin [amoxicillin-pot clavulanate], Hydrocodone, Latex, Penicillins, and Percocet [oxycodone-acetaminophen]    Isolation:   No active isolations    Intake and Output    Intake/Output Summary (Last 24 hours) at 6/8/2024 0154  Last data filed at 6/7/2024 2235  Gross per 24 hour   Intake 1500 ml   Output --   Net 1500 ml       Weight:       06/07/24  1759   Weight: 92.5 kg (204 lb)       Most recent vitals:   Vitals:    06/07/24 1757 06/07/24 1759 06/07/24 2001 06/07/24 2101   BP: 117/70  113/64 112/62   Pulse: 93  53 59   Resp:       Temp:       SpO2:   96% 95%   Weight:  92.5 kg (204 lb)     Height:  167.6 cm (66\")         Active LDAs/IV Access:   Lines, Drains & Airways       Active LDAs       Name Placement date Placement time Site Days    Peripheral IV 06/07/24 Left Antecubital 06/07/24  --  Antecubital  1                    Labs (abnormal labs have a star):   Labs Reviewed   COMPREHENSIVE METABOLIC PANEL - Abnormal; Notable for the following components:       Result Value    Glucose 119 (*)     All " other components within normal limits    Narrative:     GFR Normal >60  Chronic Kidney Disease <60  Kidney Failure <15    The GFR formula is only valid for adults with stable renal function between ages 18 and 70.   TROPONIN - Abnormal; Notable for the following components:    HS Troponin T 14 (*)     All other components within normal limits    Narrative:     High Sensitive Troponin T Reference Range:  <14.0 ng/L- Negative Female for AMI  <22.0 ng/L- Negative Male for AMI  >=14 - Abnormal Female indicating possible myocardial injury.  >=22 - Abnormal Male indicating possible myocardial injury.   Clinicians would have to utilize clinical acumen, EKG, Troponin, and serial changes to determine if it is an Acute Myocardial Infarction or myocardial injury due to an underlying chronic condition.        CBC WITH AUTO DIFFERENTIAL - Abnormal; Notable for the following components:    Neutrophil % 82.3 (*)     Lymphocyte % 8.6 (*)     Eosinophil % 0.2 (*)     Neutrophils, Absolute 7.90 (*)     All other components within normal limits   HIGH SENSITIVITIY TROPONIN T 2HR - Abnormal; Notable for the following components:    HS Troponin T 14 (*)     All other components within normal limits    Narrative:     High Sensitive Troponin T Reference Range:  <14.0 ng/L- Negative Female for AMI  <22.0 ng/L- Negative Male for AMI  >=14 - Abnormal Female indicating possible myocardial injury.  >=22 - Abnormal Male indicating possible myocardial injury.   Clinicians would have to utilize clinical acumen, EKG, Troponin, and serial changes to determine if it is an Acute Myocardial Infarction or myocardial injury due to an underlying chronic condition.        LIPASE - Normal   BNP (IN-HOUSE) - Normal    Narrative:     This assay is used as an aid in the diagnosis of individuals suspected of having heart failure. It can be used as an aid in the diagnosis of acute decompensated heart failure (ADHF) in patients presenting with signs and symptoms  of ADHF to the emergency department (ED). In addition, NT-proBNP of <300 pg/mL indicates ADHF is not likely.    Age Range Result Interpretation  NT-proBNP Concentration (pg/mL:      <50             Positive            >450                   Gray                 300-450                    Negative             <300    50-75           Positive            >900                  Gray                300-900                  Negative            <300      >75             Positive            >1800                  Gray                300-1800                  Negative            <300   LACTIC ACID, PLASMA - Normal   CK - Normal   MAGNESIUM - Normal   CBC (NO DIFF)   COMPREHENSIVE METABOLIC PANEL   CBC AND DIFFERENTIAL    Narrative:     The following orders were created for panel order CBC & Differential.  Procedure                               Abnormality         Status                     ---------                               -----------         ------                     CBC Auto Differential[[id number]]        Abnormal            Final result                 Please view results for these tests on the individual orders.       EKG:   ECG 12 Lead Dyspnea   Preliminary Result   HEART RATE= 90  bpm   RR Interval= 667  ms   NH Interval= 174  ms   P Horizontal Axis= -33  deg   P Front Axis= 17  deg   QRSD Interval= 94  ms   QT Interval= 351  ms   QTcB= 430  ms   QRS Axis= 11  deg   T Wave Axis= 0  deg   - BORDERLINE ECG -   Sinus rhythm   Probable left atrial enlargement   Low voltage, precordial leads   Electronically Signed By:    Date and Time of Study: 2024-06-07 17:34:17      Telemetry Scan   Final Result      Telemetry Scan   Final Result          Meds given in ED:   Medications   sodium chloride 0.9 % flush 10 mL (has no administration in time range)   fentaNYL citrate (PF) (SUBLIMAZE) injection 25 mcg (0 mcg Intravenous Hold 6/7/24 2212)   sodium chloride 0.9 % infusion (100 mL/hr Intravenous New Bag 6/7/24 2232)    sodium chloride 0.9 % flush 10 mL (has no administration in time range)   nitroglycerin (NITROSTAT) SL tablet 0.4 mg (has no administration in time range)   acetaminophen (TYLENOL) tablet 650 mg (has no administration in time range)     Or   acetaminophen (TYLENOL) 160 MG/5ML oral solution 650 mg (has no administration in time range)     Or   acetaminophen (TYLENOL) suppository 650 mg (has no administration in time range)   sennosides-docusate (PERICOLACE) 8.6-50 MG per tablet 2 tablet (has no administration in time range)     And   polyethylene glycol (MIRALAX) packet 17 g (has no administration in time range)     And   bisacodyl (DULCOLAX) EC tablet 5 mg (has no administration in time range)     And   bisacodyl (DULCOLAX) suppository 10 mg (has no administration in time range)   melatonin tablet 2.5 mg (has no administration in time range)   ondansetron ODT (ZOFRAN-ODT) disintegrating tablet 4 mg (has no administration in time range)     Or   ondansetron (ZOFRAN) injection 4 mg (has no administration in time range)   aluminum-magnesium hydroxide-simethicone (MAALOX MAX) 400-400-40 MG/5ML suspension 15 mL (has no administration in time range)   sodium chloride 0.9 % bolus 1,000 mL (0 mL Intravenous Stopped 6/7/24 2235)   ondansetron (ZOFRAN) injection 4 mg (4 mg Intravenous Given 6/7/24 1742)   pantoprazole (PROTONIX) injection 40 mg (40 mg Intravenous Given 6/7/24 1743)   iopamidol (ISOVUE-300) 61 % injection 100 mL (85 mL Intravenous Given 6/7/24 2023)       Imaging results:  CT Abdomen Pelvis With Contrast    Result Date: 6/7/2024  Small-bowel obstruction with long segment of abnormally dilated small-bowel loops with air/fluid levels. Distal small-bowel loops and the colon are decompressed. Mild free fluid in the pelvis.  Discussed with Dr. Melo 06/07/2024 at 8:35 p.m.  Radiation dose reduction techniques were utilized, including automated exposure control and exposure modulation based on body size.   This  report was finalized on 6/7/2024 8:44 PM by Dr. Osei Dixon M.D on Workstation: [id number]      XR Chest 1 View    Result Date: 6/7/2024   1.. No active disease is seen in the chest with no change over 2 prior chest x-rays dating back to 1/8/2019. There is moderate chronic elevation the right hemidiaphragm. The etiology of this patient's shortness of breath is not established on this exam.  This report was finalized on 6/7/2024 6:22 PM by Dr. Georges Fan M.D on Workstation: [id number]       Ambulatory status:   - with assist    Social issues:   Social History     Socioeconomic History    Marital status:    Tobacco Use    Smoking status: Never     Passive exposure: Never    Smokeless tobacco: Never   Vaping Use    Vaping status: Never Used   Substance and Sexual Activity    Alcohol use: No    Drug use: Never    Sexual activity: Not Currently     Partners: Male     Birth control/protection: Abstinence       Peripheral Neurovascular  Peripheral Neurovascular (Adult)  Peripheral Neurovascular WDL: WDL    Neuro Cognitive  Neuro Cognitive (Adult)  Cognitive/Neuro/Behavioral WDL: WDL, orientation  Orientation: oriented x 4    Learning  Learning Assessment (Adult)  Learning Readiness and Ability: no barriers identified    Respiratory  Respiratory WDL  Respiratory WDL: WDL, rhythm/pattern  Rhythm/Pattern, Respiratory: depth regular, pattern regular, unlabored    Abdominal Pain       Pain Assessments  Pain (Adult)  (0-10) Pain Rating: Rest: 0  (0-10) Pain Rating: Activity: 0    NIH Stroke Scale       Augusto Perez RN  06/08/24 01:54 EDT

## 2024-06-08 NOTE — ED PROVIDER NOTES
EMERGENCY DEPARTMENT ENCOUNTER    Room number:  08/08  Date Seen:  6/7/2024  Time of transfer:2300  PCP:  Reyes, Rosenberg Acosta, MD    Laboratory Results:  Recent Results (from the past 24 hour(s))   ECG 12 Lead Dyspnea    Collection Time: 06/07/24  5:34 PM   Result Value Ref Range    QT Interval 351 ms    QTC Interval 430 ms   Comprehensive Metabolic Panel    Collection Time: 06/07/24  5:44 PM    Specimen: Arm, Left; Blood   Result Value Ref Range    Glucose 119 (H) 65 - 99 mg/dL    BUN 19 8 - 23 mg/dL    Creatinine 0.97 0.57 - 1.00 mg/dL    Sodium 143 136 - 145 mmol/L    Potassium 3.9 3.5 - 5.2 mmol/L    Chloride 102 98 - 107 mmol/L    CO2 28.0 22.0 - 29.0 mmol/L    Calcium 9.3 8.6 - 10.5 mg/dL    Total Protein 6.8 6.0 - 8.5 g/dL    Albumin 3.6 3.5 - 5.2 g/dL    ALT (SGPT) 11 1 - 33 U/L    AST (SGOT) 14 1 - 32 U/L    Alkaline Phosphatase 86 39 - 117 U/L    Total Bilirubin 0.7 0.0 - 1.2 mg/dL    Globulin 3.2 gm/dL    A/G Ratio 1.1 g/dL    BUN/Creatinine Ratio 19.6 7.0 - 25.0    Anion Gap 13.0 5.0 - 15.0 mmol/L    eGFR 62.2 >60.0 mL/min/1.73   Lipase    Collection Time: 06/07/24  5:44 PM    Specimen: Arm, Left; Blood   Result Value Ref Range    Lipase 20 13 - 60 U/L   BNP    Collection Time: 06/07/24  5:44 PM    Specimen: Arm, Left; Blood   Result Value Ref Range    proBNP 74.0 0.0 - 900.0 pg/mL   High Sensitivity Troponin T    Collection Time: 06/07/24  5:44 PM    Specimen: Arm, Left; Blood   Result Value Ref Range    HS Troponin T 14 (H) <14 ng/L   Lactic Acid, Plasma    Collection Time: 06/07/24  5:44 PM    Specimen: Arm, Left; Blood   Result Value Ref Range    Lactate 1.1 0.5 - 2.0 mmol/L   CK    Collection Time: 06/07/24  5:44 PM    Specimen: Arm, Left; Blood   Result Value Ref Range    Creatine Kinase 66 20 - 180 U/L   Magnesium    Collection Time: 06/07/24  5:44 PM    Specimen: Arm, Left; Blood   Result Value Ref Range    Magnesium 2.1 1.6 - 2.4 mg/dL   CBC Auto Differential    Collection Time: 06/07/24   5:44 PM    Specimen: Arm, Left; Blood   Result Value Ref Range    WBC 9.61 3.40 - 10.80 10*3/mm3    RBC 4.50 3.77 - 5.28 10*6/mm3    Hemoglobin 13.1 12.0 - 15.9 g/dL    Hematocrit 41.2 34.0 - 46.6 %    MCV 91.6 79.0 - 97.0 fL    MCH 29.1 26.6 - 33.0 pg    MCHC 31.8 31.5 - 35.7 g/dL    RDW 13.0 12.3 - 15.4 %    RDW-SD 43.7 37.0 - 54.0 fl    MPV 9.0 6.0 - 12.0 fL    Platelets 243 140 - 450 10*3/mm3    Neutrophil % 82.3 (H) 42.7 - 76.0 %    Lymphocyte % 8.6 (L) 19.6 - 45.3 %    Monocyte % 8.4 5.0 - 12.0 %    Eosinophil % 0.2 (L) 0.3 - 6.2 %    Basophil % 0.2 0.0 - 1.5 %    Immature Grans % 0.3 0.0 - 0.5 %    Neutrophils, Absolute 7.90 (H) 1.70 - 7.00 10*3/mm3    Lymphocytes, Absolute 0.83 0.70 - 3.10 10*3/mm3    Monocytes, Absolute 0.81 0.10 - 0.90 10*3/mm3    Eosinophils, Absolute 0.02 0.00 - 0.40 10*3/mm3    Basophils, Absolute 0.02 0.00 - 0.20 10*3/mm3    Immature Grans, Absolute 0.03 0.00 - 0.05 10*3/mm3    nRBC 0.0 0.0 - 0.2 /100 WBC   High Sensitivity Troponin T 2Hr    Collection Time: 06/07/24  8:01 PM    Specimen: Blood   Result Value Ref Range    HS Troponin T 14 (H) <14 ng/L    Troponin T Delta 0 >=-4 - <+4 ng/L     I reviewed the above results.    Radiology:  CT Abdomen Pelvis With Contrast   Final Result   Small-bowel obstruction with long segment of abnormally   dilated small-bowel loops with air/fluid levels. Distal small-bowel   loops and the colon are decompressed. Mild free fluid in the pelvis.        Discussed with Dr. Melo 06/07/2024 at 8:35 p.m.       Radiation dose reduction techniques were utilized, including automated   exposure control and exposure modulation based on body size.           This report was finalized on 6/7/2024 8:44 PM by Dr. Osei Dixon M.D on Workstation: [id number]          XR Chest 1 View   Final Result       1.. No active disease is seen in the chest with no change over 2 prior   chest x-rays dating back to 1/8/2019. There is moderate chronic   elevation the right  hemidiaphragm. The etiology of this patient's   shortness of breath is not established on this exam.       This report was finalized on 6/7/2024 6:22 PM by Dr. Georges Fan M.D on   Workstation: [id number]            I reviewed the above results    Medications ordered in ED:  Medications   sodium chloride 0.9 % flush 10 mL (has no administration in time range)   fentaNYL citrate (PF) (SUBLIMAZE) injection 25 mcg (0 mcg Intravenous Hold 6/7/24 2212)   sodium chloride 0.9 % infusion (100 mL/hr Intravenous New Bag 6/7/24 2232)   sodium chloride 0.9 % bolus 1,000 mL (0 mL Intravenous Stopped 6/7/24 2235)   ondansetron (ZOFRAN) injection 4 mg (4 mg Intravenous Given 6/7/24 1742)   pantoprazole (PROTONIX) injection 40 mg (40 mg Intravenous Given 6/7/24 1743)   iopamidol (ISOVUE-300) 61 % injection 100 mL (85 mL Intravenous Given 6/7/24 2023)       ED Course as of 06/07/24 2332 Fri Jun 07, 2024   1746 EKG ER MD interpretation   Time: 17: 34  Rhythm and rate: Normal sinus rhythm at a rate of 90  Axis: Normal  P waves: Normal  QRS complexes: Normal  ST segments: no elevation nor depressions  T waves: Inverted T wave V1 and lead III   [AR]   1808 I viewed patient's chest x-ray and on my interpretation patient has normal heart size and clear lungs [AR]   2118 I discussed results with patient and she says her abdominal pain is returning.  She denies any nausea and has had no vomiting here in the ER.  We did discuss the need for NG tube if she did have any return of nausea or vomiting. [AR]   2135 I discussed patient's case with Dr. Deven larson for surgery who recommended low threshold for placement of NG tube if patient has any return of nausea or vomiting.  Will see in a.m. [AR]   2300 Patient was turned over to me by Dr. Melo.  Pending: Admission [CC]   2330 Spoke with JENNIFER Gagnon with [facility].  Reviewed history, exam, results, treatments.  She agrees admit the patient to Dr. Lara.      [CC]      ED Course User  Index  [AR] Lisbet Melo MD  [CC] Tiffany Whitt PA-C       Diagnosis:  Final diagnoses:   Small bowel obstruction   Nausea and vomiting, unspecified vomiting type   Generalized abdominal pain         Provider attestation:  I personally reviewed the past medical history, past surgical history, social history, family history, current medications, and allergies as they appear in the chart.    The patient was seen and examined by myself and Dr. Melo, who agree with plan.      Tiffany Whitt PA-C  06/07/24 1633

## 2024-06-09 PROCEDURE — 99233 SBSQ HOSP IP/OBS HIGH 50: CPT | Performed by: STUDENT IN AN ORGANIZED HEALTH CARE EDUCATION/TRAINING PROGRAM

## 2024-06-09 PROCEDURE — 25810000003 SODIUM CHLORIDE 0.9 % SOLUTION: Performed by: EMERGENCY MEDICINE

## 2024-06-09 PROCEDURE — 25010000002 ONDANSETRON PER 1 MG: Performed by: NURSE PRACTITIONER

## 2024-06-09 RX ADMIN — SODIUM CHLORIDE 100 ML/HR: 9 INJECTION, SOLUTION INTRAVENOUS at 10:43

## 2024-06-09 RX ADMIN — CARVEDILOL 3.12 MG: 3.12 TABLET, FILM COATED ORAL at 10:43

## 2024-06-09 RX ADMIN — ONDANSETRON 4 MG: 2 INJECTION INTRAMUSCULAR; INTRAVENOUS at 06:49

## 2024-06-09 RX ADMIN — FAMOTIDINE 20 MG: 10 INJECTION INTRAVENOUS at 10:43

## 2024-06-09 RX ADMIN — FAMOTIDINE 20 MG: 10 INJECTION INTRAVENOUS at 20:56

## 2024-06-09 RX ADMIN — SODIUM CHLORIDE 100 ML/HR: 9 INJECTION, SOLUTION INTRAVENOUS at 02:26

## 2024-06-09 NOTE — PROGRESS NOTES
"    DAILY PROGRESS NOTE  [facility]    Patient Identification:  Name: Sol Rojas  Age: 74 y.o.  Sex: female  :  1950  MRN: [id number]         Primary Care Physician: Reyes, Rosenberg Acosta, MD    Subjective:  Interval History: Still some nausea but no emesis.  She does admit to some diffuse abdominal pains that are overall much better.  She states her amount of stool has decreased and is watery.  Denies any fever chest pain troubles breathing or altered mentation    Objective: Resting in bed conversational and pleasant.  Nontoxic and does not appear in acute pain currently    Scheduled Meds:carvedilol, 3.125 mg, Oral, Daily  famotidine, 20 mg, Intravenous, Q12H  fentaNYL citrate (PF), 25 mcg, Intravenous, Once      Continuous Infusions:sodium chloride, 100 mL/hr, Last Rate: 100 mL/hr (24 0226)        Vital signs in last 24 hours:  Temp:  [97.5 °F (36.4 °C)-98.1 °F (36.7 °C)] 98.1 °F (36.7 °C)  Heart Rate:  [59-77] 77  Resp:  [16] 16  BP: (103-137)/(50-64) 123/60    Intake/Output:  No intake or output data in the 24 hours ending 24 1023    Exam:  /60 (BP Location: Right arm, Patient Position: Lying)   Pulse 77   Temp 98.1 °F (36.7 °C) (Oral)   Resp 16   Ht 167.6 cm (66\")   Wt 92.7 kg (204 lb 4.8 oz)   LMP  (LMP Unknown)   SpO2 97%   BMI 32.97 kg/m²     General Appearance:    Alert, cooperative, AOx3                         Throat:   Oral mucosa pink and moist                           Neck:   No JVD                         Lungs:    Clear to auscultation bilaterally, respirations unlabored                          Heart:    Regular rate and rhythm, S1 and S2 normal                  Abdomen:     Soft, no particular TTP, certainly no rebound or guarding, bowel sounds are little hypoactive but they are noted                 Extremities: Moving all, no cyanosis or pitting edema                        Pulses:   Pulses palpable in lower extremities                      "        Data Review:  Labs in chart were reviewed.    Assessment:  Active Hospital Problems    Diagnosis  POA    **SBO (small bowel obstruction) [K56.609]  Yes    Generalized abdominal pain [R10.84]  Unknown    Nausea & vomiting [R11.2]  Unknown    Dehydration [E86.0]  Unknown    NSVT (nonsustained ventricular tachycardia) [I47.29]  Yes    Primary hypertension [I10]  Yes      Resolved Hospital Problems   No resolved problems to display.       Plan:    Yesterday I would have anticipated patient probably had obstruction due to adhesion given past history of umbilical hernia repair as well as the fact that she was clinically improved when I saw her.  I kept her n.p.o. as we awaited surgical evaluation and they check small bowel follow-through.  She has remained free of any type of vomiting and she has a little bit of nausea she still has some vague abdominal discomfort.  She is having stools that are decreasing in frequency and are totally loose.  Small bowel follow-through did show persistent aspects concerning for partial small bowel obstruction.  Keep this patient n.p.o. and await further surgical recommendations.  No NG tube is indicated at this time    HTN/past history of NSVT all stable despite not having medications -advised continuation of Coreg if tolerated    Continue IVF and pain/antiemetic control    SCDs for prophylaxis    Pepcid for GI prophylaxis    Lyle Irby MD  6/9/2024  10:23 EDT

## 2024-06-09 NOTE — PROGRESS NOTES
"General Surgery Progress Note    CC: follow up SBO    S: Patient reports passing lots of gas and having several episodes of diarrhea after drinking her contrast yesterday.  No blood in her stool.  Reports mild occasional nausea which she feels is due to not eating, denies vomiting.  Is asking to eat.  States she does have some mild epigastric and right-sided abdominal pain which is \"like pressure \".  Denies chest pain or shortness of breath.  Has been up out of bed walking to the bathroom.    O:/60 (BP Location: Right arm, Patient Position: Lying)   Pulse 77   Temp 98.1 °F (36.7 °C) (Oral)   Resp 16   Ht 167.6 cm (66\")   Wt 92.7 kg (204 lb 4.8 oz)   LMP  (LMP Unknown)   SpO2 97%   BMI 32.97 kg/m²     Intake & Output (last day)         06/08 0701 06/09 0700 06/09 0701  06/10 0700    I.V. (mL/kg)      IV Piggyback      Total Intake(mL/kg)      Net            Urine Unmeasured Occurrence 2 x     Stool Unmeasured Occurrence 1 x             GENERAL: awake, alert, resting bed, comfortable appearing, interactive, cooperative  HEENT: EOMI, clear sclera, moist mucus membranes   CHEST: normal work of breathing on room air  CARDIAC: regular rate   GI: Abd obese, soft, mildly distended,  mildly tender in the upper abdomen without rebound or guarding, no palpable masses  EXTREMITIES: LONG, no cyanosis or edema    SKIN: Warm and dry, no rash    LABS  Results from last 7 days   Lab Units 06/08/24  0508 06/07/24  1744   WBC 10*3/mm3 6.23 9.61   HEMOGLOBIN g/dL 11.7* 13.1   HEMATOCRIT % 36.4 41.2   PLATELETS 10*3/mm3 240 243     Results from last 7 days   Lab Units 06/08/24  0508 06/07/24  1744   SODIUM mmol/L 143 143   POTASSIUM mmol/L 4.0 3.9   CHLORIDE mmol/L 106 102   CO2 mmol/L 30.0* 28.0   BUN mg/dL 15 19   CREATININE mg/dL 1.03* 0.97   CALCIUM mg/dL 8.6 9.3   BILIRUBIN mg/dL 1.0 0.7   ALK PHOS U/L 74 86   ALT (SGPT) U/L 15 11   AST (SGOT) U/L 13 14   GLUCOSE mg/dL 87 119*       GI PCR negative     IMAGING:  Small " bowel follow-through images and report reviewed, there are relatively normal proximal jejunal loops with an area of dilated small bowel loops more distally, and appearance of decompressed terminal ileum concerning for persistent distal partial small bowel obstruction    A/P: 74 y.o. female with history of open umbilical hernia repair presenting with partial small bowel obstruction, suspect secondary to intraabdominal adhesions     AVSS. No vomiting. Passing gas and having diarrhea after SBFT. Abdominal exam with mild distension and tenderness without peritonitis. Imaging shows contrast in the cecum but with persistent dilation of some small bowel loops. Recommend continued conservative nonoperative management. Ok for ice chips. Would advance diet slowly given her symptoms and imaging findings.    Roro Carvalho MD  General, Robotic and Endoscopic Surgery  [facility]    [address], [address]  [place], [place], [address]  P: [phone]  F: [phone]

## 2024-06-09 NOTE — PLAN OF CARE
Goal Outcome Evaluation:  Plan of Care Reviewed With: patient         Medicated for abd pain per MAR.Resting in bed.VSS.Continues w IVFs.SBFT results pending.No distress noted

## 2024-06-09 NOTE — PLAN OF CARE
Problem: Adult Inpatient Plan of Care  Goal: Plan of Care Review  Outcome: Ongoing, Progressing  Goal: Patient-Specific Goal (Individualized)  Outcome: Ongoing, Progressing  Goal: Absence of Hospital-Acquired Illness or Injury  Outcome: Ongoing, Progressing  Intervention: Identify and Manage Fall Risk  Recent Flowsheet Documentation  Taken 6/9/2024 1400 by Amber Prajapati RN  Safety Promotion/Fall Prevention:   activity supervised   assistive device/personal items within reach   clutter free environment maintained   fall prevention program maintained   nonskid shoes/slippers when out of bed   room organization consistent   safety round/check completed  Taken 6/9/2024 1030 by Amebr Prajapati RN  Safety Promotion/Fall Prevention:   activity supervised   assistive device/personal items within reach   clutter free environment maintained   fall prevention program maintained   nonskid shoes/slippers when out of bed   room organization consistent   safety round/check completed  Intervention: Prevent Skin Injury  Recent Flowsheet Documentation  Taken 6/9/2024 1400 by Amber Prajapati RN  Body Position: position changed independently  Taken 6/9/2024 1030 by Amber Prajapati RN  Body Position: position changed independently  Intervention: Prevent and Manage VTE (Venous Thromboembolism) Risk  Recent Flowsheet Documentation  Taken 6/9/2024 1600 by Amber Prajapati RN  Activity Management: up ad emre  Taken 6/9/2024 1400 by Amber Prajapati RN  Activity Management: up ad emre  Range of Motion: active ROM (range of motion) encouraged  Taken 6/9/2024 1200 by Amber Prajapati RN  Activity Management: up ad emre  Taken 6/9/2024 1030 by Amber Prajapati RN  Activity Management: up ad emre  Intervention: Prevent Infection  Recent Flowsheet Documentation  Taken 6/9/2024 1400 by Amber Prajapati RN  Infection Prevention:   rest/sleep promoted   single patient room provided  Taken 6/9/2024 1030 by Amber Prajapati RN  Infection  Prevention:   rest/sleep promoted   single patient room provided  Goal: Optimal Comfort and Wellbeing  Outcome: Ongoing, Progressing  Intervention: Provide Person-Centered Care  Recent Flowsheet Documentation  Taken 6/9/2024 1400 by Amber Prajapati RN  Trust Relationship/Rapport: care explained  Taken 6/9/2024 1030 by Amber Prajapati RN  Trust Relationship/Rapport:   care explained   thoughts/feelings acknowledged   reassurance provided   questions encouraged   questions answered   emotional support provided  Goal: Readiness for Transition of Care  Outcome: Ongoing, Progressing     Problem: Pain Acute  Goal: Acceptable Pain Control and Functional Ability  Outcome: Ongoing, Progressing  Intervention: Prevent or Manage Pain  Recent Flowsheet Documentation  Taken 6/9/2024 1400 by Amber Prajapati RN  Medication Review/Management: medications reviewed  Taken 6/9/2024 1030 by Amber Prajapati RN  Medication Review/Management: medications reviewed  Intervention: Optimize Psychosocial Wellbeing  Recent Flowsheet Documentation  Taken 6/9/2024 1400 by Amber Prajapati RN  Diversional Activities:   smartphone   television  Taken 6/9/2024 1030 by Amber Prajapati RN  Diversional Activities:   smartphone   television     Problem: Fall Injury Risk  Goal: Absence of Fall and Fall-Related Injury  Outcome: Ongoing, Progressing  Intervention: Identify and Manage Contributors  Recent Flowsheet Documentation  Taken 6/9/2024 1400 by Amber Prajapati RN  Medication Review/Management: medications reviewed  Self-Care Promotion:   independence encouraged   BADL personal objects within reach   BADL personal routines maintained  Taken 6/9/2024 1030 by Amber Prajapati RN  Medication Review/Management: medications reviewed  Self-Care Promotion:   independence encouraged   BADL personal objects within reach   BADL personal routines maintained  Intervention: Promote Injury-Free Environment  Recent Flowsheet Documentation  Taken 6/9/2024 1400  by Prajapati, Lace N, RN  Safety Promotion/Fall Prevention:   activity supervised   assistive device/personal items within reach   clutter free environment maintained   fall prevention program maintained   nonskid shoes/slippers when out of bed   room organization consistent   safety round/check completed  Taken 6/9/2024 1030 by Amber Prajapati, RN  Safety Promotion/Fall Prevention:   activity supervised   assistive device/personal items within reach   clutter free environment maintained   fall prevention program maintained   nonskid shoes/slippers when out of bed   room organization consistent   safety round/check completed     Problem: Dysrhythmia  Goal: Normalized Cardiac Rhythm  Outcome: Ongoing, Progressing     Problem: Fluid Deficit (Intestinal Obstruction)  Goal: Fluid Balance  Outcome: Ongoing, Progressing     Problem: Infection (Intestinal Obstruction)  Goal: Absence of Infection Signs and Symptoms  Outcome: Ongoing, Progressing     Problem: Nausea and Vomiting (Intestinal Obstruction)  Goal: Nausea and Vomiting Relief  Outcome: Ongoing, Progressing     Problem: Pain (Intestinal Obstruction)  Goal: Acceptable Pain Control  Outcome: Ongoing, Progressing  Intervention: Monitor and Manage Pain  Recent Flowsheet Documentation  Taken 6/9/2024 1400 by Amber Prajapati, RN  Diversional Activities:   smartphone   television  Taken 6/9/2024 1030 by Amber Prajapati, RN  Diversional Activities:   smartphone   television   Goal Outcome Evaluation:

## 2024-06-10 ENCOUNTER — APPOINTMENT (OUTPATIENT)
Dept: GENERAL RADIOLOGY | Facility: HOSPITAL | Age: 74
DRG: 982 | End: 2024-06-10
Payer: MEDICARE

## 2024-06-10 PROCEDURE — 99231 SBSQ HOSP IP/OBS SF/LOW 25: CPT

## 2024-06-10 PROCEDURE — 74018 RADEX ABDOMEN 1 VIEW: CPT

## 2024-06-10 RX ORDER — BENZOCAINE/MENTHOL 6 MG-10 MG
1 LOZENGE MUCOUS MEMBRANE 2 TIMES DAILY PRN
Status: DISCONTINUED | OUTPATIENT
Start: 2024-06-10 | End: 2024-06-12 | Stop reason: HOSPADM

## 2024-06-10 RX ADMIN — FAMOTIDINE 20 MG: 10 INJECTION INTRAVENOUS at 20:26

## 2024-06-10 RX ADMIN — CARVEDILOL 3.12 MG: 3.12 TABLET, FILM COATED ORAL at 08:00

## 2024-06-10 RX ADMIN — FAMOTIDINE 20 MG: 10 INJECTION INTRAVENOUS at 08:00

## 2024-06-10 NOTE — CASE MANAGEMENT/SOCIAL WORK
Discharge Planning Assessment  [facility]     Patient Name: Sol Rojas  MRN: [id number]  Today's Date: 6/10/2024    Admit Date: 6/7/2024    Plan: Plan home with spouse.   SMITA Navarrete RN   Discharge Needs Assessment       Row Name 06/10/24 0804       Living Environment    People in Home spouse    Name(s) of People in Home Spouse  ( Rehan Rojas [phone])    Current Living Arrangements apartment    Potentially Unsafe Housing Conditions none    In the past 12 months has the electric, gas, oil, or water company threatened to shut off services in your home? No    Primary Care Provided by self    Provides Primary Care For no one    Family Caregiver if Needed spouse    Family Caregiver Names Spouse ( Rehan Rojas [phone])    Quality of Family Relationships helpful;involved;supportive    Able to Return to Prior Arrangements yes    Living Arrangement Comments Pt lives with her spouse ( Rehan Rojas [phone]) in a two story house.       Resource/Environmental Concerns    Resource/Environmental Concerns none    Transportation Concerns none       Food Insecurity    Within the past 12 months, you worried that your food would run out before you got the money to buy more. Never true    Within the past 12 months, the food you bought just didn't last and you didn't have money to get more. Never true       Transition Planning    Patient/Family Anticipates Transition to home with family    Patient/Family Anticipated Services at Transition none    Transportation Anticipated family or friend will provide       Discharge Needs Assessment    Readmission Within the Last 30 Days no previous admission in last 30 days    Equipment Currently Used at Home bath bench;bp cuff;grab bar    Concerns to be Addressed no discharge needs identified;denies needs/concerns at this time    Anticipated Changes Related to Illness none    Equipment Needed After Discharge bath bench;bp cuff;grab bar, tub/shower                   Discharge  Plan       Row Name 06/10/24 0806       Plan    Plan Plan home with spouse.   SMITA Navarrete RN    Plan Comments FACE SHEET VERIFIED/ IM LETTER SIGNED.  Spoke with pt at bedside. Pt's PCP is Dr. Rosenberg Reyes.  Pt lives with her spouse ( Rehan Rojas [phone]) in a two story house.  Pt is independent with ADLs.  Pt has a bath bench, B/P cuff, and grab bar for home use if needed. Pt gets her prescriptions at [facility] at [address]  Pt denies any issues affording medications. Pt is not current with HH.  Pt has not been in SNF.  Pt denies any discharge needs at present. Pt's spouse will assist pt at home and transport pt home. Plan home with spouse.  SMITA Navarrete RN                  Continued Care and Services - Admitted Since 6/7/2024    No active coordination exists for this encounter.       Expected Discharge Date and Time       Expected Discharge Date Expected Discharge Time    Jun 11, 2024            Demographic Summary       Row Name 06/10/24 0803       General Information    Admission Type inpatient    Arrived From emergency department    Required Notices Provided Important Message from Medicare    Referral Source admission list    Reason for Consult discharge planning    Preferred Language English                   Functional Status       Row Name 06/10/24 0804       Functional Status    Usual Activity Tolerance good    Current Activity Tolerance good       Functional Status, IADL    Medications independent    Meal Preparation independent    Housekeeping independent    Laundry independent    Shopping independent       Mental Status    General Appearance WDL WDL                   Psychosocial    No documentation.                  Abuse/Neglect    No documentation.                  Legal    No documentation.                  Substance Abuse    No documentation.                  Patient Forms    No documentation.                     Kathya Navarrete, RN

## 2024-06-10 NOTE — PROGRESS NOTES
Acute Care General Surgery Progress Note    Patient: Sol Rojas  YOB: 1950  MRN: [id number]      Assessment  Sol Rojas is a 74 y.o. female with a partial small bowel obstruction. SBFT completed on the 8th shows contrast reaching colon but with persistent dilation of some small bowel loops. This morning, patient complaining of some mild epigastric pain and distention, but overall feeling much better. AVSS.     Plan  Repeat KUB today   Continue with non-operative management  Okay for full liquid diet    Subjective  Denies nausea or vomiting. Denies fever or chills. Formed BM x 2 last night.    Objective    Vitals:    06/10/24 0727   BP: 115/52   Pulse: 50   Resp: 16   Temp: 97.8 °F (36.6 °C)   SpO2: 97%       Physical Exam  Constitutional: alert, oriented, no acute distress  Respiratory: Normal work of breathing, Symmetric excursion  Cardiovascular: Well pefused, no jugular venous distention evident   Abdominal: soft, epigastric tenderness, epigastric distention  Skin: warm, dry          Giovanna KELLY Calhoun  Acute [facility] General Surgery  [facility]    [address], [address]  [place], [place], [address]  P: [phone]  F: [phone]

## 2024-06-10 NOTE — PLAN OF CARE
Goal Outcome Evaluation:  Plan of Care Reviewed With: patient            Pt up ad emre with no complaints, rested well Iv with NS at 100

## 2024-06-10 NOTE — PROGRESS NOTES
"    DAILY PROGRESS NOTE  [facility]    Patient Identification:  Name: Sol Rojas  Age: 74 y.o.  Sex: female  :  1950  MRN: [id number]         Primary Care Physician: Reyes, Rosenberg Acosta, MD    Subjective:  Interval History: Still admits to some abdominal pain but nothing like when she came in.  Denies any nausea or vomiting.  She is interested in trialing liquids today.  Denies any new chest pain or troubles breathing    Objective: Conversational and very pleasant.  Nontoxic in appearance.  No family at bedside    Scheduled Meds:carvedilol, 3.125 mg, Oral, Daily  famotidine, 20 mg, Intravenous, Q12H  fentaNYL citrate (PF), 25 mcg, Intravenous, Once      Continuous Infusions:     Vital signs in last 24 hours:  Temp:  [97 °F (36.1 °C)-98 °F (36.7 °C)] 97.8 °F (36.6 °C)  Heart Rate:  [48-70] 50  Resp:  [16] 16  BP: (115-155)/(50-76) 115/52    Intake/Output:  No intake or output data in the 24 hours ending 06/10/24 1041    Exam:  /52   Pulse 50   Temp 97.8 °F (36.6 °C) (Oral)   Resp 16   Ht 167.6 cm (66\")   Wt 92.7 kg (204 lb 4.8 oz)   LMP  (LMP Unknown)   SpO2 97%   BMI 32.97 kg/m²     General Appearance:    Alert, cooperative, no distress, AAOx3                         Throat:   Oral mucosa pink and moist                           Neck:   No JVD                         Lungs:    Clear to auscultation bilaterally, respirations unlabored                          Heart:    Regular rate and rhythm, S1 and S2 normal                  Abdomen:     Soft, still some lower quadrant TTP but no rebound or guarding, BS noted noted                 extremities: Moving all, no cyanosis or edema                        Pulses:   Pulses palpable in lower extremities                                Data Review:  Labs in chart were reviewed.    Assessment:  Active Hospital Problems    Diagnosis  POA    **SBO (small bowel obstruction) [K56.609]  Yes    Generalized abdominal pain [R10.84]  " Unknown    Nausea & vomiting [R11.2]  Unknown    Dehydration [E86.0]  Unknown    NSVT (nonsustained ventricular tachycardia) [I47.29]  Yes    Primary hypertension [I10]  Yes      Resolved Hospital Problems   No resolved problems to display.       Plan:    Surgery planning to very slowly advance diet to monitor clinical response based on partial obstruction noted on small bowel follow-through.  This patient is still complaining some abdominal discomfort but not having other systems consistent with SBO such as nausea and vomiting    KUB pending    HTN/past history of NSVT all stable despite not having medications -advised continuation of Coreg if tolerated     Saline lock IVF and pain/antiemetic control     SCDs for prophylaxis     Pepcid for GI prophylaxis    Lyle Irby MD  6/10/2024  10:41 EDT

## 2024-06-10 NOTE — PLAN OF CARE
Goal Outcome Evaluation:      Pt doing well, moved to full liquid diet, no complications, will continue to monitor.

## 2024-06-10 NOTE — CASE MANAGEMENT/SOCIAL WORK
Continued Stay Note  [facility]     Patient Name: Sol Rojas  MRN: [id number]  Today's Date: 6/10/2024    Admit Date: 6/7/2024    Plan: Plan home with spouse.   SMITA Navarrete RN   Discharge Plan       Row Name 06/10/24 0806       Plan    Plan Plan home with spouse.   SMITA Navarrete RN    Plan Comments FACE SHEET VERIFIED/ IM LETTER SIGNED.  Spoke with pt at bedside. Pt's PCP is Dr. Rosenberg Reyes.  Pt lives with her spouse ( Rehan Rojas [phone]) in a two story house.  Pt is independent with ADLs.  Pt has a bath bench, B/P cuff, and grab bar for home use if needed. Pt gets her prescriptions at [facility] at [address]  Pt denies any issues affording medications. Pt is not current with HH.  Pt has not been in SNF.  Pt denies any discharge needs at present. Pt's spouse will assist pt at home and transport pt home. Plan home with spouse.  SMITA Navarrete RN                   Discharge Codes    No documentation.                 Expected Discharge Date and Time       Expected Discharge Date Expected Discharge Time    Jun 11, 2024               Kathya Navarrete RN

## 2024-06-11 ENCOUNTER — TELEPHONE (OUTPATIENT)
Dept: CARDIOLOGY | Facility: CLINIC | Age: 74
End: 2024-06-11

## 2024-06-11 PROCEDURE — 99231 SBSQ HOSP IP/OBS SF/LOW 25: CPT

## 2024-06-11 RX ORDER — FAMOTIDINE 20 MG/1
20 TABLET, FILM COATED ORAL
Status: DISCONTINUED | OUTPATIENT
Start: 2024-06-11 | End: 2024-06-12 | Stop reason: HOSPADM

## 2024-06-11 RX ADMIN — FAMOTIDINE 20 MG: 20 TABLET, FILM COATED ORAL at 18:20

## 2024-06-11 RX ADMIN — FAMOTIDINE 20 MG: 10 INJECTION INTRAVENOUS at 12:02

## 2024-06-11 RX ADMIN — CARVEDILOL 3.12 MG: 3.12 TABLET, FILM COATED ORAL at 09:51

## 2024-06-11 RX ADMIN — HYDROCORTISONE 1 APPLICATION: 1 CREAM TOPICAL at 00:34

## 2024-06-11 NOTE — PLAN OF CARE
Goal Outcome Evaluation:  Plan of Care Reviewed With: patient        Patient tolerating full liquid diet.No nausea or vomiting reported.Noted redness/rash on upper chest, some hydrocortisone cream applied per orders.Plan of care ongoing

## 2024-06-11 NOTE — PLAN OF CARE
Problem: Adult Inpatient Plan of Care  Goal: Plan of Care Review  Outcome: Ongoing, Progressing  Flowsheets (Taken 6/11/2024 1909)  Progress: improving  Plan of Care Reviewed With: patient  Outcome Evaluation: Denies abd pain and n/v. Ck reg diet well. Inc to chest CDI. Poss d/c in am.  Goal: Patient-Specific Goal (Individualized)  Outcome: Ongoing, Progressing  Goal: Absence of Hospital-Acquired Illness or Injury  Outcome: Ongoing, Progressing  Intervention: Identify and Manage Fall Risk  Recent Flowsheet Documentation  Taken 6/11/2024 1800 by Jacqueline Duran RN  Safety Promotion/Fall Prevention:   fall prevention program maintained   safety round/check completed  Taken 6/11/2024 1600 by Jacqueline Duran RN  Safety Promotion/Fall Prevention:   fall prevention program maintained   safety round/check completed  Taken 6/11/2024 1459 by Jacqueline Duran RN  Safety Promotion/Fall Prevention:   fall prevention program maintained   safety round/check completed  Taken 6/11/2024 1200 by Jacqueline Duran RN  Safety Promotion/Fall Prevention:   fall prevention program maintained   safety round/check completed  Taken 6/11/2024 0956 by Jacqueline Duran RN  Safety Promotion/Fall Prevention:   fall prevention program maintained   safety round/check completed  Taken 6/11/2024 0800 by Jacqueline Duran RN  Safety Promotion/Fall Prevention:   fall prevention program maintained   safety round/check completed  Intervention: Prevent and Manage VTE (Venous Thromboembolism) Risk  Recent Flowsheet Documentation  Taken 6/11/2024 1200 by Jacqueline Duran RN  Activity Management: up ad emre  Taken 6/11/2024 0956 by Jacqueline Duran RN  Activity Management: up ad emre  Goal: Optimal Comfort and Wellbeing  Outcome: Ongoing, Progressing  Goal: Readiness for Transition of Care  Outcome: Ongoing, Progressing     Problem: Pain Acute  Goal: Acceptable Pain Control and Functional Ability  Outcome: Ongoing, Progressing     Problem:  Fall Injury Risk  Goal: Absence of Fall and Fall-Related Injury  Outcome: Ongoing, Progressing  Intervention: Promote Injury-Free Environment  Recent Flowsheet Documentation  Taken 6/11/2024 1800 by Jacqueline Duran RN  Safety Promotion/Fall Prevention:   fall prevention program maintained   safety round/check completed  Taken 6/11/2024 1600 by Jacqueline Duran RN  Safety Promotion/Fall Prevention:   fall prevention program maintained   safety round/check completed  Taken 6/11/2024 1459 by Jacqueline Duran RN  Safety Promotion/Fall Prevention:   fall prevention program maintained   safety round/check completed  Taken 6/11/2024 1200 by Jacqueline Duran RN  Safety Promotion/Fall Prevention:   fall prevention program maintained   safety round/check completed  Taken 6/11/2024 0956 by Jacqueline Duran RN  Safety Promotion/Fall Prevention:   fall prevention program maintained   safety round/check completed  Taken 6/11/2024 0800 by Jacqueline Duran RN  Safety Promotion/Fall Prevention:   fall prevention program maintained   safety round/check completed     Problem: Dysrhythmia  Goal: Normalized Cardiac Rhythm  Outcome: Ongoing, Progressing     Problem: Fluid Deficit (Intestinal Obstruction)  Goal: Fluid Balance  Outcome: Ongoing, Progressing     Problem: Infection (Intestinal Obstruction)  Goal: Absence of Infection Signs and Symptoms  Outcome: Ongoing, Progressing     Problem: Nausea and Vomiting (Intestinal Obstruction)  Goal: Nausea and Vomiting Relief  Outcome: Ongoing, Progressing     Problem: Pain (Intestinal Obstruction)  Goal: Acceptable Pain Control  Outcome: Ongoing, Progressing   Goal Outcome Evaluation:  Plan of Care Reviewed With: patient        Progress: improving  Outcome Evaluation: Denies abd pain and n/v. Ck reg diet well. Inc to chest CDI. Poss d/c in am.

## 2024-06-11 NOTE — TELEPHONE ENCOUNTER
PATIENT HAS A STRESS TEST SCHEDULED 6.12.24 AND IS NEEDING TO CANCEL, SHE IS CURRENTLY ADMITTED TO

## 2024-06-11 NOTE — PROGRESS NOTES
"    DAILY PROGRESS NOTE  [facility]    Patient Identification:  Name: Sol Rojas  Age: 74 y.o.  Sex: female  :  1950  MRN: [id number]         Primary Care Physician: Reyes, Rosenberg Acosta, MD    Subjective:  Interval History: Passing gas and not having much in the way of abdominal discomfort.  She denies any nausea or vomiting.  She says she did like the clear liquids but it did not cause her to have any further GI symptoms.  Denies fever or altered mentation    Objective: Sitting up in bedside chair clinically stable and pleasant conversational.  She does not appear as a patient who would have a full-blown obstruction based on her casual demeanor.  No family present.  Case discussed in MDR    Scheduled Meds:carvedilol, 3.125 mg, Oral, Daily  famotidine, 20 mg, Intravenous, Q12H  fentaNYL citrate (PF), 25 mcg, Intravenous, Once      Continuous Infusions:     Vital signs in last 24 hours:  Temp:  [97.8 °F (36.6 °C)-98.3 °F (36.8 °C)] 97.9 °F (36.6 °C)  Heart Rate:  [49-90] 61  Resp:  [16] 16  BP: (131-147)/(56-72) 131/58    Intake/Output:    Intake/Output Summary (Last 24 hours) at 2024 1155  Last data filed at 6/10/2024 1700  Gross per 24 hour   Intake 210 ml   Output --   Net 210 ml       Exam:  /58 (BP Location: Right arm, Patient Position: Lying)   Pulse 61   Temp 97.9 °F (36.6 °C) (Oral)   Resp 16   Ht 167.6 cm (66\")   Wt 92.7 kg (204 lb 4.8 oz)   LMP  (LMP Unknown)   SpO2 99%   BMI 32.97 kg/m²     General Appearance:    Alert, cooperative, no distress, AAOx3                         Throat:   Oral mucosa pink and moist                         Lungs:    Clear to auscultation bilaterally, respirations unlabored                          Heart:    Regular rate and rhythm, S1 and S2 normal                  Abdomen:     Soft, nontender, bowel sounds active, no rebound no guarding                 Extremities: Moving all, no cyanosis or edema                         "   Data Review:  Labs in chart were reviewed.    Assessment:  Active Hospital Problems    Diagnosis  POA    **SBO (small bowel obstruction) [K56.609]  Yes    Generalized abdominal pain [R10.84]  Unknown    Nausea & vomiting [R11.2]  Unknown    Dehydration [E86.0]  Unknown    NSVT (nonsustained ventricular tachycardia) [I47.29]  Yes    Primary hypertension [I10]  Yes      Resolved Hospital Problems   No resolved problems to display.       Plan:    Small bowel follow-through concerning for persistent obstruction    Surgery given a dietary trial    Other comorbidities stable    Disposition -Home tomorrow if tolerates p.o. challenge    Lyle Irby MD  6/11/2024  11:55 EDT

## 2024-06-11 NOTE — PROGRESS NOTES
Acute Care General Surgery Progress Note    Patient: Sol Rojas  YOB: 1950  MRN: [id number]      Assessment  Sol Rojas is a 74 y.o. female with a partial small bowel obstruction that seems to be clinically resolving. SBFT on the 8th shows contrast reaching the colon. Follow up KUB 6/10 improved compared to 8th, less dilation, some contrast does remain in the colon. She is having bowel function and tolerating a clear liquid diet. He abdominal exam is benign. AVSS.    Plan  Will advance to regular diet today      Subjective  Denies nausea or vomiting. Having small BM and passing flatus. Denies abdominal pain.     Objective    Vitals:    06/11/24 0729   BP: 131/58   Pulse:    Resp: 16   Temp: 97.9 °F (36.6 °C)   SpO2: 99%          Physical Exam  Constitutional: alert, oriented, in no acute distress  Respiratory: Normal work of breathing, Symmetric excursion  Cardiovascular: Well pefused, no jugular venous distention evident   Abdominal: soft, non-distended, non-tender  Skin: warm, dry      Laboratory Results  Results from last 7 days   Lab Units 06/08/24  0508 06/07/24  1744   WBC 10*3/mm3 6.23 9.61   HEMOGLOBIN g/dL 11.7* 13.1   HEMATOCRIT % 36.4 41.2   PLATELETS 10*3/mm3 240 243     Results from last 7 days   Lab Units 06/08/24  0508 06/07/24  1744   SODIUM mmol/L 143 143   POTASSIUM mmol/L 4.0 3.9   CHLORIDE mmol/L 106 102   CO2 mmol/L 30.0* 28.0   BUN mg/dL 15 19   CREATININE mg/dL 1.03* 0.97   CALCIUM mg/dL 8.6 9.3   BILIRUBIN mg/dL 1.0 0.7   ALK PHOS U/L 74 86   ALT (SGPT) U/L 15 11   AST (SGOT) U/L 13 14   GLUCOSE mg/dL 87 119*     I have personally reviewed 6/8 labs      Radiology:  KUB 6/10  FINDINGS: There is retained barium throughout the colon. There remains  dilated small bowel within the central abdomen where there is an  air-filled dilated small-bowel loop measuring 4 cm diameter. Findings  suggest a partial small-bowel obstruction. No pathologic calcifications  are evident.  There is a left total hip arthroplasty.     IMPRESSION:  Suspect partial small-bowel obstruction with abnormal  air-filled dilated small-bowel loops in the central abdomen measuring up  to 4 cm. There is contrast throughout the decompressed colon. No  evidence for gastric distention.     This report was finalized on 6/10/2024 10:35 AM by Dr. Osei Dixon M.D on Workstation: [id number]        KELLY Ledesma  Acute [facility] General Surgery  [facility]    [address], [address]  [place], [place], [address]  P: [phone]  F: [phone]

## 2024-06-12 ENCOUNTER — READMISSION MANAGEMENT (OUTPATIENT)
Dept: CALL CENTER | Facility: HOSPITAL | Age: 74
End: 2024-06-12
Payer: MEDICARE

## 2024-06-12 VITALS
BODY MASS INDEX: 32.83 KG/M2 | HEIGHT: 66 IN | SYSTOLIC BLOOD PRESSURE: 119 MMHG | OXYGEN SATURATION: 96 % | TEMPERATURE: 98.3 F | RESPIRATION RATE: 16 BRPM | HEART RATE: 57 BPM | WEIGHT: 204.3 LBS | DIASTOLIC BLOOD PRESSURE: 64 MMHG

## 2024-06-12 PROCEDURE — 99231 SBSQ HOSP IP/OBS SF/LOW 25: CPT

## 2024-06-12 RX ADMIN — CARVEDILOL 3.12 MG: 3.12 TABLET, FILM COATED ORAL at 09:21

## 2024-06-12 RX ADMIN — FAMOTIDINE 20 MG: 20 TABLET, FILM COATED ORAL at 09:21

## 2024-06-12 NOTE — DISCHARGE SUMMARY
Date of Discharge:  6/12/2024    PCP: Reyes, Rosenberg Acosta, MD    Discharge Diagnosis:   Active Hospital Problems    Diagnosis  POA    **SBO (small bowel obstruction) [K56.609]  Yes    Generalized abdominal pain [R10.84]  Unknown    Nausea & vomiting [R11.2]  Unknown    Dehydration [E86.0]  Unknown    NSVT (nonsustained ventricular tachycardia) [I47.29]  Yes    Primary hypertension [I10]  Yes      Resolved Hospital Problems   No resolved problems to display.          Consults       Date and Time Order Name Status Description    6/8/2024 12:41 AM Inpatient General Surgery Consult Completed     6/7/2024 10:26 PM [facility] (on-call MD unless specified) Details      6/7/2024  9:13 PM Surgery (on-call MD unless specified) Completed     5/11/2024  5:19 PM Inpatient Cardiology Consult Completed     5/11/2024  5:03 PM Inpatient Neurology Consult General Completed     5/11/2024  4:31 PM Inpatient Neurology Consult Stroke Completed           Hospital Course  74 y.o. female was absolute pleasure to take care of on this admission as she came here for abdominal pain.  Ultimately she was found to have issues of small bowel obstruction this is likely due to adhesion due to her past history of umbilical hernia repair.  Ultimately CT imaging was followed up with small bowel follow-through and KUB and aspects of her obstruction is likely independently and resolved.  She never required an NG tube and she tolerated bowel rest and is also tolerated advancement of diet.  To this point she is tolerating regular food with no GI aversion such as pain nausea vomiting she states she had a more normal bowel movement the other day.  She clinically looks more than stable for discharge from my perspective and discharge orders have been placed if there is no objection per surgical team.    Patient had a loop recorder placed prior to this admission for nonsustained V. tach and suggest follow-up with prescribing physician as previously  endorsed.      Temp:  [97.3 °F (36.3 °C)-98.3 °F (36.8 °C)] 98.3 °F (36.8 °C)  Heart Rate:  [57-73] 57  Resp:  [16] 16  BP: (109-123)/(59-64) 119/64  Body mass index is 32.97 kg/m².    Physical Exam  HENT:      Head: Normocephalic.      Nose: Nose normal.      Mouth/Throat:      Mouth: Mucous membranes are moist.      Pharynx: Oropharynx is clear.   Eyes:      Extraocular Movements: Extraocular movements intact.      Conjunctiva/sclera: Conjunctivae normal.      Pupils: Pupils are equal, round, and reactive to light.   Cardiovascular:      Rate and Rhythm: Normal rate and regular rhythm.   Pulmonary:      Effort: Pulmonary effort is normal. No respiratory distress.      Breath sounds: Normal breath sounds.   Abdominal:      General: Bowel sounds are normal. There is no distension.      Palpations: Abdomen is soft.      Tenderness: There is no abdominal tenderness. There is no guarding or rebound.   Musculoskeletal:         General: No swelling.   Skin:     General: Skin is warm and dry.   Neurological:      Mental Status: She is alert and oriented to person, place, and time. Mental status is at baseline.      Cranial Nerves: No cranial nerve deficit.      Motor: No weakness.      Gait: Gait normal.   Psychiatric:         Mood and Affect: Mood normal.         Behavior: Behavior normal.         Thought Content: Thought content normal.         Judgment: Judgment normal.       Disposition: Home or Self Care       Discharge Medications        Continue These Medications        Instructions Start Date   carvedilol 3.125 MG tablet  Commonly known as: COREG   3.125 mg, Oral, Daily      ferrous sulfate 325 (65 FE) MG tablet   325 mg, Oral, Daily With Breakfast      Klor-Con M20 20 MEQ CR tablet  Generic drug: potassium chloride   20 mEq, Oral, Daily      Magnesium 250 MG tablet   Oral      multivitamin with minerals tablet tablet   1 tablet, Oral, Daily      olmesartan-hydrochlorothiazide 40-25 MG per tablet  Commonly known  as: BENICAR HCT   1 tablet, Oral, Daily      polyethyl glycol-propyl glycol 0.4-0.3 % solution ophthalmic solution (artificial tears)  Commonly known as: SYSTANE   1 drop, Both Eyes, Every 1 Hour PRN      vitamin D3 125 MCG (5000 UT) capsule capsule   5,000 Units, Oral, Daily                Additional Instructions for the Follow-ups that You Need to Schedule       Discharge Follow-up with PCP   As directed       Currently Documented PCP:    Reyes, Rosenberg Acosta, MD    PCP Phone Number:    [phone]     Follow Up Details: PCP as needed.  Surgery per their recommendations               Follow-up Information       Reyes, Rosenberg Acosta, MD .    Specialty: Family Medicine  Why: PCP as needed.  Surgery per their recommendations  Contact information:  [address] [place] [address]  [phone]                            Future Appointments   Date Time Provider Department Center   6/26/2024 10:00 AM [facility] DEVICE CHECK [facility]   6/26/2024 10:15 AM Ayse Billingsley APRN [facility]   8/21/2024  2:20 PM Aileen Ulrich APRN [facility] [address] [facility]   11/7/2024  9:45 AM Deepak Myers MD [facility]        Lyle Irby MD  [facility]  06/12/24    Discharge time spent greater than 30 minutes.

## 2024-06-12 NOTE — PLAN OF CARE
Goal Outcome Evaluation:  Plan of Care Reviewed With: patient         Patient denies any pain or nausea.Tolerating regular diet.VSS.Room air.No acute changes.NSR/SB

## 2024-06-12 NOTE — PROGRESS NOTES
Acute Care General Surgery Progress Note    Patient: Sol Rojas  YOB: 1950  MRN: [id number]      Assessment  Sol Rojas is a 74 y.o. female with a partial small bowel obstruction that has clinically resolved. She is feeling much better today, tolerating a regular diet, having BM, and no complaints of abdominal pain. Her abdominal exam is benign. AVSS.     Plan  Okay for discharge home from general surgery standpoint      Subjective  Denies nausea or vomiting. Denies abdominal pain. Tolerating diet. Having regular BM.    Objective    Vitals:    06/12/24 0735   BP: 119/64   Pulse: 57   Resp: 16   Temp: 98.3 °F (36.8 °C)   SpO2: 96%        Physical Exam  Constitutional: alert, oriented, in no acute distress  Respiratory: Normal work of breathing, Symmetric excursion  Cardiovascular: Well pefused, no jugular venous distention evident   Abdominal: soft, non-distended, non-tender  Skin: warm, dry, no jaundice          KELLY Ledesma  Acute Care General Surgery  [facility]    [address] [place] [address], [address]  [place], [place], [address]  P: [phone]  F: [phone]

## 2024-06-12 NOTE — CASE MANAGEMENT/SOCIAL WORK
Continued Stay Note  [facility]     Patient Name: Sol Rojas  MRN: [id number]  Today's Date: 6/12/2024    Admit Date: 6/7/2024    Plan: Plan home with spouse.  SMITA Navarrete RN   Discharge Plan       Row Name 06/12/24 1123       Plan    Plan Plan home with spouse.  SMITA Navarrete RN    Patient/Family in Agreement with Plan yes    Plan Comments Spoke with pt at bedside. Pt denies any discharge needs at present. Pt's spouse will assist pt at home and transport pt home. Plan home with spouse. SMITA Navarrete RN                   Discharge Codes    No documentation.                 Expected Discharge Date and Time       Expected Discharge Date Expected Discharge Time    Jun 12, 2024               Kathya Navarrete RN

## 2024-06-13 NOTE — CASE MANAGEMENT/SOCIAL WORK
Case Management Discharge Note      Final Note: Pt discharged home with spouse on 6/12.   SMITA Navarrete RN         Selected Continued Care - Discharged on 6/12/2024 Admission date: 6/7/2024 - Discharge disposition: Home or Self Care      Destination    No services have been selected for the patient.                Durable Medical Equipment    No services have been selected for the patient.                Dialysis/Infusion    No services have been selected for the patient.                Home Medical Care    No services have been selected for the patient.                Therapy    No services have been selected for the patient.                Community Resources    No services have been selected for the patient.                Community & DME    No services have been selected for the patient.                    Transportation Services  Private: Car    Final Discharge Disposition Code: 01 - home or self-care

## 2024-06-13 NOTE — OUTREACH NOTE
Prep Survey      Flowsheet Row Responses   [facility] facility patient discharged from? [place]   Is LACE score < 7 ? No   Eligibility Readm Mgmt   Discharge diagnosis SBO (small bowel obstruction)   Does the patient have one of the following disease processes/diagnoses(primary or secondary)? Other   Prep survey completed? Yes            Kelsy TILLEY - Registered Nurse

## 2024-06-19 ENCOUNTER — READMISSION MANAGEMENT (OUTPATIENT)
Dept: CALL CENTER | Facility: HOSPITAL | Age: 74
End: 2024-06-19
Payer: MEDICARE

## 2024-06-19 NOTE — OUTREACH NOTE
Medical Week 1 Survey      Flowsheet Row Responses   [facility] patient discharged from? [place]   Does the patient have one of the following disease processes/diagnoses(primary or secondary)? Other   Week 1 attempt successful? Yes   Call start time 1050   Call end time 1052   Discharge diagnosis SBO (small bowel obstruction)   Meds reviewed with patient/caregiver? Yes   Is the patient having any side effects they believe may be caused by any medication additions or changes? No   Does the patient have all medications ordered at discharge? N/A   Is the patient taking all medications as directed (includes completed medication regime)? Yes   Does the patient have a primary care provider?  Yes   Does the patient have an appointment with their PCP within 7 days of discharge? No   Nursing Interventions Educated patient on importance of making appointment  [F/U with PCP as needed per AVS.]   Has the patient kept scheduled appointments due by today? N/A   Comments Appt. with cardiology 6/26/24 @ 10:15am.   Has home health visited the patient within 72 hours of discharge? N/A   Psychosocial issues? No   Week 1 call completed? Yes   Revoked No further contact(revokes)-requires comment  [Risk score 5% LACE 8.]   Would this patient benefit from a Referral to Amb Social Work? No   Is the patient interested in additional calls from an ambulatory ? No   Wrap up additional comments Pt. reports doing well, advised f/u with surgery per AVS. No c/v at this time.   Call end time 1052            Leena MUSE - Registered Nurse

## 2024-06-26 ENCOUNTER — OFFICE VISIT (OUTPATIENT)
Dept: CARDIOLOGY | Facility: CLINIC | Age: 74
End: 2024-06-26
Payer: MEDICARE

## 2024-06-26 ENCOUNTER — CLINICAL SUPPORT NO REQUIREMENTS (OUTPATIENT)
Dept: CARDIOLOGY | Facility: CLINIC | Age: 74
End: 2024-06-26
Payer: MEDICARE

## 2024-06-26 VITALS
HEIGHT: 66 IN | HEART RATE: 61 BPM | BODY MASS INDEX: 32.14 KG/M2 | SYSTOLIC BLOOD PRESSURE: 125 MMHG | WEIGHT: 200 LBS | DIASTOLIC BLOOD PRESSURE: 78 MMHG

## 2024-06-26 DIAGNOSIS — Z95.818 IMPLANTABLE LOOP RECORDER PRESENT: ICD-10-CM

## 2024-06-26 DIAGNOSIS — R55 SYNCOPE AND COLLAPSE: Primary | ICD-10-CM

## 2024-06-26 DIAGNOSIS — Z09 HOSPITAL DISCHARGE FOLLOW-UP: ICD-10-CM

## 2024-06-26 DIAGNOSIS — I10 PRIMARY HYPERTENSION: ICD-10-CM

## 2024-06-26 PROCEDURE — 3078F DIAST BP <80 MM HG: CPT | Performed by: NURSE PRACTITIONER

## 2024-06-26 PROCEDURE — 1159F MED LIST DOCD IN RCRD: CPT | Performed by: NURSE PRACTITIONER

## 2024-06-26 PROCEDURE — 99214 OFFICE O/P EST MOD 30 MIN: CPT | Performed by: NURSE PRACTITIONER

## 2024-06-26 PROCEDURE — 1160F RVW MEDS BY RX/DR IN RCRD: CPT | Performed by: NURSE PRACTITIONER

## 2024-06-26 PROCEDURE — 3074F SYST BP LT 130 MM HG: CPT | Performed by: NURSE PRACTITIONER

## 2024-06-26 NOTE — PROGRESS NOTES
" Subjective:        Sol Rojas is a 74 y.o. female who here for follow up    No chief complaint on file.      HPI    Sol Rojas is a 74-year-old female who is new to this provider.  She has a history to include pulmonary hypertension, and hypertension.      May 2024 hospitalized: Consulted on 5/12/2024 for a syncopal episode while sitting at the table.  Her labs were unremarkable in the ER.  Troponin was 16, 13 and EKG showed sinus rhythm without ischemic ST changes.  Heart rate was in the 50s on admission.    6/4/2024 seen in the office for results of Holter which resulted with NSVT.  At that time she was to have a loop placed and stress test as well as continue carvedilol.  Was not able to increase medication at the time due to low blood pressure.    4/19/2022 echo: EF 56% with normal LV function.    4/19/2022 stress test: Myocardial perfusion imaging indicated a small sized, mildly severe area of ischemia located in the lateral wall.      The following portions of the patient's history were reviewed and updated as appropriate: allergies, current medications, past family history, past medical history, past social history, past surgical history and problem list.    Past Medical History:   Diagnosis Date    Ankle sprain     Left ankle    Arthritis     OSTEO    Bilateral knee pain     Chest pain     SAW A CARDIOLOGIST--PER PT, EVERYTHING WAS \"OKAY.\"    Disease of thyroid gland     POLYP -biopsy    Dry eye syndrome of both eyes     Frozen shoulder 3/11/24    Hip arthrosis     Hypertension     Hypertension     Knee swelling     Left hip pain     Left knee pain     Obesity     Pulmonary hypertension          reports that she has never smoked. She has never been exposed to tobacco smoke. She has never used smokeless tobacco. She reports that she does not drink alcohol and does not use drugs.     Family History   Problem Relation Age of Onset    Stroke Mother     Cancer Father     Malig Hyperthermia Neg Hx  "       ROS     Review of Systems  Constitutional: No wt loss, fever, fatigue  Gastrointestinal: No nausea, abdominal pain  Behavioral/Psych: No insomnia or anxiety  Cardiovascular no cp or shob      Objective:           Constitutional:       Appearance: Well-developed.   Neck:      Vascular: No JVD.      Trachea: No tracheal deviation.   Pulmonary:      Effort: Pulmonary effort is normal. No respiratory distress.      Breath sounds: Normal breath sounds. No stridor. No decreased breath sounds. No wheezing. No rhonchi. No rales.   Chest:      Chest wall: Not tender to palpatation.   Cardiovascular:      Normal rate. Regular rhythm.      No gallop.    Pulses:     Intact distal pulses.   Edema:     Peripheral edema absent.   Abdominal:      General: Bowel sounds are normal. There is no distension.      Palpations: Abdomen is soft.      Tenderness: There is no abdominal tenderness.   Skin:     General: Skin is warm.   Neurological:      Mental Status: Alert and oriented to person, place, and time.      Deep Tendon Reflexes: Reflexes are normal and symmetric.         Procedures     5/12/24        Interpretation Summary         Left ventricular systolic function is normal. Left ventricular ejection fraction appears to be 61 - 65%.    Left ventricular diastolic function is consistent with (grade I) impaired relaxation.    Normal right ventricular cavity size and systolic function noted.    The left atrial cavity is moderately dilated.    Mild aortic valve regurgitation is present.    Calculated right ventricular systolic pressure from tricuspid regurgitation is 23 mmHg.    There is no evidence of pericardial effusion    4/19/22    Interpretation Summary       Findings consistent with an equivocal ECG stress test.  Left ventricular ejection fraction is normal. (Calculated EF = 55%).  Myocardial perfusion imaging indicates a small-sized, mildly severe area of ischemia located in the lateral wall.  Impressions are consistent  with an intermediate risk study.     Asymptomatic for chest pain. Specificity of study reduced secondary to Significant motion artifact due to poor exercise tolerance,  ECG is equivocal for ischemia.   Ectopy: Occasional PAC's in recovery.   Blood pressure response:  Appropriate for Beta-blocker therapy, Baseline 120/64, Peak (post Lexiscan) 140/82, Recovery 138//60  Pharmacologic study due to inability to tolerate increasing speed and grade of treadmill due to orthopedic, mobility  Issues and Beta-blocker therapy.  Participated in Low Level exercise and tolerance is poor.     Supervised by:  Irais MENDOZA.    6/7/24      Conclusion         Successful loop recorder implantation      Current Outpatient Medications:     carvedilol (COREG) 3.125 MG tablet, Take 1 tablet by mouth Daily., Disp: 30 tablet, Rfl: 0    ferrous sulfate 325 (65 FE) MG tablet, Take 1 tablet by mouth Daily With Breakfast., Disp: , Rfl:     KLOR-CON 20 MEQ CR tablet, Take 1 tablet by mouth Daily., Disp: , Rfl:     Magnesium 250 MG tablet, Take  by mouth., Disp: , Rfl:     multivitamin with minerals tablet tablet, Take 1 tablet by mouth Daily., Disp: , Rfl:     olmesartan-hydrochlorothiazide (BENICAR HCT) 40-25 MG per tablet, Take 1 tablet by mouth Daily., Disp: , Rfl:     polyethyl glycol-propyl glycol (SYSTANE) 0.4-0.3 % solution ophthalmic solution (artificial tears), Administer 1 drop to both eyes Every 1 (One) Hour As Needed., Disp: , Rfl:     vitamin D3 125 MCG (5000 UT) capsule capsule, Take 1 capsule by mouth Daily., Disp: , Rfl:      Assessment:        Patient Active Problem List   Diagnosis    Pain in left shin    Hyperglycemia    H/O total hip arthroplasty, left    Preoperative clearance    Abnormal electrocardiogram (ECG) (EKG)     SOB (shortness of breath)    Abnormal cardiac function test    Primary hypertension    Arthritis of left knee    NSVT (nonsustained ventricular tachycardia)    Syncope and collapse    SBO (small  bowel obstruction)    Generalized abdominal pain    Nausea & vomiting    Dehydration               Plan:   1.  Hospital follow-up for loop placement due to recent syncope and abnormal Holter results.  Loop device shows no episodes. No s/s of infection or hematoma. At the time of her previous visit she was also to be scheduled for a stress test and verbalization and reasoning for doing stress testing has been discussed and she understands reasoning.        It was explained to the patient that stress testing carries 85% specificity/sensitivity, and does not rule out future cardiac event.  Risks of the procedure were explained to the patient including shortness of breath, induction of myocardial infarction, and dizziness.  Patient is agreeable to proceeding with stress testing.       2.  Hypertension: Controlled.  No changes.    Educated patient on exercising for at least 30 minutes a day for 2 to 3 days a week. Importance of controlling hypertension and blood pressure checkup on the regular basis has been explained. Hypertension as a silent killer has been discussed. Risk reduction of the weight and regular exercises to control the hypertension has been explained.    3.  ELYSSA?:  On previous visit she was referred for a sleep study.                 No diagnosis found.    There are no diagnoses linked to this encounter.    COUNSELING: more Dennis was given to patient for the following topics: diagnostic results, risk factor reductions, impressions, risks and benefits of treatment options and importance of treatment compliance .       SMOKING COUNSELING: denies    Orders for stress test are in the computer. She will follow up for results.    Sincerely,   KELLY Grant  [facility]  06/26/24  09:12 EDT    EMR Dragon/Transcription disclaimer: Dictated utilizing Dragon Dictation

## 2024-07-08 ENCOUNTER — HOSPITAL ENCOUNTER (OUTPATIENT)
Dept: CARDIOLOGY | Facility: HOSPITAL | Age: 74
Discharge: HOME | End: 2024-07-08
Payer: MEDICARE

## 2024-07-08 VITALS — BODY MASS INDEX: 32.14 KG/M2 | HEIGHT: 66 IN | WEIGHT: 199.96 LBS

## 2024-07-08 DIAGNOSIS — R55 SYNCOPE AND COLLAPSE: ICD-10-CM

## 2024-07-08 DIAGNOSIS — I47.29 NSVT (NONSUSTAINED VENTRICULAR TACHYCARDIA): ICD-10-CM

## 2024-07-08 DIAGNOSIS — R94.31 ABNORMAL ELECTROCARDIOGRAM (ECG) (EKG): ICD-10-CM

## 2024-07-08 LAB
BH CV NUCLEAR PRIOR STUDY: 1
BH CV REST NUCLEAR ISOTOPE DOSE: 10.8 MCI
BH CV STRESS BP STAGE 1: NORMAL
BH CV STRESS BP STAGE 2: NORMAL
BH CV STRESS DURATION MIN STAGE 1: 3
BH CV STRESS DURATION MIN STAGE 2: 3
BH CV STRESS DURATION SEC STAGE 1: 0
BH CV STRESS DURATION SEC STAGE 2: 6
BH CV STRESS GRADE STAGE 1: 10
BH CV STRESS GRADE STAGE 2: 12
BH CV STRESS HR STAGE 1: 112
BH CV STRESS HR STAGE 2: 126
BH CV STRESS METS STAGE 1: 5
BH CV STRESS METS STAGE 2: 5.7
BH CV STRESS NUCLEAR ISOTOPE DOSE: 31.5 MCI
BH CV STRESS O2 STAGE 1: 97
BH CV STRESS O2 STAGE 2: 97
BH CV STRESS PROTOCOL 1: NORMAL
BH CV STRESS RECOVERY BP: NORMAL MMHG
BH CV STRESS RECOVERY HR: 70 BPM
BH CV STRESS RECOVERY O2: 97 %
BH CV STRESS SPEED STAGE 1: 1.7
BH CV STRESS SPEED STAGE 2: 2
BH CV STRESS STAGE 1: 1
BH CV STRESS STAGE 2: 2
LV EF NUC BP: 60 %
MAXIMAL PREDICTED HEART RATE: 148 BPM
PERCENT MAX PREDICTED HR: 85.14 %
STRESS BASELINE BP: NORMAL MMHG
STRESS BASELINE HR: 60 BPM
STRESS O2 SAT REST: 97 %
STRESS PERCENT HR: 100 %
STRESS POST ESTIMATED WORKLOAD: 5.7 METS
STRESS POST EXERCISE DUR MIN: 6 MIN
STRESS POST EXERCISE DUR SEC: 6 SEC
STRESS POST O2 SAT PEAK: 97 %
STRESS POST PEAK BP: NORMAL MMHG
STRESS POST PEAK HR: 126 BPM
STRESS TARGET HR: 126 BPM

## 2024-07-08 PROCEDURE — 93017 CV STRESS TEST TRACING ONLY: CPT

## 2024-07-08 PROCEDURE — 78452 HT MUSCLE IMAGE SPECT MULT: CPT | Performed by: INTERNAL MEDICINE

## 2024-07-08 PROCEDURE — 93018 CV STRESS TEST I&R ONLY: CPT | Performed by: INTERNAL MEDICINE

## 2024-07-08 PROCEDURE — 78452 HT MUSCLE IMAGE SPECT MULT: CPT

## 2024-07-08 PROCEDURE — A9500 TC99M SESTAMIBI: HCPCS | Performed by: INTERNAL MEDICINE

## 2024-07-08 PROCEDURE — 93016 CV STRESS TEST SUPVJ ONLY: CPT | Performed by: INTERNAL MEDICINE

## 2024-07-08 PROCEDURE — 0 TECHNETIUM SESTAMIBI: Performed by: INTERNAL MEDICINE

## 2024-07-08 RX ADMIN — TECHNETIUM TC 99M SESTAMIBI 1 DOSE: 1 INJECTION INTRAVENOUS at 10:00

## 2024-07-08 RX ADMIN — TECHNETIUM TC 99M SESTAMIBI 1 DOSE: 1 INJECTION INTRAVENOUS at 08:05

## 2024-07-15 ENCOUNTER — OFFICE VISIT (OUTPATIENT)
Dept: CARDIOLOGY | Facility: CLINIC | Age: 74
End: 2024-07-15
Payer: MEDICARE

## 2024-07-15 VITALS
SYSTOLIC BLOOD PRESSURE: 119 MMHG | HEART RATE: 58 BPM | DIASTOLIC BLOOD PRESSURE: 67 MMHG | WEIGHT: 203.2 LBS | BODY MASS INDEX: 32.66 KG/M2 | HEIGHT: 66 IN

## 2024-07-15 DIAGNOSIS — Z95.818 IMPLANTABLE LOOP RECORDER PRESENT: ICD-10-CM

## 2024-07-15 DIAGNOSIS — I10 PRIMARY HYPERTENSION: ICD-10-CM

## 2024-07-15 DIAGNOSIS — I34.0 MITRAL VALVE INSUFFICIENCY, UNSPECIFIED ETIOLOGY: ICD-10-CM

## 2024-07-15 DIAGNOSIS — R94.30 ABNORMAL CARDIAC FUNCTION TEST: ICD-10-CM

## 2024-07-15 DIAGNOSIS — R55 SYNCOPE AND COLLAPSE: Primary | ICD-10-CM

## 2024-07-15 PROCEDURE — 3074F SYST BP LT 130 MM HG: CPT | Performed by: INTERNAL MEDICINE

## 2024-07-15 PROCEDURE — 3078F DIAST BP <80 MM HG: CPT | Performed by: INTERNAL MEDICINE

## 2024-07-15 PROCEDURE — 99213 OFFICE O/P EST LOW 20 MIN: CPT | Performed by: INTERNAL MEDICINE

## 2024-07-15 NOTE — PROGRESS NOTES
" Subjective:        Sol Rojas is a 74 y.o. female who here for follow up      Hospital follow-up  HPI  74-year-old female with hypertension and syncope loop recorder also had a stress test which was abnormal denies any chest pains or tightness in the chest     Problems Addressed this Visit          Cardiac and Vasculature    Abnormal cardiac function test    Primary hypertension       Symptoms and Signs    Syncope and collapse - Primary     Other Visit Diagnoses       Implantable loop recorder present        Mitral valve insufficiency, unspecified etiology              Diagnoses         Codes Comments    Syncope and collapse    -  Primary ICD-10-CM: R55  ICD-9-CM: 780.2     Primary hypertension     ICD-10-CM: I10  ICD-9-CM: 401.9     Implantable loop recorder present     ICD-10-CM: Z95.818  ICD-9-CM: V45.09     Mitral valve insufficiency, unspecified etiology     ICD-10-CM: I34.0  ICD-9-CM: 424.0     Abnormal cardiac function test     ICD-10-CM: R94.30  ICD-9-CM: 794.30           .    The following portions of the patient's history were reviewed and updated as appropriate: allergies, current medications, past family history, past medical history, past social history, past surgical history and problem list.    Past Medical History:   Diagnosis Date    Ankle sprain     Left ankle    Arthritis     OSTEO    Bilateral knee pain     Chest pain     SAW A CARDIOLOGIST--PER PT, EVERYTHING WAS \"OKAY.\"    Disease of thyroid gland     POLYP -biopsy    Dry eye syndrome of both eyes     Frozen shoulder 3/11/24    Hip arthrosis     Hypertension     Hypertension     Knee swelling     Left hip pain     Left knee pain     Obesity     Pulmonary hypertension      reports that she has never smoked. She has never been exposed to tobacco smoke. She has never used smokeless tobacco. She reports that she does not drink alcohol and does not use drugs.   Family History   Problem Relation Age of Onset    Stroke Mother     Cancer Father " "    Malig Hyperthermia Neg Hx        Review of Systems  Constitutional: No wt loss, fever, fatigue  Gastrointestinal: No nausea, abdominal pain  Behavioral/Psych: No insomnia or anxiety   Cardiovascular no chest pains or tightness in the chest  Objective:       Physical Exam  /67 (BP Location: Left arm)   Pulse 58   Ht 167 cm (65.75\")   Wt 92.2 kg (203 lb 3.2 oz)   LMP  (LMP Unknown)   BMI 33.05 kg/m²   General appearance: No acute changes   Neck: Trachea midline; NECK, supple, no thyromegaly or lymphadenopathy   Lungs: Normal size and shape, normal breath sounds, equal distribution of air, no rales and rhonchi   CV: S1-S2 regular, no murmurs, no rub, no gallop   Abdomen: Soft, nontender; no masses , no abnormal abdominal sounds   Extremities: No deformity , normal color , no peripheral edema   Skin: Normal temperature, turgor and texture; no rash, ulcers          Procedures      Echocardiogram:    Results for orders placed during the hospital encounter of 05/11/24    Adult Transthoracic Echo Complete W/ Cont if Necessary Per Protocol    Interpretation Summary    Left ventricular systolic function is normal. Left ventricular ejection fraction appears to be 61 - 65%.    Left ventricular diastolic function is consistent with (grade I) impaired relaxation.    Normal right ventricular cavity size and systolic function noted.    The left atrial cavity is moderately dilated.    Mild aortic valve regurgitation is present.    Calculated right ventricular systolic pressure from tricuspid regurgitation is 23 mmHg.    There is no evidence of pericardial effusion.          Current Outpatient Medications:     carvedilol (COREG) 3.125 MG tablet, Take 1 tablet by mouth Daily., Disp: 30 tablet, Rfl: 0    ferrous sulfate 325 (65 FE) MG tablet, Take 1 tablet by mouth Daily With Breakfast., Disp: , Rfl:     KLOR-CON 20 MEQ CR tablet, Take 1 tablet by mouth Daily., Disp: , Rfl:     Magnesium 250 MG tablet, Take  by mouth., " Disp: , Rfl:     multivitamin with minerals tablet tablet, Take 1 tablet by mouth Daily., Disp: , Rfl:     olmesartan-hydrochlorothiazide (BENICAR HCT) 40-25 MG per tablet, Take 1 tablet by mouth Daily., Disp: , Rfl:     polyethyl glycol-propyl glycol (SYSTANE) 0.4-0.3 % solution ophthalmic solution (artificial tears), Administer 1 drop to both eyes Every 1 (One) Hour As Needed., Disp: , Rfl:     vitamin D3 125 MCG (5000 UT) capsule capsule, Take 1 capsule by mouth Daily., Disp: , Rfl:    Assessment:                Plan:          ICD-10-CM ICD-9-CM   1. Syncope and collapse  R55 780.2   2. Primary hypertension  I10 401.9   3. Implantable loop recorder present  Z95.818 V45.09   4. Mitral valve insufficiency, unspecified etiology  I34.0 424.0   5. Abnormal cardiac function test  R94.30 794.30     1. Syncope and collapse  Loop recorder being monitored    2. Primary hypertension  Continue current treatment    3. Implantable loop recorder present  Being monitored    4. Mitral valve insufficiency, unspecified etiology  Asymptomatic    5. Abnormal cardiac function test  Asymptomatic      Abnormal cardiac test, asymptomatic    See in 6 months  COUNSELING:    Sol Dennis was given to patient for the following topics: diagnostic results, risk factor reductions, impressions, risks and benefits of treatment options and importance of treatment compliance .       SMOKING COUNSELING:        Dictated using Dragon dictation

## 2024-08-01 ENCOUNTER — APPOINTMENT (OUTPATIENT)
Dept: GENERAL RADIOLOGY | Facility: HOSPITAL | Age: 74
End: 2024-08-01
Payer: MEDICARE

## 2024-08-01 ENCOUNTER — HOSPITAL ENCOUNTER (EMERGENCY)
Facility: HOSPITAL | Age: 74
Discharge: HOME | End: 2024-08-01
Attending: EMERGENCY MEDICINE
Payer: MEDICARE

## 2024-08-01 VITALS
OXYGEN SATURATION: 96 % | DIASTOLIC BLOOD PRESSURE: 66 MMHG | TEMPERATURE: 98.2 F | BODY MASS INDEX: 33.13 KG/M2 | HEART RATE: 65 BPM | SYSTOLIC BLOOD PRESSURE: 117 MMHG | WEIGHT: 203.71 LBS | RESPIRATION RATE: 14 BRPM

## 2024-08-01 DIAGNOSIS — R05.1 ACUTE COUGH: ICD-10-CM

## 2024-08-01 DIAGNOSIS — K52.9 GASTROENTERITIS: Primary | ICD-10-CM

## 2024-08-01 LAB
ALBUMIN SERPL-MCNC: 3.9 G/DL (ref 3.5–5.2)
ALBUMIN/GLOB SERPL: 1.3 G/DL
ALP SERPL-CCNC: 80 U/L (ref 39–117)
ALT SERPL W P-5'-P-CCNC: 11 U/L (ref 1–33)
ANION GAP SERPL CALCULATED.3IONS-SCNC: 12.2 MMOL/L (ref 5–15)
AST SERPL-CCNC: 24 U/L (ref 1–32)
BASOPHILS # BLD AUTO: 0.01 10*3/MM3 (ref 0–0.2)
BASOPHILS NFR BLD AUTO: 0.2 % (ref 0–1.5)
BILIRUB SERPL-MCNC: 0.6 MG/DL (ref 0–1.2)
BUN SERPL-MCNC: 14 MG/DL (ref 8–23)
BUN/CREAT SERPL: 13.1 (ref 7–25)
CALCIUM SPEC-SCNC: 9 MG/DL (ref 8.6–10.5)
CHLORIDE SERPL-SCNC: 101 MMOL/L (ref 98–107)
CO2 SERPL-SCNC: 24.8 MMOL/L (ref 22–29)
CREAT SERPL-MCNC: 1.07 MG/DL (ref 0.57–1)
DEPRECATED RDW RBC AUTO: 41.8 FL (ref 37–54)
EGFRCR SERPLBLD CKD-EPI 2021: 55.3 ML/MIN/1.73
EOSINOPHIL # BLD AUTO: 0 10*3/MM3 (ref 0–0.4)
EOSINOPHIL NFR BLD AUTO: 0 % (ref 0.3–6.2)
ERYTHROCYTE [DISTWIDTH] IN BLOOD BY AUTOMATED COUNT: 12.7 % (ref 12.3–15.4)
GLOBULIN UR ELPH-MCNC: 3 GM/DL
GLUCOSE SERPL-MCNC: 118 MG/DL (ref 65–99)
HCT VFR BLD AUTO: 40.4 % (ref 34–46.6)
HGB BLD-MCNC: 13 G/DL (ref 12–15.9)
HOLD SPECIMEN: NORMAL
HOLD SPECIMEN: NORMAL
IMM GRANULOCYTES # BLD AUTO: 0.01 10*3/MM3 (ref 0–0.05)
IMM GRANULOCYTES NFR BLD AUTO: 0.2 % (ref 0–0.5)
LIPASE SERPL-CCNC: 19 U/L (ref 13–60)
LYMPHOCYTES # BLD AUTO: 0.63 10*3/MM3 (ref 0.7–3.1)
LYMPHOCYTES NFR BLD AUTO: 14.2 % (ref 19.6–45.3)
MCH RBC QN AUTO: 29.1 PG (ref 26.6–33)
MCHC RBC AUTO-ENTMCNC: 32.2 G/DL (ref 31.5–35.7)
MCV RBC AUTO: 90.6 FL (ref 79–97)
MONOCYTES # BLD AUTO: 0.81 10*3/MM3 (ref 0.1–0.9)
MONOCYTES NFR BLD AUTO: 18.3 % (ref 5–12)
NEUTROPHILS NFR BLD AUTO: 2.97 10*3/MM3 (ref 1.7–7)
NEUTROPHILS NFR BLD AUTO: 67.1 % (ref 42.7–76)
NRBC BLD AUTO-RTO: 0 /100 WBC (ref 0–0.2)
PLATELET # BLD AUTO: 222 10*3/MM3 (ref 140–450)
PMV BLD AUTO: 9 FL (ref 6–12)
POTASSIUM SERPL-SCNC: 4 MMOL/L (ref 3.5–5.2)
PROT SERPL-MCNC: 6.9 G/DL (ref 6–8.5)
RBC # BLD AUTO: 4.46 10*6/MM3 (ref 3.77–5.28)
SODIUM SERPL-SCNC: 138 MMOL/L (ref 136–145)
WBC NRBC COR # BLD AUTO: 4.43 10*3/MM3 (ref 3.4–10.8)
WHOLE BLOOD HOLD COAG: NORMAL
WHOLE BLOOD HOLD SPECIMEN: NORMAL

## 2024-08-01 PROCEDURE — 25810000003 SODIUM CHLORIDE 0.9 % SOLUTION: Performed by: EMERGENCY MEDICINE

## 2024-08-01 PROCEDURE — 85025 COMPLETE CBC W/AUTO DIFF WBC: CPT | Performed by: EMERGENCY MEDICINE

## 2024-08-01 PROCEDURE — 96361 HYDRATE IV INFUSION ADD-ON: CPT

## 2024-08-01 PROCEDURE — 83690 ASSAY OF LIPASE: CPT | Performed by: EMERGENCY MEDICINE

## 2024-08-01 PROCEDURE — 80053 COMPREHEN METABOLIC PANEL: CPT | Performed by: EMERGENCY MEDICINE

## 2024-08-01 PROCEDURE — 25010000002 ONDANSETRON PER 1 MG: Performed by: EMERGENCY MEDICINE

## 2024-08-01 PROCEDURE — 71045 X-RAY EXAM CHEST 1 VIEW: CPT

## 2024-08-01 PROCEDURE — 96374 THER/PROPH/DIAG INJ IV PUSH: CPT

## 2024-08-01 PROCEDURE — 99283 EMERGENCY DEPT VISIT LOW MDM: CPT

## 2024-08-01 RX ORDER — ONDANSETRON 2 MG/ML
4 INJECTION INTRAMUSCULAR; INTRAVENOUS ONCE
Status: COMPLETED | OUTPATIENT
Start: 2024-08-01 | End: 2024-08-01

## 2024-08-01 RX ORDER — BENZONATATE 200 MG/1
200 CAPSULE ORAL 3 TIMES DAILY PRN
Qty: 20 CAPSULE | Refills: 0 | Status: SHIPPED | OUTPATIENT
Start: 2024-08-01

## 2024-08-01 RX ORDER — SODIUM CHLORIDE 0.9 % (FLUSH) 0.9 %
10 SYRINGE (ML) INJECTION AS NEEDED
Status: DISCONTINUED | OUTPATIENT
Start: 2024-08-01 | End: 2024-08-01 | Stop reason: HOSPADM

## 2024-08-01 RX ORDER — ONDANSETRON 4 MG/1
4 TABLET, ORALLY DISINTEGRATING ORAL EVERY 8 HOURS PRN
Qty: 10 TABLET | Refills: 0 | Status: SHIPPED | OUTPATIENT
Start: 2024-08-01

## 2024-08-01 RX ADMIN — Medication 10 ML: at 14:58

## 2024-08-01 RX ADMIN — SODIUM CHLORIDE 500 ML: 9 INJECTION, SOLUTION INTRAVENOUS at 14:56

## 2024-08-01 RX ADMIN — ONDANSETRON 4 MG: 2 INJECTION INTRAMUSCULAR; INTRAVENOUS at 14:58

## 2024-08-01 NOTE — ED PROVIDER NOTES
EMERGENCY DEPARTMENT ENCOUNTER  Room Number:  18/18  PCP: Reyes, Rosenberg Acosta, MD  Independent Historians: Patient,  at bedside      HPI:  Chief Complaint: Nausea vomiting diarrhea    A complete HPI/ROS/PMH/PSH/SH/FH are unobtainable due to:   Chronic or social conditions impacting patient care (Social Determinants of Health):       Context: Sol Rojas is a 74 y.o. female with a medical history of hypertension, syncope who presents to the ED c/o acute nausea, vomiting and diarrhea.  Symptoms began 2 days ago.  She has had 4 episodes of vomiting in the last 2 days as well as 6 episodes of diarrhea.  Diarrhea is mostly watery.  She did take Imodium at home recently.  She did vomit in the car on the way to the ER.  She does report mild abdominal cramping.  Denies fever or recent ill exposure.      Review of prior external notes (non-ED) -and- Review of prior external test results outside of this encounter:   I reviewed prior medical records including recent office visit with Dr. Myers.  Patient with history of hypertension, implantable loop recorder.      Prescription drug monitoring program review:         PAST MEDICAL HISTORY  Active Ambulatory Problems     Diagnosis Date Noted    Pain in left shin 05/01/2016    Hyperglycemia 05/01/2016    H/O total hip arthroplasty, left 01/15/2019    Preoperative clearance 04/26/2022    Abnormal electrocardiogram (ECG) (EKG)  04/26/2022    SOB (shortness of breath) 04/26/2022    Abnormal cardiac function test 05/05/2022    Primary hypertension 05/05/2022    Arthritis of left knee 05/20/2022    NSVT (nonsustained ventricular tachycardia) 06/04/2024    Syncope and collapse 06/04/2024    SBO (small bowel obstruction) 06/07/2024    Generalized abdominal pain 06/08/2024    Nausea & vomiting 06/08/2024    Dehydration 06/08/2024     Resolved Ambulatory Problems     Diagnosis Date Noted    Syncope 04/30/2016    Dehydration 04/30/2016    Azotemia 05/01/2016     Hypoxemia 05/01/2016    Hypermagnesemia 05/01/2016    Orthostatic hypotension 05/01/2016    ARF (acute renal failure) 05/01/2016    Syncope 05/11/2024     Past Medical History:   Diagnosis Date    Ankle sprain     Arthritis     Bilateral knee pain     Chest pain     Disease of thyroid gland     Dry eye syndrome of both eyes     Frozen shoulder 3/11/24    Hip arthrosis     Hypertension     Hypertension     Knee swelling     Left hip pain     Left knee pain     Obesity     Pulmonary hypertension          PAST SURGICAL HISTORY  Past Surgical History:   Procedure Laterality Date    CARDIAC ELECTROPHYSIOLOGY PROCEDURE N/A 6/7/2024    Procedure: Loop insertion BIO;  Surgeon: Deepak Myers MD;  Location: [facility] CATH INVASIVE LOCATION;  Service: Cardiovascular;  Laterality: N/A;    CATARACT EXTRACTION Bilateral     COLONOSCOPY N/A 04/13/2021    Procedure: COLONOSCOPY TO CECUM AND INTO TI;  Surgeon: Louie Smith MD;  Location: [facility] ENDOSCOPY;  Service: Gastroenterology;  Laterality: N/A;  pre: history of polyps  post: diverticulosis    COLONOSCOPY W/ BIOPSIES AND POLYPECTOMY      BENIGN    HERNIA REPAIR      UMBILICAL HERNIA REPAIR    LEG SURGERY Left     cellulitis    TOTAL HIP ARTHROPLASTY Left 01/15/2019    Procedure: LEFT TOTAL HIP ARTHROPLASTY;  Surgeon: Hieu Orosco MD;  Location: [facility] MAIN OR;  Service: Orthopedics    TOTAL KNEE ARTHROPLASTY Left 07/19/2022    Procedure: LEFT TOTAL KNEE ARTHROPLASTY WITH SANTIAGO NAVIGATION;  Surgeon: Guilherme Smith MD;  Location: [facility] OR OSC;  Service: Orthopedics;  Laterality: Left;    US GUIDED FINE NEEDLE ASPIRATION  04/15/2021    thyroid         FAMILY HISTORY  Family History   Problem Relation Age of Onset    Stroke Mother     Cancer Father     Malig Hyperthermia Neg Hx          SOCIAL HISTORY  Social History     Socioeconomic History    Marital status:    Tobacco Use    Smoking status: Never     Passive exposure: Never    Smokeless tobacco: Never    Vaping Use    Vaping status: Never Used   Substance and Sexual Activity    Alcohol use: No    Drug use: Never    Sexual activity: Not Currently     Partners: Male     Birth control/protection: Abstinence         ALLERGIES  Augmentin [amoxicillin-pot clavulanate], Hydrocodone, Latex, Penicillins, and Percocet [oxycodone-acetaminophen]      REVIEW OF SYSTEMS  Review of Systems   Constitutional:  Positive for fatigue. Negative for fever.   Respiratory:  Positive for cough (Productive of occasional clear sputum). Negative for shortness of breath.    Cardiovascular:  Negative for chest pain.   Gastrointestinal:  Positive for diarrhea, nausea and vomiting.   All other systems reviewed and are negative.    Included in HPI  All systems reviewed and negative except for those discussed in HPI.      PHYSICAL EXAM    I have reviewed the triage vital signs and nursing notes.    ED Triage Vitals   Temp Heart Rate Resp BP SpO2   08/01/24 1430 08/01/24 1430 08/01/24 1430 08/01/24 1438 08/01/24 1430   98.2 °F (36.8 °C) 96 20 123/72 94 %      Temp src Heart Rate Source Patient Position BP Location FiO2 (%)   08/01/24 1430 08/01/24 1430 -- -- --   Tympanic Monitor          Physical Exam  GENERAL: Alert well-appearing female no obvious distress.  Triage vitals reviewed notable temperature 98.2.  Heart rate and blood pressure are unremarkable.  O2 sats 94% on room air  SKIN: Warm, dry  HENT: Normocephalic, atraumatic  EYES: no scleral icterus  CV: regular rhythm, regular rate, clear to auscultation bilaterally-O2 sats mid 90s room air  RESPIRATORY: normal effort, lungs clear  ABDOMEN: soft, mild generalized tenderness without rebound or guarding, nondistended  MUSCULOSKELETAL: no deformity  NEURO: alert, moves all extremities, follows commands      LAB RESULTS  Recent Results (from the past 24 hour(s))   Comprehensive Metabolic Panel    Collection Time: 08/01/24  2:59 PM    Specimen: Blood   Result Value Ref Range    Glucose 118 (H)  65 - 99 mg/dL    BUN 14 8 - 23 mg/dL    Creatinine 1.07 (H) 0.57 - 1.00 mg/dL    Sodium 138 136 - 145 mmol/L    Potassium 4.0 3.5 - 5.2 mmol/L    Chloride 101 98 - 107 mmol/L    CO2 24.8 22.0 - 29.0 mmol/L    Calcium 9.0 8.6 - 10.5 mg/dL    Total Protein 6.9 6.0 - 8.5 g/dL    Albumin 3.9 3.5 - 5.2 g/dL    ALT (SGPT) 11 1 - 33 U/L    AST (SGOT) 24 1 - 32 U/L    Alkaline Phosphatase 80 39 - 117 U/L    Total Bilirubin 0.6 0.0 - 1.2 mg/dL    Globulin 3.0 gm/dL    A/G Ratio 1.3 g/dL    BUN/Creatinine Ratio 13.1 7.0 - 25.0    Anion Gap 12.2 5.0 - 15.0 mmol/L    eGFR 55.3 (L) >60.0 mL/min/1.73   Lipase    Collection Time: 08/01/24  2:59 PM    Specimen: Blood   Result Value Ref Range    Lipase 19 13 - 60 U/L   Green Top (Gel)    Collection Time: 08/01/24  2:59 PM   Result Value Ref Range    Extra Tube Hold for add-ons.    Lavender Top    Collection Time: 08/01/24  2:59 PM   Result Value Ref Range    Extra Tube hold for add-on    Gold Top - SST    Collection Time: 08/01/24  2:59 PM   Result Value Ref Range    Extra Tube Hold for add-ons.    Light Blue Top    Collection Time: 08/01/24  2:59 PM   Result Value Ref Range    Extra Tube Hold for add-ons.    CBC Auto Differential    Collection Time: 08/01/24  2:59 PM    Specimen: Blood   Result Value Ref Range    WBC 4.43 3.40 - 10.80 10*3/mm3    RBC 4.46 3.77 - 5.28 10*6/mm3    Hemoglobin 13.0 12.0 - 15.9 g/dL    Hematocrit 40.4 34.0 - 46.6 %    MCV 90.6 79.0 - 97.0 fL    MCH 29.1 26.6 - 33.0 pg    MCHC 32.2 31.5 - 35.7 g/dL    RDW 12.7 12.3 - 15.4 %    RDW-SD 41.8 37.0 - 54.0 fl    MPV 9.0 6.0 - 12.0 fL    Platelets 222 140 - 450 10*3/mm3    Neutrophil % 67.1 42.7 - 76.0 %    Lymphocyte % 14.2 (L) 19.6 - 45.3 %    Monocyte % 18.3 (H) 5.0 - 12.0 %    Eosinophil % 0.0 (L) 0.3 - 6.2 %    Basophil % 0.2 0.0 - 1.5 %    Immature Grans % 0.2 0.0 - 0.5 %    Neutrophils, Absolute 2.97 1.70 - 7.00 10*3/mm3    Lymphocytes, Absolute 0.63 (L) 0.70 - 3.10 10*3/mm3    Monocytes, Absolute 0.81  0.10 - 0.90 10*3/mm3    Eosinophils, Absolute 0.00 0.00 - 0.40 10*3/mm3    Basophils, Absolute 0.01 0.00 - 0.20 10*3/mm3    Immature Grans, Absolute 0.01 0.00 - 0.05 10*3/mm3    nRBC 0.0 0.0 - 0.2 /100 WBC         RADIOLOGY  XR Chest 1 View    Result Date: 8/1/2024  XR CHEST 1 VW-  INDICATION: Cough and vomiting  COMPARISON: Chest radiographs dating back to 1/8/2018      Patient is rotated, partially limiting evaluation. Within that limitation, no focal consolidation. No pleural effusion or pneumothorax.  Normal size cardiomediastinal silhouette. Leadless pacemaker. No focal osseous abnormality.   This report was finalized on 8/1/2024 3:35 PM by Dr. Magdaleno Pizarro M.D on Workstation: [id number]         MEDICATIONS GIVEN IN ER  Medications   sodium chloride 0.9 % flush 10 mL (10 mL Intravenous Given 8/1/24 1458)   sodium chloride 0.9 % bolus 500 mL (500 mL Intravenous New Bag 8/1/24 1456)   ondansetron (ZOFRAN) injection 4 mg (4 mg Intravenous Given 8/1/24 1458)         ORDERS PLACED DURING THIS VISIT:  Orders Placed This Encounter   Procedures    XR Chest 1 View    [facility] Draw    Comprehensive Metabolic Panel    Lipase    Urinalysis With Microscopic If Indicated (No Culture) - Urine, Clean Catch    CBC Auto Differential    NPO Diet NPO Type: Strict NPO    Insert Peripheral IV    CBC & Differential    Green Top (Gel)    Lavender Top    Gold Top - SST    Light Blue Top         OUTPATIENT MEDICATION MANAGEMENT:  Current Facility-Administered Medications Ordered in Epic   Medication Dose Route Frequency Provider Last Rate Last Admin    sodium chloride 0.9 % flush 10 mL  10 mL Intravenous PRN Rehan Gill MD   10 mL at 08/01/24 1458     Current Outpatient Medications Ordered in Epic   Medication Sig Dispense Refill    benzonatate (TESSALON) 200 MG capsule Take 1 capsule by mouth 3 (Three) Times a Day As Needed for Cough. 20 capsule 0    carvedilol (COREG) 3.125 MG tablet Take 1 tablet by mouth Daily. 30 tablet 0     ferrous sulfate 325 (65 FE) MG tablet Take 1 tablet by mouth Daily With Breakfast.      KLOR-CON 20 MEQ CR tablet Take 1 tablet by mouth Daily.      Magnesium 250 MG tablet Take  by mouth.      multivitamin with minerals tablet tablet Take 1 tablet by mouth Daily.      olmesartan-hydrochlorothiazide (BENICAR HCT) 40-25 MG per tablet Take 1 tablet by mouth Daily.      ondansetron ODT (ZOFRAN-ODT) 4 MG disintegrating tablet Place 1 tablet on the tongue Every 8 (Eight) Hours As Needed for Nausea. 10 tablet 0    polyethyl glycol-propyl glycol (SYSTANE) 0.4-0.3 % solution ophthalmic solution (artificial tears) Administer 1 drop to both eyes Every 1 (One) Hour As Needed.      vitamin D3 125 MCG (5000 UT) capsule capsule Take 1 capsule by mouth Daily.           PROCEDURES  Procedures            PROGRESS, DATA ANALYSIS, CONSULTS, AND MEDICAL DECISION MAKING  All labs have been independently interpreted by me.  All radiology studies have been reviewed by me. All EKG's have been independently viewed and interpreted by me.  Discussion below represents my analysis of pertinent findings related to patient's condition, differential diagnosis, treatment plan and final disposition.    Differential diagnosis includes but is not limited to gastroenteritis, bowel obstruction, dehydration, electrolyte disturbance, pneumonia.      ED Course as of 08/01/24 1612   Thu Aug 01, 2024   1455 Will go ahead and treat with some IV fluids and IV antiemetics and reassess after we get some of her labs and x-ray back. [DB]   1527 CBC reviewed shows normal white count hemoglobin which would generally go against serious bacterial infection. [DB]   1604 Blood work reviewed is fairly unremarkable without significant dehydration or signs of infection. [DB]   1604 I went back to reassess patient and she is feeling much better after fluids and antiemetics.    Repeat exam is reassuring without significant tenderness.  Vitals are benign and blood work is  reassuring.    We discussed doing a CT scan of her abdomen given her age and history of bowel obstruction but exam and history is really not consistent with obstruction.  Patient declined CT scan and I think that this is not unreasonable.    She does report some soreness from her coughing and would like some cough medication as well as nausea medicine to take for home. [DB]      ED Course User Index  [DB] Rehan Gill MD             AS OF 16:12 EDT VITALS:    BP - 117/66  HR - 65  TEMP - 98.2 °F (36.8 °C) (Tympanic)  O2 SATS - 96%    COMPLEXITY OF CARE  72-year-old female presents with nausea vomiting diarrhea ongoing over several days.  She is also had some cough occasionally productive of clear sputum.    Please see ED course above for my independent evaluation interpretation of ED testing and repeat evaluations.  Labs as discussed above are fairly reassuring.  Patient feeling better after IV fluids and IV antiemetics.    We discussed and considered CT scan of the abdomen but patient declined which I think is not unreasonable.  Will treat supportively with antiemetics and some cough medication.      DIAGNOSIS  Final diagnoses:   Gastroenteritis   Acute cough         DISPOSITION  ED Disposition       ED Disposition   Discharge    Condition   Stable    Comment   --                Please note that portions of this document were completed with a voice recognition program.    Note Disclaimer: At [facility], we believe that sharing information builds trust and better relationships. You are receiving this note because you recently visited [facility]. It is possible you will see health information before a provider has talked with you about it. This kind of information can be easy to misunderstand. To help you fully understand what it means for your health, we urge you to discuss this note with your provider.         Rehan Gill MD  08/01/24 0832

## 2024-08-21 ENCOUNTER — OFFICE VISIT (OUTPATIENT)
Dept: ORTHOPEDIC SURGERY | Facility: CLINIC | Age: 74
End: 2024-08-21
Payer: MEDICARE

## 2024-08-21 VITALS — HEIGHT: 66 IN | BODY MASS INDEX: 32.3 KG/M2 | TEMPERATURE: 98 F | WEIGHT: 201 LBS

## 2024-08-21 DIAGNOSIS — M17.11 PRIMARY OSTEOARTHRITIS OF RIGHT KNEE: ICD-10-CM

## 2024-08-21 DIAGNOSIS — R52 PAIN: Primary | ICD-10-CM

## 2024-08-21 PROCEDURE — 1160F RVW MEDS BY RX/DR IN RCRD: CPT | Performed by: NURSE PRACTITIONER

## 2024-08-21 PROCEDURE — 73562 X-RAY EXAM OF KNEE 3: CPT | Performed by: NURSE PRACTITIONER

## 2024-08-21 PROCEDURE — 20610 DRAIN/INJ JOINT/BURSA W/O US: CPT | Performed by: NURSE PRACTITIONER

## 2024-08-21 PROCEDURE — 1159F MED LIST DOCD IN RCRD: CPT | Performed by: NURSE PRACTITIONER

## 2024-08-21 RX ORDER — METHYLPREDNISOLONE ACETATE 80 MG/ML
1 INJECTION, SUSPENSION INTRA-ARTICULAR; INTRALESIONAL; INTRAMUSCULAR; SOFT TISSUE
Status: COMPLETED | OUTPATIENT
Start: 2024-08-21 | End: 2024-08-21

## 2024-08-21 RX ORDER — LIDOCAINE HYDROCHLORIDE 10 MG/ML
2 INJECTION, SOLUTION EPIDURAL; INFILTRATION; INTRACAUDAL; PERINEURAL
Status: COMPLETED | OUTPATIENT
Start: 2024-08-21 | End: 2024-08-21

## 2024-08-21 RX ADMIN — METHYLPREDNISOLONE ACETATE 1 ML: 80 INJECTION, SUSPENSION INTRA-ARTICULAR; INTRALESIONAL; INTRAMUSCULAR; SOFT TISSUE at 14:33

## 2024-08-21 RX ADMIN — LIDOCAINE HYDROCHLORIDE 2 ML: 10 INJECTION, SOLUTION EPIDURAL; INFILTRATION; INTRACAUDAL; PERINEURAL at 14:33

## 2024-08-21 NOTE — PROGRESS NOTES
Patient: Sol Rojas  YOB: 1950  Date of Service: 8/21/2024    Chief Complaints:   Chief Complaint   Patient presents with    Right Knee - Pain, Follow-up       Subjective: Here for right knee pain and desires conservative treatment.     History of Present Illness: Pt is seen in the office today with complaints of   Chief Complaint   Patient presents with    Right Knee - Pain, Follow-up   .  KNEE: TIMING:  The pain is described as ACUTE on CHRONIC.  LOCATION: medial joint line tenderness. AGGRAVATING FACTORS:  Is worsened by prolonged standing, sitting, walking and squatting activities.  ASSOCIATED SYMPTOMS:  swelling, tenderness, and aching.    This problem is not new to this examiner.     Allergies:   Allergies   Allergen Reactions    Augmentin [Amoxicillin-Pot Clavulanate] GI Intolerance    Hydrocodone Nausea Only and GI Intolerance    Latex Hives    Penicillins Itching, Nausea Only and GI Intolerance    Percocet [Oxycodone-Acetaminophen] GI Intolerance       Medications:   Home Medications:  Current Outpatient Medications on File Prior to Visit   Medication Sig    carvedilol (COREG) 3.125 MG tablet Take 1 tablet by mouth Daily.    ferrous sulfate 325 (65 FE) MG tablet Take 1 tablet by mouth Daily With Breakfast.    KLOR-CON 20 MEQ CR tablet Take 1 tablet by mouth Daily.    Magnesium 250 MG tablet Take  by mouth.    multivitamin with minerals tablet tablet Take 1 tablet by mouth Daily.    olmesartan-hydrochlorothiazide (BENICAR HCT) 40-25 MG per tablet Take 1 tablet by mouth Daily.    polyethyl glycol-propyl glycol (SYSTANE) 0.4-0.3 % solution ophthalmic solution (artificial tears) Administer 1 drop to both eyes Every 1 (One) Hour As Needed.    vitamin D3 125 MCG (5000 UT) capsule capsule Take 1 capsule by mouth Daily.    benzonatate (TESSALON) 200 MG capsule Take 1 capsule by mouth 3 (Three) Times a Day As Needed for Cough.    ondansetron ODT (ZOFRAN-ODT) 4 MG disintegrating tablet Place 1  "tablet on the tongue Every 8 (Eight) Hours As Needed for Nausea.     No current facility-administered medications on file prior to visit.     Current Medications:  Scheduled Meds:  Continuous Infusions:No current facility-administered medications for this visit.    PRN Meds:.    I have reviewed the patient's medical history in detail and updated the computerized patient record.  Review and summarization of old records include:    Past Medical History:   Diagnosis Date    Ankle sprain     Left ankle    Arthritis     OSTEO    Bilateral knee pain     Chest pain     SAW A CARDIOLOGIST--PER PT, EVERYTHING WAS \"OKAY.\"    Disease of thyroid gland     POLYP -biopsy    Dry eye syndrome of both eyes     Frozen shoulder 3/11/24    Hip arthrosis     Hypertension     Hypertension     Knee swelling     Left hip pain     Left knee pain     Obesity     Pulmonary hypertension         Past Surgical History:   Procedure Laterality Date    CARDIAC ELECTROPHYSIOLOGY PROCEDURE N/A 6/7/2024    Procedure: Loop insertion BIO;  Surgeon: Deepak Myers MD;  Location: [facility] CATH INVASIVE LOCATION;  Service: Cardiovascular;  Laterality: N/A;    CATARACT EXTRACTION Bilateral     COLONOSCOPY N/A 04/13/2021    Procedure: COLONOSCOPY TO CECUM AND INTO TI;  Surgeon: Louie Smith MD;  Location: [facility] ENDOSCOPY;  Service: Gastroenterology;  Laterality: N/A;  pre: history of polyps  post: diverticulosis    COLONOSCOPY W/ BIOPSIES AND POLYPECTOMY      BENIGN    HERNIA REPAIR      UMBILICAL HERNIA REPAIR    LEG SURGERY Left     cellulitis    TOTAL HIP ARTHROPLASTY Left 01/15/2019    Procedure: LEFT TOTAL HIP ARTHROPLASTY;  Surgeon: Hieu Orosco MD;  Location: [facility] MAIN OR;  Service: Orthopedics    TOTAL KNEE ARTHROPLASTY Left 07/19/2022    Procedure: LEFT TOTAL KNEE ARTHROPLASTY WITH SANTIAGO NAVIGATION;  Surgeon: Guilherme Smith MD;  Location: [facility] OR OSC;  Service: Orthopedics;  Laterality: Left;    US GUIDED FINE NEEDLE ASPIRATION  " 04/15/2021    thyroid        Social History     Occupational History    Not on file   Tobacco Use    Smoking status: Never     Passive exposure: Never    Smokeless tobacco: Never   Vaping Use    Vaping status: Never Used   Substance and Sexual Activity    Alcohol use: No    Drug use: Never    Sexual activity: Not Currently     Partners: Male     Birth control/protection: Abstinence      Social History     Social History Narrative    Not on file        Family History   Problem Relation Age of Onset    Stroke Mother     Cancer Father     Malyoav Hyperthermia Neg Hx        ROS: 14 point review of systems was performed and was negative except for documented findings in HPI and today's encounter.     Allergies:   Allergies   Allergen Reactions    Augmentin [Amoxicillin-Pot Clavulanate] GI Intolerance    Hydrocodone Nausea Only and GI Intolerance    Latex Hives    Penicillins Itching, Nausea Only and GI Intolerance    Percocet [Oxycodone-Acetaminophen] GI Intolerance     Constitutional:  Denies fever, shaking or chills   Eyes:  Denies change in visual acuity   HENT:  Denies nasal congestion or sore throat   Respiratory:  Denies cough or shortness of breath   Cardiovascular:  Denies chest pain or severe LE edema   GI:  Denies abdominal pain, nausea, vomiting, bloody stools or diarrhea   Musculoskeletal:  Numbness, tingling, or loss of motor function only as noted above in history of present illness.  : Denies painful urination or hematuria  Integument:  Denies rash, lesion or ulceration   Neurologic:  Denies headache or focal weakness  Endocrine:  Denies lymphadenopathy  Psych:  Denies confusion or change in mental status   Hem:  Denies active bleeding      Physical Exam: 74 y.o. female  Wt Readings from Last 3 Encounters:   08/21/24 91.2 kg (201 lb)   08/01/24 92.4 kg (203 lb 11.3 oz)   07/15/24 92.2 kg (203 lb 3.2 oz)         Body mass index is 32.44 kg/m².  Facility age limit for growth %ernestine is 20 years.  Vitals:     08/21/24 1426   Temp: 98 °F (36.7 °C)     Vital signs reviewed.   General Appearance:    Alert, cooperative, in no acute distress                  Eyes: conjunctiva clear  ENT: external ears and nose atraumatic  CV: no peripheral edema  Resp: normal respiratory effort  Skin: no rashes or wounds; normal turgor  Psych: mood and affect appropriate  Lymph: no nodes appreciated  Neuro: gross sensation intact  Vascular:  Palpable peripheral pulse in noted extremity  Musculoskeletal Extremities: KNEE Exam: medial and lateral joint line tenderness with crepitation, synovitis, swelling, and joint effusion right knee.      Diagnostic Data:  Imaging done today and discussed with the patient:    Indication: pain related symptoms,  Views: 3V AP, LAT & 40 degree PA right knee(s)   Findings: advanced arthritis  Comparison views: viewed last xray done in the office.      Procedure:  Large Joint Arthrocentesis: R knee  Date/Time: 8/21/2024 2:33 PM  Consent given by: patient  Site marked: site marked  Timeout: Immediately prior to procedure a time out was called to verify the correct patient, procedure, equipment, support staff and site/side marked as required   Supporting Documentation  Indications: pain   Procedure Details  Location: knee - R knee  Preparation: Patient was prepped and draped in the usual sterile fashion  Needle gauge: 21G.  Medications administered: 1 mL methylPREDNISolone acetate 80 MG/ML; 2 mL lidocaine PF 1% 1 %  Patient tolerance: patient tolerated the procedure well with no immediate complications        Assessment:     ICD-10-CM ICD-9-CM   1. Pain  R52 780.96   2. Primary osteoarthritis of right knee  M17.11 715.16       Plan:   Follow up as indicated.  Ice, elevate, and rest as needed.  The diagnosis(es), natural history, pathophysiology and treatment for diagnosis(es) were discussed. Opportunity given and questions answered.  Biomechanics of pertinent body areas discussed.  When appropriate, the use of  ambulatory aids discussed.  EXERCISES:  Advice on benefits of, and types of regular/moderate exercise pertaining to orthopedic diagnosis(es).  MEDICATIONS:  The risks, benefits, warnings,side effects and alternatives of medications discussed.  Inflammation/pain control; with cold, heat, elevation and/or liniments discussed as appropriate  Cortisone Injection. See procedure note.  HOME EXERCISE/PT program encouraged  MEDICAL RECORDS reviewed from other provider(s) for past and current medical history pertinent to this complaint.    8/21/2024

## 2024-09-09 PROCEDURE — 93298 REM INTERROG DEV EVAL SCRMS: CPT | Performed by: INTERNAL MEDICINE

## 2024-09-10 ENCOUNTER — OFFICE (AMBULATORY)
Age: 74
End: 2024-09-10
Payer: COMMERCIAL

## 2024-09-10 ENCOUNTER — OFFICE (AMBULATORY)
Dept: URBAN - METROPOLITAN AREA CLINIC 76 | Facility: CLINIC | Age: 74
End: 2024-09-10
Payer: COMMERCIAL

## 2024-09-10 VITALS
OXYGEN SATURATION: 97 % | HEART RATE: 61 BPM | DIASTOLIC BLOOD PRESSURE: 78 MMHG | SYSTOLIC BLOOD PRESSURE: 130 MMHG | SYSTOLIC BLOOD PRESSURE: 130 MMHG | OXYGEN SATURATION: 97 % | HEIGHT: 66 IN | OXYGEN SATURATION: 97 % | HEART RATE: 61 BPM | OXYGEN SATURATION: 97 % | SYSTOLIC BLOOD PRESSURE: 130 MMHG | SYSTOLIC BLOOD PRESSURE: 130 MMHG | HEIGHT: 66 IN | HEIGHT: 66 IN | DIASTOLIC BLOOD PRESSURE: 78 MMHG | OXYGEN SATURATION: 97 % | HEART RATE: 61 BPM | HEIGHT: 66 IN | WEIGHT: 207 LBS | HEART RATE: 61 BPM | HEART RATE: 61 BPM | SYSTOLIC BLOOD PRESSURE: 130 MMHG | HEIGHT: 66 IN | DIASTOLIC BLOOD PRESSURE: 78 MMHG | DIASTOLIC BLOOD PRESSURE: 78 MMHG | WEIGHT: 207 LBS | DIASTOLIC BLOOD PRESSURE: 78 MMHG | SYSTOLIC BLOOD PRESSURE: 130 MMHG | HEART RATE: 61 BPM | WEIGHT: 207 LBS | SYSTOLIC BLOOD PRESSURE: 130 MMHG | WEIGHT: 207 LBS | HEIGHT: 66 IN | WEIGHT: 207 LBS | HEIGHT: 66 IN | DIASTOLIC BLOOD PRESSURE: 78 MMHG | WEIGHT: 207 LBS | HEART RATE: 61 BPM | WEIGHT: 207 LBS | OXYGEN SATURATION: 97 % | OXYGEN SATURATION: 97 % | DIASTOLIC BLOOD PRESSURE: 78 MMHG

## 2024-09-10 DIAGNOSIS — K56.699 OTHER INTESTINAL OBSTRUCTION UNSPECIFIED AS TO PARTIAL VERSU: ICD-10-CM

## 2024-09-10 DIAGNOSIS — K76.89 OTHER SPECIFIED DISEASES OF LIVER: ICD-10-CM

## 2024-09-10 PROCEDURE — 99214 OFFICE O/P EST MOD 30 MIN: CPT

## 2024-10-14 ENCOUNTER — OFFICE VISIT (OUTPATIENT)
Dept: SLEEP MEDICINE | Facility: HOSPITAL | Age: 74
End: 2024-10-14
Payer: MEDICARE

## 2024-10-14 VITALS
OXYGEN SATURATION: 96 % | DIASTOLIC BLOOD PRESSURE: 80 MMHG | HEIGHT: 66 IN | HEART RATE: 66 BPM | BODY MASS INDEX: 33.56 KG/M2 | WEIGHT: 208.8 LBS | SYSTOLIC BLOOD PRESSURE: 128 MMHG

## 2024-10-14 DIAGNOSIS — G47.33 OSA (OBSTRUCTIVE SLEEP APNEA): ICD-10-CM

## 2024-10-14 DIAGNOSIS — R06.83 SNORING: Primary | ICD-10-CM

## 2024-10-14 PROCEDURE — G0463 HOSPITAL OUTPT CLINIC VISIT: HCPCS

## 2024-10-14 NOTE — PROGRESS NOTES
"CONSULT NOTE    Patient Identification:  Sol Rojas  74 y.o.  female  1950  [id number]            Requesting physician: Rosenberg Reyes, MD    Reason for Consultation: Ventricular tachycardia evaluate for underlying ELYSSA    CC:     History of Present Illness:  Very pleasant 74-year-old female referred by her cardiologist.  She has known history of syncope with loop recorder in place.  She tells me that she has had ventricular tachycardia.  Her cardiologist wants her to be evaluated for sleep apnea.  She denies any snoring or witnessed apnea.  She generally feels rested in the morning and feels well as the day progresses.  No heavy use of alcohol reported.  No naps during the daytime.  No parasomnia such as sleepwalking sleep talking or restless leg symptoms reported.  Goes to bed 8:00 gets up between 3 AM to 4 AM.  Gets about 6+ hours of sleep and generally feels okay.      Review of Systems  12 point review of system negative  [place] 3 out of 24 within normal limits  Past Medical History:  Past Medical History:   Diagnosis Date    Ankle sprain     Left ankle    Arthritis     OSTEO    Bilateral knee pain     Chest pain     SAW A CARDIOLOGIST--PER PT, EVERYTHING WAS \"OKAY.\"    Disease of thyroid gland     POLYP -biopsy    Dry eye syndrome of both eyes     Frozen shoulder 3/11/24    Hip arthrosis     Hypertension     Hypertension     Knee swelling     Left hip pain     Left knee pain     Obesity     Pulmonary hypertension        Past Surgical History:  Past Surgical History:   Procedure Laterality Date    CARDIAC ELECTROPHYSIOLOGY PROCEDURE N/A 6/7/2024    Procedure: Loop insertion BIO;  Surgeon: Deepak Myers MD;  Location: [facility] CATH INVASIVE LOCATION;  Service: Cardiovascular;  Laterality: N/A;    CATARACT EXTRACTION Bilateral     COLONOSCOPY N/A 04/13/2021    Procedure: COLONOSCOPY TO CECUM AND INTO TI;  Surgeon: Louie Smith MD;  Location: [facility] ENDOSCOPY;  Service: Gastroenterology;  " "Laterality: N/A;  pre: history of polyps  post: diverticulosis    COLONOSCOPY W/ BIOPSIES AND POLYPECTOMY      BENIGN    HERNIA REPAIR      UMBILICAL HERNIA REPAIR    LEG SURGERY Left     cellulitis    TOTAL HIP ARTHROPLASTY Left 01/15/2019    Procedure: LEFT TOTAL HIP ARTHROPLASTY;  Surgeon: Hieu Orosco MD;  Location: [facility] OR;  Service: Orthopedics    TOTAL KNEE ARTHROPLASTY Left 07/19/2022    Procedure: LEFT TOTAL KNEE ARTHROPLASTY WITH SANTIAGO NAVIGATION;  Surgeon: Guilherme Smith MD;  Location: [facility];  Service: Orthopedics;  Laterality: Left;    US GUIDED FINE NEEDLE ASPIRATION  04/15/2021    thyroid        Home Meds:  (Not in a hospital admission)      Allergies:  Allergies   Allergen Reactions    Augmentin [Amoxicillin-Pot Clavulanate] GI Intolerance    Hydrocodone Nausea Only and GI Intolerance    Latex Hives    Penicillins Itching, Nausea Only and GI Intolerance    Percocet [Oxycodone-Acetaminophen] GI Intolerance       Social History:   Social History     Socioeconomic History    Marital status:    Tobacco Use    Smoking status: Never     Passive exposure: Never    Smokeless tobacco: Never   Vaping Use    Vaping status: Never Used   Substance and Sexual Activity    Alcohol use: No    Drug use: Never    Sexual activity: Not Currently     Partners: Male     Birth control/protection: Abstinence       Family History:  Family History   Problem Relation Age of Onset    Stroke Mother     Cancer Father     Malig Hyperthermia Neg Hx        Physical Exam:  /80   Pulse 66   Ht 167.6 cm (66\")   Wt 94.7 kg (208 lb 12.8 oz)   LMP  (LMP Unknown)   SpO2 96%   BMI 33.70 kg/m²  Body mass index is 33.7 kg/m². 96% 94.7 kg (208 lb 12.8 oz)  Physical Exam  Patient is examined using the personal protective equipment as per guidelines from infection control for this particular patient as enacted.  Hand hygiene was performed before and after patient interaction.  Well-developed normal body " habitus  Eyes normal conjunctivae and pupils reactive to light  ENT Mallampati between 3 and 4 normal nasal exam  Neck midline trachea no thyromegaly  Chest no labored breathing clear  CVS regular rate and rhythm no lower extremity edema  Abdomen soft nontender no hepatosplenomegaly  CNS intact normal sensory exam  Skin no rashes no nodules  Psych oriented to time place and person normal memory  Musculoskeletal no cyanosis no clubbing normal range of motion        LABS:  Lab Results   Component Value Date    CALCIUM 9.0 08/01/2024       Lab Results   Component Value Date    CKTOTAL 66 06/07/2024    CKMB 0 04/30/2016    TROPONINT 14 (H) 06/07/2024                                 Lab Results   Component Value Date    TSH 1.170 04/30/2016     CrCl cannot be calculated (Patient's most recent lab result is older than the maximum 30 days allowed.).         Imaging: I personally visualized the images of scans/x-rays performed within last 3 days.      Assessment:  Probable snoring  Syncope  Hypertension        Recommendations:  At this time we have a female with ventricular tachycardia and syncope loop recorder with airway anatomy and some subtle symptoms to suggest possibility of sleep disordered breathing  Discussed pathophysiology consequence of untreated sleep apnea.  Patient agreeable wishing to proceed for home sleep study.  Sleep hygiene measures discussed in detail  Treatment of underlying comorbidities  Correlation between ELYSSA and current cardiac comorbidities also discussed in detail  We will follow-up and make further recommendations after reviewing the home sleep study              Lizeth Pacheco MD  10/14/2024  12:00 EDT      Much of this encounter note is an electronic transcription/translation of spoken language to printed text using Dragon Software.

## 2024-10-30 ENCOUNTER — HOSPITAL ENCOUNTER (OUTPATIENT)
Dept: SLEEP MEDICINE | Facility: HOSPITAL | Age: 74
Discharge: HOME | End: 2024-10-30
Admitting: INTERNAL MEDICINE
Payer: MEDICARE

## 2024-10-30 PROCEDURE — G0399 HOME SLEEP TEST/TYPE 3 PORTA: HCPCS

## 2024-11-07 ENCOUNTER — TELEPHONE (OUTPATIENT)
Dept: SLEEP MEDICINE | Facility: HOSPITAL | Age: 74
End: 2024-11-07
Payer: MEDICARE

## 2024-11-07 NOTE — TELEPHONE ENCOUNTER
..Spoke with patient, scheduled follow up to discuss sleep study results and treatment options on 11/25/24.

## 2024-11-10 PROCEDURE — 93298 REM INTERROG DEV EVAL SCRMS: CPT | Performed by: INTERNAL MEDICINE

## 2024-11-25 ENCOUNTER — OFFICE VISIT (OUTPATIENT)
Dept: SLEEP MEDICINE | Facility: HOSPITAL | Age: 74
End: 2024-11-25
Payer: MEDICARE

## 2024-11-25 ENCOUNTER — TELEPHONE (OUTPATIENT)
Dept: SLEEP MEDICINE | Facility: HOSPITAL | Age: 74
End: 2024-11-25
Payer: MEDICARE

## 2024-11-25 VITALS
OXYGEN SATURATION: 99 % | BODY MASS INDEX: 33.59 KG/M2 | SYSTOLIC BLOOD PRESSURE: 134 MMHG | HEIGHT: 66 IN | HEART RATE: 55 BPM | DIASTOLIC BLOOD PRESSURE: 79 MMHG | WEIGHT: 209 LBS

## 2024-11-25 DIAGNOSIS — G47.33 OSA (OBSTRUCTIVE SLEEP APNEA): Primary | ICD-10-CM

## 2024-11-25 PROCEDURE — G0463 HOSPITAL OUTPT CLINIC VISIT: HCPCS

## 2024-11-25 NOTE — PROGRESS NOTES
"      [facility]         Dr Lizeth Pacheco  [unfilled]  Patient Care Team:  Reyes, Rosenberg Acosta, MD as PCP - General (Family Medicine)    Chief Complaint:Overall AHI 5.5 events per hour consistent with mild ELYSSA  Lowest oxygen saturation 86%  AHI in supine positioning 6 events per hour  AHI on the right side 4.7 events per hour     Mean heart rate 58 bpm with deceleration to 43 bpm     Patient snored 72% of sleep time     Oxygen summary  Oxygen desaturation below 89% for 1.8 mi    Interval History: Patient here for follow-up after sleep study to discuss result and further management options.  She continues to feel somewhat tired during the daytime.  She goes to bedtime variable time gets up at a variable time and feels somewhat tired during the daytime.  No tobacco no alcohol no caffeine abuse.  [place] 1 out of 24 within normal limits.    REVIEW OF SYSTEMS:   CARDIOVASCULAR: No chest pain, chest pressure or chest discomfort. No palpitations or edema.   RESPIRATORY: No shortness of breath, cough or sputum.   GASTROINTESTINAL: No anorexia, nausea, vomiting or diarrhea. No abdominal pain or blood.   HEMATOLOGIC: No bleeding or bruising.     Ventilator/Non-Invasive Ventilation Settings (From admission, onward)      None              Vital Signs  Heart Rate:  [55] 55  BP: (134)/(79) 134/79  [unfilled]  Flowsheet Rows      Flowsheet Row First Filed Value   Admission Height 167.6 cm (66\") Documented at 11/25/2024 1433   Admission Weight 94.8 kg (209 lb) Documented at 11/25/2024 1433            Physical Exam:  Patient is examined using the personal protective equipment as per guidelines from infection control for this particular patient as enacted.  Hand hygiene was performed before and after patient interaction.   General Appearance:    Alert, cooperative, in no acute distress.  Following simple commands  ENT Mallampati between 3 and 4 no nasal congestion  Neck midline trachea, no thyromegaly   Lungs:     " Clear to auscultation, respirations regular, even and                  unlabored    Heart:    Regular rhythm and normal rate, normal S1 and S2, no            murmur, no gallop, no rub, no click   Chest Wall:    No abnormalities observed   Abdomen:     Normal bowel sounds, no masses, no organomegaly, soft        nontender, nondistended, no guarding, no rebound                tenderness   Extremities:   Moves all extremities well, no edema, no cyanosis, no             redness  CNS no focal neurological deficits normal sensory exam  Skin no rashes no nodules  Musculoskeletal no cyanosis no clubbing normal range of motion     Results Review:                                          I reviewed the patient's new clinical results.  I personally viewed and interpreted the patient's chest x-ray.        Medication Review:       No current facility-administered medications for this visit.      ASSESSMENT:   Mild ELYSSA  Syncope  Hypertension    PLAN:  Reviewed sleep study result in detail with the patient  She has mild ELYSSA with underlying history of ventricular tachycardia  I would recommend treatment with CPAP  Patient agreeable to proceed  Set up auto CPAP 5 to 15 cm with heated timidity and ramp  Sleep hygiene measures  Treatment of underlying comorbidities  Follow-up in 8 to 10 weeks      Lizeth Pacheco MD  11/25/24  14:53 EST

## 2024-12-03 ENCOUNTER — OFFICE VISIT (OUTPATIENT)
Dept: ORTHOPEDIC SURGERY | Facility: CLINIC | Age: 74
End: 2024-12-03
Payer: MEDICARE

## 2024-12-03 VITALS — TEMPERATURE: 98.4 F | BODY MASS INDEX: 33.91 KG/M2 | HEIGHT: 66 IN | WEIGHT: 211 LBS

## 2024-12-03 DIAGNOSIS — M17.11 PRIMARY OSTEOARTHRITIS OF RIGHT KNEE: Primary | ICD-10-CM

## 2024-12-03 PROCEDURE — 20610 DRAIN/INJ JOINT/BURSA W/O US: CPT | Performed by: NURSE PRACTITIONER

## 2024-12-03 RX ORDER — METHYLPREDNISOLONE ACETATE 80 MG/ML
80 INJECTION, SUSPENSION INTRA-ARTICULAR; INTRALESIONAL; INTRAMUSCULAR; SOFT TISSUE
Status: COMPLETED | OUTPATIENT
Start: 2024-12-03 | End: 2024-12-03

## 2024-12-03 RX ADMIN — METHYLPREDNISOLONE ACETATE 80 MG: 80 INJECTION, SUSPENSION INTRA-ARTICULAR; INTRALESIONAL; INTRAMUSCULAR; SOFT TISSUE at 13:17

## 2024-12-03 NOTE — PROGRESS NOTES
12/3/2024    Sol Rojas is here today for worsening knee pain. Pt has undergone injection of the knee in the past with good resolution of symptoms. Pt is requesting a repeat injection.     KNEE Injection Procedure Note:    Large Joint Arthrocentesis: R knee  Date/Time: 12/3/2024 1:17 PM  Consent given by: patient  Site marked: site marked  Timeout: Immediately prior to procedure a time out was called to verify the correct patient, procedure, equipment, support staff and site/side marked as required   Supporting Documentation  Indications: pain and joint swelling   Procedure Details  Location: knee - R knee  Preparation: Patient was prepped and draped in the usual sterile fashion  Needle gauge: 21 G.  Approach: anterolateral  Medications administered: 2 mL lidocaine (cardiac); 80 mg methylPREDNISolone acetate 80 MG/ML  Patient tolerance: patient tolerated the procedure well with no immediate complications        At the conclusion of the injection I discussed the importance of continued quad strengthening exercises on a daily basis. I will see the patient back if the symptoms should fail to improve or worsen.    Kimberlee Smith, APRN  12/3/2024

## 2025-01-21 NOTE — PROGRESS NOTES
" Subjective:        Sol Rojas is a 75 y.o. female who here for follow up    No chief complaint on file.      HPI       Sol Rojas is a 75 year old female who is here today for one year follow up for hypertension.Past medical history of include pulmonary hypertension,  sushma (not able to tolerate cpap machine), arthritis, and obesity.     5/12/24 echo: ef 61-65 %, LV diastolic function is consistent with grade 1 impaired relaxation  LAC is moderately dilated. Mild AV regurgitation present.     7/8/24: myocardial perfusion imaging indicated a small sized, mildly severe area of ischemia located in the lateral wall.     6/7/24 loop implanted         The following portions of the patient's history were reviewed and updated as appropriate: allergies, current medications, past family history, past medical history, past social history, past surgical history and problem list.    Past Medical History:   Diagnosis Date    Ankle sprain     Left ankle    Arthritis     OSTEO    Bilateral knee pain     Chest pain     SAW A CARDIOLOGIST--PER PT, EVERYTHING WAS \"OKAY.\"    Disease of thyroid gland     POLYP -biopsy    Dry eye syndrome of both eyes     Frozen shoulder 3/11/24    Hip arthrosis     Hypertension     Hypertension     Knee swelling     Left hip pain     Left knee pain     Obesity     Pulmonary hypertension          reports that she has never smoked. She has never been exposed to tobacco smoke. She has never used smokeless tobacco. She reports that she does not drink alcohol and does not use drugs.     Family History   Problem Relation Age of Onset    Stroke Mother     Cancer Father     Malig Hyperthermia Neg Hx        ROS     Review of Systems  Constitutional: No wt loss, fever, fatigue  Gastrointestinal: No nausea, abdominal pain  Behavioral/Psych: No insomnia or anxiety  Cardiovascular: some shob and no cp       Objective:           Physical Exam      ECG 12 Lead    Date/Time: 1/22/2025 10:59 AM  Performed by: " Ayse Billingsley APRN    Authorized by: Ayse Billingsley APRN  Comparison: compared with previous ECG from 6/7/2024  Rate: normal    Clinical impression: non-specific ECG            6/7/24: loop implanted    Conclusion         Successful loop recorder implantation    7/8/24  Interpretation Summary         Impressions are consistent with an intermediate risk study.    Findings consistent with a normal ECG stress test.    Left ventricular ejection fraction is normal (Calculated EF = 60%).    Myocardial perfusion imaging indicates a small-sized, mildly severe area of ischemia located in the lateral wall.  5/12/24    Interpretation Summary         Left ventricular systolic function is normal. Left ventricular ejection fraction appears to be 61 - 65%.    Left ventricular diastolic function is consistent with (grade I) impaired relaxation.    Normal right ventricular cavity size and systolic function noted.    The left atrial cavity is moderately dilated.    Mild aortic valve regurgitation is present.    Calculated right ventricular systolic pressure from tricuspid regurgitation is 23 mmHg.    There is no evidence of pericardial effusion.        Current Outpatient Medications:     benzonatate (TESSALON) 200 MG capsule, Take 1 capsule by mouth 3 (Three) Times a Day As Needed for Cough., Disp: 20 capsule, Rfl: 0    carvedilol (COREG) 3.125 MG tablet, Take 1 tablet by mouth Daily., Disp: 30 tablet, Rfl: 0    ferrous sulfate 325 (65 FE) MG tablet, Take 1 tablet by mouth Daily With Breakfast., Disp: , Rfl:     KLOR-CON 20 MEQ CR tablet, Take 1 tablet by mouth Daily., Disp: , Rfl:     Magnesium 250 MG tablet, Take  by mouth., Disp: , Rfl:     multivitamin with minerals tablet tablet, Take 1 tablet by mouth Daily., Disp: , Rfl:     olmesartan-hydrochlorothiazide (BENICAR HCT) 40-25 MG per tablet, Take 1 tablet by mouth Daily., Disp: , Rfl:     ondansetron ODT (ZOFRAN-ODT) 4 MG disintegrating tablet, Place 1 tablet on the  tongue Every 8 (Eight) Hours As Needed for Nausea., Disp: 10 tablet, Rfl: 0    polyethyl glycol-propyl glycol (SYSTANE) 0.4-0.3 % solution ophthalmic solution (artificial tears), Administer 1 drop to both eyes Every 1 (One) Hour As Needed. (Patient not taking: Reported on 12/3/2024), Disp: , Rfl:     vitamin D3 125 MCG (5000 UT) capsule capsule, Take 1 capsule by mouth Daily., Disp: , Rfl:      Assessment:        Patient Active Problem List   Diagnosis    Pain in left shin    Hyperglycemia    H/O total hip arthroplasty, left    Preoperative clearance    Abnormal electrocardiogram (ECG) (EKG)     SOB (shortness of breath)    Abnormal cardiac function test    Primary hypertension    Arthritis of left knee    NSVT (nonsustained ventricular tachycardia)    Syncope and collapse    SBO (small bowel obstruction)    Generalized abdominal pain    Nausea & vomiting    Dehydration               Plan:   Hypertension:   controlled.     Educated patient on exercising for at least 30 minutes a day for 2 to 3 days a week. Importance of controlling hypertension and blood pressure checkup on the regular basis has been explained. Hypertension as a silent killer has been discussed. Risk reduction of the weight and regular exercises to control the hypertension has been explained.      Loop implanted 6/7/24: Device checked and increase with PVCs per day (up to 600 daily.  No A-fib or a tacky episodes.She states she has been having shortness of breath.       ELYSSA (not able to tolerate cpap machine): follows pulmonary             No diagnosis found.    There are no diagnoses linked to this encounter.    COUNSELING: more Dennis was given to patient for the following topics: diagnostic results, risk factor reductions, impressions, risks and benefits of treatment options and importance of treatment compliance .       SMOKING COUNSELING: denies     -Increase with PVC's daily on device check: Stop amlodipine and I will  increase coreg to 6.25 mg twice a day. Do blood pressure and HR checks.     -shortness of breath with PVC's: She will do a walking lexiscan and an echo.    Sincerely,   KELLY Grant  [facility]  01/22/25  11:07 EST    EMR Dragon/Transcription disclaimer: Dictated utilizing Dragon Dictation

## 2025-01-22 ENCOUNTER — CLINICAL SUPPORT NO REQUIREMENTS (OUTPATIENT)
Dept: CARDIOLOGY | Facility: CLINIC | Age: 75
End: 2025-01-22
Payer: MEDICARE

## 2025-01-22 ENCOUNTER — OFFICE VISIT (OUTPATIENT)
Dept: CARDIOLOGY | Facility: CLINIC | Age: 75
End: 2025-01-22
Payer: MEDICARE

## 2025-01-22 VITALS
HEART RATE: 60 BPM | BODY MASS INDEX: 33.27 KG/M2 | WEIGHT: 207 LBS | DIASTOLIC BLOOD PRESSURE: 82 MMHG | SYSTOLIC BLOOD PRESSURE: 127 MMHG | HEIGHT: 66 IN

## 2025-01-22 DIAGNOSIS — I10 ESSENTIAL HYPERTENSION: ICD-10-CM

## 2025-01-22 DIAGNOSIS — Z45.09 ENCOUNTER FOR INTERROGATION OF CARDIAC RECORDER: Primary | ICD-10-CM

## 2025-01-22 DIAGNOSIS — I49.3 PVC (PREMATURE VENTRICULAR CONTRACTION): ICD-10-CM

## 2025-01-22 DIAGNOSIS — R06.02 SOB (SHORTNESS OF BREATH): Primary | ICD-10-CM

## 2025-01-22 DIAGNOSIS — I10 PRIMARY HYPERTENSION: ICD-10-CM

## 2025-01-22 PROCEDURE — 99214 OFFICE O/P EST MOD 30 MIN: CPT | Performed by: NURSE PRACTITIONER

## 2025-01-22 PROCEDURE — 1159F MED LIST DOCD IN RCRD: CPT | Performed by: NURSE PRACTITIONER

## 2025-01-22 PROCEDURE — 93000 ELECTROCARDIOGRAM COMPLETE: CPT | Performed by: NURSE PRACTITIONER

## 2025-01-22 PROCEDURE — 1160F RVW MEDS BY RX/DR IN RCRD: CPT | Performed by: NURSE PRACTITIONER

## 2025-01-22 PROCEDURE — 3074F SYST BP LT 130 MM HG: CPT | Performed by: NURSE PRACTITIONER

## 2025-01-22 PROCEDURE — 3079F DIAST BP 80-89 MM HG: CPT | Performed by: NURSE PRACTITIONER

## 2025-01-22 RX ORDER — CARVEDILOL 3.12 MG/1
6.25 TABLET ORAL DAILY
Qty: 30 TABLET | Refills: 0 | Status: SHIPPED | OUTPATIENT
Start: 2025-01-22 | End: 2025-01-24 | Stop reason: SDUPTHER

## 2025-01-22 RX ORDER — AMLODIPINE BESYLATE 10 MG/1
TABLET ORAL
COMMUNITY
Start: 2025-01-20 | End: 2025-01-22 | Stop reason: ALTCHOICE

## 2025-01-22 NOTE — PATIENT INSTRUCTIONS
-stop amlodipine and I will increase coreg to 6.25 mg twice a day.    -Monitor blood pressure 1 hour and a half after taking blood pressure medications and  write the reading down along with heart rate.  Please bring these readings with you on your follow up. Call if sys <100, dizzy, or HR <  55.

## 2025-01-23 PROBLEM — Z45.09 ENCOUNTER FOR INTERROGATION OF CARDIAC RECORDER: Status: ACTIVE | Noted: 2025-01-23

## 2025-01-24 ENCOUNTER — TELEPHONE (OUTPATIENT)
Dept: CARDIOLOGY | Facility: CLINIC | Age: 75
End: 2025-01-24
Payer: MEDICARE

## 2025-01-24 RX ORDER — CARVEDILOL 6.25 MG/1
6.25 TABLET ORAL 2 TIMES DAILY WITH MEALS
Qty: 60 TABLET | Refills: 1 | Status: SHIPPED | OUTPATIENT
Start: 2025-01-24 | End: 2025-01-27

## 2025-01-24 NOTE — TELEPHONE ENCOUNTER
Caller: Sol Rojas    Relationship: Self    Best call back number: [phone]    What is the best time to reach you: ANYTIME    Who are you requesting to speak with (clinical staff, provider,  specific staff member): CLINICAL        What was the call regarding: PT WAS IN OFFICE 01/22/2025 AND WAS TOLD SHE WOULD NEED TO INCREASE HER CARVEDILOL TO 6.25 TWICE DAILY. RX IS FOR 3.25.  PLEASE SEND IN NEW PRESCRIPTION

## 2025-01-27 RX ORDER — CARVEDILOL 6.25 MG/1
6.25 TABLET ORAL 2 TIMES DAILY WITH MEALS
Qty: 180 TABLET | Refills: 3 | Status: SHIPPED | OUTPATIENT
Start: 2025-01-27

## 2025-01-28 ENCOUNTER — TELEPHONE (OUTPATIENT)
Dept: CARDIOLOGY | Facility: CLINIC | Age: 75
End: 2025-01-28
Payer: MEDICARE

## 2025-01-28 RX ORDER — OLMESARTAN MEDOXOMIL AND HYDROCHLOROTHIAZIDE 20/12.5 20; 12.5 MG/1; MG/1
1 TABLET ORAL DAILY
Qty: 90 TABLET | Refills: 3 | Status: SHIPPED | OUTPATIENT
Start: 2025-01-28 | End: 2026-01-28

## 2025-01-28 RX ORDER — OLMESARTAN MEDOXOMIL AND HYDROCHLOROTHIAZIDE 20/12.5 20; 12.5 MG/1; MG/1
1 TABLET ORAL DAILY
Qty: 30 TABLET | Refills: 1 | Status: SHIPPED | OUTPATIENT
Start: 2025-01-28 | End: 2025-01-28

## 2025-01-28 NOTE — TELEPHONE ENCOUNTER
----- Message from Mayra RAMÍREZ sent at 1/28/2025  8:32 AM EST -----  Regarding: BP MEDS  Contact: [phone]  Her bp med 98/61  hr 75bp    SHE WAS TOLD IF HER BP GOT BELOW 100 TO CALL US AND LET US KNOW. HER DOSAGE ON HER BP WAS CHANGED

## 2025-01-28 NOTE — TELEPHONE ENCOUNTER
PER KELLY MCALLISTER  HOLD CARVEDILOL THIS AM.   DECREASE BENICAR TO 20/12.5 MG   CONTINUE TO MONITOR BP/HR.  PT IS AWARE AND VERBALIZED UNDERSTANDING.  PRESCRIPTION FOR BENICAR HAS BEEN SENT TO PHARMACY.

## 2025-02-11 ENCOUNTER — HOSPITAL ENCOUNTER (OUTPATIENT)
Dept: CARDIOLOGY | Facility: HOSPITAL | Age: 75
Discharge: HOME | End: 2025-02-11
Payer: MEDICARE

## 2025-02-11 ENCOUNTER — HOSPITAL ENCOUNTER (OUTPATIENT)
Dept: CARDIOLOGY | Facility: HOSPITAL | Age: 75
Discharge: HOME | End: 2025-02-11
Admitting: NURSE PRACTITIONER
Payer: MEDICARE

## 2025-02-11 VITALS
OXYGEN SATURATION: 100 % | WEIGHT: 207 LBS | HEIGHT: 66 IN | BODY MASS INDEX: 33.27 KG/M2 | SYSTOLIC BLOOD PRESSURE: 154 MMHG | DIASTOLIC BLOOD PRESSURE: 69 MMHG | HEART RATE: 47 BPM

## 2025-02-11 VITALS
HEART RATE: 48 BPM | HEIGHT: 66 IN | WEIGHT: 207 LBS | BODY MASS INDEX: 33.27 KG/M2 | SYSTOLIC BLOOD PRESSURE: 125 MMHG | DIASTOLIC BLOOD PRESSURE: 78 MMHG

## 2025-02-11 PROCEDURE — A9500 TC99M SESTAMIBI: HCPCS | Performed by: INTERNAL MEDICINE

## 2025-02-11 PROCEDURE — 93017 CV STRESS TEST TRACING ONLY: CPT

## 2025-02-11 PROCEDURE — 93306 TTE W/DOPPLER COMPLETE: CPT

## 2025-02-11 PROCEDURE — 25010000002 REGADENOSON 0.4 MG/5ML SOLUTION: Performed by: INTERNAL MEDICINE

## 2025-02-11 PROCEDURE — 34310000005 TECHNETIUM SESTAMIBI: Performed by: INTERNAL MEDICINE

## 2025-02-11 PROCEDURE — 78452 HT MUSCLE IMAGE SPECT MULT: CPT

## 2025-02-11 RX ORDER — REGADENOSON 0.08 MG/ML
0.4 INJECTION, SOLUTION INTRAVENOUS
Status: COMPLETED | OUTPATIENT
Start: 2025-02-11 | End: 2025-02-11

## 2025-02-11 RX ADMIN — TECHNETIUM TC 99M SESTAMIBI 1 DOSE: 1 INJECTION INTRAVENOUS at 07:18

## 2025-02-11 RX ADMIN — REGADENOSON 0.4 MG: 0.08 INJECTION, SOLUTION INTRAVENOUS at 09:23

## 2025-02-11 RX ADMIN — TECHNETIUM TC 99M SESTAMIBI 1 DOSE: 1 INJECTION INTRAVENOUS at 09:23

## 2025-02-12 LAB
AORTIC ARCH: 2.7 CM
AORTIC DIMENSIONLESS INDEX: 0.9 (DI)
ASCENDING AORTA: 3.8 CM
AV MEAN PRESS GRAD SYS DOP V1V2: 4.6 MMHG
AV VMAX SYS DOP: 160.2 CM/SEC
BH CV ECHO MEAS - ACS: 2.12 CM
BH CV ECHO MEAS - AI P1/2T: 615.2 MSEC
BH CV ECHO MEAS - AO MAX PG: 10.3 MMHG
BH CV ECHO MEAS - AO ROOT DIAM: 3.4 CM
BH CV ECHO MEAS - AO V2 VTI: 37 CM
BH CV ECHO MEAS - AVA(I,D): 2.7 CM2
BH CV ECHO MEAS - EDV(CUBED): 148.6 ML
BH CV ECHO MEAS - EDV(MOD-SP2): 104 ML
BH CV ECHO MEAS - EDV(MOD-SP4): 112 ML
BH CV ECHO MEAS - EF(MOD-SP2): 67.3 %
BH CV ECHO MEAS - EF(MOD-SP4): 63.4 %
BH CV ECHO MEAS - ESV(CUBED): 31.9 ML
BH CV ECHO MEAS - ESV(MOD-SP2): 34 ML
BH CV ECHO MEAS - ESV(MOD-SP4): 41 ML
BH CV ECHO MEAS - FS: 40.1 %
BH CV ECHO MEAS - IVS/LVPW: 1.16 CM
BH CV ECHO MEAS - IVSD: 1.11 CM
BH CV ECHO MEAS - LA DIMENSION: 4.6 CM
BH CV ECHO MEAS - LAT PEAK E' VEL: 16 CM/SEC
BH CV ECHO MEAS - LV DIASTOLIC VOL/BSA (35-75): 55.2 CM2
BH CV ECHO MEAS - LV MASS(C)D: 209.7 GRAMS
BH CV ECHO MEAS - LV MAX PG: 11.3 MMHG
BH CV ECHO MEAS - LV MEAN PG: 4.1 MMHG
BH CV ECHO MEAS - LV SYSTOLIC VOL/BSA (12-30): 20.2 CM2
BH CV ECHO MEAS - LV V1 MAX: 168.3 CM/SEC
BH CV ECHO MEAS - LV V1 VTI: 34.7 CM
BH CV ECHO MEAS - LVIDD: 5.3 CM
BH CV ECHO MEAS - LVIDS: 3.2 CM
BH CV ECHO MEAS - LVOT AREA: 2.9 CM2
BH CV ECHO MEAS - LVOT DIAM: 1.93 CM
BH CV ECHO MEAS - LVPWD: 0.96 CM
BH CV ECHO MEAS - MED PEAK E' VEL: 11.3 CM/SEC
BH CV ECHO MEAS - MV A DUR: 0.15 SEC
BH CV ECHO MEAS - MV A MAX VEL: 91.7 CM/SEC
BH CV ECHO MEAS - MV DEC SLOPE: 321.6 CM/SEC2
BH CV ECHO MEAS - MV DEC TIME: 185 SEC
BH CV ECHO MEAS - MV E MAX VEL: 82.7 CM/SEC
BH CV ECHO MEAS - MV E/A: 0.9
BH CV ECHO MEAS - MV MAX PG: 4.4 MMHG
BH CV ECHO MEAS - MV MEAN PG: 1.87 MMHG
BH CV ECHO MEAS - MV P1/2T: 84.1 MSEC
BH CV ECHO MEAS - MV V2 VTI: 38.2 CM
BH CV ECHO MEAS - MVA(P1/2T): 2.6 CM2
BH CV ECHO MEAS - MVA(VTI): 2.6 CM2
BH CV ECHO MEAS - PA ACC TIME: 0.16 SEC
BH CV ECHO MEAS - PA V2 MAX: 97 CM/SEC
BH CV ECHO MEAS - PI END-D VEL: 103.7 CM/SEC
BH CV ECHO MEAS - PULM A REVS DUR: 0.12 SEC
BH CV ECHO MEAS - PULM A REVS VEL: 27.8 CM/SEC
BH CV ECHO MEAS - PULM DIAS VEL: 43.7 CM/SEC
BH CV ECHO MEAS - PULM S/D: 1.45
BH CV ECHO MEAS - PULM SYS VEL: 63.2 CM/SEC
BH CV ECHO MEAS - QP/QS: 1.85
BH CV ECHO MEAS - RAP SYSTOLE: 3 MMHG
BH CV ECHO MEAS - RV MAX PG: 2.07 MMHG
BH CV ECHO MEAS - RV V1 MAX: 72 CM/SEC
BH CV ECHO MEAS - RV V1 VTI: 20.1 CM
BH CV ECHO MEAS - RVDD: 3 CM
BH CV ECHO MEAS - RVOT DIAM: 3.4 CM
BH CV ECHO MEAS - RVSP: 29.9 MMHG
BH CV ECHO MEAS - SUP REN AO DIAM: 2.2 CM
BH CV ECHO MEAS - SV(LVOT): 101.2 ML
BH CV ECHO MEAS - SV(MOD-SP2): 70 ML
BH CV ECHO MEAS - SV(MOD-SP4): 71 ML
BH CV ECHO MEAS - SV(RVOT): 187.3 ML
BH CV ECHO MEAS - SVI(LVOT): 49.9 ML/M2
BH CV ECHO MEAS - SVI(MOD-SP2): 34.5 ML/M2
BH CV ECHO MEAS - SVI(MOD-SP4): 35 ML/M2
BH CV ECHO MEAS - TAPSE (>1.6): 3.3 CM
BH CV ECHO MEAS - TR MAX PG: 26.9 MMHG
BH CV ECHO MEAS - TR MAX VEL: 259.3 CM/SEC
BH CV ECHO MEASUREMENTS AVERAGE E/E' RATIO: 6.06
BH CV XLRA - RV BASE: 3.1 CM
BH CV XLRA - RV LENGTH: 6.4 CM
BH CV XLRA - RV MID: 2.7 CM
BH CV XLRA - TDI S': 13.5 CM/SEC
LEFT ATRIUM VOLUME INDEX: 31 ML/M2
LV EF BIPLANE MOD: 62.7 %
SINUS: 3 CM
STJ: 3.1 CM

## 2025-02-13 LAB
BH CV REST NUCLEAR ISOTOPE DOSE: 10.9 MCI
BH CV STRESS BP STAGE 1: NORMAL
BH CV STRESS COMMENTS STAGE 1: NORMAL
BH CV STRESS DOSE REGADENOSON STAGE 1: 0.4
BH CV STRESS DURATION MIN STAGE 1: 3
BH CV STRESS DURATION SEC STAGE 1: 10
BH CV STRESS GRADE STAGE 1: 0
BH CV STRESS HR STAGE 1: 108
BH CV STRESS METS STAGE 1: 1.8
BH CV STRESS NUCLEAR ISOTOPE DOSE: 32.1 MCI
BH CV STRESS PROTOCOL 1: NORMAL
BH CV STRESS RECOVERY BP: NORMAL MMHG
BH CV STRESS RECOVERY HR: 83 BPM
BH CV STRESS SPEED STAGE 1: 1
BH CV STRESS STAGE 1: 1
MAXIMAL PREDICTED HEART RATE: 147 BPM
PERCENT MAX PREDICTED HR: 73.47 %
SPECT HRT GATED+EF W RNC IV: 60 %
STRESS BASELINE BP: NORMAL MMHG
STRESS BASELINE HR: 52 BPM
STRESS O2 SAT REST: 100 %
STRESS PERCENT HR: 86 %
STRESS POST ESTIMATED WORKLOAD: 1.8 METS
STRESS POST EXERCISE DUR MIN: 3 MIN
STRESS POST EXERCISE DUR SEC: 0 SEC
STRESS POST PEAK BP: NORMAL MMHG
STRESS POST PEAK HR: 108 BPM
STRESS TARGET HR: 125 BPM

## 2025-02-17 ENCOUNTER — OFFICE VISIT (OUTPATIENT)
Dept: CARDIOLOGY | Facility: CLINIC | Age: 75
End: 2025-02-17
Payer: MEDICARE

## 2025-02-17 VITALS
DIASTOLIC BLOOD PRESSURE: 68 MMHG | SYSTOLIC BLOOD PRESSURE: 130 MMHG | BODY MASS INDEX: 33.27 KG/M2 | HEIGHT: 66 IN | HEART RATE: 54 BPM | WEIGHT: 207 LBS

## 2025-02-17 DIAGNOSIS — R06.02 SOB (SHORTNESS OF BREATH): ICD-10-CM

## 2025-02-17 DIAGNOSIS — I49.3 PVC (PREMATURE VENTRICULAR CONTRACTION): ICD-10-CM

## 2025-02-17 DIAGNOSIS — Z95.818 IMPLANTABLE LOOP RECORDER PRESENT: ICD-10-CM

## 2025-02-17 DIAGNOSIS — I10 PRIMARY HYPERTENSION: Primary | ICD-10-CM

## 2025-02-17 PROCEDURE — 3075F SYST BP GE 130 - 139MM HG: CPT | Performed by: INTERNAL MEDICINE

## 2025-02-17 PROCEDURE — 99213 OFFICE O/P EST LOW 20 MIN: CPT | Performed by: INTERNAL MEDICINE

## 2025-02-17 PROCEDURE — 3078F DIAST BP <80 MM HG: CPT | Performed by: INTERNAL MEDICINE

## 2025-02-17 NOTE — PROGRESS NOTES
"Test results   Subjective:        Sol Rojas is a 75 y.o. female who here for follow up    CC  Test results  HPI  75-year-old female here for the test results denies any chest pains or tightness in the chest     Problems Addressed this Visit          Cardiac and Vasculature    Primary hypertension - Primary       Pulmonary and Pneumonias    SOB (shortness of breath)     Other Visit Diagnoses       Implantable loop recorder present        PVC (premature ventricular contraction)              Diagnoses         Codes Comments    Primary hypertension    -  Primary ICD-10-CM: I10  ICD-9-CM: 401.9     SOB (shortness of breath)     ICD-10-CM: R06.02  ICD-9-CM: 786.05     Implantable loop recorder present     ICD-10-CM: Z95.818  ICD-9-CM: V45.09     PVC (premature ventricular contraction)     ICD-10-CM: I49.3  ICD-9-CM: 427.69           .    The following portions of the patient's history were reviewed and updated as appropriate: allergies, current medications, past family history, past medical history, past social history, past surgical history and problem list.    Past Medical History:   Diagnosis Date    Ankle sprain     Left ankle    Arthritis     OSTEO    Bilateral knee pain     Chest pain     SAW A CARDIOLOGIST--PER PT, EVERYTHING WAS \"OKAY.\"    Disease of thyroid gland     POLYP -biopsy    Dry eye syndrome of both eyes     Frozen shoulder 3/11/24    Hip arthrosis     Hypertension     Hypertension     Knee swelling     Left hip pain     Left knee pain     Obesity     Pulmonary hypertension      reports that she has never smoked. She has never been exposed to tobacco smoke. She has never used smokeless tobacco. She reports that she does not drink alcohol and does not use drugs.   Family History   Problem Relation Age of Onset    Stroke Mother     Cancer Father     Malig Hyperthermia Neg Hx        Review of Systems  Constitutional: No wt loss, fever, fatigue  Gastrointestinal: No nausea, abdominal " "pain  Behavioral/Psych: No insomnia or anxiety   Cardiovascular no chest pains or tightness in the chest  Objective:       Physical Exam  /68   Pulse 54   Ht 167.6 cm (65.98\")   Wt 93.9 kg (207 lb)   LMP  (LMP Unknown)   BMI 33.43 kg/m²   General appearance: No acute changes   Neck: Trachea midline; NECK, supple, no thyromegaly or lymphadenopathy   Lungs: Normal size and shape, normal breath sounds, equal distribution of air, no rales and rhonchi   CV: S1-S2 regular, no murmurs, no rub, no gallop   Abdomen: Soft, nontender; no masses , no abnormal abdominal sounds   Extremities: No deformity , normal color , no peripheral edema   Skin: Normal temperature, turgor and texture; no rash, ulcers          Procedures      Echocardiogram:    Results for orders placed in visit on 01/22/25    Adult Transthoracic Echo Complete W/ Cont if Necessary Per Protocol    Interpretation Summary    Left ventricular ejection fraction appears to be 61 - 65%.    Left ventricular diastolic function was normal.    Estimated right ventricular systolic pressure from tricuspid regurgitation is normal (<35 mmHg).    Interpretation Summary         Findings consistent with a normal ECG stress test.    Myocardial perfusion imaging indicates a normal myocardial perfusion study with no evidence of ischemia. Impressions are consistent with a low risk study.    Left ventricular ejection fraction is normal (Calculated EF = 60%).    Small Lateral wall ischemia seen on polar view has questionable significance      Current Outpatient Medications:     carvedilol (COREG) 6.25 MG tablet, TAKE 1 TABLET BY MOUTH TWICE DAILY WITH MEALS, Disp: 180 tablet, Rfl: 3    ferrous sulfate 325 (65 FE) MG tablet, Take 1 tablet by mouth Daily With Breakfast., Disp: , Rfl:     Ferrous Sulfate 5 MG/20ML solution, Take 1 tablet by mouth twice a day, Disp: , Rfl:     KLOR-CON 20 MEQ CR tablet, Take 1 tablet by mouth Daily., Disp: , Rfl:     Magnesium 250 MG tablet, " Take  by mouth., Disp: , Rfl:     olmesartan-hydrochlorothiazide (BENICAR HCT) 20-12.5 MG per tablet, TAKE 1 TABLET BY MOUTH DAILY, Disp: 90 tablet, Rfl: 3    polyethyl glycol-propyl glycol (SYSTANE) 0.4-0.3 % solution ophthalmic solution (artificial tears), Administer 1 drop to both eyes Every 1 (One) Hour As Needed., Disp: , Rfl:     vitamin D3 125 MCG (5000 UT) capsule capsule, Take 1 capsule by mouth Daily., Disp: , Rfl:    Assessment:                Plan:          ICD-10-CM ICD-9-CM   1. Primary hypertension  I10 401.9   2. SOB (shortness of breath)  R06.02 786.05   3. Implantable loop recorder present  Z95.818 V45.09   4. PVC (premature ventricular contraction)  I49.3 427.69     1. Primary hypertension  Blood pressure well-controlled    2. SOB (shortness of breath)  Continue current treatment    3. Implantable loop recorder present  Continue to monitor    4. PVC (premature ventricular contraction)  Asymptomatic      Specificity and sensitivity of the stress test/ cardiac workup has been explained. Pt has been explained if  Symptoms continue please go to ER, and further w/p will be required.    Also explained this does not rule out coronary artery disease or the future events, continue to emphasize on risk reductions for coronary artery disease    Pt also advised to contact PCP for other causes of symptoms    Pvcs on beta blockers   1 yr  COUNSELING:    Sol Dennis was given to patient for the following topics: diagnostic results, risk factor reductions, impressions, risks and benefits of treatment options and importance of treatment compliance .       SMOKING COUNSELING:        Dictated using Dragon dictation

## 2025-02-24 ENCOUNTER — OFFICE VISIT (OUTPATIENT)
Dept: SLEEP MEDICINE | Facility: HOSPITAL | Age: 75
End: 2025-02-24
Payer: MEDICARE

## 2025-02-24 VITALS
SYSTOLIC BLOOD PRESSURE: 138 MMHG | DIASTOLIC BLOOD PRESSURE: 77 MMHG | BODY MASS INDEX: 33.75 KG/M2 | OXYGEN SATURATION: 97 % | HEIGHT: 66 IN | WEIGHT: 210 LBS | HEART RATE: 67 BPM

## 2025-02-24 DIAGNOSIS — G47.33 OSA (OBSTRUCTIVE SLEEP APNEA): Primary | ICD-10-CM

## 2025-02-24 PROCEDURE — G0463 HOSPITAL OUTPT CLINIC VISIT: HCPCS

## 2025-02-24 NOTE — PROGRESS NOTES
"[facility] PULMONARY CARE         Dr Lizeth Pacheco  [unfilled]  Patient Care Team:  Reyes, Rosenberg Acosta, MD as PCP - General (Family Medicine)    Chief Complaint:Overall AHI 5.5 events per hour consistent with mild ELYSSA  Lowest oxygen saturation 86%  AHI in supine positioning 6 events per hour  AHI on the right side 4.7 events per hour     Mean heart rate 58 bpm with deceleration to 43 bpm     Patient snored 72% of sleep time     Oxygen summary  Oxygen desaturation below 89% for 1.8 mi    Interval History: She is here for compliance visit.  Unfortunately despite several mask fitting she remains intolerant to the CPAP.  Currently she wants to give up the CPAP.  Wants to try alternative means for treating sleep apnea.  She tells me she still has mild snoring but generally feels rested in the morning.  No tobacco no alcohol no caffeine abuse.  [place] 0 out of 24 within normal limits.    REVIEW OF SYSTEMS:   CARDIOVASCULAR: No chest pain, chest pressure or chest discomfort. No palpitations or edema.   RESPIRATORY: No shortness of breath, cough or sputum.   GASTROINTESTINAL: No anorexia, nausea, vomiting or diarrhea. No abdominal pain or blood.   HEMATOLOGIC: No bleeding or bruising.     Ventilator/Non-Invasive Ventilation Settings (From admission, onward)      None              Vital Signs  Heart Rate:  [67] 67  BP: (138)/(77) 138/77  [unfilled]  Flowsheet Rows      Flowsheet Row First Filed Value   Admission Height 167.6 cm (65.98\") Documented at 02/24/2025 1153   Admission Weight 95.3 kg (210 lb) Documented at 02/24/2025 1153                  Physical Exam:  Patient is examined using the personal protective equipment as per guidelines from infection control for this particular patient as enacted.  Hand hygiene was performed before and after patient interaction.   General Appearance:    Alert, cooperative, in no acute distress.  Following simple commands  ENT Mallampati between 3 and 4 no nasal congestion  Neck " midline trachea, no thyromegaly   Lungs:     Clear to auscultation, respirations regular, even and                  unlabored    Heart:    Regular rhythm and normal rate, normal S1 and S2, no            murmur, no gallop, no rub, no click   Chest Wall:    No abnormalities observed   Abdomen:     Normal bowel sounds, no masses, no organomegaly, soft        nontender, nondistended, no guarding, no rebound                tenderness   Extremities:   Moves all extremities well, no edema, no cyanosis, no             redness  CNS no focal neurological deficits normal sensory exam  Skin no rashes no nodules  Musculoskeletal no cyanosis no clubbing normal range of motion     Results Review:                                          I reviewed the patient's new clinical results.  I personally viewed and interpreted the patient's chest x-ray.        Medication Review:       No current facility-administered medications for this visit.      ASSESSMENT:   Mild ELYSSA  Syncope  Hypertension      PLAN:  I reviewed compliance download with the patient  Compliance remains suboptimal and poor.  Despite several discussions she does not want to use the CPAP machine anymore.  With her having mild sleep apnea alternative methods include mandibular advancement device.  Patient will discuss further with her dentist for set up of mandibular advancement device.  We also gave her number for a local dentist be used for mandibular advancement devices  I will see her back after placement of mandibular advancement device and she may need repeat sleep study on YAMIKLA Pacheco MD  02/24/25  11:58 EST

## 2025-03-03 ENCOUNTER — TELEPHONE (OUTPATIENT)
Dept: SLEEP MEDICINE | Facility: HOSPITAL | Age: 75
End: 2025-03-03
Payer: MEDICARE

## 2025-03-05 ENCOUNTER — OFFICE VISIT (OUTPATIENT)
Dept: ORTHOPEDIC SURGERY | Facility: CLINIC | Age: 75
End: 2025-03-05
Payer: MEDICARE

## 2025-03-05 VITALS — TEMPERATURE: 98.4 F | HEIGHT: 65 IN | BODY MASS INDEX: 34.82 KG/M2 | WEIGHT: 209 LBS

## 2025-03-05 DIAGNOSIS — M17.11 PRIMARY OSTEOARTHRITIS OF RIGHT KNEE: ICD-10-CM

## 2025-03-05 DIAGNOSIS — R52 PAIN: Primary | ICD-10-CM

## 2025-03-05 RX ORDER — METHYLPREDNISOLONE ACETATE 80 MG/ML
80 INJECTION, SUSPENSION INTRA-ARTICULAR; INTRALESIONAL; INTRAMUSCULAR; SOFT TISSUE
Status: COMPLETED | OUTPATIENT
Start: 2025-03-05 | End: 2025-03-05

## 2025-03-05 RX ORDER — DIPHENOXYLATE HYDROCHLORIDE AND ATROPINE SULFATE 2.5; .025 MG/1; MG/1
TABLET ORAL DAILY
COMMUNITY

## 2025-03-05 RX ADMIN — METHYLPREDNISOLONE ACETATE 80 MG: 80 INJECTION, SUSPENSION INTRA-ARTICULAR; INTRALESIONAL; INTRAMUSCULAR; SOFT TISSUE at 11:55

## 2025-03-05 NOTE — PROGRESS NOTES
3/5/2025    Sol Rojas is here today for worsening knee pain. Pt has undergone injection of the knee in the past with good resolution of symptoms. Pt is requesting a repeat injection.     KNEE Injection Procedure Note:    Large Joint Arthrocentesis: R knee  Date/Time: 3/5/2025 11:55 AM  Consent given by: patient  Site marked: site marked  Timeout: Immediately prior to procedure a time out was called to verify the correct patient, procedure, equipment, support staff and site/side marked as required   Supporting Documentation  Indications: pain and joint swelling   Procedure Details  Location: knee - R knee  Preparation: Patient was prepped and draped in the usual sterile fashion  Needle gauge: 21 G.  Approach: anterolateral  Medications administered: 2 mL lidocaine (cardiac); 80 mg methylPREDNISolone acetate 80 MG/ML  Patient tolerance: patient tolerated the procedure well with no immediate complications    Right knee 3 views done for pain with comparison views show tricompartmental osteoarthritis left knee has a well aligned total knee arthroplasty in good position  At the conclusion of the injection I discussed the importance of continued quad strengthening exercises on a daily basis. I will see the patient back if the symptoms should fail to improve or worsen.    Kimberlee Smith, APRN  3/5/2025

## 2025-03-14 PROCEDURE — 93298 REM INTERROG DEV EVAL SCRMS: CPT | Performed by: INTERNAL MEDICINE

## 2025-05-28 NOTE — PROGRESS NOTES
New Shoulder      Patient: Sol Rojas        YOB: 1950    Medical Record Number: [id number]        Chief Complaints: Left shoulder pain, right shoulder pain      History of Present Illness: This is a 75-year-old female presents complaining of both shoulders initially was just the left have seen her in the past for the left but she states both are hurting her left is greater than right been ongoing for a couple of months no 1 history injury change in activity that she canHome both bother her at night bother her with overhead activity she did hit does have a past history of capsulitis in the left but she states this feels different      Allergies:   Allergies   Allergen Reactions    Augmentin [Amoxicillin-Pot Clavulanate] GI Intolerance    Hydrocodone Nausea Only and GI Intolerance    Latex Hives    Penicillins Itching, Nausea Only and GI Intolerance    Percocet [Oxycodone-Acetaminophen] GI Intolerance       Medications:   Home Medications:  Current Outpatient Medications on File Prior to Visit   Medication Sig    carvedilol (COREG) 6.25 MG tablet TAKE 1 TABLET BY MOUTH TWICE DAILY WITH MEALS    ferrous sulfate 325 (65 FE) MG tablet Take 1 tablet by mouth Daily With Breakfast.    Ferrous Sulfate 5 MG/20ML solution Take 1 tablet by mouth twice a day    KLOR-CON 20 MEQ CR tablet Take 1 tablet by mouth Daily.    Magnesium 250 MG tablet Take  by mouth.    multivitamin (MULTI-VITAMIN DAILY PO) Take  by mouth Daily.    olmesartan-hydrochlorothiazide (BENICAR HCT) 20-12.5 MG per tablet TAKE 1 TABLET BY MOUTH DAILY    polyethyl glycol-propyl glycol (SYSTANE) 0.4-0.3 % solution ophthalmic solution (artificial tears) Administer 1 drop to both eyes Every 1 (One) Hour As Needed.    vitamin D3 125 MCG (5000 UT) capsule capsule Take 1 capsule by mouth Daily.     No current facility-administered medications on file prior to visit.     Current Medications:  Scheduled Meds:  Continuous Infusions:No current  "facility-administered medications for this visit.    PRN Meds:.    Past Medical History:   Diagnosis Date    Allergic     Ankle sprain     Left ankle    Arthritis     OSTEO    Bilateral knee pain     Chest pain     SAW A CARDIOLOGIST--PER PT, EVERYTHING WAS \"OKAY.\"    Disease of thyroid gland     POLYP -biopsy    Dry eye syndrome of both eyes     Frozen shoulder 3/11/24    Heart murmur     Don’t recall    Hip arthrosis     Hypertension     Hypertension     Knee swelling     Left hip pain     Left knee pain     Obesity     Periarthritis of shoulder     Pulmonary hypertension     Sleep apnea 2024        Past Surgical History:   Procedure Laterality Date    CARDIAC ELECTROPHYSIOLOGY PROCEDURE N/A 06/07/2024    Procedure: Loop insertion BIO;  Surgeon: Deepak Myers MD;  Location: [facility] CATH INVASIVE LOCATION;  Service: Cardiovascular;  Laterality: N/A;    CATARACT EXTRACTION Bilateral     COLONOSCOPY N/A 04/13/2021    Procedure: COLONOSCOPY TO CECUM AND INTO TI;  Surgeon: Louie Smith MD;  Location: [facility] ENDOSCOPY;  Service: Gastroenterology;  Laterality: N/A;  pre: history of polyps  post: diverticulosis    COLONOSCOPY W/ BIOPSIES AND POLYPECTOMY      BENIGN    HERNIA REPAIR      UMBILICAL HERNIA REPAIR    INSERT / REPLACE / REMOVE PACEMAKER      Implant don’t recall date    LEG SURGERY Left     cellulitis    TOTAL HIP ARTHROPLASTY Left 01/15/2019    Procedure: LEFT TOTAL HIP ARTHROPLASTY;  Surgeon: Hieu Orosco MD;  Location: [facility] MAIN OR;  Service: Orthopedics    TOTAL KNEE ARTHROPLASTY Left 07/19/2022    Procedure: LEFT TOTAL KNEE ARTHROPLASTY WITH SANTIAGO NAVIGATION;  Surgeon: Guilherme Smith MD;  Location: [facility] OR OSC;  Service: Orthopedics;  Laterality: Left;    US GUIDED FINE NEEDLE ASPIRATION  04/15/2021    thyroid        Social History     Occupational History    Not on file   Tobacco Use    Smoking status: Never     Passive exposure: Never    Smokeless tobacco: Never   Vaping Use    " "Vaping status: Never Used   Substance and Sexual Activity    Alcohol use: Never    Drug use: Never    Sexual activity: Not Currently     Partners: Male     Birth control/protection: Abstinence      Social History     Social History Narrative    Not on file        Family History   Problem Relation Age of Onset    Stroke Mother     Asthma Mother     Cancer Father     Asthma Sister     Cancer Sister         Brain    Cancer Brother         Liver    Cancer Sister         COPD    Asthma Sister     Cancer Paternal Uncle     Malig Hyperthermia Neg Hx              Review of Systems:     Review of Systems      Physical Exam: 75 y.o. female  General Appearance:    Alert, cooperative, in no acute distress                   Vitals:    06/10/25 1258   Temp: 98.2 °F (36.8 °C)   TempSrc: Temporal   Weight: 95.7 kg (211 lb)   Height: 167.6 cm (66\")   PainSc: 0-No pain   PainLoc: Shoulder      Patient is alert and read ×3 no acute distress appears her above-listed at height weight and age.  Affect is normal respiratory rate is normal unlabored. Heart rate regular rate rhythm, sclera, dentition and hearing are normal for the purpose of this exam.    Ortho Exam  Physical exam of the right shoulder reveals no overlying skin changes no lymphedema no lymphadenopathy.  Patient has active flexion 180 with mild symptoms abduction is similar external rotation is to 50 and internal rotation to the upper lumbar spine with mild symptoms.  Patient has good rotator cuff strength 4+ over 5 with isometric strength testing with pain.  Patient has a positive impingement and a positive Kang sign.  Patient has good cervical range of motion which is full and asymptomatic no radicular symptoms.  Patient has a normal elbow exam.  Good distal pulses are present  Patient has pain with overhead activity and a positive Neer sign and a positive empty can sign , a positive drop arm and a definitive painful arc     Physical exam of the left shoulder reveals no " overlying skin changes no lymphedema no lymphadenopathy.  Patient has active flexion 180 with mild symptoms abduction is similar external rotation is to 50 and internal rotation to the upper lumbar spine with mild symptoms.  Patient has good rotator cuff strength 4+ over 5 with isometric strength testing with pain.  Patient has a positive impingement and a positive Kang sign.  Patient has good cervical range of motion which is full and asymptomatic no radicular symptoms.  Patient has a normal elbow exam.  Good distal pulses are presentPatient has pain with overhead activity and a positive Neer sign and a positive empty can sign  They have a positive drop arm any definitive painful arc   Large Joint Arthrocentesis: L subacromial bursa  Date/Time: 6/10/2025 1:09 PM  Consent given by: patient  Site marked: site marked  Timeout: Immediately prior to procedure a time out was called to verify the correct patient, procedure, equipment, support staff and site/side marked as required   Supporting Documentation  Indications: pain   Procedure Details  Location: shoulder - L subacromial bursa  Preparation: Patient was prepped and draped in the usual sterile fashion  Needle gauge: 21G.  Approach: posterior  Medications administered: 80 mg methylPREDNISolone acetate 80 MG/ML; 2 mL lidocaine PF 1% 1 %  Patient tolerance: patient tolerated the procedure well with no immediate complications      Large Joint Arthrocentesis: R subacromial bursa  Date/Time: 6/10/2025 1:09 PM  Consent given by: patient  Site marked: site marked  Timeout: Immediately prior to procedure a time out was called to verify the correct patient, procedure, equipment, support staff and site/side marked as required   Supporting Documentation  Indications: pain   Procedure Details  Location: shoulder - R subacromial bursa  Preparation: Patient was prepped and draped in the usual sterile fashion  Needle gauge: 21G.  Approach: posterior  Medications administered:  80 mg methylPREDNISolone acetate 80 MG/ML; 2 mL lidocaine PF 1% 1 %  Patient tolerance: patient tolerated the procedure well with no immediate complications              Radiology:   AP, Scapular Y and Axillary Lateral of the right and left shoulder were ordered/reviewed to evauate shoulder pain.  I have compared to x-rays on the left she has some clavicular arthritis some sclerosis at the insertion of the cuff evidence of impingement especially on the left but no acute pathology no change on the left  Imaging Results (Most Recent)       Procedure Component Value Units Date/Time    XR Shoulder 2+ View Right [[id number]] Resulted: 06/10/25 1316     Updated: 06/10/25 1316    Impression:      Ordering physician's impression is located in the Encounter Note dated 06/10/25. X-ray performed in the DR room.    XR Shoulder 2+ View Left [[id number]] Resulted: 06/10/25 1246     Updated: 06/10/25 1246    Impression:      Ordering physician's impression is located in the Encounter Note dated 06/10/25. X-ray performed in the DR room.            Assessment/Plan: Bilateral shoulder pain I think it is rotator cuff in both think it is impingement she would like to inject them both which I think is reasonable she understands what to do from an exercise standpoint if for some reason she does not get better then we could pursue other means of testing the last I saw her for conservative things on the left was a year ago I do think injecting to joints are slightly higher risk but I think the benefits outweigh the risk  Cortisone Injection. See procedure note.  Cortisone Injection for DIAGNOSTIC and THERAPUTIC purposes.

## 2025-06-03 ENCOUNTER — OFFICE VISIT (OUTPATIENT)
Age: 75
End: 2025-06-03
Payer: MEDICARE

## 2025-06-03 VITALS — RESPIRATION RATE: 18 BRPM | TEMPERATURE: 97.7 F | HEIGHT: 66 IN | WEIGHT: 210 LBS | BODY MASS INDEX: 33.75 KG/M2

## 2025-06-03 DIAGNOSIS — M17.11 PRIMARY OSTEOARTHRITIS OF RIGHT KNEE: Primary | ICD-10-CM

## 2025-06-03 PROCEDURE — 1159F MED LIST DOCD IN RCRD: CPT | Performed by: STUDENT IN AN ORGANIZED HEALTH CARE EDUCATION/TRAINING PROGRAM

## 2025-06-03 PROCEDURE — 20611 DRAIN/INJ JOINT/BURSA W/US: CPT | Performed by: STUDENT IN AN ORGANIZED HEALTH CARE EDUCATION/TRAINING PROGRAM

## 2025-06-03 PROCEDURE — 1160F RVW MEDS BY RX/DR IN RCRD: CPT | Performed by: STUDENT IN AN ORGANIZED HEALTH CARE EDUCATION/TRAINING PROGRAM

## 2025-06-03 RX ORDER — METHYLPREDNISOLONE ACETATE 80 MG/ML
80 INJECTION, SUSPENSION INTRA-ARTICULAR; INTRALESIONAL; INTRAMUSCULAR; SOFT TISSUE
Status: COMPLETED | OUTPATIENT
Start: 2025-06-03 | End: 2025-06-03

## 2025-06-03 RX ORDER — LIDOCAINE HYDROCHLORIDE 10 MG/ML
2 INJECTION, SOLUTION EPIDURAL; INFILTRATION; INTRACAUDAL; PERINEURAL
Status: COMPLETED | OUTPATIENT
Start: 2025-06-03 | End: 2025-06-03

## 2025-06-03 RX ADMIN — METHYLPREDNISOLONE ACETATE 80 MG: 80 INJECTION, SUSPENSION INTRA-ARTICULAR; INTRALESIONAL; INTRAMUSCULAR; SOFT TISSUE at 13:22

## 2025-06-03 RX ADMIN — LIDOCAINE HYDROCHLORIDE 2 ML: 10 INJECTION, SOLUTION EPIDURAL; INFILTRATION; INTRACAUDAL; PERINEURAL at 13:22

## 2025-06-03 NOTE — PROGRESS NOTES
"Chief Complaint   Patient presents with    Right Knee - Follow-up     Right knee injection.        History of Present Illness  Sol is a 75 y.o. year old female patient of KELLY Cline, with previously diagnosed osteoarthritis here today for intra-articular cortisone injection. Last injection was on 3/5/25. No new injuries or complaints.    The following data was reviewed by: Zara Sanz DO on 06/03/2025:  Data reviewed : XR images from 3/5/25     Temp 97.7 °F (36.5 °C) (Temporal)   Resp 18   Ht 167.6 cm (66\")   Wt 95.3 kg (210 lb)   LMP  (LMP Unknown)   BMI 33.89 kg/m²        Physical Exam  Vital signs reviewed.   General: Well developed, well nourished; No acute distress.  Eyes: conjunctiva clear; pupils equally round and reactive  ENT: external ears and nose atraumatic; hearing normal  CV: extremity warm and well perfused  Resp: normal respiratory effort, no use of accessory muscles  Skin: normal color and pigmentation; no rashes or wounds; normal turgor  Psych: alert and oriented; mood and affect appropriate; recent and remote memory intact    - Large Joint Arthrocentesis: L knee on 6/3/2025 1:22 PM  Indications: pain  Details: 22 G needle, ultrasound-guided superolateral approach  Medications: 2 mL lidocaine PF 1% 1 %; 80 mg methylPREDNISolone acetate 80 MG/ML  Outcome: tolerated well, no immediate complications  Procedure, treatment alternatives, risks and benefits explained, specific risks discussed. Consent was given by the patient. Immediately prior to procedure a time out was called to verify the correct patient, procedure, equipment, support staff and site/side marked as required. Patient was prepped and draped in the usual sterile fashion.           Assessment and Plan  Diagnoses and all orders for this visit:    1. Primary osteoarthritis of right knee (Primary)  -     - Large Joint Arthrocentesis       The procedure was well tolerated. She has been educated on the signs and symptoms " of local reaction and infection and should call the office if she experiences any of these. She should take it easy for the next 24 to 48 hours and ice as needed. After that, she may return to normal activity. It was recommended to continue low impact activity. She may continue with previously recommended supportive measures. Will follow-up with Kimberlee as needed.   All of her questions were answered and she is agreeable with the plan.    Dictated utilizing Dragon dictation.

## 2025-06-10 ENCOUNTER — OFFICE VISIT (OUTPATIENT)
Dept: ORTHOPEDIC SURGERY | Facility: CLINIC | Age: 75
End: 2025-06-10
Payer: MEDICARE

## 2025-06-10 VITALS — BODY MASS INDEX: 33.91 KG/M2 | TEMPERATURE: 98.2 F | WEIGHT: 211 LBS | HEIGHT: 66 IN

## 2025-06-10 DIAGNOSIS — M75.100 TEAR OF ROTATOR CUFF, UNSPECIFIED LATERALITY, UNSPECIFIED TEAR EXTENT, UNSPECIFIED WHETHER TRAUMATIC: ICD-10-CM

## 2025-06-10 DIAGNOSIS — R52 PAIN: Primary | ICD-10-CM

## 2025-06-10 RX ORDER — LIDOCAINE HYDROCHLORIDE 10 MG/ML
2 INJECTION, SOLUTION EPIDURAL; INFILTRATION; INTRACAUDAL; PERINEURAL
Status: COMPLETED | OUTPATIENT
Start: 2025-06-10 | End: 2025-06-10

## 2025-06-10 RX ORDER — METHYLPREDNISOLONE ACETATE 80 MG/ML
80 INJECTION, SUSPENSION INTRA-ARTICULAR; INTRALESIONAL; INTRAMUSCULAR; SOFT TISSUE
Status: COMPLETED | OUTPATIENT
Start: 2025-06-10 | End: 2025-06-10

## 2025-06-10 RX ADMIN — METHYLPREDNISOLONE ACETATE 80 MG: 80 INJECTION, SUSPENSION INTRA-ARTICULAR; INTRALESIONAL; INTRAMUSCULAR; SOFT TISSUE at 13:09

## 2025-06-10 RX ADMIN — LIDOCAINE HYDROCHLORIDE 2 ML: 10 INJECTION, SOLUTION EPIDURAL; INFILTRATION; INTRACAUDAL; PERINEURAL at 13:09

## 2025-07-09 ENCOUNTER — CLINICAL SUPPORT NO REQUIREMENTS (OUTPATIENT)
Dept: CARDIOLOGY | Facility: CLINIC | Age: 75
End: 2025-07-09
Payer: MEDICARE

## 2025-07-10 LAB
MDC_IDC_MSMT_BATTERY_REMAINING_PERCENTAGE: 95 %
MDC_IDC_MSMT_BATTERY_STATUS: NORMAL
MDC_IDC_PG_IMPLANT_DTM: NORMAL
MDC_IDC_PG_MFG: NORMAL
MDC_IDC_PG_MODEL: NORMAL
MDC_IDC_PG_SERIAL: NORMAL
MDC_IDC_PG_TYPE: NORMAL
MDC_IDC_SESS_DTM: NORMAL
MDC_IDC_STAT_AT_BURDEN_PERCENT: 0

## 2025-07-13 LAB
MC_CV_MDC_IDC_RATE_1: 160
MC_CV_MDC_IDC_RATE_1: 3
MC_CV_MDC_IDC_RATE_1: 40
MC_CV_MDC_IDC_ZONE_ID: 1
MC_CV_MDC_IDC_ZONE_ID: 2
MC_CV_MDC_IDC_ZONE_ID: 3
MC_CV_MDC_IDC_ZONE_ID: 4
MC_CV_MDC_IDC_ZONE_ID: 5
MDC_IDC_MSMT_BATTERY_STATUS: NORMAL
MDC_IDC_PG_IMPLANT_DTM: NORMAL
MDC_IDC_PG_MFG: NORMAL
MDC_IDC_PG_MODEL: NORMAL
MDC_IDC_PG_SERIAL: NORMAL
MDC_IDC_PG_TYPE: NORMAL
MDC_IDC_SESS_DTM: NORMAL
MDC_IDC_SESS_TYPE: NORMAL
MDC_IDC_SET_ZONE_DETECTION_DETAILS: 12
MDC_IDC_SET_ZONE_STATUS: NORMAL
MDC_IDC_SET_ZONE_TYPE: NORMAL
MDC_IDC_STAT_AT_BURDEN_PERCENT: 0

## 2025-08-14 LAB
MC_CV_MDC_IDC_RATE_1: 160
MC_CV_MDC_IDC_RATE_1: 3
MC_CV_MDC_IDC_RATE_1: 40
MC_CV_MDC_IDC_ZONE_ID: 1
MC_CV_MDC_IDC_ZONE_ID: 2
MC_CV_MDC_IDC_ZONE_ID: 3
MC_CV_MDC_IDC_ZONE_ID: 4
MC_CV_MDC_IDC_ZONE_ID: 5
MDC_IDC_MSMT_BATTERY_STATUS: NORMAL
MDC_IDC_PG_IMPLANT_DTM: NORMAL
MDC_IDC_PG_MFG: NORMAL
MDC_IDC_PG_MODEL: NORMAL
MDC_IDC_PG_SERIAL: NORMAL
MDC_IDC_PG_TYPE: NORMAL
MDC_IDC_SESS_DTM: NORMAL
MDC_IDC_SESS_TYPE: NORMAL
MDC_IDC_SET_ZONE_DETECTION_DETAILS: 12
MDC_IDC_SET_ZONE_STATUS: NORMAL
MDC_IDC_SET_ZONE_TYPE: NORMAL
MDC_IDC_STAT_AT_BURDEN_PERCENT: 0

## (undated) DEVICE — SYR LUERLOK 30CC

## (undated) DEVICE — SUT VICRYL 1 CT1 27IN  JJ40G

## (undated) DEVICE — ADHS SKIN DERMABOND TOP ADVANCED

## (undated) DEVICE — 3M™ IOBAN™ 2 ANTIMICROBIAL INCISE DRAPE 6640EZ: Brand: IOBAN™ 2

## (undated) DEVICE — THE TORRENT IRRIGATION SCOPE CONNECTOR IS USED WITH THE TORRENT IRRIGATION TUBING TO PROVIDE IRRIGATION FLUIDS SUCH AS STERILE WATER DURING GASTROINTESTINAL ENDOSCOPIC PROCEDURES WHEN USED IN CONJUNCTION WITH AN IRRIGATION PUMP (OR ELECTROSURGICAL UNIT).: Brand: TORRENT

## (undated) DEVICE — SUT ETHIB 2/0 CX46D

## (undated) DEVICE — TRAP FLD MINIVAC MEGADYNE 100ML

## (undated) DEVICE — SYS CLS SKIN PREMIERPRO EXOFINFUSION 22CM

## (undated) DEVICE — APPL CHLORAPREP HI/LITE 26ML ORNG

## (undated) DEVICE — HANDPIECE SET WITH COAXIAL HIGH FLOW TIP AND SUCTION TUBE: Brand: INTERPULSE

## (undated) DEVICE — HEWSON SUTURE RETRIEVER: Brand: HEWSON SUTURE RETRIEVER

## (undated) DEVICE — OPTIFOAM GENTLE SA, POSTOP, 4X12: Brand: MEDLINE

## (undated) DEVICE — SENSR O2 OXIMAX FNGR A/ 18IN NONSTR

## (undated) DEVICE — P.F.C. DRILL BIT AND STEINMAN PIN PACKET (1 UNIT) .125IN DIA 5IN LGTH
Type: IMPLANTABLE DEVICE | Site: KNEE | Status: NON-FUNCTIONAL
Brand: P.F.C.
Removed: 2022-07-19

## (undated) DEVICE — SOL NACL 0.9PCT 100ML SGL

## (undated) DEVICE — PK HIP TOTL 40

## (undated) DEVICE — STERILE PATIENT PROTECTIVE PAD FOR IMP® KNEE POSITIONERS & COHESIVE WRAP (10 / CASE): Brand: DE MAYO KNEE POSITIONER®

## (undated) DEVICE — PK KN TOTL 40

## (undated) DEVICE — TRY SKINPREP DRYPREP

## (undated) DEVICE — ADAPT CLN BIOGUARD AIR/H2O DISP

## (undated) DEVICE — SUT TEVDEX 5 K61 30IN 79745

## (undated) DEVICE — PREP SOL POVIDONE/IODINE BT 4OZ

## (undated) DEVICE — APPL CHLORAPREP W/TINT 26ML ORNG

## (undated) DEVICE — ANTIBACTERIAL UNDYED BRAIDED (POLYGLACTIN 910), SYNTHETIC ABSORBABLE SUTURE: Brand: COATED VICRYL

## (undated) DEVICE — ENCORE® LATEX ORTHO SIZE 8, STERILE LATEX POWDER-FREE SURGICAL GLOVE: Brand: ENCORE

## (undated) DEVICE — SUT MNCRYL PLS ANTIB UD 4/0 PS2 18IN

## (undated) DEVICE — UNDERCAST PADDING: Brand: DEROYAL

## (undated) DEVICE — 3M™ IOBAN™ 2 ANTIMICROBIAL INCISE DRAPE 6650EZ: Brand: IOBAN™ 2

## (undated) DEVICE — GLV SURG PREMIERPRO ORTHO LTX PF SZ8.5 BRN

## (undated) DEVICE — TBG PENCL TELESCP MEGADYNE SMOKE EVAC 10FT

## (undated) DEVICE — DUAL CUT SAGITTAL BLADE

## (undated) DEVICE — NEEDLE, QUINCKE, 18GX3.5": Brand: MEDLINE

## (undated) DEVICE — 2108 SERIES SAGITTAL BLADE, NO OFFSET  (12.4 X 1.19 X 82.1MM)

## (undated) DEVICE — GLV SURG SENSICARE W/ALOE PF LF 9 STRL

## (undated) DEVICE — PREMIUM WET SKIN PREP TRAY: Brand: MEDLINE INDUSTRIES, INC.

## (undated) DEVICE — TUBING, SUCTION, 1/4" X 10', STRAIGHT: Brand: MEDLINE

## (undated) DEVICE — COVER,MAYO STAND,STERILE: Brand: MEDLINE

## (undated) DEVICE — BIPOLAR SEALER 23-112-1 AQM 6.0: Brand: AQUAMANTYS™

## (undated) DEVICE — BNDG ELAS ELITE V/CLOSE 6IN 5YD LF STRL

## (undated) DEVICE — MAT FLR ABSORBENT LG 4FT 10 2.5FT

## (undated) DEVICE — DRSNG WND GEL FIBR OPTICELL AG PLS W/SLV LF 4X5IN  STRL

## (undated) DEVICE — DECANTER BAG 9": Brand: MEDLINE INDUSTRIES, INC.

## (undated) DEVICE — OPTIFOAM GENTLE SA, POSTOP, 4X8: Brand: MEDLINE

## (undated) DEVICE — BIT DRL RNGLC QC 3.2X30MM

## (undated) DEVICE — DRAPE,U/ SHT,SPLIT,PLAS,STERIL: Brand: MEDLINE

## (undated) DEVICE — GLV SURG SENSICARE W/ALOE PF LF 8 STRL

## (undated) DEVICE — SOL ISO/ALC RUB 70PCT 4OZ

## (undated) DEVICE — PILLW ABD SM

## (undated) DEVICE — INSTRUMENT BATTERY

## (undated) DEVICE — LN SMPL CO2 SHTRM SD STREAM W/M LUER

## (undated) DEVICE — LOU LOOP RECORDER PACK: Brand: MEDLINE INDUSTRIES, INC.

## (undated) DEVICE — Device

## (undated) DEVICE — KT ORCA ORCAPOD DISP STRL

## (undated) DEVICE — CANN O2 ETCO2 FITS ALL CONN CO2 SMPL A/ 7IN DISP LF

## (undated) DEVICE — GLV SURG SENSICARE POLYISPRN W/ALOE PF LF 9 GRN STRL